# Patient Record
Sex: FEMALE | Race: BLACK OR AFRICAN AMERICAN | HISPANIC OR LATINO | Employment: UNEMPLOYED | URBAN - METROPOLITAN AREA
[De-identification: names, ages, dates, MRNs, and addresses within clinical notes are randomized per-mention and may not be internally consistent; named-entity substitution may affect disease eponyms.]

---

## 2017-01-12 ENCOUNTER — HOSPITAL ENCOUNTER (EMERGENCY)
Facility: HOSPITAL | Age: 27
Discharge: HOME | End: 2017-01-12
Attending: EMERGENCY MEDICINE | Admitting: EMERGENCY MEDICINE

## 2017-01-12 VITALS
OXYGEN SATURATION: 98 % | DIASTOLIC BLOOD PRESSURE: 77 MMHG | WEIGHT: 125.66 LBS | SYSTOLIC BLOOD PRESSURE: 117 MMHG | HEART RATE: 104 BPM | TEMPERATURE: 99.3 F | RESPIRATION RATE: 16 BRPM

## 2017-01-12 DIAGNOSIS — K11.20 SIALADENITIS: Primary | ICD-10-CM

## 2017-01-12 PROCEDURE — 99283 EMERGENCY DEPT VISIT LOW MDM: CPT

## 2017-06-16 ENCOUNTER — ALLSCRIPTS OFFICE VISIT (OUTPATIENT)
Dept: OTHER | Facility: OTHER | Age: 27
End: 2017-06-16

## 2017-06-19 ENCOUNTER — ALLSCRIPTS OFFICE VISIT (OUTPATIENT)
Dept: OTHER | Facility: OTHER | Age: 27
End: 2017-06-19

## 2017-06-27 ENCOUNTER — ALLSCRIPTS OFFICE VISIT (OUTPATIENT)
Dept: OTHER | Facility: OTHER | Age: 27
End: 2017-06-27

## 2017-08-04 ENCOUNTER — GENERIC CONVERSION - ENCOUNTER (OUTPATIENT)
Dept: OTHER | Facility: OTHER | Age: 27
End: 2017-08-04

## 2017-08-04 DIAGNOSIS — Z34.92 ENCOUNTER FOR SUPERVISION OF NORMAL PREGNANCY IN SECOND TRIMESTER: ICD-10-CM

## 2017-08-08 ENCOUNTER — APPOINTMENT (OUTPATIENT)
Dept: LAB | Facility: HOSPITAL | Age: 27
End: 2017-08-08

## 2017-08-08 DIAGNOSIS — Z34.92 ENCOUNTER FOR SUPERVISION OF NORMAL PREGNANCY IN SECOND TRIMESTER: ICD-10-CM

## 2017-08-08 LAB
ABO GROUP BLD: NORMAL
BACTERIA UR QL AUTO: ABNORMAL /HPF
BASOPHILS # BLD AUTO: 0.01 THOUSANDS/ΜL (ref 0–0.1)
BASOPHILS NFR BLD AUTO: 0 % (ref 0–1)
BILIRUB UR QL STRIP: NEGATIVE
BLD GP AB SCN SERPL QL: NEGATIVE
CLARITY UR: ABNORMAL
COLOR UR: YELLOW
EOSINOPHIL # BLD AUTO: 0.07 THOUSAND/ΜL (ref 0–0.61)
EOSINOPHIL NFR BLD AUTO: 1 % (ref 0–6)
ERYTHROCYTE [DISTWIDTH] IN BLOOD BY AUTOMATED COUNT: 15.9 % (ref 11.6–15.1)
GLUCOSE UR STRIP-MCNC: NEGATIVE MG/DL
HCT VFR BLD AUTO: 28.7 % (ref 34.8–46.1)
HGB BLD-MCNC: 8.8 G/DL (ref 11.5–15.4)
HGB UR QL STRIP.AUTO: NEGATIVE
KETONES UR STRIP-MCNC: NEGATIVE MG/DL
LEUKOCYTE ESTERASE UR QL STRIP: ABNORMAL
LYMPHOCYTES # BLD AUTO: 1.21 THOUSANDS/ΜL (ref 0.6–4.47)
LYMPHOCYTES NFR BLD AUTO: 13 % (ref 14–44)
MCH RBC QN AUTO: 23.1 PG (ref 26.8–34.3)
MCHC RBC AUTO-ENTMCNC: 30.7 G/DL (ref 31.4–37.4)
MCV RBC AUTO: 75 FL (ref 82–98)
MONOCYTES # BLD AUTO: 0.52 THOUSAND/ΜL (ref 0.17–1.22)
MONOCYTES NFR BLD AUTO: 6 % (ref 4–12)
NEUTROPHILS # BLD AUTO: 7.29 THOUSANDS/ΜL (ref 1.85–7.62)
NEUTS SEG NFR BLD AUTO: 80 % (ref 43–75)
NITRITE UR QL STRIP: POSITIVE
NON-SQ EPI CELLS URNS QL MICRO: ABNORMAL /HPF
NRBC BLD AUTO-RTO: 0 /100 WBCS
PH UR STRIP.AUTO: 6.5 [PH] (ref 4.5–8)
PLATELET # BLD AUTO: 247 THOUSANDS/UL (ref 149–390)
PMV BLD AUTO: 10.4 FL (ref 8.9–12.7)
PROT UR STRIP-MCNC: NEGATIVE MG/DL
RBC # BLD AUTO: 3.81 MILLION/UL (ref 3.81–5.12)
RBC #/AREA URNS AUTO: ABNORMAL /HPF
RH BLD: POSITIVE
SP GR UR STRIP.AUTO: 1.02 (ref 1–1.03)
SPECIMEN EXPIRATION DATE: NORMAL
UROBILINOGEN UR QL STRIP.AUTO: 0.2 E.U./DL
WBC # BLD AUTO: 9.1 THOUSAND/UL (ref 4.31–10.16)
WBC #/AREA URNS AUTO: ABNORMAL /HPF

## 2017-08-08 PROCEDURE — 80081 OBSTETRIC PANEL INC HIV TSTG: CPT

## 2017-08-08 PROCEDURE — 87077 CULTURE AEROBIC IDENTIFY: CPT

## 2017-08-08 PROCEDURE — 87086 URINE CULTURE/COLONY COUNT: CPT

## 2017-08-08 PROCEDURE — 36415 COLL VENOUS BLD VENIPUNCTURE: CPT

## 2017-08-08 PROCEDURE — 81001 URINALYSIS AUTO W/SCOPE: CPT

## 2017-08-08 PROCEDURE — 87186 SC STD MICRODIL/AGAR DIL: CPT

## 2017-08-09 ENCOUNTER — GENERIC CONVERSION - ENCOUNTER (OUTPATIENT)
Dept: OTHER | Facility: OTHER | Age: 27
End: 2017-08-09

## 2017-08-09 ENCOUNTER — ALLSCRIPTS OFFICE VISIT (OUTPATIENT)
Dept: PERINATAL CARE | Facility: OTHER | Age: 27
End: 2017-08-09

## 2017-08-09 LAB
HBV SURFACE AG SER QL: NORMAL
HIV 1+2 AB+HIV1 P24 AG SERPL QL IA: NORMAL
RPR SER QL: NORMAL
RUBV IGG SERPL IA-ACNC: 21.8 IU/ML

## 2017-08-09 PROCEDURE — 76811 OB US DETAILED SNGL FETUS: CPT | Performed by: OBSTETRICS & GYNECOLOGY

## 2017-08-09 PROCEDURE — 76817 TRANSVAGINAL US OBSTETRIC: CPT | Performed by: OBSTETRICS & GYNECOLOGY

## 2017-08-10 ENCOUNTER — GENERIC CONVERSION - ENCOUNTER (OUTPATIENT)
Dept: OTHER | Facility: OTHER | Age: 27
End: 2017-08-10

## 2017-08-10 LAB — BACTERIA UR CULT: NORMAL

## 2017-08-15 ENCOUNTER — GENERIC CONVERSION - ENCOUNTER (OUTPATIENT)
Dept: OTHER | Facility: OTHER | Age: 27
End: 2017-08-15

## 2017-09-14 ENCOUNTER — GENERIC CONVERSION - ENCOUNTER (OUTPATIENT)
Dept: OTHER | Facility: OTHER | Age: 27
End: 2017-09-14

## 2017-09-14 ENCOUNTER — APPOINTMENT (OUTPATIENT)
Dept: LAB | Facility: HOSPITAL | Age: 27
End: 2017-09-14

## 2017-09-14 ENCOUNTER — ALLSCRIPTS OFFICE VISIT (OUTPATIENT)
Dept: OTHER | Facility: OTHER | Age: 27
End: 2017-09-14

## 2017-09-14 DIAGNOSIS — O09.93 SUPERVISION OF HIGH RISK PREGNANCY IN THIRD TRIMESTER: ICD-10-CM

## 2017-09-14 DIAGNOSIS — O99.019 ANEMIA COMPLICATING PREGNANCY: ICD-10-CM

## 2017-09-14 DIAGNOSIS — Z13.1 ENCOUNTER FOR SCREENING FOR DIABETES MELLITUS: ICD-10-CM

## 2017-09-14 DIAGNOSIS — R82.71 BACTERIURIA: ICD-10-CM

## 2017-09-14 LAB
BASOPHILS # BLD AUTO: 0.02 THOUSANDS/ΜL (ref 0–0.1)
BASOPHILS NFR BLD AUTO: 0 % (ref 0–1)
EOSINOPHIL # BLD AUTO: 0.12 THOUSAND/ΜL (ref 0–0.61)
EOSINOPHIL NFR BLD AUTO: 1 % (ref 0–6)
ERYTHROCYTE [DISTWIDTH] IN BLOOD BY AUTOMATED COUNT: 17 % (ref 11.6–15.1)
FERRITIN SERPL-MCNC: 5 NG/ML (ref 8–388)
GLUCOSE (HISTORICAL): NORMAL
GLUCOSE 1H P 50 G GLC PO SERPL-MCNC: 124 MG/DL
HCT VFR BLD AUTO: 30.7 % (ref 34.8–46.1)
HGB BLD-MCNC: 9.4 G/DL (ref 11.5–15.4)
HGB UR QL STRIP.AUTO: NORMAL
KETONES UR STRIP-MCNC: NORMAL MG/DL
LEUKOCYTE ESTERASE UR QL STRIP: NORMAL
LYMPHOCYTES # BLD AUTO: 1.52 THOUSANDS/ΜL (ref 0.6–4.47)
LYMPHOCYTES NFR BLD AUTO: 16 % (ref 14–44)
MCH RBC QN AUTO: 23.3 PG (ref 26.8–34.3)
MCHC RBC AUTO-ENTMCNC: 30.6 G/DL (ref 31.4–37.4)
MCV RBC AUTO: 76 FL (ref 82–98)
MONOCYTES # BLD AUTO: 0.55 THOUSAND/ΜL (ref 0.17–1.22)
MONOCYTES NFR BLD AUTO: 6 % (ref 4–12)
NEUTROPHILS # BLD AUTO: 7.29 THOUSANDS/ΜL (ref 1.85–7.62)
NEUTS SEG NFR BLD AUTO: 77 % (ref 43–75)
NITRITE UR QL STRIP: NORMAL
NRBC BLD AUTO-RTO: 0 /100 WBCS
PLATELET # BLD AUTO: 206 THOUSANDS/UL (ref 149–390)
PMV BLD AUTO: 10.1 FL (ref 8.9–12.7)
PROT UR STRIP-MCNC: NORMAL MG/DL
RBC # BLD AUTO: 4.03 MILLION/UL (ref 3.81–5.12)
WBC # BLD AUTO: 9.5 THOUSAND/UL (ref 4.31–10.16)

## 2017-09-14 PROCEDURE — 86592 SYPHILIS TEST NON-TREP QUAL: CPT

## 2017-09-14 PROCEDURE — 36415 COLL VENOUS BLD VENIPUNCTURE: CPT

## 2017-09-14 PROCEDURE — 82950 GLUCOSE TEST: CPT

## 2017-09-14 PROCEDURE — G0145 SCR C/V CYTO,THINLAYER,RESCR: HCPCS | Performed by: OBSTETRICS & GYNECOLOGY

## 2017-09-14 PROCEDURE — 82728 ASSAY OF FERRITIN: CPT

## 2017-09-14 PROCEDURE — 85025 COMPLETE CBC W/AUTO DIFF WBC: CPT

## 2017-09-15 ENCOUNTER — LAB REQUISITION (OUTPATIENT)
Dept: LAB | Facility: HOSPITAL | Age: 27
End: 2017-09-15

## 2017-09-15 ENCOUNTER — GENERIC CONVERSION - ENCOUNTER (OUTPATIENT)
Dept: OTHER | Facility: OTHER | Age: 27
End: 2017-09-15

## 2017-09-15 DIAGNOSIS — Z11.3 ENCOUNTER FOR SCREENING FOR INFECTIONS WITH PREDOMINANTLY SEXUAL MODE OF TRANSMISSION: ICD-10-CM

## 2017-09-15 DIAGNOSIS — Z12.4 ENCOUNTER FOR SCREENING FOR MALIGNANT NEOPLASM OF CERVIX: ICD-10-CM

## 2017-09-15 DIAGNOSIS — O26.893 OTHER SPECIFIED PREGNANCY RELATED CONDITIONS, THIRD TRIMESTER: ICD-10-CM

## 2017-09-15 LAB
CHLAMYDIA DNA CVX QL NAA+PROBE: NORMAL
N GONORRHOEA DNA GENITAL QL NAA+PROBE: NORMAL
RPR SER QL: NORMAL

## 2017-09-15 PROCEDURE — 87147 CULTURE TYPE IMMUNOLOGIC: CPT | Performed by: OBSTETRICS & GYNECOLOGY

## 2017-09-15 PROCEDURE — 87086 URINE CULTURE/COLONY COUNT: CPT | Performed by: OBSTETRICS & GYNECOLOGY

## 2017-09-15 PROCEDURE — 87491 CHLMYD TRACH DNA AMP PROBE: CPT | Performed by: OBSTETRICS & GYNECOLOGY

## 2017-09-15 PROCEDURE — 87591 N.GONORRHOEAE DNA AMP PROB: CPT | Performed by: OBSTETRICS & GYNECOLOGY

## 2017-09-16 LAB
BACTERIA UR CULT: NORMAL
BACTERIA UR CULT: NORMAL

## 2017-09-21 LAB
LAB AP GYN PRIMARY INTERPRETATION: NORMAL
Lab: NORMAL
PATH INTERP SPEC-IMP: NORMAL

## 2017-09-25 ENCOUNTER — GENERIC CONVERSION - ENCOUNTER (OUTPATIENT)
Dept: OTHER | Facility: OTHER | Age: 27
End: 2017-09-25

## 2017-09-28 ENCOUNTER — ALLSCRIPTS OFFICE VISIT (OUTPATIENT)
Dept: OTHER | Facility: OTHER | Age: 27
End: 2017-09-28

## 2017-09-28 LAB
GLUCOSE (HISTORICAL): NORMAL
HGB UR QL STRIP.AUTO: NORMAL
NITRITE UR QL STRIP: POSITIVE
PROT UR STRIP-MCNC: NORMAL MG/DL

## 2017-10-18 ENCOUNTER — GENERIC CONVERSION - ENCOUNTER (OUTPATIENT)
Dept: OTHER | Facility: OTHER | Age: 27
End: 2017-10-18

## 2017-11-14 ENCOUNTER — HOSPITAL ENCOUNTER (OUTPATIENT)
Facility: HOSPITAL | Age: 27
Discharge: HOME | End: 2017-11-14
Attending: OBSTETRICS & GYNECOLOGY | Admitting: OBSTETRICS & GYNECOLOGY

## 2017-11-14 VITALS
TEMPERATURE: 98.2 F | OXYGEN SATURATION: 99 % | WEIGHT: 142 LBS | RESPIRATION RATE: 16 BRPM | HEIGHT: 60 IN | HEART RATE: 97 BPM | DIASTOLIC BLOOD PRESSURE: 62 MMHG | SYSTOLIC BLOOD PRESSURE: 113 MMHG | BODY MASS INDEX: 27.88 KG/M2

## 2017-11-14 DIAGNOSIS — Z3A.36 36 WEEKS GESTATION OF PREGNANCY: ICD-10-CM

## 2017-11-14 DIAGNOSIS — O26.899 ABDOMINAL PAIN AFFECTING PREGNANCY, ANTEPARTUM: ICD-10-CM

## 2017-11-14 DIAGNOSIS — R10.9 ABDOMINAL PAIN AFFECTING PREGNANCY, ANTEPARTUM: ICD-10-CM

## 2017-11-14 PROCEDURE — 99202 OFFICE O/P NEW SF 15 MIN: CPT

## 2017-11-14 PROCEDURE — 87653 STREP B DNA AMP PROBE: CPT | Performed by: OBSTETRICS & GYNECOLOGY

## 2017-11-14 RX ORDER — DIPHENOXYLATE HYDROCHLORIDE AND ATROPINE SULFATE 2.5; .025 MG/1; MG/1
1 TABLET ORAL DAILY
Status: ON HOLD | COMMUNITY
End: 2018-11-05 | Stop reason: ALTCHOICE

## 2017-11-15 ENCOUNTER — ALLSCRIPTS OFFICE VISIT (OUTPATIENT)
Dept: OTHER | Facility: OTHER | Age: 27
End: 2017-11-15

## 2017-11-15 LAB
GLUCOSE (HISTORICAL): NORMAL
PROT UR STRIP-MCNC: NORMAL MG/DL

## 2017-11-15 NOTE — PROGRESS NOTES
Progress Note - OB/GYN   Michael Escalante 32 y o  female MRN: [id number]  Unit/Bed#: L&D 323-01 Encounter: [id number]    Assessment:  27yo  @ 36wga here not in  labor  Plan:  D/C home with labor precautions  PO hydration  Tylenol PRN  F/u GBS culture  Routine office appointment tomorrow  D/W Dr Bib Madison  Subjective/Objective   Chief Complaint: contractions    Subjective:   -contractions have now resolved - were every 2minutes duration of 1minute, now unable to quantify frequency  -denies LOF, VB  -feels baby moving    Pertinent hx:  -GBS unknown - was collected  -poor prenatal care - patient states secondary to transportation & children    Objective:   -SVE fingertip/thick/high  -'sbpm reactive  -[place] quiet, mild irritability    Vitals: Blood pressure 113/62, pulse 97, temperature 98 2 °F (36 8 °C), temperature source Oral, resp  rate 16, height 5' (1 524 m), weight 64 4 kg (142 lb), last menstrual period 2017, SpO2 99 %  ,Body mass index is 27 73 kg/m²      No intake or output data in the 24 hours ending 17 2107    Invasive Devices          No matching active lines, drains, or airways

## 2017-11-16 LAB — GP B STREP DNA SPEC QL NAA+PROBE: ABNORMAL

## 2017-11-22 ENCOUNTER — ALLSCRIPTS OFFICE VISIT (OUTPATIENT)
Dept: OTHER | Facility: OTHER | Age: 27
End: 2017-11-22

## 2017-11-22 DIAGNOSIS — O26.893 OTHER SPECIFIED PREGNANCY RELATED CONDITIONS, THIRD TRIMESTER: ICD-10-CM

## 2017-11-22 DIAGNOSIS — Z34.93 ENCOUNTER FOR SUPERVISION OF NORMAL PREGNANCY IN THIRD TRIMESTER: ICD-10-CM

## 2017-11-22 DIAGNOSIS — O99.019 ANEMIA COMPLICATING PREGNANCY: ICD-10-CM

## 2017-11-22 LAB
GLUCOSE (HISTORICAL): NORMAL
LEUKOCYTE ESTERASE UR QL STRIP: NORMAL
PROT UR STRIP-MCNC: NORMAL MG/DL

## 2017-11-27 ENCOUNTER — HOSPITAL ENCOUNTER (OUTPATIENT)
Facility: HOSPITAL | Age: 27
Discharge: HOME | End: 2017-11-27
Attending: OBSTETRICS & GYNECOLOGY | Admitting: OBSTETRICS & GYNECOLOGY

## 2017-11-27 VITALS
SYSTOLIC BLOOD PRESSURE: 109 MMHG | RESPIRATION RATE: 18 BRPM | HEART RATE: 112 BPM | OXYGEN SATURATION: 99 % | TEMPERATURE: 98.1 F | DIASTOLIC BLOOD PRESSURE: 62 MMHG

## 2017-11-27 PROCEDURE — 99203 OFFICE O/P NEW LOW 30 MIN: CPT

## 2017-11-27 NOTE — PROGRESS NOTES
L&D Triage Note - OB/GYN  Daniel Cervantes 32 y o  female MRN: [id number]  Unit/Bed#: L&D 323-01 Encounter: [id number]      SUBJECTIVE:  Daniel Cervantes 32 y o   at 41w10d with an Estimated Date of Delivery: 17 with increasingly strong contractions  She has not felt this intense pressure in her prior 3 pregnancies  Came in because she was concerned  Denies any vaginal bleeding, LOF and endorses positive FM  Her current obstetrical history is significant for GBS positive status, 3 prior  deliveries        Contractions: present, q5-10min  Leakage of fluid: none  Vaginal Bleeding: none  Fetal movement: present    OBJECTIVE:  Vitals:    17 1628   BP:    Pulse:    Resp: 20   Temp: 97 8 °F (36 6 °C)   SpO2:      SVE: 1 / thick / high    FHT:  13 / Moderate 6 - 25 bpm / reactive accelerations, no decelerations  [place]: [id number], irritability and irregular    ASSESSMENT:  Daniel Cervantes is a 32 y o   at 37w6d not in labor    PLAN:  1) Rule out labor workup negative   - Patient previously fingertip/thick/high in past clinic visit   - Now 1cm/thick/high   - No regular contraction pattern observed, NST reactive, scant contractions  2) Continue routine prenatal care  3) Discharge from [facility] triage with term labor precautions   - Case discussed with Dr Celestine Zarate DO  PGY-1 OB/GYN Resident   2017 5:39 PM

## 2017-11-27 NOTE — DISCHARGE INSTRUCTIONS
Pregnancy at [address] to [address]:   You are considered full term at the beginning of 37 weeks  Your breathing may be easier if your baby has moved down into a head-down position  You may need to urinate more often because the baby may be pressing on your bladder  You may also feel more discomfort and get tired easily  DISCHARGE INSTRUCTIONS:   Seek care immediately if:   · You develop a severe headache that does not go away  · You have new or increased vision changes, such as blurred or spotted vision  · You have new or increased swelling in your face or hands  · You have vaginal spotting or bleeding  · Your water broke or you feel warm water gushing or trickling from your vagina  Contact your healthcare provider if:   · You have more than 5 contractions in 1 hour  · You notice any changes in your baby's movements  · You have abdominal cramps, pressure, or tightening  · You have a change in vaginal discharge  · You have chills or a fever  · You have vaginal itching, burning, or pain  · You have yellow, green, white, or foul-smelling vaginal discharge  · You have pain or burning when you urinate, less urine than usual, or pink or bloody urine  · You have questions or concerns about your condition or care  How to care for yourself at this stage of your pregnancy:   · Eat a variety of healthy foods  Healthy foods include fruits, vegetables, whole-grain breads, low-fat dairy foods, beans, lean meats, and fish  Drink liquids as directed  Ask how much liquid to drink each day and which liquids are best for you  Limit caffeine to less than 200 milligrams each day  Limit your intake of fish to 2 servings each week  Choose fish low in mercury such as canned light tuna, shrimp, salmon, cod, or tilapia  Do not  eat fish high in mercury such as swordfish, tilefish, will mackerel, and shark  · Take prenatal vitamins as directed    Your need for certain vitamins and minerals, such as folic acid, increases during pregnancy  Prenatal vitamins provide some of the extra vitamins and minerals you need  Prenatal vitamins may also help to decrease the risk of certain birth defects  · Rest as needed  Put your feet up if you have swelling in your ankles and feet  · Do not smoke  If you smoke, it is never too late to quit  Smoking increases your risk of a miscarriage and other health problems during your pregnancy  Smoking can cause your baby to be born too early or weigh less at birth  Ask your healthcare provider for information if you need help quitting  · Do not drink alcohol  Alcohol passes from your body to your baby through the placenta  It can affect your baby's brain development and cause fetal alcohol syndrome (FAS)  FAS is a group of conditions that causes mental, behavior, and growth problems  · Talk to your healthcare provider before you take any medicines  Many medicines may harm your baby if you take them when you are pregnant  Do not take any medicines, vitamins, herbs, or supplements without first talking to your healthcare provider  Never use illegal or street drugs (such as marijuana or cocaine) while you are pregnant  · Talk to your healthcare provider before you travel  You may not be able to travel in an airplane after 36 weeks  He may also recommend that you avoid long road trips  Safety tips:   · Avoid hot tubs and saunas  Do not use a hot tub or sauna while you are pregnant, especially during your first trimester  Hot tubs and saunas may raise your baby's temperature and increase the risk of birth defects  · Avoid toxoplasmosis  This is an infection caused by eating raw meat or being around infected cat feces  It can cause birth defects, miscarriages, and other problems  Wash your hands after you touch raw meat  Make sure any meat is well-cooked before you eat it  Avoid raw eggs and unpasteurized milk   Use gloves or ask someone else to clean your cat's litter box while you are pregnant  · Ask your healthcare provider about travel  The most comfortable time to travel is during the second trimester  Ask your healthcare provider if you can travel after 36 weeks  You may not be able to travel in an airplane after 36 weeks  He may also recommend that you avoid long road trips  Changes that are happening with your baby:  By 38 weeks, your baby may weigh between 6 and 9 pounds  Your baby may be about 14 inches long from the top of the head to the rump (baby's bottom)  Your baby hears well enough to know your voice  As your baby gets larger, you may feel fewer kicks and more stretching and rolling  Your baby may move into a head-down position  Your baby will also rest lower in your abdomen  What you need to know about prenatal care: Your healthcare provider will check your blood pressure and weight  You may also need the following:  · A urine test  may also be done to check for sugar and protein  These can be signs of gestational diabetes or infection  Protein in your urine may also be a sign of preeclampsia  Preeclampsia is a condition that can develop during week 20 or later of your pregnancy  It causes high blood pressure, and it can cause problems with your kidneys and other organs  · A blood test  may be done to check for anemia (low iron level)  · A Tdap vaccine  may be recommended by your healthcare provider  · A group B strep test  is a test that is done to check for group B strep infection  Group B strep is a type of bacteria that may be found in the vagina or rectum  It can be passed to your baby during delivery if you have it  Your healthcare provider will take swab your vagina or rectum and send the sample to the lab for tests  · Fundal height  is a measurement of your uterus to check your baby's growth  This number is usually the same as the number of weeks that you have been pregnant   Your healthcare provider may also check your baby's position  · Your baby's heart rate  will be checked  © 2017 [address] [employer] Information is for End User's use only and may not be sold, redistributed or otherwise used for commercial purposes  All illustrations and images included in CareNotes® are the copyrighted property of A D A M [employer] Inc  or Edgar Castro  The above information is an  only  It is not intended as medical advice for individual conditions or treatments  Talk to your doctor, nurse or pharmacist before following any medical regimen to see if it is safe and effective for you

## 2017-11-29 ENCOUNTER — GENERIC CONVERSION - ENCOUNTER (OUTPATIENT)
Dept: OTHER | Facility: OTHER | Age: 27
End: 2017-11-29

## 2017-11-29 ENCOUNTER — APPOINTMENT (OUTPATIENT)
Dept: LAB | Facility: HOSPITAL | Age: 27
End: 2017-11-29

## 2017-11-29 DIAGNOSIS — Z34.93 ENCOUNTER FOR SUPERVISION OF NORMAL PREGNANCY IN THIRD TRIMESTER: ICD-10-CM

## 2017-11-29 DIAGNOSIS — O99.019 ANEMIA COMPLICATING PREGNANCY: ICD-10-CM

## 2017-11-29 LAB
ANISOCYTOSIS BLD QL SMEAR: PRESENT
BASOPHILS # BLD MANUAL: 0 THOUSAND/UL (ref 0–0.1)
BASOPHILS NFR MAR MANUAL: 0 % (ref 0–1)
CLARITY UR: NORMAL
COLOR UR: YELLOW
EOSINOPHIL # BLD MANUAL: 0 THOUSAND/UL (ref 0–0.4)
EOSINOPHIL NFR BLD MANUAL: 0 % (ref 0–6)
ERYTHROCYTE [DISTWIDTH] IN BLOOD BY AUTOMATED COUNT: 16.9 % (ref 11.6–15.1)
GLUCOSE (HISTORICAL): NORMAL
HCT VFR BLD AUTO: 29 % (ref 34.8–46.1)
HGB BLD-MCNC: 8.6 G/DL (ref 11.5–15.4)
HGB UR QL STRIP.AUTO: NORMAL
KETONES UR STRIP-MCNC: NORMAL MG/DL
LEUKOCYTE ESTERASE UR QL STRIP: NORMAL
LYMPHOCYTES # BLD AUTO: 0.19 THOUSAND/UL (ref 0.6–4.47)
LYMPHOCYTES # BLD AUTO: 2 % (ref 14–44)
MCH RBC QN AUTO: 21.8 PG (ref 26.8–34.3)
MCHC RBC AUTO-ENTMCNC: 29.7 G/DL (ref 31.4–37.4)
MCV RBC AUTO: 74 FL (ref 82–98)
MONOCYTES # BLD AUTO: 0.58 THOUSAND/UL (ref 0–1.22)
MONOCYTES NFR BLD: 6 % (ref 4–12)
NEUTROPHILS # BLD MANUAL: 8.94 THOUSAND/UL (ref 1.85–7.62)
NEUTS BAND NFR BLD MANUAL: 31 % (ref 0–8)
NEUTS SEG NFR BLD AUTO: 61 % (ref 43–75)
NITRITE UR QL STRIP: NORMAL
PLATELET # BLD AUTO: 195 THOUSANDS/UL (ref 149–390)
PLATELET BLD QL SMEAR: ADEQUATE
PMV BLD AUTO: 11 FL (ref 8.9–12.7)
POLYCHROMASIA BLD QL SMEAR: PRESENT
PROT UR STRIP-MCNC: NORMAL MG/DL
RBC # BLD AUTO: 3.94 MILLION/UL (ref 3.81–5.12)
TOTAL CELLS COUNTED SPEC: 100
WBC # BLD AUTO: 9.72 THOUSAND/UL (ref 4.31–10.16)

## 2017-11-29 PROCEDURE — 86592 SYPHILIS TEST NON-TREP QUAL: CPT

## 2017-11-29 PROCEDURE — 85027 COMPLETE CBC AUTOMATED: CPT

## 2017-11-29 PROCEDURE — 36415 COLL VENOUS BLD VENIPUNCTURE: CPT

## 2017-11-29 PROCEDURE — 85007 BL SMEAR W/DIFF WBC COUNT: CPT

## 2017-11-30 ENCOUNTER — APPOINTMENT (OUTPATIENT)
Dept: LAB | Facility: HOSPITAL | Age: 27
End: 2017-11-30

## 2017-11-30 DIAGNOSIS — O26.893 OTHER SPECIFIED PREGNANCY RELATED CONDITIONS, THIRD TRIMESTER: ICD-10-CM

## 2017-11-30 LAB — RPR SER QL: NORMAL

## 2017-11-30 PROCEDURE — 87186 SC STD MICRODIL/AGAR DIL: CPT

## 2017-11-30 PROCEDURE — 87086 URINE CULTURE/COLONY COUNT: CPT

## 2017-11-30 PROCEDURE — 87077 CULTURE AEROBIC IDENTIFY: CPT

## 2017-11-30 PROCEDURE — 87147 CULTURE TYPE IMMUNOLOGIC: CPT

## 2017-12-02 LAB
BACTERIA UR CULT: ABNORMAL
BACTERIA UR CULT: ABNORMAL

## 2017-12-05 ENCOUNTER — GENERIC CONVERSION - ENCOUNTER (OUTPATIENT)
Dept: OTHER | Facility: OTHER | Age: 27
End: 2017-12-05

## 2017-12-06 ENCOUNTER — ALLSCRIPTS OFFICE VISIT (OUTPATIENT)
Dept: OTHER | Facility: OTHER | Age: 27
End: 2017-12-06

## 2017-12-06 LAB
BILIRUB UR QL STRIP: NEGATIVE
CLARITY UR: NORMAL
COLOR UR: YELLOW
GLUCOSE (HISTORICAL): NORMAL
HGB UR QL STRIP.AUTO: NORMAL
KETONES UR STRIP-MCNC: NORMAL MG/DL
LEUKOCYTE ESTERASE UR QL STRIP: NORMAL
NITRITE UR QL STRIP: POSITIVE
PH UR STRIP.AUTO: 7 [PH]
PROT UR STRIP-MCNC: NORMAL MG/DL
SP GR UR STRIP.AUTO: 1.01
UROBILINOGEN UR QL STRIP.AUTO: 0.2

## 2017-12-11 ENCOUNTER — HOSPITAL ENCOUNTER (INPATIENT)
Facility: HOSPITAL | Age: 27
LOS: 2 days | Discharge: HOME | End: 2017-12-13
Attending: OBSTETRICS & GYNECOLOGY | Admitting: OBSTETRICS & GYNECOLOGY

## 2017-12-11 DIAGNOSIS — Z3A.39 39 WEEKS GESTATION OF PREGNANCY: Primary | ICD-10-CM

## 2017-12-11 PROBLEM — R82.71 BACTERIURIA: Status: ACTIVE | Noted: 2017-09-22

## 2017-12-11 PROBLEM — O26.893 DYSURIA DURING PREGNANCY IN THIRD TRIMESTER: Status: ACTIVE | Noted: 2017-09-14

## 2017-12-11 PROBLEM — R30.0 DYSURIA DURING PREGNANCY IN THIRD TRIMESTER: Status: ACTIVE | Noted: 2017-09-14

## 2017-12-11 PROBLEM — O99.019 ANEMIA DURING PREGNANCY: Status: ACTIVE | Noted: 2017-09-14

## 2017-12-11 LAB
ABO GROUP BLD: NORMAL
BASE EXCESS BLDCOA CALC-SCNC: -7.7 MMOL/L (ref 3–11)
BASE EXCESS BLDCOV CALC-SCNC: -4.5 MMOL/L (ref 1–9)
BASOPHILS # BLD AUTO: 0.01 THOUSANDS/ΜL (ref 0–0.1)
BASOPHILS NFR BLD AUTO: 0 % (ref 0–1)
BLD GP AB SCN SERPL QL: NEGATIVE
EOSINOPHIL # BLD AUTO: 0.04 THOUSAND/ΜL (ref 0–0.61)
EOSINOPHIL NFR BLD AUTO: 0 % (ref 0–6)
ERYTHROCYTE [DISTWIDTH] IN BLOOD BY AUTOMATED COUNT: 16.9 % (ref 11.6–15.1)
HCO3 BLDCOA-SCNC: 18.7 MMOL/L (ref 17.3–27.3)
HCO3 BLDCOV-SCNC: 19.9 MMOL/L (ref 12.2–28.6)
HCT VFR BLD AUTO: 27.6 % (ref 34.8–46.1)
HGB BLD-MCNC: 8.3 G/DL (ref 11.5–15.4)
LYMPHOCYTES # BLD AUTO: 1.59 THOUSANDS/ΜL (ref 0.6–4.47)
LYMPHOCYTES NFR BLD AUTO: 14 % (ref 14–44)
MCH RBC QN AUTO: 21.9 PG (ref 26.8–34.3)
MCHC RBC AUTO-ENTMCNC: 30.1 G/DL (ref 31.4–37.4)
MCV RBC AUTO: 73 FL (ref 82–98)
MONOCYTES # BLD AUTO: 0.85 THOUSAND/ΜL (ref 0.17–1.22)
MONOCYTES NFR BLD AUTO: 7 % (ref 4–12)
NEUTROPHILS # BLD AUTO: 9.16 THOUSANDS/ΜL (ref 1.85–7.62)
NEUTS SEG NFR BLD AUTO: 79 % (ref 43–75)
NRBC BLD AUTO-RTO: 0 /100 WBCS
O2 CT VFR BLDCOA CALC: 13.2 ML/DL
OXYHGB MFR BLDCOA: 67.8 %
OXYHGB MFR BLDCOV: 83.3 %
PCO2 BLDCOA: 41.1 MM[HG] (ref 30–60)
PCO2 BLDCOV: 34.8 MM HG (ref 27–43)
PH BLDCOA: 7.28 [PH] (ref 7.23–7.43)
PH BLDCOV: 7.38 [PH] (ref 7.19–7.49)
PLATELET # BLD AUTO: 186 THOUSANDS/UL (ref 149–390)
PMV BLD AUTO: 11.2 FL (ref 8.9–12.7)
PO2 BLDCOA: 29.4 MM HG (ref 5–25)
PO2 BLDCOV: 37.6 MM HG (ref 15–45)
RBC # BLD AUTO: 3.79 MILLION/UL (ref 3.81–5.12)
RH BLD: POSITIVE
RPR SER QL: NORMAL
SAO2 % BLDCOV: 15.9 ML/DL
SPECIMEN EXPIRATION DATE: NORMAL
WBC # BLD AUTO: 11.65 THOUSAND/UL (ref 4.31–10.16)

## 2017-12-11 PROCEDURE — 86850 RBC ANTIBODY SCREEN: CPT | Performed by: OBSTETRICS & GYNECOLOGY

## 2017-12-11 PROCEDURE — 99213 OFFICE O/P EST LOW 20 MIN: CPT

## 2017-12-11 PROCEDURE — 82805 BLOOD GASES W/O2 SATURATION: CPT | Performed by: OBSTETRICS & GYNECOLOGY

## 2017-12-11 PROCEDURE — 85025 COMPLETE CBC W/AUTO DIFF WBC: CPT | Performed by: OBSTETRICS & GYNECOLOGY

## 2017-12-11 PROCEDURE — 86900 BLOOD TYPING SEROLOGIC ABO: CPT | Performed by: OBSTETRICS & GYNECOLOGY

## 2017-12-11 PROCEDURE — 86592 SYPHILIS TEST NON-TREP QUAL: CPT | Performed by: OBSTETRICS & GYNECOLOGY

## 2017-12-11 PROCEDURE — 86901 BLOOD TYPING SEROLOGIC RH(D): CPT | Performed by: OBSTETRICS & GYNECOLOGY

## 2017-12-11 RX ORDER — FERROUS SULFATE 325(65) MG
325 TABLET ORAL
Status: DISCONTINUED | OUTPATIENT
Start: 2017-12-11 | End: 2017-12-13 | Stop reason: HOSPADM

## 2017-12-11 RX ORDER — CEPHALEXIN 500 MG/1
500 CAPSULE ORAL EVERY 12 HOURS SCHEDULED
COMMUNITY
End: 2017-12-13 | Stop reason: HOSPADM

## 2017-12-11 RX ORDER — OXYTOCIN/RINGER'S LACTATE 30/500 ML
1-30 PLASTIC BAG, INJECTION (ML) INTRAVENOUS
Status: DISCONTINUED | OUTPATIENT
Start: 2017-12-11 | End: 2017-12-11

## 2017-12-11 RX ORDER — ONDANSETRON 2 MG/ML
4 INJECTION INTRAMUSCULAR; INTRAVENOUS EVERY 6 HOURS PRN
Status: DISCONTINUED | OUTPATIENT
Start: 2017-12-11 | End: 2017-12-11

## 2017-12-11 RX ORDER — FERROUS SULFATE 325(65) MG
1 TABLET ORAL 2 TIMES DAILY
Status: ON HOLD | COMMUNITY
Start: 2017-08-09 | End: 2018-08-07

## 2017-12-11 RX ORDER — ONDANSETRON 2 MG/ML
4 INJECTION INTRAMUSCULAR; INTRAVENOUS EVERY 8 HOURS PRN
Status: DISCONTINUED | OUTPATIENT
Start: 2017-12-11 | End: 2017-12-13 | Stop reason: HOSPADM

## 2017-12-11 RX ORDER — BUTORPHANOL TARTRATE 1 MG/ML
1 INJECTION, SOLUTION INTRAMUSCULAR; INTRAVENOUS ONCE
Status: COMPLETED | OUTPATIENT
Start: 2017-12-11 | End: 2017-12-11

## 2017-12-11 RX ORDER — ACETAMINOPHEN 325 MG/1
650 TABLET ORAL EVERY 6 HOURS PRN
Status: DISCONTINUED | OUTPATIENT
Start: 2017-12-11 | End: 2017-12-13 | Stop reason: HOSPADM

## 2017-12-11 RX ORDER — CALCIUM CARBONATE 200(500)MG
1000 TABLET,CHEWABLE ORAL DAILY PRN
Status: DISCONTINUED | OUTPATIENT
Start: 2017-12-11 | End: 2017-12-13 | Stop reason: HOSPADM

## 2017-12-11 RX ORDER — DIPHENHYDRAMINE HCL 25 MG
25 TABLET ORAL EVERY 6 HOURS PRN
Status: DISCONTINUED | OUTPATIENT
Start: 2017-12-11 | End: 2017-12-13 | Stop reason: HOSPADM

## 2017-12-11 RX ORDER — PROMETHAZINE HYDROCHLORIDE 25 MG/ML
12.5 INJECTION, SOLUTION INTRAMUSCULAR; INTRAVENOUS ONCE
Status: COMPLETED | OUTPATIENT
Start: 2017-12-11 | End: 2017-12-11

## 2017-12-11 RX ORDER — DOCUSATE SODIUM 100 MG/1
100 CAPSULE, LIQUID FILLED ORAL 2 TIMES DAILY
Status: DISCONTINUED | OUTPATIENT
Start: 2017-12-11 | End: 2017-12-13 | Stop reason: HOSPADM

## 2017-12-11 RX ORDER — SODIUM CHLORIDE, SODIUM LACTATE, POTASSIUM CHLORIDE, CALCIUM CHLORIDE 600; 310; 30; 20 MG/100ML; MG/100ML; MG/100ML; MG/100ML
125 INJECTION, SOLUTION INTRAVENOUS CONTINUOUS
Status: DISCONTINUED | OUTPATIENT
Start: 2017-12-11 | End: 2017-12-11

## 2017-12-11 RX ORDER — OXYCODONE HYDROCHLORIDE AND ACETAMINOPHEN 5; 325 MG/1; MG/1
1 TABLET ORAL EVERY 4 HOURS PRN
Status: DISCONTINUED | OUTPATIENT
Start: 2017-12-11 | End: 2017-12-13 | Stop reason: HOSPADM

## 2017-12-11 RX ORDER — DIAPER,BRIEF,INFANT-TODD,DISP
1 EACH MISCELLANEOUS AS NEEDED
Status: DISCONTINUED | OUTPATIENT
Start: 2017-12-11 | End: 2017-12-13 | Stop reason: HOSPADM

## 2017-12-11 RX ORDER — IBUPROFEN 600 MG/1
600 TABLET ORAL EVERY 6 HOURS PRN
Status: DISCONTINUED | OUTPATIENT
Start: 2017-12-11 | End: 2017-12-13 | Stop reason: HOSPADM

## 2017-12-11 RX ORDER — OXYTOCIN/RINGER'S LACTATE 30/500 ML
250 PLASTIC BAG, INJECTION (ML) INTRAVENOUS CONTINUOUS
Status: ACTIVE | OUTPATIENT
Start: 2017-12-11 | End: 2017-12-11

## 2017-12-11 RX ADMIN — SODIUM CHLORIDE 2.5 MILLION UNITS: 9 INJECTION, SOLUTION INTRAVENOUS at 06:24

## 2017-12-11 RX ADMIN — IBUPROFEN 600 MG: 600 TABLET, FILM COATED ORAL at 20:07

## 2017-12-11 RX ADMIN — FERROUS SULFATE TAB 325 MG (65 MG ELEMENTAL FE) 325 MG: 325 (65 FE) TAB at 14:08

## 2017-12-11 RX ADMIN — Medication 2 MILLI-UNITS/MIN: at 05:29

## 2017-12-11 RX ADMIN — BUTORPHANOL TARTRATE 1 MG: 1 INJECTION, SOLUTION INTRAMUSCULAR; INTRAVENOUS at 09:16

## 2017-12-11 RX ADMIN — PROMETHAZINE HYDROCHLORIDE 12.5 MG: 25 INJECTION INTRAMUSCULAR; INTRAVENOUS at 09:16

## 2017-12-11 RX ADMIN — SODIUM CHLORIDE 5 MILLION UNITS: 0.9 INJECTION, SOLUTION INTRAVENOUS at 02:27

## 2017-12-11 RX ADMIN — DOCUSATE SODIUM 100 MG: 100 CAPSULE, LIQUID FILLED ORAL at 14:08

## 2017-12-11 RX ADMIN — SODIUM CHLORIDE, SODIUM LACTATE, POTASSIUM CHLORIDE, AND CALCIUM CHLORIDE 125 ML/HR: .6; .31; .03; .02 INJECTION, SOLUTION INTRAVENOUS at 05:29

## 2017-12-11 RX ADMIN — IBUPROFEN 600 MG: 600 TABLET, FILM COATED ORAL at 14:08

## 2017-12-11 RX ADMIN — DOCUSATE SODIUM 100 MG: 100 CAPSULE, LIQUID FILLED ORAL at 18:06

## 2017-12-11 RX ADMIN — FERROUS SULFATE TAB 325 MG (65 MG ELEMENTAL FE) 325 MG: 325 (65 FE) TAB at 18:06

## 2017-12-11 RX ADMIN — SODIUM CHLORIDE 2.5 MILLION UNITS: 9 INJECTION, SOLUTION INTRAVENOUS at 11:09

## 2017-12-11 NOTE — H&P
H&P Exam - Obstetrics   Song Peters 32 y o  female MRN: [id number]  Unit/Bed#: L&D 321-01 Encounter: [id number]      History of Present Illness     Chief Complaint: SROM    HPI:  Song Peters is a 32 y o   female with an JIMMIE of 2017, by Patient Reported at 39w6d weeks gestation who is being admitted for spontaneous rupture of membrane  Contractions: present, 2-7 minutes  Loss of fluid: present  Vaginal bleeding: absent  Fetal movement: present    She is a [facility] patient  PREGNANCY COMPLICATIONS:   1) Anemia, Hg 9 4g/dL, MCV 76, ferritin 5 (17), not taking iron  2) GBS bacteriuria     OB History    Para Term  AB Living   4 3 3     3   SAB TAB Ectopic Multiple Live Births           3      # Outcome Date GA Lbr Nicho/2nd Weight Sex Delivery Anes PTL Lv   4 Current            3 Term 02/16/15 38w0d  3515 g (7 lb 12 oz) F Vag-Spont   MIO   2 Term 13 40w0d  3232 g (7 lb 2 oz) F Vag-Spont   MIO   1 Term 10/06/12 40w0d  3175 g (7 lb) M Vag-Spont   MIO          Baby complications/comments: None    Review of Systems   Constitutional: Negative  Respiratory: Negative  Cardiovascular: Negative  Historical Information   History reviewed  No pertinent past medical history  Past Surgical History:   Procedure Laterality Date    TONSILLECTOMY      TONSILLECTOMY       Social History   History   Alcohol Use No     History   Drug Use No     History   Smoking Status    Current Every Day Smoker    Packs/day: 1 00   Smokeless Tobacco    Not on file     Comment: 1 cigarette/ day     Family History: non-contributory    Meds/Allergies      Prescriptions Prior to Admission   Medication    cephalexin (KEFLEX) 500 mg capsule    ferrous sulfate 325 (65 Fe) mg tablet    multivitamin (THERAGRAN) TABS      No Known Allergies    OBJECTIVE:    Vitals: Blood pressure 129/74, pulse (!) 123, temperature 98 5 °F (36 9 °C), temperature source Oral, resp   rate 20, height 5' (1 524 m), weight 65 8 kg (145 lb), last menstrual period 01/11/2017, currently breastfeeding  Body mass index is 28 32 kg/m²  Physical Exam   Constitutional: She is oriented to person, place, and time  She appears well-developed and well-nourished  Cardiovascular: Normal rate, regular rhythm and normal heart sounds  Exam reveals no gallop and no friction rub  No murmur heard  Pulmonary/Chest: Effort normal and breath sounds normal  No respiratory distress  She has no wheezes  She has no rales  She exhibits no tenderness  Abdominal: Soft  Bowel sounds are normal  She exhibits no distension and no mass  There is no tenderness  There is no rebound and no guarding  Gravid   Neurological: She is alert and oriented to person, place, and time         Nitrazine: positive    Cervix:  Dilation: 3  Effacement (%): 50  Station: -2    Fetal heart rate:   Baseline: 140  Variability: moderate  Accelerations: 15x15, episodic  Decelerations: absent    [place]:   Every 2-7 minutes    EFW: 7lb    GBS: positive    Prenatal Labs:   Blood Type:   Lab Results   Component Value Date/Time    ABO Grouping A 08/08/2017 04:35 PM    ABO Grouping A 02/16/2015 04:42 AM     , D (Rh type):   Lab Results   Component Value Date/Time    Rh Factor Positive 08/08/2017 04:35 PM    Rh Factor Positive 02/16/2015 04:42 AM     , Antibody Screen:   Lab Results   Component Value Date/Time    Antibody Screen Negative 08/08/2017 04:35 PM    Antibody Screen Negative 02/16/2015 04:42 AM    , HCT/HGB:   Lab Results   Component Value Date/Time    Hematocrit 27 6 (L) 12/11/2017 02:24 AM    Hematocrit 27 1 (L) 02/16/2015 04:42 AM    Hemoglobin 8 3 (L) 12/11/2017 02:24 AM    Hemoglobin 8 0 (L) 02/16/2015 04:42 AM    Hemoglobin, Istat 11 9 07/27/2015 07:29 PM      , MCV:   Lab Results   Component Value Date/Time    MCV 73 (L) 12/11/2017 02:24 AM    MCV 71 (L) 02/16/2015 04:42 AM      , Platelets:   Lab Results   Component Value Date/Time    Platelets 366 2017 02:24 AM    Platelets 733 15/90/6743 04:42 AM      , 1 hour Glucola:   Lab Results   Component Value Date/Time    GLUCOSE 1 HR 50 GM GLUC CHALLENGE-PREG  2014 01:16 PM    Glucose 124 2017 06:10 PM   Rubella:   Lab Results   Component Value Date/Time    RUBELLA IGG QUANTITATION 18 0 2014 12:43 PM    Rubella IgG Quant 21 8 2017 04:35 PM        VDRL/RPR:   Lab Results   Component Value Date/Time    RPR SCREEN Non-Reactive (q 2015 04:42 AM    RPR Non-Reactive 2017 04:21 PM      , Urine Culture/Screen:   Lab Results   Component Value Date/Time    Urine Culture >100,000 cfu/ml Escherichia coli (A) 2017 02:18 PM    Urine Culture (A) 2017 02:18 PM     40,000-49,000 cfu/ml Beta Hemolytic Streptococcus Group B       Hep B:   Lab Results   Component Value Date/Time    HEPATITIS B SURFACE ANTIGEN Non-Reactive (q 2014 12:43 PM    Hepatitis B Surface Ag Non-reactive 2017 04:35 PM     , HIV:   Lab Results   Component Value Date/Time    HIV-1/2 AB-AG Non-Reactive (q 2014 12:43 PM    HIV-1/HIV-2 Ab Non-Reactive 2017 04:35 PM     , Gonorrhea:   Lab Results   Component Value Date/Time    N GONORRHOEAE, AMPLIFIED DNA Negative 2015 10:35 AM    N gonorrhoeae, DNA Probe N  gonorrhoeae Amplified DNA Negative 09/15/2017 01:53 PM     , Group B Strep:    Lab Results   Component Value Date/Time    Strep Grp B PCR Positive for Beta Hemolytic Strep Grp B by PCR (A) 2017 09:43 PM          Invasive Devices          No matching active lines, drains, or airways            Assessment/Plan     ASSESSMENT:   26yo   at 39w6d weeks gestation who is being admitted for SROM      PLAN:   1) Admit   2) CBC, RPR, Blood Type   3) Expectant management   4) GBS positive status: start PCN for prophylaxis    5) Analgesia and/or epidural at patient request   6) Anticipate    7) Discussed with Dr Pebbles Huerta      This patient will be an INPATIENT  and I certify the anticipated length of stay is >2 Midnights      Vania Reyes MD  12/11/2017  2:48 AM

## 2017-12-11 NOTE — DISCHARGE INSTRUCTIONS
Vaginal Delivery   WHAT YOU SHOULD KNOW:   A vaginal delivery is the birth of your baby through your vagina (birth canal)  AFTER YOU LEAVE:   Medicines:  · NSAIDs  help decrease swelling and pain or fever  This medicine is available with or without a doctor's order  NSAIDs can cause stomach bleeding or kidney problems in certain people  If you take blood thinner medicine, always ask your healthcare provider if NSAIDs are safe for you  Always read the medicine label and follow directions  · Take your medicine as directed  Call your healthcare provider if you think your medicine is not helping or if you have side effects  Tell him if you are allergic to any medicine  Keep a list of the medicines, vitamins, and herbs you take  Include the amounts, and when and why you take them  Bring the list or the pill bottles to follow-up visits  Carry your medicine list with you in case of an emergency  Follow up with your primary healthcare provider:  Most women need to return 6 weeks after a vaginal delivery  Ask about how to care for your wounds or stitches  Write down your questions so you remember to ask them during your visits  Activity:  Rest as much as possible  Try to keep all activities short  You may be able to do some exercise soon after you have your baby  Talk with your primary healthcare provider before you start exercising  If you work outside the home, ask when you can return to your job  Kegel exercises:  Kegel exercises may help your vaginal and rectal muscles heal faster  You can do Kegel exercises by tightening and relaxing the muscles around your vagina  Kegel exercises help make the muscles stronger  Breast care:  When your milk comes in, your breasts may feel full and hard  Ask how to care for your breasts, even if you are not breastfeeding  Constipation:  Do not try to push the bowel movement out if it is too hard   High-fiber foods, extra liquids, and regular exercise can help you prevent constipation  Examples of high-fiber foods are fruit and bran  Prune juice and water are good liquids to drink  Regular exercise helps your digestive system work  You may also be told to take over-the-counter fiber and stool softener medicines  Take these items as directed  Hemorrhoids:  Pregnancy can cause severe hemorrhoids  You may have rectal pain because of the hemorrhoids  Ask how to prevent or treat hemorrhoids  Perineum care: Your perineum is the area between your vagina and anus  Keep the area clean and dry to help it heal and to prevent infection  Wash the area gently with soap and water when you bathe or shower  Rinse your perineum with warm water when you use the toilet  Your primary healthcare provider may suggest you use a warm sitz bath to help decrease pain  A sitz bath is a bathtub or basin filled to hip level  Stay in the sitz bath for 20 to 30 minutes, or as directed  Vaginal discharge: You will have vaginal discharge, called lochia, after your delivery  The lochia is bright red the first day or two after the birth  By the fourth day, the amount decreases, and it turns red-brown  Use a sanitary pad rather than a tampon to prevent a vaginal infection  It is normal to have lochia up to 8 weeks after your baby is born  Monthly periods: Your period may start again within 7 to 12 weeks after your baby is born  If you are breastfeeding, it may take longer for your period to start again  You can still get pregnant again even though you do not have your monthly period  Talk with your primary healthcare provider about a birth control method that will be good for you if you do not want to get pregnant  Mood changes: Many new mothers have some kind of mood changes after delivery  Some of these changes occur because of lack of sleep, hormone changes, and caring for a new baby  Some mood changes can be more serious, such as postpartum depression   Talk with your primary healthcare provider if you feel unable to care for yourself or your baby  Sexual activity:  You may need to avoid sex for 6 to 7 weeks after you have your baby  You may notice you have a decreased desire for sex, or sex may be painful  You may need to use a vaginal lubricant (gel) to help make sex more comfortable  Contact your primary healthcare provider if:   · You have heavy vaginal bleeding that fills 1 or more sanitary pads in 1 hour  · You have a fever  · Your pain does not go away, or gets worse  · The skin between your vagina and rectum is swollen, warm, or red  · You have swollen, hard, or painful breasts  · You feel very sad or depressed  · You feel more tired than usual      · You have questions or concerns about your condition or care  Seek care immediately or call 911 if:   · You have pus or yellow drainage coming from your vagina or wound  · You are urinating very little, or not at all  · Your arm or leg feels warm, tender, and painful  It may look swollen and red  · You feel lightheaded, have sudden and worsening chest pain, or trouble breathing  You may have more pain when you take deep breaths or cough, or you may cough up blood  © [address] is for End User's use only and may not be sold, redistributed or otherwise used for commercial purposes  All illustrations and images included in CareNotes® are the copyrighted property of A [employer] M [employer] Inc  or [employer]  The above information is an  only  It is not intended as medical advice for individual conditions or treatments  Talk to your doctor, nurse or pharmacist before following any medical regimen to see if it is safe and effective for you  Postpartum Bleeding   WHAT YOU NEED TO KNOW:   Postpartum bleeding is vaginal bleeding after childbirth  This bleeding is normal, whether your baby was born vaginally or by    It contains blood and the tissue that lined the inside of your uterus when you were pregnant  DISCHARGE INSTRUCTIONS:   What to expect with postpartum bleeding:  Postpartum bleeding usually lasts at least 10 days, and may last longer than 6 weeks  Your bleeding may range from light (barely staining a pad) to heavy (soaking a pad in 1 hour)  Usually, you have heavier bleeding right after childbirth, which slows over the next few weeks until it stops  The bleeding is red or dark brown with clots for the first 1 to 3 days  It then turns pink for several days, and then becomes a white or yellow discharge until it ends  Follow up with your obstetrician as directed:  Do not have sex until your obstetrician says it is okay  Write down your questions so you remember to ask them during your visits  Contact your healthcare provider or obstetrician if:   · Your bleeding increases, or you have heavy bleeding that soaks a pad in 1 hour for 2 hours in a row  · You pass large blood clots  · You are breathing faster than normal, or your heart is beating faster than normal     · You are urinating less than usual, or not at all  · You feel dizzy  · You have questions or concerns about your condition or care  Seek immediate care or call 911 if:   · You are suddenly short of breath and feel lightheaded  · You have sudden chest pain  © 2017 [address]  Information is for End User's use only and may not be sold, redistributed or otherwise used for commercial purposes  All illustrations and images included in CareNotes® are the copyrighted property of [employer]  or [employer] Castro  The above information is an  only  It is not intended as medical advice for individual conditions or treatments  Talk to your doctor, nurse or pharmacist before following any medical regimen to see if it is safe and effective for you        Breast Care for the Breast Feeding Mother   WHAT YOU SHOULD KNOW:   Your breasts will go through normal changes while you are breastfeeding  Sometimes breast and nipple problems can develop while you are breastfeeding  Learn about changes that are normal and those that may be a problem  Breast care can help you prevent and manage problems so you and your baby can enjoy the benefits of breastfeeding  AFTER YOU LEAVE:   Breast changes while you are breastfeeding:   · For the first few days after your baby is born, your body makes a small amount of breast milk (colostrum)  Within about 2 to 5 days, your body will begin making mature milk  It may take up to 10 days or longer for mature milk to come in  When your mature milk comes in, your breasts will become full and firm  They may feel tender  · Breastfeeding your baby will decrease the full feeling in your breasts  You may feel a tingly sensation during feedings as milk is released from your breasts  This is called the milk let-down reflex  After 7 or more days, the fullness may feel like it has decreased  Your nipples should look the same as they did before you started breastfeeding  Breasts that feel full before and empty after breastfeeding are signs that breastfeeding is going well  Breast problems that can occur while you are breastfeeding:   · Nipple soreness  may occur when you begin to breastfeed your baby  You may also have nipple soreness if your baby is not latched on to your breast correctly  Correct positioning and latch-on may decrease or stop the pain in your nipples  Work with your caregivers to help your baby latch on correctly  It may also be helpful to place warm, wet compresses on your nipples to help decrease pain  · Plugged milk ducts  may cause painful breast lumps  Plugged ducts may be caused by not emptying your breasts completely during feedings  When your baby pauses during breastfeeding, massage and gently squeeze your breast  Gentle massage may unplug a blocked milk duct   Pump out any milk left in your breasts after your baby is done breastfeeding  Avoid wearing tight tops, tight bras, or under-wire bras, because they may put pressure on your breasts  · Engorgement  may occur as your milk comes in soon after you begin breastfeeding  Engorgement may cause your breasts to become swollen and painful  Your breasts may also become engorged if you miss a feeding or you do not breastfeed on demand  The best way to decrease engorgement symptoms is to empty your breasts by feeding your baby often  Engorgement can make it hard for your baby to latch on to your breast  If this happens, express a small amount of milk and then have your baby latch on  Cold compresses, gel packs, or ice packs on your breasts can help decrease pain and swelling  Ask your caregiver how often and how long you should use cold, or ice packs  · A breast infection called mastitis  can develop if you have plugged milk ducts or engorgement  Mastitis causes your breasts to become red, swollen, and painful  You may also have flu-like symptoms, such as chills and a fever  Place heat on your breasts to help decrease the pain  You may want to place a moist, warm cloth on the painful breast or both of your breasts  Ask how often to do this  Your primary healthcare provider [facility]) may suggest that you take an NSAID, such as ibuprofen, to decrease pain and swelling  He may also order antibiotics to treat mastitis  Ask about feeding your baby when you have a breast infection  How to help prevent or manage breast problems while you are breastfeeding:   · Learn how to position your baby and latch him on correctly  To latch your baby correctly to your breast, make sure that his mouth covers most of your areola (dark area around your nipple)  He should not be attached only to the nipple  Your baby is latched on well if you feel comfortable and do not feel pain  A correct latch helps him get enough milk and can help to prevent sore nipples and other breast problems   There are several breastfeeding positions that you can try  Find the position that works best for you and your baby  Ask your caregiver for more information about how to hold and breastfeed your baby  · Prevent biting  Your baby may get teeth at about 1to 3months of age  To help prevent biting, break his suction once he is finished or if he has fallen asleep  To break his suction, slip a finger into the side of his mouth  If your baby bites you, respond with surprise or unhappiness  Offer praise when he does not bite you  · Breastfeed your baby regularly  Feed your baby 8 to 12 times a day  You may need to wake up your baby at night to feed him  It is okay to feed from 1 or both breasts at each feeding  Your baby should breastfeed from both breasts equally over the course of a day  If your baby only feeds from 1 side during a feeding, offer your other breast to him first for the next feeding  · Schedule and keep follow-up visits  Talk to your baby's pediatrician or your PHP during follow-up visits if you have breast problems  Caregivers may suggest that you, or you and your partner, attend classes on breastfeeding  You also may want to join a breastfeeding support group  Caregivers may suggest that you see a lactation consultant  This is a caregiver who can help you with breastfeeding  Contact your PHP if:   · You have a fever and chills  · You have body aches and you feel like you do not have any energy  · One or both of your breasts is red, swollen or hard, painful, and feels warm or hot  · You have breast engorgement that does not get better within 24 hours  · You see or feel a lump in your breast that hurts when you touch it  · You have nipple pain during breastfeeding or between feedings  · Your nipples are red, dry, cracked, or bleeding, or they have scabs on them  · You have questions or concerns about your condition or care    © 2014 [address] is for End User's use only and may not be sold, redistributed or otherwise used for commercial purposes  All illustrations and images included in CareNotes® are the copyrighted property of [employer]  or [address]  The above information is an  only  It is not intended as medical advice for individual conditions or treatments  Talk to your doctor, nurse or pharmacist before following any medical regimen to see if it is safe and effective for you

## 2017-12-11 NOTE — OB LABOR/OXYTOCIN SAFETY PROGRESS
Oxytocin Safety Progress Check Note - Kenia Conway 32 y o  female MRN: [id number]    Unit/Bed#: L&D 321-01 Encounter: [id number]    Obstetric History       T3      L3     SAB0   TAB0   Ectopic0   Multiple0   Live Births3      Gestational Age: 37w11d  Dose (ludmila-units/min) Oxytocin: 6 ludmila-units/min  Contraction Frequency (minutes): 2  Contraction Quality: Moderate  Tachysystole: No   Dilation: 4        Effacement (%): 80  Station: -1  Baseline Rate: 130 bpm     FHR Category: Category I     Oxytocin Safety Progress Check: Safety check completed    Notes/comments:   Patient reports painful contractions, desires pain medication, declines epidural  SVE 80/-1  Pitocin at 6, contractions every 2 minutes  Category I FHT  Continue current plan of care: PCN for GBS+, Pitocin  Stadol and Phenergan for pain control  Recheck in 2-4 hours, sooner if indicated       Discussed w/ Dr Malik Montoya MD 2017 9:12 AM

## 2017-12-11 NOTE — DISCHARGE SUMMARY
Discharge Summary - OB/GYN  Darryl Melendez 32 y o  female MRN: [id number]  Unit/Bed#: L&D 321-01 Encounter: [id number]    Admission Date: 2017     Discharge Date: 17    Attending: Rebecca Mejias MD       Principal Diagnosis: Pregnancy at 39w6d    Secondary Diagnosis:   Premature rupture of membranes  GBS positive  Anemia    Procedures: spontaneous vaginal delivery    Anesthesia: none    Hospital course: Darryl Melendez is a 32 y o  [id number] at 39w6d who was initially admitted for premature rupture of membranes  OB history notable for GBS positive  SVE on admission 3/50/ and PCN was started  Labor was augmented with Pitocin  She delivered a viable male  on 17 at 1158  Weight 6lbs 9oz via normal spontaneous vaginal delivery  She sustained no lacerations  Apgars were 8 (1 min) and 9 (5 min)   was transferred to  nursery  Patient tolerated the procedure well  Her post-delivery course was uncomplicated  Admission Hb 8 3, postdelivery Hb 6 3, repeat stable at 6 1  The patient was symptomatic  She was continued on PO iron during admission  Her postpartum pain was well controlled with oral analgesics  On day of discharge, she was ambulating and able to reasonably perform all ADLs  She was voiding and had appropriate bowel function  Pain was well controlled  She was discharged home on postpartum day #2 without complications  Patient was instructed to follow up with her OB as an outpatient and was given appropriate warnings to call provider if she develops signs of infection or uncontrolled pain  Complications: none apparent    Condition at discharge: stable     Discharge instructions/Information to patient and family:   See after visit summary for information provided to patient and family  Provisions for Follow-Up Care:  See after visit summary for information related to follow-up care and any pertinent home health orders        Disposition: See After Visit Summary for discharge disposition information  Planned Readmission: No    Discharge Medications: For a complete list of the patient's medications, please refer to her med rec

## 2017-12-11 NOTE — OB LABOR/OXYTOCIN SAFETY PROGRESS
Oxytocin Safety Progress Check Note - Suri Egan 32 y o  female MRN: [id number]    Unit/Bed#: L&D 321-01 Encounter: [id number]    Obstetric History       T3      L3     SAB0   TAB0   Ectopic0   Multiple0   Live Births3      Gestational Age: 37w11d  Dose (ludmila-units/min) Oxytocin: 6 ludmila-units/min  Contraction Frequency (minutes): 2-3  Contraction Quality: Strong  Tachysystole: No   Dilation: 7        Effacement (%): 80  Station: 0  Baseline Rate: 130 bpm     FHR Category: Category II     Oxytocin Safety Progress Check: Safety check completed    Notes/comments:   Patient reports increased pressure  SVE 7/80/0  Painful contractions, still declining epidural  Category II FHT for nonrecurrent variable decelerations w/ spontaneous return to baseline, moderate variability & accels present, overall reassuring FHT  Continue current plan of care    Dr Margarie Hammans aware            Juan Infante MD 2017 11:03 AM

## 2017-12-11 NOTE — L&D DELIVERY NOTE
DELIVERY NOTE  Beau Salmeron 32 y o  female MRN: [id number]  Unit/Bed#: L&D 321-01 Encounter: [id number]    Obstetrician:   [facility]  Sw:  Wicho Navarro    Pre-Delivery Diagnosis:   Pregnancy at 39w6d  Premature rupture of membranes  GBS positive    Post-Delivery Diagnosis: Same as above - Delivered    Procedure: Spontaneous vaginal delivery    Indications for instrumental delivery: none    Estimated Blood Loss:  517            Complications:  None    Description of Delivery: Patient delivered a viable Male  over intact perineum  A nuchal cord was not noted  With the assistance of maternal expulsive efforts and downward traction of fetal head, the anterior shoulder was delivered without difficulty, followed by the remainder of the infant's body  After delivery of the , the umbilical cord was doubly clamped and cut and the  was passed off to  staff for routine care  Umbilical cord blood and umbilical artery and venous gases were collected  Placenta was delivered with fundal massage and gentle traction on the cord with active management of the third stage of labor  Placenta delivered intact with a 3-vessel cord  Active management of the third stage of labor was undertaken with IV pitocin  Bleeding was noted to be stable  The uterus was noted to be firm, palpated 1cm above the umbilicus  Inspection of the perineum, vagina, labia, and urethra revealed no lacerations  Mother and baby are currently recovering nicely in stable condition      APGARS: 8 at one minute, 9 at 5 minutes  Blood gases: Arterial pH: 7 275, Base excess: -7 7 ;   Venous pH:7 375, Base excess: -4 5

## 2017-12-11 NOTE — OB LABOR/OXYTOCIN SAFETY PROGRESS
Oxytocin Safety Progress Check Note - Helen Asp 32 y o  female MRN: [id number]    Unit/Bed#: L&D 321-01 Encounter: [id number]    Obstetric History       T3      L3     SAB0   TAB0   Ectopic0   Multiple0   Live Births3      Gestational Age: 39w6d  Dose (ludmila-units/min) Oxytocin: 4 ludmila-units/min  Contraction Frequency (minutes): 4, irregular  Contraction Quality: Mild  Tachysystole: No   Dilation: 3        Effacement (%): 50  Station: -2  Baseline Rate: 120 bpm     FHR Category: Category I     Oxytocin Safety Progress Check: Safety check completed    Notes/comments:   Pitocin at 4, irregular contraction pattern  Category I FHT  Patient comfortable, SVE deferred at this time  Continue Pitocin titrations  Continue PCN for GBS+  Plan to recheck in 2 hours, sooner if indicated    Discussed w/ Dr Braulio Banks MD 2017 7:56 AM

## 2017-12-11 NOTE — LACTATION NOTE
This note was copied from a baby's chart  Spoke with mom about exclusive breastfeeding and discouraged her giving bottles of formula  Discussed the risks of early supplementation and enc excl  breastfeeding for 3-4 weeks

## 2017-12-11 NOTE — OB LABOR/OXYTOCIN SAFETY PROGRESS
Labor Progress Note - Cee Ivanor 32 y o  female MRN: [id number]    Unit/Bed#: L&D 321-01 Encounter: [id number]    Obstetric History       T3      L3     SAB0   TAB0   Ectopic0   Multiple0   Live Births3      Gestational Age: 39w6d  Dose (ludmila-units/min) Oxytocin: 4 ludmila-units/min  Contraction Frequency (minutes): 1-2  Contraction Quality: Strong  Tachysystole: No   Dilation: 4        Effacement (%): 80  Station: -1  Baseline Rate: 130 bpm     FHR Category: Category II     Oxytocin Safety Progress Check: Safety check completed    Notes/comments:   Patient reports increased painful contractions, declines epidural   Category II FHT for nonrecurrent variable decelerations that spontaneously resolve to baseline  Patient sitting up in bed, small breaks in  [address]  Patient refuses SVE at this time         Dr Mehta aware    Yolanda Centeno MD 2017 10:47 AM

## 2017-12-12 LAB
ERYTHROCYTE [DISTWIDTH] IN BLOOD BY AUTOMATED COUNT: 16.8 % (ref 11.6–15.1)
HCT VFR BLD AUTO: 21.2 % (ref 34.8–46.1)
HGB BLD-MCNC: 6.3 G/DL (ref 11.5–15.4)
MCH RBC QN AUTO: 21.8 PG (ref 26.8–34.3)
MCHC RBC AUTO-ENTMCNC: 29.7 G/DL (ref 31.4–37.4)
MCV RBC AUTO: 73 FL (ref 82–98)
PLATELET # BLD AUTO: 159 THOUSANDS/UL (ref 149–390)
PMV BLD AUTO: 10.5 FL (ref 8.9–12.7)
RBC # BLD AUTO: 2.89 MILLION/UL (ref 3.81–5.12)
WBC # BLD AUTO: 15.36 THOUSAND/UL (ref 4.31–10.16)

## 2017-12-12 PROCEDURE — 85027 COMPLETE CBC AUTOMATED: CPT | Performed by: OBSTETRICS & GYNECOLOGY

## 2017-12-12 RX ORDER — ACETAMINOPHEN AND CODEINE PHOSPHATE 120; 12 MG/5ML; MG/5ML
1 SOLUTION ORAL DAILY
Qty: 30 TABLET | Refills: 3 | Status: SHIPPED | OUTPATIENT
Start: 2017-12-12 | End: 2018-08-07 | Stop reason: HOSPADM

## 2017-12-12 RX ADMIN — DOCUSATE SODIUM 100 MG: 100 CAPSULE, LIQUID FILLED ORAL at 18:07

## 2017-12-12 RX ADMIN — FERROUS SULFATE TAB 325 MG (65 MG ELEMENTAL FE) 325 MG: 325 (65 FE) TAB at 11:53

## 2017-12-12 RX ADMIN — FERROUS SULFATE TAB 325 MG (65 MG ELEMENTAL FE) 325 MG: 325 (65 FE) TAB at 18:07

## 2017-12-12 RX ADMIN — FERROUS SULFATE TAB 325 MG (65 MG ELEMENTAL FE) 325 MG: 325 (65 FE) TAB at 07:56

## 2017-12-12 RX ADMIN — IBUPROFEN 600 MG: 600 TABLET, FILM COATED ORAL at 13:07

## 2017-12-12 RX ADMIN — OXYCODONE HYDROCHLORIDE AND ACETAMINOPHEN 1 TABLET: 5; 325 TABLET ORAL at 22:45

## 2017-12-12 RX ADMIN — DOCUSATE SODIUM 100 MG: 100 CAPSULE, LIQUID FILLED ORAL at 07:56

## 2017-12-12 RX ADMIN — WITCH HAZEL 1 PAD: 500 SOLUTION RECTAL; TOPICAL at 23:08

## 2017-12-12 RX ADMIN — BENZOCAINE AND MENTHOL: 20; .5 SPRAY TOPICAL at 23:08

## 2017-12-12 RX ADMIN — IBUPROFEN 600 MG: 600 TABLET, FILM COATED ORAL at 19:11

## 2017-12-12 RX ADMIN — IBUPROFEN 600 MG: 600 TABLET, FILM COATED ORAL at 04:34

## 2017-12-12 NOTE — PROGRESS NOTES
Progress Note - OB/GYN   Roman Hendrix 32 y o  female MRN: [id number]  Unit/Bed#: L&D 313-01 Encounter: [id number]    Assessment:  Post partum Day #1 s/p , stable, baby in room    Plan:  1) Anemia   Hg 8 3 --> 6 3   325mg ferrous sulfate three times daily   Asymptomatic, VSS  2) Contraception   Would like IUD with micronor bridge  3) Continue routine post partum care   Encourage ambulation   Encourage breastfeeding   Anticipate discharge tomorrow     Subjective/Objective   Chief Complaint:     Post delivery  Patient feeling well  Pain is minimal  Lochia is minimal with no clots  She denies lightheadedness, headache, nausea, vomiting, chest pain, shortness of breath  Subjective:     Pain: yes, cramping, improved with meds  Tolerating PO: yes  Voiding: yes  Flatus: no  BM: no  Ambulating: yes  Breastfeeding: attempted yesterday  Currently bottlefeeding  Chest pain: no  Shortness of breath: no  Leg pain: no  Lochia: minimal    Objective:     Vitals: BP 93/59   Pulse 86   Temp 98 3 °F (36 8 °C) (Oral)   Resp 16   Ht 5' (1 524 m)   Wt 65 8 kg (145 lb)   LMP 2017   Breastfeeding? Yes Comment: breast/bottle  BMI 28 32 kg/m²       Intake/Output Summary (Last 24 hours) at 17 2095  Last data filed at 17   Gross per 24 hour   Intake                0 ml   Output              600 ml   Net             -600 ml       Lab Results   Component Value Date    WBC 15 36 (H) 2017    HGB 6 3 (LL) 2017    HCT 21 2 (L) 2017    MCV 73 (L) 2017     2017       Physical Exam:     Gen: AAOx3, NAD  CV: RRR  Lungs: CTA b/l  Abd: Soft, non-tender, non-distended, no rebound or guarding  Uterine fundus firm and non-tender, at the umbilicus     Ext: Non tender    Delmi Kennedy MD  2017  6:27 AM

## 2017-12-12 NOTE — LACTATION NOTE
This note was copied from a baby's chart  Spoke with mom about breastfeeding and she verb she continued to supplement with formula over night but that baby is not tolerating the formula so she is going to try to latch next feeding   Enc her to call for assistance as needed,phone # given

## 2017-12-13 ENCOUNTER — GENERIC CONVERSION - ENCOUNTER (OUTPATIENT)
Dept: OTHER | Facility: OTHER | Age: 27
End: 2017-12-13

## 2017-12-13 VITALS
RESPIRATION RATE: 18 BRPM | HEIGHT: 60 IN | OXYGEN SATURATION: 99 % | TEMPERATURE: 97.8 F | SYSTOLIC BLOOD PRESSURE: 73 MMHG | WEIGHT: 145 LBS | HEART RATE: 72 BPM | DIASTOLIC BLOOD PRESSURE: 47 MMHG | BODY MASS INDEX: 28.47 KG/M2

## 2017-12-13 LAB
BASOPHILS # BLD AUTO: 0.02 THOUSANDS/ΜL (ref 0–0.1)
BASOPHILS NFR BLD AUTO: 0 % (ref 0–1)
EOSINOPHIL # BLD AUTO: 0.11 THOUSAND/ΜL (ref 0–0.61)
EOSINOPHIL NFR BLD AUTO: 1 % (ref 0–6)
ERYTHROCYTE [DISTWIDTH] IN BLOOD BY AUTOMATED COUNT: 16.8 % (ref 11.6–15.1)
HCT VFR BLD AUTO: 21.4 % (ref 34.8–46.1)
HGB BLD-MCNC: 6.1 G/DL (ref 11.5–15.4)
LYMPHOCYTES # BLD AUTO: 2.54 THOUSANDS/ΜL (ref 0.6–4.47)
LYMPHOCYTES NFR BLD AUTO: 23 % (ref 14–44)
MCH RBC QN AUTO: 21.5 PG (ref 26.8–34.3)
MCHC RBC AUTO-ENTMCNC: 28.5 G/DL (ref 31.4–37.4)
MCV RBC AUTO: 75 FL (ref 82–98)
MONOCYTES # BLD AUTO: 1 THOUSAND/ΜL (ref 0.17–1.22)
MONOCYTES NFR BLD AUTO: 9 % (ref 4–12)
NEUTROPHILS # BLD AUTO: 7.21 THOUSANDS/ΜL (ref 1.85–7.62)
NEUTS SEG NFR BLD AUTO: 67 % (ref 43–75)
PLATELET # BLD AUTO: 197 THOUSANDS/UL (ref 149–390)
PMV BLD AUTO: 11.3 FL (ref 8.9–12.7)
RBC # BLD AUTO: 2.84 MILLION/UL (ref 3.81–5.12)
WBC # BLD AUTO: 10.88 THOUSAND/UL (ref 4.31–10.16)

## 2017-12-13 PROCEDURE — 85025 COMPLETE CBC W/AUTO DIFF WBC: CPT | Performed by: OBSTETRICS & GYNECOLOGY

## 2017-12-13 RX ORDER — DIAPER,BRIEF,INFANT-TODD,DISP
1 EACH MISCELLANEOUS AS NEEDED
Qty: 30 G | Refills: 0
Start: 2017-12-13 | End: 2018-10-10 | Stop reason: HOSPADM

## 2017-12-13 RX ORDER — ACETAMINOPHEN 325 MG/1
TABLET ORAL
Qty: 30 TABLET | Refills: 0 | Status: ON HOLD
Start: 2017-12-13 | End: 2018-11-09

## 2017-12-13 RX ORDER — IBUPROFEN 600 MG/1
600 TABLET ORAL EVERY 6 HOURS PRN
Qty: 30 TABLET | Refills: 0
Start: 2017-12-13 | End: 2018-09-13 | Stop reason: HOSPADM

## 2017-12-13 RX ADMIN — DOCUSATE SODIUM 100 MG: 100 CAPSULE, LIQUID FILLED ORAL at 10:05

## 2017-12-13 RX ADMIN — IBUPROFEN 600 MG: 600 TABLET, FILM COATED ORAL at 04:12

## 2017-12-13 RX ADMIN — FERROUS SULFATE TAB 325 MG (65 MG ELEMENTAL FE) 325 MG: 325 (65 FE) TAB at 10:05

## 2017-12-13 RX ADMIN — IBUPROFEN 600 MG: 600 TABLET, FILM COATED ORAL at 10:04

## 2017-12-13 NOTE — PROGRESS NOTES
Progress Note - OB/GYN   Mino Stephenson 32 y o  female MRN: [id number]  Unit/Bed#: L&D 313-01 Encounter: [id number]    Assessment:  Post partum Day #2 s/p , stable, baby in room    Plan:  1) Anemia              Hg 8 3 --> 6 3 --> 6 1              325mg ferrous sulfate three times daily              Asymptomatic, VSS  2) Contraception              Would like IUD with micronor bridge  3) Continue routine post partum care              Encourage ambulation              Encourage breastfeeding              Anticipate discharge today       Subjective/Objective   Chief Complaint:     Post delivery  Patient is doing well  Lochia WNL  Pain well controlled  Subjective:     Pain: yes, cramping, improved with meds  Tolerating PO: yes  Voiding: yes  Flatus: yes  BM: no  Ambulating: yes  Breastfeeding:  yes  Chest pain: no  Shortness of breath: no  Leg pain: no  Lochia: minimal    Objective:     Vitals: BP 97/57   Pulse 91   Temp 97 8 °F (36 6 °C) (Oral)   Resp 18   Ht 5' (1 524 m)   Wt 65 8 kg (145 lb)   LMP 2017   Breastfeeding? No   BMI 28 32 kg/m²     No intake or output data in the 24 hours ending 17 0648    Lab Results   Component Value Date    WBC 10 88 (H) 2017    HGB 6 1 (LL) 2017    HCT 21 4 (L) 2017    MCV 75 (L) 2017     2017       Physical Exam:     Gen: AAOx3, NAD  CV: RRR  Lungs: CTA b/l  Abd: Soft, non-tender, non-distended, no rebound or guarding  Uterine fundus firm and non-tender, 1 cm above the umbilicus     Ext: Non tender    Antonio Lopez MD  2017  6:48 AM

## 2017-12-13 NOTE — PLAN OF CARE
Problem: Knowledge Deficit  Goal: Patient/family/caregiver demonstrates understanding of disease process, treatment plan, medications, and discharge instructions  Complete learning assessment and assess knowledge base    Interventions:  - Provide teaching at level of understanding  - Provide teaching via preferred learning methods   Outcome: Adequate for Discharge      Problem: PAIN - ADULT  Goal: Verbalizes/displays adequate comfort level or baseline comfort level  Interventions:  - Encourage patient to monitor pain and request assistance  - Assess pain using appropriate pain scale  - Administer analgesics based on type and severity of pain and evaluate response  - Implement non-pharmacological measures as appropriate and evaluate response  - Consider cultural and social influences on pain and pain management  - Notify physician/advanced practitioner if interventions unsuccessful or patient reports new pain   Outcome: Adequate for Discharge      Problem: INFECTION - ADULT  Goal: Absence or prevention of progression during hospitalization  INTERVENTIONS:  - Assess and monitor for signs and symptoms of infection  - Monitor lab/diagnostic results  - Monitor all insertion sites, i e  indwelling lines, tubes, and drains  - Monitor endotracheal (as able) and nasal secretions for changes in amount and color  - [place] appropriate cooling/warming therapies per order  - Administer medications as ordered  - Instruct and encourage patient and family to use good hand hygiene technique  - Identify and instruct in appropriate isolation precautions for identified infection/condition   Outcome: Adequate for Discharge    Goal: Absence of fever/infection during neutropenic period  INTERVENTIONS:  - Monitor WBC  - Implement neutropenic guidelines   Outcome: Adequate for Discharge      Problem: SAFETY ADULT  Goal: Patient will remain free of falls  INTERVENTIONS:  - Assess patient frequently for physical needs  -  Identify cognitive and physical deficits and behaviors that affect risk of falls    -  [place] fall precautions as indicated by assessment   - Educate patient/family on patient safety including physical limitations  - Instruct patient to call for assistance with activity based on assessment  - Modify environment to reduce risk of injury  - Consider OT/PT consult to assist with strengthening/mobility   Outcome: Adequate for Discharge    Goal: Maintain or return to baseline ADL function  INTERVENTIONS:  -  Assess patient's ability to carry out ADLs; assess patient's baseline for ADL function and identify physical deficits which impact ability to perform ADLs (bathing, care of mouth/teeth, toileting, grooming, dressing, etc )  - Assess/evaluate cause of self-care deficits   - Assess range of motion  - Assess patient's mobility; develop plan if impaired  - Assess patient's need for assistive devices and provide as appropriate  - Encourage maximum independence but intervene and supervise when necessary  ¯ Involve family in performance of ADLs  ¯ Assess for home care needs following discharge   ¯ Request OT consult to assist with ADL evaluation and planning for discharge  ¯ Provide patient education as appropriate   Outcome: Adequate for Discharge    Goal: Maintain or return mobility status to optimal level  INTERVENTIONS:  - Assess patient's baseline mobility status (ambulation, transfers, stairs, etc )    - Identify cognitive and physical deficits and behaviors that affect mobility  - Identify mobility aids required to assist with transfers and/or ambulation (gait belt, sit-to-stand, lift, walker, cane, etc )  - [place] fall precautions as indicated by assessment  - Record patient progress and toleration of activity level on Mobility SBAR; progress patient to next Phase/Stage  - Instruct patient to call for assistance with activity based on assessment  - Request Rehabilitation consult to assist with strengthening/weightbearing, etc  Outcome: Adequate for Discharge      Problem: DISCHARGE PLANNING  Goal: Discharge to home or other facility with appropriate resources  INTERVENTIONS:  - Identify barriers to discharge w/patient and caregiver  - Arrange for needed discharge resources and transportation as appropriate  - Identify discharge learning needs (meds, wound care, etc )  - Arrange for interpretive services to assist at discharge as needed  - Refer to Case Management Department for coordinating discharge planning if the patient needs post-hospital services based on physician/advanced practitioner order or complex needs related to functional status, cognitive ability, or social support system   Outcome: Adequate for Discharge      Problem: POSTPARTUM  Goal: Experiences normal postpartum course  INTERVENTIONS:  - Monitor maternal vital signs  - Assess uterine involution and lochia   Outcome: Adequate for Discharge    Goal: Appropriate maternal -  bonding  INTERVENTIONS:  - Identify family support  - Assess for appropriate maternal/infant bonding   -Encourage maternal/infant bonding opportunities  - Referral to  or  as needed   Outcome: Adequate for Discharge    Goal: Establishment of infant feeding pattern  INTERVENTIONS:  - Assess breast/bottle feeding  - Refer to lactation as needed   Outcome: Adequate for Discharge    Goal: Incision(s), wounds(s) or drain site(s) healing without S/S of infection  INTERVENTIONS  - Assess and document risk factors for skin impairment   - Assess and document dressing, incision, wound bed, drain sites and surrounding tissue  - Initiate Nutrition services consult and/or wound management as needed   Outcome: Adequate for Discharge

## 2017-12-15 ENCOUNTER — GENERIC CONVERSION - ENCOUNTER (OUTPATIENT)
Dept: OTHER | Facility: OTHER | Age: 27
End: 2017-12-15

## 2017-12-30 LAB — PLACENTA IN STORAGE: NORMAL

## 2018-01-09 NOTE — PROGRESS NOTES
Chief Complaint  Pt here today for her 2nd PPD placement  Placed on LFA @11:15AM Pt was informed to return within 48-72 hours   for the reading  OB64      Active Problems    1  Anemia (285 9) (D64 9)   2  Bacterial vaginosis (616 10,041 9) (N76 0,B96 89)   3  Cervical cancer screening (V76 2) (Z12 4)   4  Depression screening (V79 0) (Z13 89)   5  Encounter for PPD test (V74 1) (Z11 1)   6  Migraine (346 90) (G43 909)   7  Pregnancy (V22 2) (Z33 1)   8  UTI in pregnancy (646 60,599 0) (O23 40)    Current Meds   1  Cephalexin 500 MG Oral Capsule; TAKE 1 CAPSULE EVERY 12 HOURS UNTIL GONE;   Therapy: 45HLB4667 to (Evaluate:24Ydi8463)  Requested for: 21OIC6206; Last   Rx:85Dcv8346 Ordered   2  Colace 100 MG Oral Capsule; TAKE 1 CAPSULE TWICE DAILY AS NEEDED; Therapy: 66JPO5308 to (Evaluate:19Ojo3827)  Requested for: 45GWQ7566; Last   Rx:56Hsm9702 Ordered   3  Ferrous Sulfate 325 (65 Fe) MG Oral Tablet; TAKE 1 TABLET TWICE DAILY WITH   MEALS; Therapy: 16MXN1475 to (Evaluate:37Obj9643)  Requested for: 56ALC0806; Last   Rx:91Xbs5423 Ordered   4  Prenatal One Daily 27-0 8 MG Oral Tablet; Therapy: 79RSH8239 to Recorded    Allergies    1  No Known Drug Allergies    2  No Known Environmental Allergies   3   No Known Food Allergies    Plan  Encounter for PPD test    · PPD    Future Appointments    Date/Time Provider Specialty Site   06/30/2017 10:00 AM Anita CARBALLO, Nurse Schedule  [facility]     Signatures   Electronically signed by : NIKKI Paz; Jun 27 2017 12:20PM EST                       (Author)    Electronically signed by : MARQUEZ Montenegro ; Jul 5 2017  2:33PM EST

## 2018-01-09 NOTE — MISCELLANEOUS
Message  spoke to patient on the phone, she is having irregular contractions and back pain  spoke with Dr Linnette Klein she is to drink lots of water, trying a warm shower, heating pad, tylenol, if not getting better she is to call the [phone] number to speak with the doctors on call  baby is moving normal, denies loss of fluids or bleeding  she Vita Burton [address] to her apt on 10/12, she is to be evaluated this week by the doctors      Active Problems    1  Anemia affecting pregnancy, third trimester   2  Anemia during pregnancy (648 20) (O99 019)   3  Bacteriuria (791 9) (R82 71)   4  Cervical cancer screening (V76 2) (Z12 4)   5  Dysuria during pregnancy in third trimester (646 83,788 1) (O26 893,R30 0)   6  Encounter for pregnancy related examination in second trimester (V22 1) (Z34 92)   7  Encounter for supervision of high risk pregnancy in third trimester, antepartum (V23 9)   (O09 93)   8  Late prenatal care (V23 7) (O09 30)   9  Prenatal care in third trimester (V22 1) (Z34 93)   10  Screen for sexually transmitted diseases (V74 5) (Z11 3)   11  Screening for cervical cancer (V76 2) (Z12 4)   12  Screening for diabetes mellitus (V77 1) (Z13 1)   13  Urinary tract infection in mother during pregnancy, second trimester    Current Meds   1  Amoxicillin 500 MG Oral Tablet; TAKE 1 TABLET EVERY 12 HOURS DAILY; Therapy: 57UVU4080 to (Evaluate:10Lhh8042)  Requested for: 89IRE2916; Last   Rx:27Poh8826 Ordered   2  Colace 100 MG Oral Capsule; TAKE 1 CAPSULE TWICE DAILY AS NEEDED; Therapy: 29OIP0019 to (Evaluate:83Ghb8073)  Requested for: 36Wbf4391; Last   Rx:57Tkl2054 Ordered   3  Ferrous Sulfate 325 (65 Fe) MG Oral Tablet; TAKE 1 TABLET TWICE DAILY WITH   MEALS; Therapy: 23LIG3451 to (Evaluate:72Nim6604)  Requested for: 91Vvg9514; Last   Rx:37Ebg2843 Ordered   4  Nitrofurantoin Macrocrystal 100 MG Oral Capsule; TAKE 1 CAPSULE EVERY 12 HOURS   DAILY;    Therapy: 19PER4369 to (Evaluate:98Ebo6592)  Requested for: 92Ckk6776; Last Rx:10Aug2017 Ordered   5  Nitrofurantoin Monohyd Macro 100 MG Oral Capsule (Macrobid); TAKE 1 CAPSULE   TWICE DAILY UNTIL GONE;   Therapy: 55VUP7526 to (Evaluate:17Aug2017)  Requested for: 10Aug2017; Last   Rx:10Aug2017 Ordered   6  Prenatal One Daily 27-0 8 MG Oral Tablet; Therapy: 79WPW0051 to Recorded    Allergies    1  No Known Drug Allergies    2  No Known Environmental Allergies   3   No Known Food Allergies    Signatures   Electronically signed by : Faiza Penaloza RN; Oct 18 2017  1:36PM EST                       (Author)

## 2018-01-10 NOTE — MISCELLANEOUS
Provider Comments  Provider Comments:   pt didn't show to her appointment at [phone]        Signatures   Electronically signed by : NIKKI Bolton; Dec 28 2016  5:29PM EST                       (Author)

## 2018-01-10 NOTE — RESULT NOTES
Verified Results  (1) PRENATAL PANEL 70Nrk6098 04:35PM Herminia Corona Order Number: [id number]     Test Name Result Flag Reference   HIV 1/2 AND P24 Non-Reactive  Non-Reactive   This test detects HIV 1, HIV2 and p24 Antigen  BILIRUBIN UA Negative  Negative   CLARITY Slightly Cloudy     COLOR Yellow     KETONES UA Negative mg/dl  Negative   LEUKOCYTE ESTERASE UA Small A Negative   NITRITE UA Positive A Negative   BLOOD UA Negative  Negative, Trace-Intact   PH UA 6 5  4 5-8 0   PROTEIN UA Negative mg/dl  Negative   SPECIFIC GRAVITY UA 1 020  1 003-1 030   GLUCOSE UA Negative mg/dl  Negative   UROBILINOGEN UA 0 2 E U /dl  0 2, 1 0 E U /dl   HEPATITIS B SURFACE ANTIGEN Non-reactive  Non-reactive, NonReactive - Confirmed   BASOPHILS ABSOLUTE COUNT 0 01 Thousands/? ??L  0 00-0 10   EOSINOPHILS ABSOLUTE COUNT 0 07 Thousand/? ??L  0 00-0 61   LYMPHOCYTES ABSOLUTE COUNT 1 21 Thousands/? ??L  0 60-4 47   MONOCYTES ABSOLUTE COUNT 0 52 Thousand/? ??L  0 17-1 22   NEUTROPHILS ABSOLUTE COUNT 7 29 Thousands/? ??L  1 85-7 62   BASOPHILS RELATIVE PERCENT 0 %  0-1   EOSINOPHILS RELATIVE PERCENT 1 %  0-6   HEMATOCRIT 28 7 % L 34 8-46 1   HEMOGLOBIN 8 8 g/dL L 11 5-15 4   LYMPHOCYTES RELATIVE PERCENT 13 % L 14-44   MCH 23 1 pg L 26 8-34 3   MCHC 30 7 g/dL L 31 4-37 4   MCV 75 fL L 82-98   MONOCYTES RELATIVE PERCENT 6 %  4-12   nRBC AUTOMATED 0 /100 WBCs     PLATELET COUNT 788 Thousands/uL  149-390   MPV 10 4 fL  8 9-12 7   RBC COUNT 3 81 Million/uL  3 81-5 12   RDW 15 9 % H 11 6-15 1   NEUTROPHILS RELATIVE PERCENT 80 % H 43-75   WBC COUNT 9 10 Thousand/uL  4 31-10 16   RUBELLA (IGG) 21 8 IU/mL  >9 9   Rubella IgG       <5 0 IU/mL     Negative: not immune      5 0-9 9 IU/mL  Equivocal     >9 9 IU/mL     Positive: immune   ABO GROUPING A     RH FACTOR Positive     ANTIBODY SCREEN Negative     RPR Non-Reactive  Non-Reactive   SPECIMEN EXPIRATION DATE [id number]

## 2018-01-11 NOTE — MISCELLANEOUS
Message  left a message on the patients phone to make sure she is taking her iron and prenatal vitamins every day  Hgb was low but higher then what it was in August      Active Problems    1  Anemia affecting pregnancy, third trimester   2  Anemia during pregnancy (648 20) (O99 019)   3  Cervical cancer screening (V76 2) (Z12 4)   4  Dysuria during pregnancy in third trimester (646 83,788 1) (O26 893,R30 0)   5  Encounter for pregnancy related examination in second trimester (V22 1) (Z34 92)   6  Encounter for supervision of high risk pregnancy in third trimester, antepartum (V23 9)   (O09 93)   7  Late prenatal care (V23 7) (O09 30)   8  Screen for sexually transmitted diseases (V74 5) (Z11 3)   9  Screening for cervical cancer (V76 2) (Z12 4)   10  Screening for diabetes mellitus (V77 1) (Z13 1)   11  Urinary tract infection in mother during pregnancy, second trimester    Current Meds   1  Colace 100 MG Oral Capsule; TAKE 1 CAPSULE TWICE DAILY AS NEEDED; Therapy: 93KTM9067 to (Evaluate:68Xnp6375)  Requested for: 56Ead5528; Last   Rx:59Ecl1705 Ordered   2  Ferrous Sulfate 325 (65 Fe) MG Oral Tablet; TAKE 1 TABLET TWICE DAILY WITH   MEALS; Therapy: 59VOX1828 to (Evaluate:48Lep2782)  Requested for: 06Dja1620; Last   Rx:54Djp9144 Ordered   3  Nitrofurantoin Macrocrystal 100 MG Oral Capsule; TAKE 1 CAPSULE EVERY 12 HOURS   DAILY; Therapy: 02MID7449 to (Evaluate:17Aug2017)  Requested for: 10Aug2017; Last   Rx:10Aug2017 Ordered   4  Nitrofurantoin Monohyd Macro 100 MG Oral Capsule (Macrobid); TAKE 1 CAPSULE   TWICE DAILY UNTIL GONE;   Therapy: 08DNK0489 to (Evaluate:17Aug2017)  Requested for: 10Aug2017; Last   Rx:10Aug2017 Ordered   5  Prenatal One Daily 27-0 8 MG Oral Tablet; Therapy: 67TDS4983 to Recorded    Allergies    1  No Known Drug Allergies    2  No Known Environmental Allergies   3   No Known Food Allergies    Signatures   Electronically signed by : Tripp Ball RN; Sep 15 2017 12:59PM EST (Author)

## 2018-01-11 NOTE — MISCELLANEOUS
Provider Comments  Provider Comments:   PT NO SHOW FOR PN NOB LETTER SENT      Signatures   Electronically signed by : Mayra Jaeger, ; Aug 15 2017  2:51PM EST                       (Author)

## 2018-01-11 NOTE — RESULT NOTES
Verified Results  (1) PRENATAL PANEL 09Onv7386 04:35PM Krystal VIDAL Order Number: [id number]     Test Name Result Flag Reference   BILIRUBIN UA Negative  Negative   CLARITY Slightly Cloudy     COLOR Yellow     KETONES UA Negative mg/dl  Negative   LEUKOCYTE ESTERASE UA Small A Negative   NITRITE UA Positive A Negative   BLOOD UA Negative  Negative, Trace-Intact   PH UA 6 5  4 5-8 0   PROTEIN UA Negative mg/dl  Negative   SPECIFIC GRAVITY UA 1 020  1 003-1 030   GLUCOSE UA Negative mg/dl  Negative   UROBILINOGEN UA 0 2 E U /dl  0 2, 1 0 E U /dl   HEPATITIS B SURFACE ANTIGEN Non-reactive  Non-reactive, NonReactive - Confirmed   BASOPHILS ABSOLUTE COUNT 0 01 Thousands/? ??L  0 00-0 10   EOSINOPHILS ABSOLUTE COUNT 0 07 Thousand/? ??L  0 00-0 61   LYMPHOCYTES ABSOLUTE COUNT 1 21 Thousands/? ??L  0 60-4 47   MONOCYTES ABSOLUTE COUNT 0 52 Thousand/? ??L  0 17-1 22   NEUTROPHILS ABSOLUTE COUNT 7 29 Thousands/? ??L  1 85-7 62   BASOPHILS RELATIVE PERCENT 0 %  0-1   EOSINOPHILS RELATIVE PERCENT 1 %  0-6   HEMATOCRIT 28 7 % L 34 8-46 1   HEMOGLOBIN 8 8 g/dL L 11 5-15 4   LYMPHOCYTES RELATIVE PERCENT 13 % L 14-44   MCH 23 1 pg L 26 8-34 3   MCHC 30 7 g/dL L 31 4-37 4   MCV 75 fL L 82-98   MONOCYTES RELATIVE PERCENT 6 %  4-12   nRBC AUTOMATED 0 /100 WBCs     PLATELET COUNT 875 Thousands/uL  149-390   MPV 10 4 fL  8 9-12 7   RBC COUNT 3 81 Million/uL  3 81-5 12   RDW 15 9 % H 11 6-15 1   NEUTROPHILS RELATIVE PERCENT 80 % H 43-75   WBC COUNT 9 10 Thousand/uL  4 31-10 16   RUBELLA (IGG) 21 8 IU/mL  >9 9   Rubella IgG       <5 0 IU/mL     Negative: not immune      5 0-9 9 IU/mL  Equivocal     >9 9 IU/mL     Positive: immune   ABO GROUPING A     RH FACTOR Positive     ANTIBODY SCREEN Negative     RPR Non-Reactive  Non-Reactive   SPECIMEN EXPIRATION DATE [id number]

## 2018-01-11 NOTE — MISCELLANEOUS
Message  Return to work or school:   Shanel Bernstein is under my professional care  She was seen in my office on 08/04/2017       Pt  had appt  at 0900 appt  today          Signatures   Electronically signed by : Vinod Arango RN; Aug  4 2017  9:24AM EST                       (Author)

## 2018-01-12 VITALS
BODY MASS INDEX: 28 KG/M2 | SYSTOLIC BLOOD PRESSURE: 110 MMHG | WEIGHT: 142.6 LBS | DIASTOLIC BLOOD PRESSURE: 74 MMHG | HEIGHT: 60 IN

## 2018-01-12 NOTE — PROGRESS NOTES
AUG 9 2017         RE: Song Peters                         To: Benjy MARQUEZ Mejía    MR#: [id number]                                     [address]   : [address], [address]   SS#: *********                                    Fax: [phone]   ENC: [id number]   (Exam #: [id number])      The LMP of this 32year old,  G4, P3-0-0-3 patient was unknown, her   working JIMMIE is DEC 12 2017 and the current gestational age is 25 weeks 1   day by [address] [place] [address]  A sonographic examination was performed on AUG   9 2017 using real time equipment  The ultrasound examination was performed   using abdominal & vaginal techniques  The patient has a BMI of 27 9  Her   blood pressure today was 104/67  Earliest ultrasound found in her record   17  22w1d 17  JIMMIE            Cruz Grissom is late to prenatal care  This is her first ultrasound  She   reports she was on iron pills in early pregnancy for anemia  She denies   any allergies or any use of alcohol or illicit drugs  She reports she   smokes about 10 cigarettes daily outside of pregnancy and she has   decreased down to 4 cigarettes currently per day  She denies any other   medical history  Her surgical history is significant for a tonsillectomy  Her OB history includes 3 term pregnancies and she reports no   miscarriages  She reports her babies weighed between 7 pounds to 7 lbs  12   oz  This pregnancy has a new father  Her first generation family history   includes diabetes and hypertension in her mom and dad and she denies any   history of thrombosis or fetal anomalies in either her family or the   father of the baby's family  She has not had genetic screening to date  She is unsure of her LMP        Cardiac motion was observed at 146 bpm       INDICATIONS      fetal anatomical survey   dating      Exam Types      LEVEL II   Transvaginal      RESULTS      Fetus # 1 of 1 Vertex presentation   Fetal growth appeared normal   Placenta Location = Anterior   No placenta previa   Placenta Grade = I      MEASUREMENTS (* Included In Average GA)      AC              16 7 cm        21 weeks 4 days* (38%)   BPD              5 3 cm        22 weeks 1 day * (52%)   HC              19 6 cm        21 weeks 5 days* (38%)   Femur            3 9 cm        22 weeks 6 days* (58%)      Nuchal Fold      4 3 mm      Humerus          3 7 cm        22 weeks 6 days  (67%)      Cerebellum       2 3 cm        22 weeks 4 days   Biorbit          3 6 cm        22 weeks 6 days   CisternaMagna    5 7 mm      HC/AC           1 17   FL/AC           0 23   FL/BPD          0 74   EFW (Ac/Fl/Hc)   469 grams - 1 lbs 1 oz                 (37%)      THE AVERAGE GESTATIONAL AGE is 22 weeks 1 day +/- 10 days  AMNIOTIC FLUID         Largest Vertical Pocket = 4 2 cm   Amniotic Fluid: Normal      CERVICAL EVALUATION      SUPINE      Cervical Length: 4 80 cm      OTHER TEST RESULTS           Funneling?: No             Dynamic Changes?: No        Resp  To TFP?: No      ANATOMY      Head                                    Normal   Face/Neck                               Normal   Th  Cav  Normal   Heart                                   Normal   Abd  Cav  Normal   Stomach                                 Normal   Right Kidney                            Normal   Left Kidney                             Normal   Bladder                                 Normal   Abd  Wall                               Normal   Spine                                   Normal   Extrems                                 Normal   Genitalia                               Normal   Placenta                                Normal   Umbl   Cord                              Normal   Uterus                                  Normal   PCI                                     Normal      ANATOMY DETAILS Visualized Appearing Sonographically Normal:   HEAD: (Calvarium, BPD Level, Cavum, Lateral Ventricles, Choroid Plexus,   Cerebellum, Cisterna Magna);    FACE/NECK: (Neck, Nuchal Fold, Profile,   Orbits, Nose/Lips, Palate, Face);    TH  CAV  : (Lungs, Diaphragm); HEART: (Four Chamber View, Proximal Left Outflow, Proximal Right Outflow,   3VV, 3 Vessel Trachea, Short Axis of Greater Vessels, Ductal Arch, Aortic   Arch, Interventricular Septum, Interatrial Septum, IVC, SVC, Cardiac Axis,   Cardiac Position);    ABD  CAV : (Liver);    STOMACH, RIGHT KIDNEY, LEFT   KIDNEY, BLADDER, ABD  WALL, SPINE: (Cervical Spine, Thoracic Spine, Lumbar   Spine, Sacrum);    EXTREMS: (Lt Humerus, Rt Humerus, Lt Forearm, Rt   Forearm, Lt Hand, Rt Hand, Lt Femur, Rt Femur, Lt Low Leg, Rt Low Leg, Lt   Foot, Rt Foot);    GENITALIA, PLACENTA, UMBL  CORD, UTERUS, PCI      ANATOMY COMMENTS       Her survey of the fetal anatomy is complete  No fetal structural abnormality or ultrasound marker for aneuploidy is   identified on the Level II ultrasound study today  Fetal growth and   amniotic fluid volume appear normal   Active movement of the fetal body &   extremities was seen  There is no suspicion of a subchorionic bleed  The   placental cord insertion was normal       ADNEXA      The left ovary appeared normal and measured 2 3 x 1 4 x 2 0 cm with a   volume of 3 4 cc  The right ovary appeared normal and measured 2 7 x 2 4 x   2 0 cm with a volume of 6 8 cc  IMPRESSION      Fox IUP   22 weeks and 1 day by this ultrasound  (JIMMIE=DEC 12 2017)   22 weeks and 1 day by 2nd Trimester Sono  (JIMMIE=DEC 12 2017)   Vertex presentation   Fetal growth appeared normal   Normal anatomy survey   Regular fetal heart rate of 146 bpm   Anterior placenta   No placenta previa      GENERAL COMMENT      She has her first ob visit with a provider scheduled for 8/15/17        RECOMMENDATION      Growth Ultrasound: As indicated      Jt Reyes R  MARQUEZ Harman     Maternal-Fetal Medicine   Electronically signed 08/09/17 09:39

## 2018-01-13 VITALS
BODY MASS INDEX: 26.96 KG/M2 | SYSTOLIC BLOOD PRESSURE: 94 MMHG | HEIGHT: 60 IN | TEMPERATURE: 98.4 F | HEART RATE: 109 BPM | DIASTOLIC BLOOD PRESSURE: 60 MMHG | OXYGEN SATURATION: 100 % | RESPIRATION RATE: 18 BRPM | WEIGHT: 137.31 LBS

## 2018-01-13 VITALS
BODY MASS INDEX: 28.17 KG/M2 | HEIGHT: 60 IN | SYSTOLIC BLOOD PRESSURE: 95 MMHG | DIASTOLIC BLOOD PRESSURE: 65 MMHG | WEIGHT: 143.5 LBS

## 2018-01-13 VITALS
DIASTOLIC BLOOD PRESSURE: 62 MMHG | HEIGHT: 60 IN | BODY MASS INDEX: 28.27 KG/M2 | SYSTOLIC BLOOD PRESSURE: 96 MMHG | WEIGHT: 144 LBS

## 2018-01-14 VITALS
SYSTOLIC BLOOD PRESSURE: 104 MMHG | HEIGHT: 60 IN | WEIGHT: 143.01 LBS | BODY MASS INDEX: 28.08 KG/M2 | DIASTOLIC BLOOD PRESSURE: 67 MMHG

## 2018-01-14 VITALS
BODY MASS INDEX: 28.71 KG/M2 | DIASTOLIC BLOOD PRESSURE: 77 MMHG | SYSTOLIC BLOOD PRESSURE: 114 MMHG | HEART RATE: 118 BPM | WEIGHT: 147 LBS

## 2018-01-15 NOTE — MISCELLANEOUS
Message  telephone call to patient to inform of UTI and need for antibiotic  Will obtain ASAP and take until completed      Active Problems    1  Anemia affecting pregnancy, third trimester (437 02) (O99 013)   2  Encounter for pregnancy related examination in second trimester (V22 1) (Z34 92)   3  Late prenatal care (V23 7) (O09 30)   4  Urinary tract infection in mother during pregnancy, second trimester (646 63) (O23 42)    Current Meds   1  Colace 100 MG Oral Capsule; TAKE 1 CAPSULE TWICE DAILY AS NEEDED; Therapy: 26FWV8522 to (Evaluate:07Dec2017)  Requested for: 09Aug2017; Last   Rx:09Aug2017 Ordered   2  Ferrous Sulfate 325 (65 Fe) MG Oral Tablet; TAKE 1 TABLET TWICE DAILY WITH   MEALS; Therapy: 42UOS9264 to (Evaluate:07Dec2017)  Requested for: 09Aug2017; Last   Rx:09Aug2017 Ordered   3  Nitrofurantoin Macrocrystal 100 MG Oral Capsule; TAKE 1 CAPSULE EVERY 12 HOURS   DAILY; Therapy: 64PBH8850 to (Evaluate:17Aug2017)  Requested for: 10Aug2017; Last   Rx:10Aug2017 Ordered   4  Nitrofurantoin Monohyd Macro 100 MG Oral Capsule (Macrobid); TAKE 1 CAPSULE   TWICE DAILY UNTIL GONE;   Therapy: 34XQF9934 to (Evaluate:17Aug2017)  Requested for: 10Aug2017; Last   Rx:10Aug2017 Ordered   5  Prenatal One Daily 27-0 8 MG Oral Tablet; Therapy: 62WGN4005 to Recorded    Allergies    1  No Known Drug Allergies    2  No Known Environmental Allergies   3   No Known Food Allergies    Signatures   Electronically signed by : Scott Perry RN; Aug 10 2017 12:32PM EST                       (Author)

## 2018-01-16 NOTE — MISCELLANEOUS
Message  spoke to patient on the phone, she has a mild UTI  patient ordered antibiotics to take twice a day for 7 days  patient to take until completion      Active Problems    1  Anemia affecting pregnancy, third trimester   2  Anemia during pregnancy (648 20) (O99 019)   3  Bacteriuria (791 9) (R82 71)   4  Cervical cancer screening (V76 2) (Z12 4)   5  Dysuria during pregnancy in third trimester (646 83,788 1) (O26 893,R30 0)   6  Encounter for pregnancy related examination in second trimester (V22 1) (Z34 92)   7  Encounter for supervision of high risk pregnancy in third trimester, antepartum (V23 9)   (O09 93)   8  Late prenatal care (V23 7) (O09 30)   9  Screen for sexually transmitted diseases (V74 5) (Z11 3)   10  Screening for cervical cancer (V76 2) (Z12 4)   11  Screening for diabetes mellitus (V77 1) (Z13 1)   12  Urinary tract infection in mother during pregnancy, second trimester    Current Meds   1  Amoxicillin 500 MG Oral Tablet; TAKE 1 TABLET EVERY 12 HOURS DAILY; Therapy: 07CAQ9106 to (Evaluate:69Frp5150)  Requested for: 00XKT2973; Last   Rx:10Crr8276 Ordered   2  Colace 100 MG Oral Capsule; TAKE 1 CAPSULE TWICE DAILY AS NEEDED; Therapy: 75CGS1578 to (Evaluate:81Nmq3921)  Requested for: 99Kmd2720; Last   Rx:83Cnq1483 Ordered   3  Ferrous Sulfate 325 (65 Fe) MG Oral Tablet; TAKE 1 TABLET TWICE DAILY WITH   MEALS; Therapy: 99TMU7153 to (Evaluate:39Xvt6840)  Requested for: 15Whv3288; Last   Rx:15Xiq7537 Ordered   4  Nitrofurantoin Macrocrystal 100 MG Oral Capsule; TAKE 1 CAPSULE EVERY 12 HOURS   DAILY; Therapy: 53JKD8593 to (Evaluate:64Fef7529)  Requested for: 47Pbe5906; Last   Rx:47Ktf4648 Ordered   5  Nitrofurantoin Monohyd Macro 100 MG Oral Capsule (Macrobid); TAKE 1 CAPSULE   TWICE DAILY UNTIL GONE;   Therapy: 93XIX1847 to (Evaluate:51Pef1670)  Requested for: 54Hmm3544; Last   Rx:99Ctm1316 Ordered   6  Prenatal One Daily 27-0 8 MG Oral Tablet;    Therapy: 76DAL9229 to Recorded    Allergies    1  No Known Drug Allergies    2  No Known Environmental Allergies   3   No Known Food Allergies    Signatures   Electronically signed by : Corrina Carrasco RN; Sep 25 2017  9:04AM EST                       (Author)

## 2018-01-17 NOTE — PROGRESS NOTES
Assessment    1  Encounter for preventive health examination (V70 0) (Z00 00)    Plan  Cervical cancer screening    · *1 - [address], [address]  [phone]  Status: Canceled - Retrospective By Protocol Authorization  Care Summary provided  : Yes  Depression screening    · *VB-Depression Screening; Status:Complete - Retrospective By Protocol Authorization;    Done: 05UQD0135 01:15PM  Health Maintenance    · Begin or continue regular aerobic exercise  Gradually work up to at least 3 sessions of 30  minutes of exercise a week ; Status:Complete;   Done: 82AKD7784   · Brush your teeth 3 times a day and floss at least once a day ; Status:Complete;   Done:  70MUK3444   · Decreasing the stress in your life may help your condition improve ; Status:Complete;    Done: 74GOD6526   · Drink plenty of fluids ; Status:Complete;   Done: 97UBX4621   · Regular aerobic exercise can help reduce stress ; Status:Complete;   Done: 42FAF5347   · Stretch and warm up your muscles during the first 10 minutes , then cool down your  muscles for the last 10 minutes of exercise ; Status:Complete;   Done: 04YES9433   · There are ways to decrease your stress and improve your sense of well-being  We  encourage you to keep active and exercise regularly  Make time to take care of yourself  and participate in activities that you enjoy  Stay connected to friends and family that can  support and comfort you  If at any time you have thoughts of harming yourself or  someone else, contact us immediately ; Status:Active; Requested KOE:[id number];    · Use a sun block product with an SPF of 15 or more ; Status:Complete;   Done:  96KZO0687   · We encourage all of our patients to exercise regularly    30 minutes of exercise or physical  activity five or more days a week is recommended for children and adults ;  Status:Complete;   Done: 01MOB8973   · We recommend routine visits to a dentist ; Status:Complete;   Done: 62FAS2304    Discussion/Summary  health maintenance visit Currently, she eats a healthy diet and has an adequate exercise regimen  cervical cancer screening is current Breast cancer screening: the risks and benefits of breast cancer screening were discussed, self breast exam technique was taught and monthly self breast exam was advised  The immunizations are up to date  She was advised to be evaluated by a dentist  Advice and education were given regarding nutrition, aerobic exercise, tobacco cessation, sunscreen use, self skin examination and seat belt use  Patient discussion: discussed with the patient  Discussed smoking cessation with patient, especially since she is pregnant  Continue with current diet and exercise routine  Placed PPD today, follow up on Monday  Follow up in 1 year for physical    Possible side effects of new medications were reviewed with the patient/guardian today  The treatment plan was reviewed with the patient/guardian  The patient/guardian understands and agrees with the treatment plan     Self Referrals: No      Chief Complaint  NP here to establish care job physical and ppd placement  pt is 11 weeks pregnant      History of Present Illness  HM, Adult Female: The patient is being seen for a health maintenance evaluation  The last health maintenance visit was 1 year(s) ago  General Health: The patient's health since the last visit is described as good  She does not have regular dental visits  She denies vision problems  She denies hearing loss  Immunizations status: up to date  Lifestyle:  She does not have a healthy diet  She does not have any weight concerns  She exercises regularly  She exercises 3 or more times per week  Exercise includes walking and Goes to gym a couple of times a week  She uses tobacco  She denies alcohol use  She denies drug use  Reproductive health:  she reports normal menses  she is sexually active     Screening:   HPI: 31 y/o F presenting for job physical  No concerns or questions  Needs PPD placed  Review of Systems    Constitutional: No fever, no chills, feels well, no tiredness, no recent weight gain or weight loss  Eyes: No complaints of eye pain, no red eyes, no eyesight problems, no discharge, no dry eyes, no itching of eyes  ENT: no complaints of earache, no loss of hearing, no nose bleeds, no nasal discharge, no sore throat, no hoarseness  Cardiovascular: No complaints of slow heart rate, no fast heart rate, no chest pain, no palpitations, no leg claudication, no lower extremity edema  Respiratory: No complaints of shortness of breath, no wheezing, no cough, no SOB on exertion, no orthopnea, no PND  Gastrointestinal: No complaints of abdominal pain, no constipation, no nausea or vomiting, no diarrhea, no bloody stools  Genitourinary: No complaints of dysuria, no incontinence, no pelvic pain, no dysmenorrhea, no vaginal discharge or bleeding  Musculoskeletal: No complaints of arthralgias, no myalgias, no joint swelling or stiffness, no limb pain or swelling  Integumentary: No complaints of skin rash or lesions, no itching, no skin wounds, no breast pain or lump  Neurological: No complaints of headache, no confusion, no convulsions, no numbness, no dizziness or fainting, no tingling, no limb weakness, no difficulty walking  Psychiatric: Not suicidal, no sleep disturbance, no anxiety or depression, no change in personality, no emotional problems  Endocrine: No complaints of proptosis, no hot flashes, no muscle weakness, no deepening of the voice, no feelings of weakness  Hematologic/Lymphatic: No complaints of swollen glands, no swollen glands in the neck, does not bleed easily, does not bruise easily  ROS reviewed  Active Problems    1  Anemia (285 9) (D64 9)   2  Bacterial vaginosis (616 10,041 9) (N76 0,B96 89)   3  Cervical cancer screening (V76 2) (Z12 4)   4  Pregnancy (V22 2) (Z33 1)   5  UTI in pregnancy (646 60,599 0) (O23 40)    Past Medical History    · Encounter for nuchal translucency testing (V28 89) (Z36)   · Screening, , for risk of pre-term labor (V28 82) (Z36)    Surgical History    · History of Tonsillectomy    Family History  Mother    · No pertinent family history    Social History    · [employer] ([address])   · Current every day smoker (305 1) (F17 200)   · Denied: History of drug use   · No alcohol use   · No drug use    Current Meds   1  Cephalexin 500 MG Oral Capsule; TAKE 1 CAPSULE EVERY 12 HOURS UNTIL GONE;   Therapy: 71ALR1399 to (Evaluate:99Brb4968)  Requested for: 24EZX9175; Last   Rx:70Qlw8150 Ordered   2  Colace 100 MG Oral Capsule; TAKE 1 CAPSULE TWICE DAILY AS NEEDED; Therapy: 07SRE5507 to (Evaluate:25Qgl6891)  Requested for: 65MRO9326; Last   Rx:48Agc9666 Ordered   3  Ferrous Sulfate 325 (65 Fe) MG Oral Tablet; TAKE 1 TABLET TWICE DAILY WITH   MEALS; Therapy: 82JEP6746 to (Evaluate:43Avn3290)  Requested for: 08LBI9237; Last   Rx:61Ugc9521 Ordered   4  Prenatal One Daily 27-0 8 MG Oral Tablet; Therapy: 73AXF6838 to Recorded    Allergies    1  No Known Drug Allergies    2  No Known Environmental Allergies   3  No Known Food Allergies    Vitals   Recorded: 03LLW2981 01:08PM   Temperature 98 4 F, Tympanic   Heart Rate 109   Respiration 18   Systolic 94   Diastolic 60   Height 5 ft    Weight 137 lb 5 oz   BMI Calculated 26 82   BSA Calculated 1 59   O2 Saturation 100   LMP 11 WEEKS PREGNANT     Physical Exam    Constitutional   General appearance: No acute distress, well appearing and well nourished  Head and Face   Head and face: Normal     Palpation of the face and sinuses: No sinus tenderness  Eyes   Conjunctiva and lids: No swelling, erythema or discharge  Pupils and irises: Equal, round, reactive to light      Ears, Nose, Mouth, and Throat   External inspection of ears and nose: Normal     Otoscopic examination: Tympanic membranes translucent with normal light reflex  Canals patent without erythema  Hearing: Normal     Nasal mucosa, septum, and turbinates: Normal without edema or erythema  Lips, teeth, and gums: Normal, good dentition  Oropharynx: Normal with no erythema, edema, exudate or lesions  Neck   Neck: Supple, symmetric, trachea midline, no masses  Pulmonary   Respiratory effort: No increased work of breathing or signs of respiratory distress  Auscultation of lungs: Clear to auscultation  Cardiovascular   Palpation of heart: Normal PMI, no thrills  Auscultation of heart: Normal rate and rhythm, normal S1 and S2, no murmurs  Peripheral vascular exam: Normal     Examination of extremities for edema and/or varicosities: Normal     Abdomen   Abdomen: Non-tender, no masses  Liver and spleen: No hepatomegaly or splenomegaly  Lymphatic   Palpation of lymph nodes in neck: No lymphadenopathy  Musculoskeletal   Gait and station: Normal     Joints, bones, and muscles: Normal     Range of motion: Normal     Muscle strength/tone: Normal     Skin   Skin and subcutaneous tissue: Normal without rashes or lesions  Neurologic   Cranial nerves: Cranial nerves II-XII intact  Reflexes: 2+ and symmetric  Coordination: Normal finger to nose and heel to shin  Psychiatric   Judgment and insight: Normal     Orientation to person, place, and time: Normal     Mood and affect: Normal        Results/Data  PHQ-2 Adult Depression Screening 07Ogx7102 01:15PM User, Sunshine     Test Name Result Flag Reference   PHQ-2 Adult Depression Score 0     Over the last two weeks, how often have you been bothered by any of the following problems?   Little interest or pleasure in doing things: Not at all - 0  Feeling down, depressed, or hopeless: Not at all - 0   PHQ-2 Adult Depression Screening Negative       *VB-Depression Screening 48CRO0390 01:15PM Cleophus Rafiq     Test Name Result Flag Reference   Depression Scale Result Depression Screen - Negative For Symptoms       Signatures   Electronically signed by : NIKKI Nichole; Jun 16 2017  1:34PM EST                       (Author)    Electronically signed by : MARQUEZ Nagel ; Jul 5 2017  2:33PM EST

## 2018-01-18 NOTE — PROGRESS NOTES
Chief Complaint  Pt here today today for her PPD read results are Negative @0MM SR16      Active Problems    1  Anemia (285 9) (D64 9)   2  Bacterial vaginosis (616 10,041 9) (N76 0,B96 89)   3  Cervical cancer screening (V76 2) (Z12 4)   4  Depression screening (V79 0) (Z13 89)   5  Encounter for PPD test (V74 1) (Z11 1)   6  Migraine (346 90) (G43 909)   7  Pregnancy (V22 2) (Z33 1)   8  UTI in pregnancy (646 60,599 0) (O23 40)    Current Meds   1  Cephalexin 500 MG Oral Capsule; TAKE 1 CAPSULE EVERY 12 HOURS UNTIL GONE;   Therapy: 10JED4185 to (Evaluate:92Fpr7816)  Requested for: 49GZF8690; Last   Rx:89Vbi1188 Ordered   2  Colace 100 MG Oral Capsule; TAKE 1 CAPSULE TWICE DAILY AS NEEDED; Therapy: 39CFM0439 to (Evaluate:98Hcy9350)  Requested for: 81ZVQ5919; Last   Rx:14Hdw9546 Ordered   3  Ferrous Sulfate 325 (65 Fe) MG Oral Tablet; TAKE 1 TABLET TWICE DAILY WITH   MEALS; Therapy: 55LTI1008 to (Evaluate:03Jzz1155)  Requested for: 60GOJ5325; Last   Rx:23Fhw9622 Ordered   4  Prenatal One Daily 27-0 8 MG Oral Tablet; Therapy: 01TQO1530 to Recorded    Allergies    1  No Known Drug Allergies    2  No Known Environmental Allergies   3   No Known Food Allergies    Plan  Encounter for PPD test    · PPD    Future Appointments    Date/Time Provider Specialty Site   06/27/2017 01:00 PM Anita CARBALLO, Nurse Schedule  [facility]     Signatures   Electronically signed by : NIKKI Saldana; Jun 20 2017  8:03AM EST                       (Author)    Electronically signed by : MARQUEZ Ochoa ; Jul 5 2017  2:33PM EST

## 2018-01-22 VITALS
HEART RATE: 75 BPM | BODY MASS INDEX: 28.66 KG/M2 | WEIGHT: 146 LBS | SYSTOLIC BLOOD PRESSURE: 103 MMHG | HEIGHT: 60 IN | DIASTOLIC BLOOD PRESSURE: 63 MMHG

## 2018-01-22 VITALS
SYSTOLIC BLOOD PRESSURE: 93 MMHG | WEIGHT: 147 LBS | DIASTOLIC BLOOD PRESSURE: 63 MMHG | BODY MASS INDEX: 28.86 KG/M2 | HEIGHT: 60 IN

## 2018-01-23 VITALS
HEART RATE: 100 BPM | WEIGHT: 145 LBS | DIASTOLIC BLOOD PRESSURE: 66 MMHG | BODY MASS INDEX: 28.32 KG/M2 | SYSTOLIC BLOOD PRESSURE: 102 MMHG

## 2018-01-23 NOTE — MISCELLANEOUS
Message  patient had  on   baby is breast feeding and bottle feeding  no questions or concerns at this time  transferred to front desk to schedule a 3 week PP apt      Active Problems    1  Anemia affecting pregnancy, third trimester   2  Anemia during pregnancy (648 20) (O99 019)   3  Bacteriuria (791 9) (R82 71)   4  Cervical cancer screening (V76 2) (Z12 4)   5  Dysuria during pregnancy in third trimester (646 83,788 1) (O26 893,R30 0)   6  Encounter for pregnancy related examination in second trimester (V22 1) (Z34 92)   7  Encounter for supervision of high risk pregnancy in third trimester, antepartum (V23 9)   (O09 93)   8  Group B streptococcal infection in pregnancy (647 80,041 02) (O98 819,B95 1)   9  Late prenatal care (V23 7) (O09 30)   10  Prenatal care in third trimester (V22 1) (Z34 93)   11  Screen for sexually transmitted diseases (V74 5) (Z11 3)   12  Screening for cervical cancer (V76 2) (Z12 4)   13  Screening for diabetes mellitus (V77 1) (Z13 1)   14  Urinary tract infection in mother during pregnancy, second trimester    Current Meds   1  Colace 100 MG Oral Capsule; TAKE 1 CAPSULE TWICE DAILY AS NEEDED; Therapy: 41QSS5774 to (Evaluate:12Lim2540)  Requested for: 80Fmd1588; Last   Rx:27Kmp8067 Ordered   2  DrRx Keflex 500 MG #30; Keflex 500mg 1 po q 12 hrs x7 days; Therapy: 97EZD9327 to (Evaluate:01Gvm1416); Last Rx:70Lbe5721 Ordered   3  Ferrous Sulfate 325 (65 Fe) MG Oral Tablet; TAKE 1 TABLET TWICE DAILY WITH   MEALS; Therapy: 49NKO3478 to (Evaluate:86Inr9401)  Requested for: 08Nxt9910; Last   Rx:10Cvu2013 Ordered   4  Prenatal One Daily 27-0 8 MG Oral Tablet; Therapy: 76BXR5731 to Recorded    Allergies    1  No Known Drug Allergies    2  No Known Environmental Allergies   3   No Known Food Allergies    Signatures   Electronically signed by : Pascual Khan RN; Dec 15 2017  1:18PM EST                       (Author)

## 2018-01-23 NOTE — MISCELLANEOUS
Discussion/Summary  Discussion Summary:   Patient is to schedule follow-up appointment with OBGYN for vaginal delivery  History of Present Illness  TCM Communication [facility]: The patient is being contacted for follow-up after hospitalization  She was hospitalized at [facility]  The date of admission: 12/11/17, date of discharge: 12/13/17  Diagnosis: VAGINAL DELIVERY  She was discharged to home  Medications were not reviewed today  She did not schedule a follow up appointment  Communication performed and completed by Morteza Stack   HPI: TCMXX/NC      Active Problems    1  Anemia affecting pregnancy, third trimester   2  Anemia during pregnancy (648 20) (O99 019)   3  Bacteriuria (791 9) (R82 71)   4  Cervical cancer screening (V76 2) (Z12 4)   5  Dysuria during pregnancy in third trimester (646 83,788 1) (O26 893,R30 0)   6  Encounter for pregnancy related examination in second trimester (V22 1) (Z34 92)   7  Encounter for supervision of high risk pregnancy in third trimester, antepartum (V23 9)   (O09 93)   8  Group B streptococcal infection in pregnancy (647 80,041 02) (O98 819,B95 1)   9  Late prenatal care (V23 7) (O09 30)   10  Prenatal care in third trimester (V22 1) (Z34 93)   11  Screen for sexually transmitted diseases (V74 5) (Z11 3)   12  Screening for cervical cancer (V76 2) (Z12 4)   13  Screening for diabetes mellitus (V77 1) (Z13 1)   14  Urinary tract infection in mother during pregnancy, second trimester    Past Medical History    1  History of Bacterial vaginosis (616 10,041 9) (N76 0,B96 89)   2  History of Depression screening (V79 0) (Z13 89)   3  Encounter for nuchal translucency testing (V28 89) (Z36 82)   4  History of Encounter for PPD test (V74 1) (Z11 1)   5  History of Encounter for supervision of other normal pregnancy in first trimester (V22 1)   (Z34 81)   6  History of anemia (V12 3) (Z86 2)   7  History of pregnancy (V13 29)   8  History of Migraine (346 90) (G43 909)   9  Screening, , for risk of pre-term labor (V28 82) (Z36 86)   10  History of UTI in pregnancy (646 60,599 0) (O23 40)    Surgical History    1  History of Tonsillectomy    Family History  Mother    1  No pertinent family history  Father    2  Family history of diabetes mellitus (V18 0) (Z83 3)    Social History    · [employer] ([employer]   · Current every day smoker (305 1) (F17 200)   · Denied: History of drug use   · No alcohol use   · No drug use    Current Meds   1  Colace 100 MG Oral Capsule; TAKE 1 CAPSULE TWICE DAILY AS NEEDED; Therapy: 79TVE0738 to (Evaluate:14Dhe4877)  Requested for: 18Bzp0077; Last   Rx:37Fub4029 Ordered   2  DrRx Keflex 500 MG #30; Keflex 500mg 1 po q 12 hrs x7 days; Therapy: 63HDI9182 to (Evaluate:57Udu8902); Last Rx:68Wjx9384 Ordered   3  Ferrous Sulfate 325 (65 Fe) MG Oral Tablet; TAKE 1 TABLET TWICE DAILY WITH MEALS; Therapy: 86ARA0603 to (Evaluate:28Ksf9201)  Requested for: 47Pkh3485; Last   Rx:87Osf8873 Ordered   4  Prenatal One Daily 27-0 8 MG Oral Tablet; Therapy: 49VUS3069 to Recorded    Allergies    1  No Known Drug Allergies    2  No Known Environmental Allergies   3   No Known Food Allergies    Message   Recorded as Task   Date: 2017 02:09 PM, Created By: System   Task Name: [facility]   Assigned To: RACQUEL HEBERT,CLINICAL TEAM   Regarding Patient: Lakeisha Sandra, Status: Active   Comment:    System - 13 Dec 2017 2:09 PM     Patient discharged from hospital   Patient Name: Abdulaziz Resendiz  Patient YOB: 1990  Discharge Date: 2017  Facility: [place] [address] Dec 2017 2:23 PM     TASK REASSIGNED: Previously Assigned To Brionna Batres - 13 Dec 2017 2:31 PM     TASK REASSIGNED: Previously Assigned To RACQUEL HEBERT,CLINICAL TEAM  TCMXX/NC     Signatures   Electronically signed by : Caleb Benson, ; Dec 13 2017  2:34PM EST                       (Author)    Electronically signed by : Ann 14Alfredo Avsilvio N Romance, [address]; Dec 21 2017 11:55AM EST                       (Author)    Electronically signed by : MARQUEZ Wesley ; Dec 21 2017  2:19PM EST

## 2018-01-23 NOTE — MISCELLANEOUS
Message  Left msg for patient to  keflex from the pharmacy and taking BID for 7 days for UTI   she has a PN apt tomorrow      Active Problems    1  Anemia affecting pregnancy, third trimester   2  Anemia during pregnancy (648 20) (O99 019)   3  Bacteriuria (791 9) (R82 71)   4  Cervical cancer screening (V76 2) (Z12 4)   5  Dysuria during pregnancy in third trimester (646 83,788 1) (O26 893,R30 0)   6  Encounter for pregnancy related examination in second trimester (V22 1) (Z34 92)   7  Encounter for supervision of high risk pregnancy in third trimester, antepartum (V23 9)   (O09 93)   8  Group B streptococcal infection in pregnancy (647 80,041 02) (O98 819,B95 1)   9  Late prenatal care (V23 7) (O09 30)   10  Prenatal care in third trimester (V22 1) (Z34 93)   11  Screen for sexually transmitted diseases (V74 5) (Z11 3)   12  Screening for cervical cancer (V76 2) (Z12 4)   13  Screening for diabetes mellitus (V77 1) (Z13 1)   14  Urinary tract infection in mother during pregnancy, second trimester    Current Meds   1  Colace 100 MG Oral Capsule; TAKE 1 CAPSULE TWICE DAILY AS NEEDED; Therapy: 25BBJ6496 to (Evaluate:61Jcx6397)  Requested for: 31Cmf3240; Last   Rx:55Zvm2365 Ordered   2  DrRx Keflex 500 MG #30; Keflex 500mg 1 po q 12 hrs x7 days; Therapy: 66WDJ2709 to (Evaluate:02Tey6626); Last Rx:49Nmo4970 Ordered   3  Ferrous Sulfate 325 (65 Fe) MG Oral Tablet; TAKE 1 TABLET TWICE DAILY WITH   MEALS; Therapy: 15JTJ3979 to (Evaluate:26Ckl5997)  Requested for: 96Fih6496; Last   Rx:57Nae3644 Ordered   4  Prenatal One Daily 27-0 8 MG Oral Tablet; Therapy: 38DKS8477 to Recorded    Allergies    1  No Known Drug Allergies    2  No Known Environmental Allergies   3   No Known Food Allergies    Signatures   Electronically signed by : Natalee Salgado RN; Dec  5 2017  4:47PM EST                       (Author)

## 2018-08-07 ENCOUNTER — HOSPITAL ENCOUNTER (OUTPATIENT)
Facility: HOSPITAL | Age: 28
Discharge: HOME | End: 2018-08-07
Attending: OBSTETRICS & GYNECOLOGY | Admitting: OBSTETRICS & GYNECOLOGY
Payer: COMMERCIAL

## 2018-08-07 VITALS
HEIGHT: 65 IN | WEIGHT: 123 LBS | SYSTOLIC BLOOD PRESSURE: 120 MMHG | RESPIRATION RATE: 18 BRPM | BODY MASS INDEX: 20.49 KG/M2 | TEMPERATURE: 98.8 F | HEART RATE: 113 BPM | OXYGEN SATURATION: 99 % | DIASTOLIC BLOOD PRESSURE: 67 MMHG

## 2018-08-07 DIAGNOSIS — O99.019 ANEMIA DURING PREGNANCY: ICD-10-CM

## 2018-08-07 DIAGNOSIS — O23.40 URINARY TRACT INFECTION AFFECTING PREGNANCY: ICD-10-CM

## 2018-08-07 DIAGNOSIS — B37.3 VAGINAL CANDIDIASIS: ICD-10-CM

## 2018-08-07 DIAGNOSIS — Z3A.25 25 WEEKS GESTATION OF PREGNANCY: Primary | ICD-10-CM

## 2018-08-07 PROBLEM — R30.0 DYSURIA DURING PREGNANCY IN THIRD TRIMESTER: Status: RESOLVED | Noted: 2017-09-14 | Resolved: 2018-08-07

## 2018-08-07 PROBLEM — B37.31 VAGINAL CANDIDIASIS: Status: ACTIVE | Noted: 2018-08-07

## 2018-08-07 PROBLEM — Z3A.39 39 WEEKS GESTATION OF PREGNANCY: Status: RESOLVED | Noted: 2017-12-11 | Resolved: 2018-08-07

## 2018-08-07 PROBLEM — O23.43 URINARY TRACT INFECTION IN MOTHER DURING THIRD TRIMESTER OF PREGNANCY: Status: ACTIVE | Noted: 2018-08-07

## 2018-08-07 PROBLEM — O26.893 DYSURIA DURING PREGNANCY IN THIRD TRIMESTER: Status: RESOLVED | Noted: 2017-09-14 | Resolved: 2018-08-07

## 2018-08-07 PROBLEM — O26.899 ABDOMINAL PAIN IN PREGNANCY: Status: ACTIVE | Noted: 2018-08-07

## 2018-08-07 PROBLEM — O09.33 NO PRENATAL CARE IN CURRENT PREGNANCY IN THIRD TRIMESTER: Status: ACTIVE | Noted: 2018-08-07

## 2018-08-07 PROBLEM — R10.9 ABDOMINAL PAIN IN PREGNANCY: Status: ACTIVE | Noted: 2018-08-07

## 2018-08-07 LAB
ABO GROUP BLD: NORMAL
AMPHETAMINES SERPL QL SCN: NEGATIVE
BACTERIA UR QL AUTO: ABNORMAL /HPF
BARBITURATES UR QL: NEGATIVE
BASOPHILS # BLD AUTO: 0.01 THOUSANDS/ΜL (ref 0–0.1)
BASOPHILS NFR BLD AUTO: 0 % (ref 0–1)
BENZODIAZ UR QL: NEGATIVE
BILIRUB UR QL STRIP: NEGATIVE
BLD GP AB SCN SERPL QL: NEGATIVE
CLARITY UR: ABNORMAL
COCAINE UR QL: NEGATIVE
COLOR UR: YELLOW
EOSINOPHIL # BLD AUTO: 0.06 THOUSAND/ΜL (ref 0–0.61)
EOSINOPHIL NFR BLD AUTO: 1 % (ref 0–6)
ERYTHROCYTE [DISTWIDTH] IN BLOOD BY AUTOMATED COUNT: 17.6 % (ref 11.6–15.1)
GLUCOSE UR STRIP-MCNC: NEGATIVE MG/DL
HCT VFR BLD AUTO: 25 % (ref 34.8–46.1)
HGB BLD-MCNC: 7 G/DL (ref 11.5–15.4)
HGB UR QL STRIP.AUTO: ABNORMAL
KETONES UR STRIP-MCNC: NEGATIVE MG/DL
LEUKOCYTE ESTERASE UR QL STRIP: ABNORMAL
LYMPHOCYTES # BLD AUTO: 1.67 THOUSANDS/ΜL (ref 0.6–4.47)
LYMPHOCYTES NFR BLD AUTO: 20 % (ref 14–44)
MCH RBC QN AUTO: 19.4 PG (ref 26.8–34.3)
MCHC RBC AUTO-ENTMCNC: 28 G/DL (ref 31.4–37.4)
MCV RBC AUTO: 69 FL (ref 82–98)
METHADONE UR QL: NEGATIVE
MONOCYTES # BLD AUTO: 0.68 THOUSAND/ΜL (ref 0.17–1.22)
MONOCYTES NFR BLD AUTO: 8 % (ref 4–12)
NEUTROPHILS # BLD AUTO: 5.78 THOUSANDS/ΜL (ref 1.85–7.62)
NEUTS SEG NFR BLD AUTO: 71 % (ref 43–75)
NITRITE UR QL STRIP: POSITIVE
NON-SQ EPI CELLS URNS QL MICRO: ABNORMAL /HPF
NRBC BLD AUTO-RTO: 0 /100 WBCS
OPIATES UR QL SCN: NEGATIVE
OTHER STN SPEC: ABNORMAL
PCP UR QL: NEGATIVE
PH UR STRIP.AUTO: 7.5 [PH] (ref 4.5–8)
PLATELET # BLD AUTO: 224 THOUSANDS/UL (ref 149–390)
PMV BLD AUTO: 10.3 FL (ref 8.9–12.7)
PROT UR STRIP-MCNC: ABNORMAL MG/DL
RBC # BLD AUTO: 3.6 MILLION/UL (ref 3.81–5.12)
RBC #/AREA URNS AUTO: ABNORMAL /HPF
RH BLD: POSITIVE
SP GR UR STRIP.AUTO: 1.01 (ref 1–1.03)
SPECIMEN EXPIRATION DATE: NORMAL
THC UR QL: NEGATIVE
UROBILINOGEN UR QL STRIP.AUTO: 2 E.U./DL
WBC # BLD AUTO: 8.2 THOUSAND/UL (ref 4.31–10.16)
WBC #/AREA URNS AUTO: ABNORMAL /HPF

## 2018-08-07 PROCEDURE — 87491 CHLMYD TRACH DNA AMP PROBE: CPT | Performed by: STUDENT IN AN ORGANIZED HEALTH CARE EDUCATION/TRAINING PROGRAM

## 2018-08-07 PROCEDURE — 87077 CULTURE AEROBIC IDENTIFY: CPT | Performed by: STUDENT IN AN ORGANIZED HEALTH CARE EDUCATION/TRAINING PROGRAM

## 2018-08-07 PROCEDURE — 80307 DRUG TEST PRSMV CHEM ANLYZR: CPT | Performed by: STUDENT IN AN ORGANIZED HEALTH CARE EDUCATION/TRAINING PROGRAM

## 2018-08-07 PROCEDURE — 87591 N.GONORRHOEAE DNA AMP PROB: CPT | Performed by: STUDENT IN AN ORGANIZED HEALTH CARE EDUCATION/TRAINING PROGRAM

## 2018-08-07 PROCEDURE — 80081 OBSTETRIC PANEL INC HIV TSTG: CPT | Performed by: STUDENT IN AN ORGANIZED HEALTH CARE EDUCATION/TRAINING PROGRAM

## 2018-08-07 PROCEDURE — 81001 URINALYSIS AUTO W/SCOPE: CPT | Performed by: STUDENT IN AN ORGANIZED HEALTH CARE EDUCATION/TRAINING PROGRAM

## 2018-08-07 PROCEDURE — 76815 OB US LIMITED FETUS(S): CPT | Performed by: STUDENT IN AN ORGANIZED HEALTH CARE EDUCATION/TRAINING PROGRAM

## 2018-08-07 PROCEDURE — 87186 SC STD MICRODIL/AGAR DIL: CPT | Performed by: STUDENT IN AN ORGANIZED HEALTH CARE EDUCATION/TRAINING PROGRAM

## 2018-08-07 PROCEDURE — 99212 OFFICE O/P EST SF 10 MIN: CPT

## 2018-08-07 PROCEDURE — 76817 TRANSVAGINAL US OBSTETRIC: CPT | Performed by: STUDENT IN AN ORGANIZED HEALTH CARE EDUCATION/TRAINING PROGRAM

## 2018-08-07 PROCEDURE — 87086 URINE CULTURE/COLONY COUNT: CPT | Performed by: STUDENT IN AN ORGANIZED HEALTH CARE EDUCATION/TRAINING PROGRAM

## 2018-08-07 RX ORDER — FERROUS SULFATE 325(65) MG
1 TABLET ORAL
Qty: 60 TABLET | Refills: 4 | Status: ON HOLD | OUTPATIENT
Start: 2018-08-07 | End: 2018-11-05 | Stop reason: SDDI

## 2018-08-07 RX ORDER — NITROFURANTOIN 25; 75 MG/1; MG/1
100 CAPSULE ORAL 2 TIMES DAILY
Qty: 10 CAPSULE | Refills: 0 | Status: SHIPPED | OUTPATIENT
Start: 2018-08-07 | End: 2018-08-12

## 2018-08-07 NOTE — ED NOTES
FHT = [address] [facility], [address], [address] [facility]  08/07/18 0793

## 2018-08-08 LAB
CHLAMYDIA DNA CVX QL NAA+PROBE: NORMAL
HBV SURFACE AG SER QL: NORMAL
HIV 1+2 AB+HIV1 P24 AG SERPL QL IA: NORMAL
N GONORRHOEA DNA GENITAL QL NAA+PROBE: NORMAL
RPR SER QL: NORMAL
RUBV IGG SERPL IA-ACNC: 21.4 IU/ML

## 2018-08-08 NOTE — PROGRESS NOTES
Triage Note - OB  Zechariah Tidwell 29 y o  female MRN: [id number]  Unit/Bed#: L&D 326-01 Encounter: [id number]    Chief Complaint: Abdominal pain  JIMMIE: Estimated Date of Delivery: None noted  HPI: Patient is a [id number] with LMP of "sometime at the end of March" presenting with abdominal pain  Patient has not had any prenatal care to date  Patient states she was unable to obtain care because of her work schedule  She reports abdominal pain that had begun today while she was at work  She reports this as sharp generalized abdominal pain that is more uncomfortable when she is sitting up in forward and is relieved when she is laying back  She reports some dysuria and vaginal itching and she either has a urinary tract infection or yeast infection  She reports she feels she had fevers over the weekend but denies current fevers  She denies any nausea or vomiting, diarrhea, chest pain, shortness of breath or any other complaints this evening  Her OB history significant for vaginal deliveries with her last delivery here at [facility]  She reports would like to follow up at [facility]  She denies any gyn history  Vitals:   /67 (BP Location: Left arm)   Pulse (!) 113   Temp 98 8 °F (37 1 °C) (Oral)   Resp 18   Ht 5' 5" (1 651 m)   Wt 55 8 kg (123 lb)   SpO2 99%   BMI 20 47 kg/m²   Body mass index is 20 47 kg/m²  Physical Exam  GEN: Appears common in no acute distress  ABD:   Soft, nontender, gravid uterus measuring 5 cm above umbilicus,  SSE: vaginal erythema noted with thick white discharge, cervix appear multiparous and FT dilated, no lesions noted, minimal amount of thin white discharge with moderate amount of thick white discharge  Wet mount/KOH:  Negative clue cells, negative Trichomonas, moderate amount hyphae seen, moderate leuks    pH: 4 0    FHT:  Baseline Rate: 145 bpm  Variability: Moderate 6-25 bpm  Accelerations: 10 x 10 (<32 weeks), 15 x 15 or greater, At variable times  Decelerations: None  TOCO:   Contraction Frequency (minutes): 0    Labs:   Recent Results (from the past 24 hour(s))   Rapid drug screen, urine    Collection Time: 08/07/18  8:15 PM   Result Value Ref Range    Amph/Meth UR Negative Negative    Barbiturate Ur Negative Negative    Benzodiazepine Urine Negative Negative    Cocaine Urine Negative Negative    Methadone Urine Negative Negative    Opiate Urine Negative Negative    PCP Ur Negative Negative    THC Urine Negative Negative   Urinalysis with reflex to microscopic    Collection Time: 08/07/18  8:15 PM   Result Value Ref Range    Color, UA Yellow     Clarity, UA Slightly Cloudy     Specific Dexter, UA 1 015 1 003 - 1 030    pH, UA 7 5 4 5 - 8 0    Leukocytes, UA Moderate (A) Negative    Nitrite, UA Positive (A) Negative    Protein, UA Trace (A) Negative mg/dl    Glucose, UA Negative Negative mg/dl    Ketones, UA Negative Negative mg/dl    Urobilinogen, UA 2 0 (A) 0 2, 1 0 E U /dl E U /dl    Bilirubin, UA Negative Negative    Blood, UA Small (A) >=2000 (4+), Trace-Intact, Negative   CBC and differential    Collection Time: 08/07/18  8:15 PM   Result Value Ref Range    WBC 8 20 4  31 - 10 16 Thousand/uL    RBC 3 60 (L) 3 81 - 5 12 Million/uL    Hemoglobin 7 0 (L) 11 5 - 15 4 g/dL    Hematocrit 25 0 (L) 34 8 - 46 1 %    MCV 69 (L) 82 - 98 fL    MCH 19 4 (L) 26 8 - 34 3 pg    MCHC 28 0 (L) 31 4 - 37 4 g/dL    RDW 17 6 (H) 11 6 - 15 1 %    MPV 10 3 8 9 - 12 7 fL    Platelets 429 805 - 539 Thousands/uL    nRBC 0 /100 WBCs    Neutrophils Relative 71 43 - 75 %    Lymphocytes Relative 20 14 - 44 %    Monocytes Relative 8 4 - 12 %    Eosinophils Relative 1 0 - 6 %    Basophils Relative 0 0 - 1 %    Neutrophils Absolute 5 78 1 85 - 7 62 Thousands/µL    Lymphocytes Absolute 1 67 0 60 - 4 47 Thousands/µL    Monocytes Absolute 0 68 0 17 - 1 22 Thousand/µL    Eosinophils Absolute 0 06 0 00 - 0 61 Thousand/µL    Basophils Absolute 0 01 0 00 - 0 10 Thousands/µL       Imaging:   Transvaginal ultrasound:   cervical length measuring 3 71-3 83 cm   no dynamic change   no funneling   vertex presentation     Transabdominal ultrasound:   single intrauterine pregnancy   vertex presentation    Left fundal placenta   BPD 6 31 cm ( 25 weeks 4 days)   HC  23 13 cm ( 25 weeks 1 day)   AC: 20 06 cm ( 24 weeks 5 days)   FL 4 56 cm ( 25 weeks 1 day)   gestational age based on today's ultrasound was 25 weeks and 2 days with an  JIMMIE 2018 based on today's ultrasound  Lab, Imaging and other studies: I have personally reviewed p  Negative clue cells, negativeertinent reports  A/P:  72-year-old  004 with  No prenatal care and abdominal pain  1)   No prenatal care with current pregnancy at approximately 25 weeks and 2 days by today's ultrasound    Prenatal panel drawn and is pending    Anemia with hemoglobin of 7 0 noted  on labs returning on prenatal panel     GC chlamydia complete impending    UDS negative    Counseled patient on the importance of  routine prenatal follow-up and to call for prenatal appointment as soon as possible    Consultation to perinatology for level 1 ultrasound given     2)  Abdominal pain with dysuria     labor workup negative    UA significant for UTI-  Will send Macrobid 100 mg b i d  for 5 days to pharmacy    KOH significant for vulvar candidiasis-  Will send Monistat 7 to pharmacy    3) Discharge instructions given to patient and   labor precautions reviewed      4) D/W Dr Samuel Garcia MD  2018  9:32 PM

## 2018-08-08 NOTE — PROCEDURES
bev Payan [id number] at Unknown with an JIMMIE of Not found  , was seen at [facility] for the following procedure(s): $Procedure Type: US - Transvaginal, US - abdominal]    Ultrasound Other  Fetal Presentation: Vertex  Cervical Length: 3 71  Funnel: No  Debris: No  Placenta Location: Fundal    Transvaginal ultrasound:   cervical length measuring 3 71-3 83 cm   no dynamic change   no funneling   vertex presentation      Transabdominal ultrasound:   single intrauterine pregnancy   vertex presentation    Left fundal placenta   BPD 6 31 cm ( 25 weeks 4 days)   HC  23 13 cm ( 25 weeks 1 day)   AC: 20 06 cm ( 24 weeks 5 days)   FL 4 56 cm ( 25 weeks 1 day)   gestational age based on today's ultrasound was 25 weeks and 2 days with an  JIMMIE 11/18/2018 based on today's ultrasound  Completed with Dr Alexis Rhine Lawrance Skiff, MD  08/07/18

## 2018-08-09 LAB — BACTERIA UR CULT: ABNORMAL

## 2018-08-10 ENCOUNTER — APPOINTMENT (OUTPATIENT)
Dept: LAB | Facility: HOSPITAL | Age: 28
End: 2018-08-10
Payer: COMMERCIAL

## 2018-08-10 ENCOUNTER — INITIAL PRENATAL (OUTPATIENT)
Dept: OBGYN CLINIC | Facility: CLINIC | Age: 28
End: 2018-08-10
Payer: COMMERCIAL

## 2018-08-10 VITALS
DIASTOLIC BLOOD PRESSURE: 67 MMHG | HEIGHT: 61 IN | WEIGHT: 128.8 LBS | BODY MASS INDEX: 24.32 KG/M2 | HEART RATE: 81 BPM | SYSTOLIC BLOOD PRESSURE: 108 MMHG

## 2018-08-10 DIAGNOSIS — Z3A.25 25 WEEKS GESTATION OF PREGNANCY: ICD-10-CM

## 2018-08-10 DIAGNOSIS — Z3A.25 25 WEEKS GESTATION OF PREGNANCY: Primary | ICD-10-CM

## 2018-08-10 LAB — HBA1C MFR BLD HPLC: 5.2 %

## 2018-08-10 PROCEDURE — 83036 HEMOGLOBIN GLYCOSYLATED A1C: CPT

## 2018-08-10 PROCEDURE — 99211 OFF/OP EST MAY X REQ PHY/QHP: CPT

## 2018-08-10 PROCEDURE — 36415 COLL VENOUS BLD VENIPUNCTURE: CPT

## 2018-08-10 PROCEDURE — 83020 HEMOGLOBIN ELECTROPHORESIS: CPT

## 2018-08-10 NOTE — PATIENT INSTRUCTIONS
Pregnancy   WHAT YOU NEED TO KNOW:   What do I need to know about pregnancy? A normal pregnancy lasts about 40 weeks  The first trimester lasts from your last period through the 12th week of pregnancy  The second trimester lasts from the 13th week of your pregnancy through the 23rd week  The third trimester lasts from your 24th week of pregnancy until your baby is born  If you know the date of your last period, your healthcare provider can estimate your due date  You may give birth to your baby any time from 37 weeks to 2 weeks after your due date  What is prenatal care? Prenatal care is a series of visits with your healthcare provider throughout your pregnancy  Prenatal care can help prevent problems during pregnancy and childbirth  At each prenatal visit, your healthcare provider will weigh you and check your blood pressure  Your healthcare provider will also check your baby's heartbeat and growth  You may also need the following at some visits:  · A pelvic exam  allows your healthcare provider to see your cervix (the bottom part of your uterus)  Your healthcare provider uses a speculum to gently open your vagina  He will check the size and shape of your uterus  · Blood tests  may be done to check for gestational diabetes and anemia (low iron level)  You may need other blood tests, such as blood type, Rh factor, or tests to check for birth defects  · A fetal ultrasound  shows pictures of your baby inside your uterus  It shows your baby's development  The movement and position of your baby can also be seen  Your healthcare provider may be able to tell you what your baby's gender is during the ultrasound  What can I do to have a healthy pregnancy? · Eat a variety of healthy foods  Healthy foods include fruits, vegetables, whole-grain breads, low-fat dairy foods, beans, lean meats, and fish  Drink liquids as directed  Ask how much liquid to drink each day and which liquids are best for you   Limit caffeine to less than 200 milligrams each day  Limit your intake of fish to 2 servings each week  Choose fish low in mercury such as canned light tuna, shrimp, crab, salmon, cod, or tilapia  Do not  eat fish high in mercury such as swordfish, tilefish, will mackerel, and shark  · Take prenatal vitamins as directed  Your need for certain vitamins and minerals, such as folic acid, increases during pregnancy  Prenatal vitamins provide some of the extra vitamins and minerals you need  Prenatal vitamins may also help to decrease the risk of certain birth defects  · Ask how much weight you should gain during your pregnancy  Too much or too little weight gain can be unhealthy for you and your baby  · Talk to your healthcare provider about exercise  Moderate exercise can help you stay fit  Your healthcare provider will help you plan an exercise program that is safe for you during pregnancy  · Do not smoke  If you smoke, it is never too late to quit  Smoking increases your risk of a miscarriage and other health problems during your pregnancy  Smoking can cause your baby to be born too early or weigh less at birth  Ask your healthcare provider for information if you need help quitting  · Do not drink alcohol  Alcohol passes from your body to your baby through the placenta  It can affect your baby's brain development and cause fetal alcohol syndrome (FAS)  FAS is a group of conditions that causes mental, behavior, and growth problems  · Talk to your healthcare provider before you take any medicines  Many medicines may harm your baby if you take them when you are pregnant  Do not take any medicines, vitamins, herbs, or supplements without first talking to your healthcare provider  Never use illegal or street drugs (such as marijuana or cocaine) while you are pregnant  What body changes may happen during my pregnancy?    · Breast changes  you will experience include tenderness and tingling during the early part of your pregnancy  Your breasts will become larger  You may need to use a support bra  You may see a thin, yellow fluid, called colostrum, leak from your nipples during the second trimester  Colostrum is a liquid that changes to milk about 3 days after you give birth  · Skin changes and stretch marks  may occur during your pregnancy  You may have red marks, called stretch marks, on your skin  Stretch marks will usually fade after pregnancy  Use lotion if your skin is dry and itchy  The skin on your face, around your nipples, and below your belly button may darken  Most of the time, your skin will return to its normal color after your baby is born  · Morning sickness  is nausea and vomiting that can happen at any time of day  Avoid fatty and spicy foods  Eat small meals throughout the day instead of large meals  Giuliana may help to decrease nausea  Ask your healthcare provider about other ways of decreasing nausea and vomiting  · Heartburn  may be caused by changes in your hormones during pregnancy  Your growing uterus may also push your stomach upward and force stomach acid to back up into your esophagus  Eat 4 or 5 small meals each day instead of large meals  Avoid spicy foods  Avoid eating right before bedtime  · Constipation  may develop during your pregnancy  To treat constipation, eat foods high in fiber such as fiber cereals, beans, fruits, vegetables, whole-grain breads, and prune juice  Get regular exercise and drink plenty of water  Your healthcare provider may also suggest a fiber supplement to soften your bowel movements  Talk to your healthcare provider before you use any medicines to decrease constipation  · Hemorrhoids  are enlarged veins in the rectal area  They may cause pain, itching, and bright red bleeding from your rectum  To decrease your risk of hemorrhoids, prevent constipation and do not strain to have a bowel movement   If you have hemorrhoids, soak in a tub of warm water to ease discomfort  Ask your healthcare provider how you can treat hemorrhoids  · Leg cramps and swelling  may be caused by low calcium levels or the added weight of pregnancy  Raise your legs above the level of your heart to decrease swelling  During a leg cramp, stretch or massage the muscle that has the cramp  Heat may help decrease pain and muscle spasms  Apply heat on your muscle for 20 to 30 minutes every 2 hours for as many days as directed  · Back pain  may occur as your baby grows  Do not stand for long periods of time or lift heavy items  Use good posture while you stand, squat, or bend  Wear low-heeled shoes with good support  Rest may also help to relieve back pain  Ask your healthcare provider about exercises you can do to strengthen your back muscles  What are some safety tips during pregnancy? · Avoid hot tubs and saunas  Do not use a hot tub or sauna while you are pregnant, especially during your first trimester  Hot tubs and saunas may raise your baby's temperature and increase the risk of birth defects  · Avoid toxoplasmosis  This is an infection caused by eating raw meat or being around infected cat feces  It can cause birth defects, miscarriages, and other problems  Wash your hands after you touch raw meat  Make sure any meat is well-cooked before you eat it  Avoid raw eggs and unpasteurized milk  Use gloves or ask someone else to clean your cat's litter box while you are pregnant  · Ask your healthcare provider about travel  The most comfortable time to travel is during the second trimester  Ask your healthcare provider if you can travel after 36 weeks  You may not be able to travel in an airplane after 36 weeks  He may also recommend that you avoid long road trips  When should I seek immediate care? · You develop a severe headache that does not go away  · You have new or increased vision changes, such as blurred or spotted vision      · You have new or increased swelling in your face or hands  · You have pain or cramping in your abdomen or low back  · You have vaginal bleeding  When should I contact my healthcare provider? · You have abdominal cramps, pressure, or tightening  · You have a change in vaginal discharge  · You cannot keep food or drinks down, and you are losing weight  · You have chills or a fever  · You have vaginal itching, burning, or pain  · You have yellow, green, white, or foul-smelling vaginal discharge  · You have pain or burning when you urinate, less urine than usual, or pink or bloody urine  · You have questions or concerns about your condition or care  CARE AGREEMENT:   You have the right to help plan your care  Learn about your health condition and how it may be treated  Discuss treatment options with your caregivers to decide what care you want to receive  You always have the right to refuse treatment  The above information is an  only  It is not intended as medical advice for individual conditions or treatments  Talk to your doctor, nurse or pharmacist before following any medical regimen to see if it is safe and effective for you  © 2017 [employer]  Information is for End User's use only and may not be sold, redistributed or otherwise used for commercial purposes  All illustrations and images included in CareNotes® are the copyrighted property of [employer]  or Edgar Castro

## 2018-08-10 NOTE — PROGRESS NOTES
OB Intake  o Patient presents for OB intake interview  o Accompanied by: Self  o [id number]  - Hx of  delivery prior to 36 weeks 6 days: no  o LMP: Patient's last menstrual period was 2018 (approximate)  o Estimated Date of Delivery: 18   - confirmed by U/S  o Signs and Symptoms of pregnancy:  - positive home pregnancy test  - Constipation: no  - Headaches: no  - Cramping/spotting: no  - PICA cravings: no  - Diabetes: If you answer yes, please order 1 hr gtt testing, 50grams   History of gestational diabetes no   BMI >35  no   Advance maternal age >35 no   First degree relative with type 2 diabetes no   History of PCOS no   Current metformin use no   Prior history of macrosomia or LGA no  o Immunization Record  Immunization History   Administered Date(s) Administered    Tuberculin Skin Test-PPD Intradermal 2017, 2017   -   o Tdap:  - Counseled to be given after 28 weeks  - Influenza vaccine discussed  o MRSA questionnaire: negative  o Dental visit within last 6 months  - no, recommendations discussed  Nurse Family Partnership: No  ONAF form submitted: No    Interview education:  Airam Wilson Pregnancy Essentials booklet given to patient  Reviewed and explained   Handouts given at todays visit  o Conrado Quigley & me phone application guide  o [facility] Baby & Me support center  o CDCs Response to [address] [place] [address] Maternal Fetal Medicine  o JIMMIE letter given    - [address]  - Work       Patient here receiving late prenatal care    U/S done on L&D, JIMMIE 18    Scripts given for hemoglobin electrophoresis and HgbA1C    Level 2 ultrasound scheduled for 8/15    Patient in the process of getting insurance set up

## 2018-08-13 NOTE — PROGRESS NOTES
Prenatal panel resulted with e coli UTI susceptible to macrobid  Manus Dianne was started empirically based on symptoms prior to this result during triage visit by Dr Cristobal Garvin  On 8/10/18 when she was seen for her intake visit, she was indeed taking her Macrobid  She will need a test of cure 7 days after her last pill       Larisa Hubbard MD  OB/GYN  8/13/2018 11:49 AM

## 2018-08-14 LAB
DEPRECATED HGB OTHER BLD-IMP: 0 %
HGB A MFR BLD: 1.9 % (ref 1.8–3.2)
HGB A MFR BLD: 98.1 % (ref 96.4–98.8)
HGB C MFR BLD: 0 %
HGB F MFR BLD: 0 % (ref 0–2)
HGB FRACT BLD-IMP: NORMAL
HGB S BLD QL SOLY: NEGATIVE
HGB S MFR BLD: 0 %

## 2018-08-15 ENCOUNTER — ROUTINE PRENATAL (OUTPATIENT)
Dept: PERINATAL CARE | Facility: CLINIC | Age: 28
End: 2018-08-15
Payer: COMMERCIAL

## 2018-08-15 VITALS
BODY MASS INDEX: 24.88 KG/M2 | SYSTOLIC BLOOD PRESSURE: 110 MMHG | HEART RATE: 123 BPM | WEIGHT: 131.8 LBS | DIASTOLIC BLOOD PRESSURE: 75 MMHG | HEIGHT: 61 IN

## 2018-08-15 DIAGNOSIS — Z3A.26 26 WEEKS GESTATION OF PREGNANCY: ICD-10-CM

## 2018-08-15 DIAGNOSIS — Z36.3 ENCOUNTER FOR ANTENATAL SCREENING FOR MALFORMATIONS: ICD-10-CM

## 2018-08-15 DIAGNOSIS — O09.892 SHORT INTERVAL BETWEEN PREGNANCIES COMPLICATING PREGNANCY, ANTEPARTUM, SECOND TRIMESTER: Primary | ICD-10-CM

## 2018-08-15 PROCEDURE — 99212 OFFICE O/P EST SF 10 MIN: CPT | Performed by: OBSTETRICS & GYNECOLOGY

## 2018-08-15 PROCEDURE — 76805 OB US >/= 14 WKS SNGL FETUS: CPT | Performed by: OBSTETRICS & GYNECOLOGY

## 2018-08-15 NOTE — LETTER
August 15, 2018     [address] [place] [address] PROVIDER    Patient: Mark Howard   YOB: 1990   Date of Visit: 8/15/2018       Dear   Provider: Thank you for referring Mark Howard to me for evaluation  Below are my notes for this consultation  If you have questions, please do not hesitate to call me  I look forward to following your patient along with you  Sincerely,        Yung Avalos MD        CC: No Recipients  Yung Avalos MD  8/15/2018  9:30 AM  Sign at close encounter  Please refer to the [facility] ultrasound report in Ob Procedures for additional information regarding the visit to the [facility]  today

## 2018-08-15 NOTE — PROGRESS NOTES
Please refer to the [facility] ultrasound report in Ob Procedures for additional information regarding the visit to the [facility]  today

## 2018-08-17 LAB
EST. AVERAGE GLUCOSE BLD GHB EST-MCNC: 103 MG/DL
HBA1C MFR BLD: 5.2 % (ref 4.2–6.3)

## 2018-08-20 ENCOUNTER — HOSPITAL ENCOUNTER (OUTPATIENT)
Facility: HOSPITAL | Age: 28
Discharge: HOME | End: 2018-08-20
Attending: OBSTETRICS & GYNECOLOGY | Admitting: OBSTETRICS & GYNECOLOGY
Payer: COMMERCIAL

## 2018-08-20 ENCOUNTER — DOCUMENTATION (OUTPATIENT)
Dept: LABOR AND DELIVERY | Facility: HOSPITAL | Age: 28
End: 2018-08-20

## 2018-08-20 VITALS
TEMPERATURE: 98.2 F | HEIGHT: 60 IN | SYSTOLIC BLOOD PRESSURE: 95 MMHG | HEART RATE: 99 BPM | RESPIRATION RATE: 16 BRPM | BODY MASS INDEX: 25.72 KG/M2 | DIASTOLIC BLOOD PRESSURE: 57 MMHG | OXYGEN SATURATION: 99 % | WEIGHT: 131 LBS

## 2018-08-20 DIAGNOSIS — N76.0 BACTERIAL VAGINOSIS: ICD-10-CM

## 2018-08-20 DIAGNOSIS — B96.89 BACTERIAL VAGINOSIS: ICD-10-CM

## 2018-08-20 DIAGNOSIS — O23.43 URINARY TRACT INFECTION IN MOTHER DURING THIRD TRIMESTER OF PREGNANCY: Primary | ICD-10-CM

## 2018-08-20 PROCEDURE — 99214 OFFICE O/P EST MOD 30 MIN: CPT

## 2018-08-20 RX ORDER — METRONIDAZOLE 500 MG/1
500 TABLET ORAL EVERY 8 HOURS SCHEDULED
Status: DISCONTINUED | OUTPATIENT
Start: 2018-08-20 | End: 2018-08-20 | Stop reason: HOSPADM

## 2018-08-20 RX ORDER — NITROFURANTOIN 25; 75 MG/1; MG/1
100 CAPSULE ORAL 2 TIMES DAILY WITH MEALS
Qty: 14 CAPSULE | Refills: 0 | Status: SHIPPED | OUTPATIENT
Start: 2018-08-20 | End: 2018-09-13 | Stop reason: HOSPADM

## 2018-08-20 RX ORDER — METRONIDAZOLE 500 MG/1
500 TABLET ORAL EVERY 12 HOURS SCHEDULED
Qty: 14 TABLET | Refills: 0 | Status: SHIPPED | OUTPATIENT
Start: 2018-08-20 | End: 2018-09-03

## 2018-08-20 RX ORDER — NITROFURANTOIN 25; 75 MG/1; MG/1
100 CAPSULE ORAL 2 TIMES DAILY WITH MEALS
Status: DISCONTINUED | OUTPATIENT
Start: 2018-08-20 | End: 2018-08-20 | Stop reason: HOSPADM

## 2018-08-20 NOTE — LETTER
[address] AND DELIVERY  [facility]  Dept: [phone]    August 20, 2018     Patient: Yoana Chavez   YOB: 1990   Date of Visit: 8/20/2018       To Whom it May Concern:    Yoana Chavez is under my professional care  She was seen in the hospital from 8/20/2018 at 1500 to 1630  She may return to work on today without limitation  Please take this into consideration with her occupational responsibilities  If you have any questions or concerns, please don't hesitate to call           Sincerely,          Amol Guevara MD

## 2018-08-20 NOTE — PROGRESS NOTES
L&D Triage Note - OB/GYN  Es Awe 29 y o  female MRN: [id number]  Unit/Bed#: L&D 322-01 Encounter: [id number]      Assessment:  29 y o   at Unknown Not  labor    Plan:  1  Encourage increase fluid intake  2  Bacterial vaginosis  -Flagyl [id number] for 7 days  3  Urinary tract infection at pregnancy  -Macrobid 200 for 7 days  4  Continue to routine prenatal care        ______________________________________________________________________      Chief Compliant: Pelvic pain    TIME: 1400  Subjective:  29 y o  [id number] at 27w3d with LMP c/o lowe abdominal pain and discharge since morning  Pain is intermittent and 5/10 intensity sharp  Decline vaginal bleeding and fluid leakage  Reporting good fetal movement  Discharge is whitish and not feeling bad odor  Urination is not painfull and decline bleeding but report bad odor with urination  Objective:  Vitals:    18 1514   BP: 95/57   Pulse: 99   Resp: 16   Temp: 98 2 °F (36 8 °C)   SpO2:      Speculum exam: Cervical os closed  No signs of bleeding and green mucopurulent discharge apprecited    KOH -  [place] -   Wet [place] cell apprecited  Nitrazine -   TVUS cervical length 4 5 cm and no funneling vertex fetus,   SVE: Deferred    FHT:  140 / Moderate 6 - 25 bpm / +accels not decel, reactive  [place]: q infrequent          Jaun Mclean    77:79

## 2018-08-20 NOTE — PROGRESS NOTES
12 Pt left with discharge instructions given by Dr Ladi Maria  Pt left prior to giving antibiotic dose

## 2018-09-13 ENCOUNTER — TELEPHONE (OUTPATIENT)
Dept: OBGYN CLINIC | Facility: CLINIC | Age: 28
End: 2018-09-13

## 2018-09-13 ENCOUNTER — HOSPITAL ENCOUNTER (OUTPATIENT)
Facility: HOSPITAL | Age: 28
Discharge: HOME | End: 2018-09-13
Attending: OBSTETRICS & GYNECOLOGY | Admitting: OBSTETRICS & GYNECOLOGY
Payer: COMMERCIAL

## 2018-09-13 VITALS
RESPIRATION RATE: 18 BRPM | HEART RATE: 109 BPM | TEMPERATURE: 98.6 F | BODY MASS INDEX: 27.09 KG/M2 | DIASTOLIC BLOOD PRESSURE: 55 MMHG | SYSTOLIC BLOOD PRESSURE: 105 MMHG | WEIGHT: 138 LBS | HEIGHT: 60 IN

## 2018-09-13 PROBLEM — O26.893 ABDOMINAL PAIN DURING PREGNANCY, THIRD TRIMESTER: Status: ACTIVE | Noted: 2018-08-07

## 2018-09-13 PROBLEM — R10.9 ABDOMINAL PAIN DURING PREGNANCY, THIRD TRIMESTER: Status: RESOLVED | Noted: 2018-08-07 | Resolved: 2018-09-13

## 2018-09-13 PROBLEM — Z3A.30 30 WEEKS GESTATION OF PREGNANCY: Status: ACTIVE | Noted: 2018-08-07

## 2018-09-13 PROBLEM — O26.893 ABDOMINAL PAIN DURING PREGNANCY, THIRD TRIMESTER: Status: RESOLVED | Noted: 2018-08-07 | Resolved: 2018-09-13

## 2018-09-13 PROCEDURE — 99204 OFFICE O/P NEW MOD 45 MIN: CPT

## 2018-09-13 NOTE — PROGRESS NOTES
L&D Triage Note - OB/GYN  Osmel Sesay 29 y o  female MRN: [id number]  Unit/Bed#: L&D 325-01 Encounter: [id number]      Assessment:  29 y o   at 30w6d with [facility] contractions, not in  labor  Grand multiparity  Short interval pregnancy    Plan:  1  Contractions  -no contractions on monitor, patient states that they have subsided since her arrival to triage  -not in  labor  -TVUS: CL: 3 96cm-4 24cm  2  Discharge home  -patient to f/u in the office  _____________________________________________________________________    Chief Compliant: contractions    TIME: 1115  Subjective:  29 y o  [id number] at 30w6d complaining of painful contractions that have been occurring intermittently for the past 2 days  She has tried staying hydrated and tylenol, but still gets contractions which are painful  She denies any LOF, vaginal bleeding  She reports good fetal movement  This is the patient's 5th child in the past 6 years so she is always on her feet with her other young children          Objective:  Vitals:    18 1059   BP: 105/55   Pulse: (!) 109   Resp: 18   Temp: 98 6 °F (37 °C)       SVE: def  FHT:  135 / Moderate 6 - 25 bpm / + accelerations, reactive  [place]: none    TVUS: CL: 3 96-4 24cm      Peg Davidson MD 2018 11:35 AM

## 2018-09-13 NOTE — LETTER
[address] AND DELIVERY  [address] [place] [address]  Dept: [phone]    September 13, 2018     Patient: Mason Archuleta   YOB: 1990   Date of Visit: 9/13/2018       To Whom it May Concern:    Mason Archuleta is under my professional care  She was seen in the hospital on 9/13/2018  She should be excused from work on 9/13/18  If you have any questions or concerns, please don't hesitate to call           Sincerely,          Zuleyma Melendez MD

## 2018-09-13 NOTE — TELEPHONE ENCOUNTER
Patient called stating she is having contractions 5 mins apart last 30 secs  She is having pain and pressure  She drank a bottle of water and took tylenol without any relief  Spoke with Dr Guevara Jade  Patient to be evaluated on L&D  L&D called and aware of patient coming to triage

## 2018-10-08 ENCOUNTER — HOSPITAL ENCOUNTER (OUTPATIENT)
Facility: HOSPITAL | Age: 28
Discharge: HOME | End: 2018-10-08
Attending: OBSTETRICS & GYNECOLOGY | Admitting: OBSTETRICS & GYNECOLOGY
Payer: COMMERCIAL

## 2018-10-08 VITALS — RESPIRATION RATE: 18 BRPM | DIASTOLIC BLOOD PRESSURE: 58 MMHG | HEART RATE: 117 BPM | SYSTOLIC BLOOD PRESSURE: 111 MMHG

## 2018-10-08 PROBLEM — Z3A.34 34 WEEKS GESTATION OF PREGNANCY: Status: ACTIVE | Noted: 2018-08-07

## 2018-10-08 PROCEDURE — 99212 OFFICE O/P EST SF 10 MIN: CPT

## 2018-10-08 NOTE — PROGRESS NOTES
L&D Triage Note - OB/GYN  De Washington 29 y o  female MRN: [id number]  Unit/Bed#: L&D 321-01 Encounter: [id number]      Assessment:  29 y o   at 34w3d who presents for rule out labor  Plan:  1  Rule out labor   - Initial cervical exam is -2   - Will re-check in two hours    - Consider steroid administration if patient is discharged     Management discussed with Dr Mary Ellen Brady  ______________________________________________________________________  Chief Compliant: contractions    TIME: 16:00  Subjective: De Washington is a 29 y o   at 34w3d who presents with contractions  She states that she has had intermittent contractions over the last month, however today they have intensified  She states that they last 42 seconds and come every 10 seconds  The patient has a history of uncomplicated pregnancies  Pregnancy complications: none    Leakage of fluid: no   Fetal movement: yes  Vaginal bleeding: no   Contractions: yes    Objective:  Vitals:    10/08/18 1602   BP: 111/58   Pulse: (!) 117   Resp: 18       Physical Exam   Gen: no acute distress   SVE: % / -2  FHT:   140/ Moderate 6 - 25 bpm / accelerations, no decelerations   [place]: irregular     Bel Garcia MD 10/8/2018 4:41 PM     UPDATE  On recheck SVE: pt unchanged /-  Pt comfortable, states she wants to go home  Appointment at [facility] tomorrow  Reviewed  labor precautions with patient  Pt verbalized understanding, to call with any concerns      Skip Mitchell DO  PGY-2 OB/GYN   10/8/2018 7:30 PM

## 2018-10-08 NOTE — DISCHARGE INSTRUCTIONS
Pregnancy at 31 to 34 [address]:   You may continue to have symptoms such as shortness of breath, heartburn, contractions, or swelling of your ankles and feet  You may be gaining about 1 pound a week now  DISCHARGE INSTRUCTIONS:   Seek care immediately if:   · You develop a severe headache that does not go away  · You have new or increased vision changes, such as blurred or spotted vision  · You have new or increased swelling in your face or hands  · You have vaginal spotting or bleeding  · Your water broke or you feel warm water gushing or trickling from your vagina  Contact your healthcare provider if:   · You have more than 5 contractions in 1 hour  · You notice any changes in your baby's movements  · You have abdominal cramps, pressure, or tightening  · You have a change in vaginal discharge  · You have chills or a fever  · You have vaginal itching, burning, or pain  · You have yellow, green, white, or foul-smelling vaginal discharge  · You have pain or burning when you urinate, less urine than usual, or pink or bloody urine  · You have questions or concerns about your condition or care  How to care for yourself at this stage of your pregnancy:   · Eat a variety of healthy foods  Healthy foods include fruits, vegetables, whole-grain breads, low-fat dairy foods, beans, lean meats, and fish  Drink liquids as directed  Ask how much liquid to drink each day and which liquids are best for you  Limit caffeine to less than 200 milligrams each day  Limit your intake of fish to 2 servings each week  Choose fish low in mercury such as canned light tuna, shrimp, salmon, cod, or tilapia  Do not  eat fish high in mercury such as swordfish, tilefish, will mackerel, and shark  · Manage heartburn  by eating 4 or 5 small meals each day instead of large meals  Avoid spicy food  · Manage swelling  by lying down and putting your feet up       · Take prenatal vitamins as directed  Your need for certain vitamins and minerals, such as folic acid, increases during pregnancy  Prenatal vitamins provide some of the extra vitamins and minerals you need  Prenatal vitamins may also help to decrease the risk of certain birth defects  · Talk to your healthcare provider about exercise  Moderate exercise can help you stay fit  Your healthcare provider will help you plan an exercise program that is safe for you during pregnancy  · Do not smoke  If you smoke, it is never too late to quit  Smoking increases your risk of a miscarriage and other health problems during your pregnancy  Smoking can cause your baby to be born too early or weigh less at birth  Ask your healthcare provider for information if you need help quitting  · Do not drink alcohol  Alcohol passes from your body to your baby through the placenta  It can affect your baby's brain development and cause fetal alcohol syndrome (FAS)  FAS is a group of conditions that causes mental, behavior, and growth problems  · Talk to your healthcare provider before you take any medicines  Many medicines may harm your baby if you take them when you are pregnant  Do not take any medicines, vitamins, herbs, or supplements without first talking to your healthcare provider  Never use illegal or street drugs (such as marijuana or cocaine) while you are pregnant  Safety tips during pregnancy:   · Avoid hot tubs and saunas  Do not use a hot tub or sauna while you are pregnant, especially during your first trimester  Hot tubs and saunas may raise your baby's temperature and increase the risk of birth defects  · Avoid toxoplasmosis  This is an infection caused by eating raw meat or being around infected cat feces  It can cause birth defects, miscarriages, and other problems  Wash your hands after you touch raw meat  Make sure any meat is well-cooked before you eat it  Avoid raw eggs and unpasteurized milk   Use gloves or ask someone else to clean your cat's litter box while you are pregnant  Changes that are happening with your baby:  By 34 weeks, your baby may weigh more than 5 pounds  Your baby will be about 12 ½ inches long from the top of the head to the rump (baby's bottom)  Your baby is gaining about ½ pound a week  Your baby's eyes open and close now  Your baby's kicks and movements are more forceful at this time  What you need to know about prenatal care: Your healthcare provider will check your blood pressure and weight  You may also need the following:  · A urine test  may also be done to check for sugar and protein  These can be signs of gestational diabetes or infection  Protein in your urine may also be a sign of preeclampsia  Preeclampsia is a condition that can develop during week 20 or later of your pregnancy  It causes high blood pressure, and it can cause problems with your kidneys and other organs  · A Tdap vaccine  may be recommended by your healthcare provider  · Fundal height  is a measurement of your uterus to check your baby's growth  This number is usually the same as the number of weeks that you have been pregnant  Your healthcare provider may also check your baby's position  · Your baby's heart rate  will be checked  © 2017 [employer] Information is for End User's use only and may not be sold, redistributed or otherwise used for commercial purposes  All illustrations and images included in CareNotes® are the copyrighted property of [employer]  or Edgar Castro  The above information is an  only  It is not intended as medical advice for individual conditions or treatments  Talk to your doctor, nurse or pharmacist before following any medical regimen to see if it is safe and effective for you

## 2018-10-09 ENCOUNTER — ROUTINE PRENATAL (OUTPATIENT)
Dept: OBGYN CLINIC | Facility: CLINIC | Age: 28
End: 2018-10-09
Payer: COMMERCIAL

## 2018-10-09 VITALS — WEIGHT: 134 LBS | BODY MASS INDEX: 26.17 KG/M2 | SYSTOLIC BLOOD PRESSURE: 96 MMHG | DIASTOLIC BLOOD PRESSURE: 67 MMHG

## 2018-10-09 DIAGNOSIS — B37.3 VAGINAL CANDIDIASIS: ICD-10-CM

## 2018-10-09 DIAGNOSIS — Z3A.34 34 WEEKS GESTATION OF PREGNANCY: Primary | ICD-10-CM

## 2018-10-09 DIAGNOSIS — O23.43 URINARY TRACT INFECTION IN MOTHER DURING THIRD TRIMESTER OF PREGNANCY: ICD-10-CM

## 2018-10-09 PROCEDURE — 99213 OFFICE O/P EST LOW 20 MIN: CPT | Performed by: OBSTETRICS & GYNECOLOGY

## 2018-10-09 NOTE — PROGRESS NOTES
Jeremiah Marrufo presents today for routine OB visit at 34w4d  She reports irregular ctx  Denies vaginal bleeding or leaking of fluid  Reports adequate fetal movement of at least 10 movements in 2 hours once daily  Third trimester bloodwork reveals anemia  Scheduled for next ultrasound none  Reviewed premature labor precautions and fetal kick counts  Advised to continue prenatal vitamins and return in 1 week      Pregnancy Problems (from 08/07/18 to present)     Problem Noted Resolved    Urinary tract infection in mother during third trimester of pregnancy 8/7/2018 by Jennifer Barnhart MD No    34 weeks gestation of pregnancy 8/7/2018 by Jennifer Barnhart MD No    No prenatal care in current pregnancy in third trimester 8/7/2018 by Jennifer Barnhart MD No    Vaginal candidiasis 8/7/2018 by Jennifer Barnhart MD No

## 2018-10-10 ENCOUNTER — HOSPITAL ENCOUNTER (OUTPATIENT)
Facility: HOSPITAL | Age: 28
Discharge: HOME | End: 2018-10-10
Attending: OBSTETRICS & GYNECOLOGY | Admitting: OBSTETRICS & GYNECOLOGY
Payer: COMMERCIAL

## 2018-10-10 ENCOUNTER — TELEPHONE (OUTPATIENT)
Dept: OBGYN CLINIC | Facility: MEDICAL CENTER | Age: 28
End: 2018-10-10

## 2018-10-10 VITALS
RESPIRATION RATE: 17 BRPM | HEART RATE: 131 BPM | TEMPERATURE: 98.4 F | SYSTOLIC BLOOD PRESSURE: 108 MMHG | DIASTOLIC BLOOD PRESSURE: 60 MMHG

## 2018-10-10 PROCEDURE — 99212 OFFICE O/P EST SF 10 MIN: CPT

## 2018-10-10 PROCEDURE — 99213 OFFICE O/P EST LOW 20 MIN: CPT | Performed by: OBSTETRICS & GYNECOLOGY

## 2018-10-10 NOTE — PROGRESS NOTES
L&D Triage Note - OB/GYN  Dagoberto Marion 29 y o  female MRN: [id number]  Unit/Bed#: L&D 329-02 Encounter: [id number]    Patient is seen by [facility]  _________________________________________________________  ASSESSMENT:  _________________________________________________________  Dagoberto Marion is a 29 y o   at 34w5d r/o ROM  _________________________________________________________  PLAN:  _________________________________________________________  1) R/o ROM   - Negative workup, see below  2) Continue routine prenatal care   - Next appt 10/16/18  3) Discharge from [facility] triage with  labor precautions   - Reviewed ROM, spotting, when to call OB provider   - Case discussed with Dr Chato Santa  Date Time Provider Diana Lisa   10/16/2018 3:00 PM [facility]     _________________________________________________________  SUBJECTIVE:  _________________________________________________________  Dagoberto Marion 29 y o  [id number] at 34w5d with an Estimated Date of Delivery: 18 who presents after having lost her mucus plug  She denies any gush of fluid, vaginal bleeding, and appears comfortable despite having "back to back" contractions earlier that have also "gone away" while in triage      Contractions: none  Leakage of fluid: none  Vaginal Bleeding: none  Fetal movement: present  _________________________________________________________  OBJECTIVE:  _________________________________________________________  Vitals:    10/10/18 1832   BP: 108/60   Pulse: (!) 131   Resp: 17   Temp: 98 4 °F (36 9 °C)       ROS:  Constitutional: Negative  Respiratory: Negative  Cardiovascular: Negative    Gastrointestinal: Negative    General Physical Exam:  General: in no apparent distress, well developed and well nourished and non-toxic  Abdomen: abdomen is soft without significant tenderness, masses, organomegaly or guarding  Lower extremeties: nontender    Cervical Exam  Speculum: Cervical os is 2cm dilated, no pooling, no bleeding present    Fetal monitoring:  FHT:  140 bpm/ Moderate 6 - 25 bpm / reactive accelerations, no decelerations  [place]: no contractions noted     KOH/WTMT:     Infection:   - no clue cells    - no hyphae   - no trichomonads present    Membrane status   - neg ferning   - neg nitrazene   - no pooling       Kumar Yanez DO  PGY-2 OB/GYN Resident   10/10/2018 7:18 PM

## 2018-10-18 ENCOUNTER — HOSPITAL ENCOUNTER (OUTPATIENT)
Facility: HOSPITAL | Age: 28
Discharge: HOME | End: 2018-10-18
Attending: OBSTETRICS & GYNECOLOGY | Admitting: OBSTETRICS & GYNECOLOGY
Payer: COMMERCIAL

## 2018-10-18 VITALS — RESPIRATION RATE: 16 BRPM | TEMPERATURE: 98.3 F

## 2018-10-18 PROBLEM — Z3A.35 35 WEEKS GESTATION OF PREGNANCY: Status: ACTIVE | Noted: 2018-08-07

## 2018-10-18 PROCEDURE — 99212 OFFICE O/P EST SF 10 MIN: CPT

## 2018-10-19 NOTE — PROGRESS NOTES
L&D Triage Note - OB/GYN  Daniel Cervantes 29 y o  female MRN: [id number]  Unit/Bed#: L&D 329-01 Encounter: [id number]    Patient is seen by [facility]  _________________________________________________________  ASSESSMENT:  _________________________________________________________  Rise Billy is a 29 y o  [id number] at 35w6d r/o labor  _________________________________________________________  PLAN:  _________________________________________________________  1) R/o labor   - patient had called stating that her contractions were every 10-20 seconds and lasting 3 min at a time      - she stated she would call back after an hour and half if her contractions got worse, they did and she presented to triage without calling   - on arrival she appears very comfortable, and on digital exam she is unchanged from prior exam /-2   - NST reactive, cxns very irregular and q2-6min  2) Continue routine prenatal care   - Pt has an appt tmrw with [facility] [place] but will call to confirm  3) Discharge from [facility] triage with  labor precautions   - Reviewed hydration, timing contractions, calling with any concerns   - Case discussed with Dr Wander Boudreaux  _________________________________________________________  SUBJECTIVE:  _________________________________________________________  Rise Billy 29 y o  [id number] at 35w6d with an Estimated Date of Delivery: 18 who presents to r/o labor  Her past obstetrical history is significant for all prior vaginal deliveries      Contractions: present  Leakage of fluid: none  Vaginal Bleeding: none  Fetal movement: present  _________________________________________________________  OBJECTIVE:  _________________________________________________________  Vitals:    10/18/18 2227   Resp: 16   Temp: 98 3 °F (36 8 °C)       ROS:  Constitutional: Negative  Respiratory: Negative  Cardiovascular: Negative    Gastrointestinal: Negative    General Physical Exam:  General: in no apparent distress, well developed and well nourished and non-toxic  Cardiovascular: Cor RRR  Lungs: non-labored breathing, CTAB  Abdomen: abdomen is soft without significant tenderness, masses, organomegaly or guarding  Lower extremeties: nontender    Cervical Exam  SVE: 1-2 / 70% / -2    Fetal monitoring:  FHT:  130 bpm/ Moderate 6 - 25 bpm / reactive accelerations, no decelerations  [place]: irregular, q2-5min, pt visibly comfortable    Cheri Ferro DO  PGY-2 OB/GYN Resident   10/18/2018 11:25 PM

## 2018-10-30 ENCOUNTER — HOSPITAL ENCOUNTER (OUTPATIENT)
Facility: HOSPITAL | Age: 28
Discharge: HOME | End: 2018-10-30
Attending: OBSTETRICS & GYNECOLOGY | Admitting: OBSTETRICS & GYNECOLOGY
Payer: COMMERCIAL

## 2018-10-30 VITALS
RESPIRATION RATE: 18 BRPM | SYSTOLIC BLOOD PRESSURE: 106 MMHG | DIASTOLIC BLOOD PRESSURE: 57 MMHG | HEART RATE: 115 BPM | TEMPERATURE: 98.9 F

## 2018-10-30 PROCEDURE — 99213 OFFICE O/P EST LOW 20 MIN: CPT

## 2018-10-30 NOTE — PROGRESS NOTES
L&D Triage Note - OB/GYN  Aurea Mast 29 y o  female MRN: [id number]  Unit/Bed#: L&D 329-02 Encounter: [id number]    Patient is seen by [facility]  _________________________________________________________  ASSESSMENT:  _________________________________________________________  Aurea Mast is a 29 y o   at 37w4d r/o PROM  _________________________________________________________  PLAN:  _________________________________________________________  1) R/o PROM   - Negative workup completely below   - On SVE 1-50/-3  2) Initial blood pressure falsely elevated 207/90 during speculum exam   - On repeat 10min later 106/56 after rest  3) Continue routine prenatal care  4) Discharge from [facility] triage with term labor precautions   - Case discussed with Dr Jarvis Collins, chief resident  _________________________________________________________  SUBJECTIVE:  _________________________________________________________  Aurea Mast 29 y o  [id number] at 37w4d with an Estimated Date of Delivery: 18 who presents with possible rupture of membranes at 1425 today  She states she felt a trickle of fluid and has increased vaginal discharge      Contractions: present, but pt is comfortable  Leakage of fluid: present per patient  Vaginal Bleeding: no  Fetal movement: present  _________________________________________________________  OBJECTIVE:  _________________________________________________________  Vitals  /57   HR 78    ROS:  Constitutional: Negative  Respiratory: Negative  Cardiovascular: Negative    Gastrointestinal: Negative    General Physical Exam:  General: in no apparent distress and well developed and well nourished  Cardiovascular: Cor RRR  Lungs: non-labored breathing, CTAB  Abdomen: abdomen is soft without significant tenderness, masses, organomegaly or guarding  Lower extremeties: nontender    Cervical Exam  Speculum: Cervical os is visibly dilated to 2cm externally, no vaginal bleeding, negative pooling  SVE: 1-2 / 50% / -3 fetal head ballotable    JO   - 15 3 cm, vertex by TAUS    Fetal monitoring:  FHT:  135 bpm/ Moderate 6 - 25 bpm / reactive accelerations, no decelerations  [place]: 3-5min, patient visibly comofortable     KOH/WTMT:    Membrane status   - no ferning   - neg nitrazene   - no pooling     Ebony Powell DO  PGY-2 OB/GYN Resident   10/30/2018 4:59 PM

## 2018-11-05 ENCOUNTER — ANESTHESIA (INPATIENT)
Dept: PERIOP | Facility: HOSPITAL | Age: 28
DRG: 540 | End: 2018-11-05
Payer: COMMERCIAL

## 2018-11-05 ENCOUNTER — APPOINTMENT (INPATIENT)
Dept: CT IMAGING | Facility: HOSPITAL | Age: 28
DRG: 540 | End: 2018-11-05
Payer: COMMERCIAL

## 2018-11-05 ENCOUNTER — APPOINTMENT (INPATIENT)
Dept: RADIOLOGY | Facility: HOSPITAL | Age: 28
DRG: 540 | End: 2018-11-05
Payer: COMMERCIAL

## 2018-11-05 ENCOUNTER — ANESTHESIA EVENT (INPATIENT)
Dept: PERIOP | Facility: HOSPITAL | Age: 28
DRG: 540 | End: 2018-11-05
Payer: COMMERCIAL

## 2018-11-05 ENCOUNTER — ANESTHESIA (INPATIENT)
Dept: LABOR AND DELIVERY | Facility: HOSPITAL | Age: 28
DRG: 540 | End: 2018-11-05
Payer: COMMERCIAL

## 2018-11-05 ENCOUNTER — HOSPITAL ENCOUNTER (INPATIENT)
Facility: HOSPITAL | Age: 28
LOS: 4 days | Discharge: HOME | DRG: 540 | End: 2018-11-09
Attending: OBSTETRICS & GYNECOLOGY | Admitting: OBSTETRICS & GYNECOLOGY
Payer: COMMERCIAL

## 2018-11-05 ENCOUNTER — APPOINTMENT (INPATIENT)
Dept: NON INVASIVE DIAGNOSTICS | Facility: HOSPITAL | Age: 28
DRG: 540 | End: 2018-11-05
Payer: COMMERCIAL

## 2018-11-05 DIAGNOSIS — R65.10 SIRS (SYSTEMIC INFLAMMATORY RESPONSE SYNDROME) (HCC): ICD-10-CM

## 2018-11-05 DIAGNOSIS — N13.30 HYDRONEPHROSIS: ICD-10-CM

## 2018-11-05 DIAGNOSIS — N19 RENAL FAILURE: ICD-10-CM

## 2018-11-05 DIAGNOSIS — R00.0 TACHYCARDIA, UNSPECIFIED: ICD-10-CM

## 2018-11-05 DIAGNOSIS — Z3A.38 38 WEEKS GESTATION OF PREGNANCY: ICD-10-CM

## 2018-11-05 LAB
ABO GROUP BLD: NORMAL
ALBUMIN SERPL BCP-MCNC: 1.7 G/DL (ref 3.5–5)
ALBUMIN SERPL BCP-MCNC: 2.7 G/DL (ref 3.5–5)
ALP SERPL-CCNC: 107 U/L (ref 46–116)
ALP SERPL-CCNC: 186 U/L (ref 46–116)
ALT SERPL W P-5'-P-CCNC: 12 U/L (ref 12–78)
ALT SERPL W P-5'-P-CCNC: 8 U/L (ref 12–78)
AMPHETAMINES SERPL QL SCN: NEGATIVE
ANION GAP SERPL CALCULATED.3IONS-SCNC: 12 MMOL/L (ref 4–13)
ANION GAP SERPL CALCULATED.3IONS-SCNC: 15 MMOL/L (ref 4–13)
ANION GAP SERPL CALCULATED.3IONS-SCNC: 17 MMOL/L (ref 4–13)
ANISOCYTOSIS BLD QL SMEAR: PRESENT
APTT PPP: 42 SECONDS (ref 24–36)
ARTERIAL PATENCY WRIST A: YES
AST SERPL W P-5'-P-CCNC: 17 U/L (ref 5–45)
AST SERPL W P-5'-P-CCNC: 19 U/L (ref 5–45)
ATRIAL RATE: 141 BPM
BACTERIA UR QL AUTO: ABNORMAL /HPF
BARBITURATES UR QL: NEGATIVE
BASE EX.OXY STD BLDV CALC-SCNC: 94.7 % (ref 60–80)
BASE EXCESS BLDA CALC-SCNC: -12 MMOL/L
BASE EXCESS BLDCOA CALC-SCNC: -13.4 MMOL/L (ref 3–11)
BASE EXCESS BLDCOV CALC-SCNC: -12.4 MMOL/L (ref 1–9)
BASE EXCESS BLDV CALC-SCNC: -4.3 MMOL/L
BASOPHILS # BLD MANUAL: 0 THOUSAND/UL (ref 0–0.1)
BASOPHILS # BLD MANUAL: 0 THOUSAND/UL (ref 0–0.1)
BASOPHILS NFR MAR MANUAL: 0 % (ref 0–1)
BASOPHILS NFR MAR MANUAL: 0 % (ref 0–1)
BENZODIAZ UR QL: NEGATIVE
BILIRUB SERPL-MCNC: 0.53 MG/DL (ref 0.2–1)
BILIRUB SERPL-MCNC: 0.59 MG/DL (ref 0.2–1)
BILIRUB UR QL STRIP: NEGATIVE
BLD GP AB SCN SERPL QL: NEGATIVE
BUN SERPL-MCNC: 11 MG/DL (ref 5–25)
BUN SERPL-MCNC: 12 MG/DL (ref 5–25)
BUN SERPL-MCNC: 13 MG/DL (ref 5–25)
CA-I BLD-SCNC: 0.97 MMOL/L (ref 1.12–1.32)
CALCIUM SERPL-MCNC: 7.4 MG/DL (ref 8.3–10.1)
CALCIUM SERPL-MCNC: 7.6 MG/DL (ref 8.3–10.1)
CALCIUM SERPL-MCNC: 8.6 MG/DL (ref 8.3–10.1)
CHLORIDE SERPL-SCNC: 102 MMOL/L (ref 100–108)
CHLORIDE SERPL-SCNC: 104 MMOL/L (ref 100–108)
CHLORIDE SERPL-SCNC: 104 MMOL/L (ref 100–108)
CLARITY UR: ABNORMAL
CO2 SERPL-SCNC: 15 MMOL/L (ref 21–32)
CO2 SERPL-SCNC: 19 MMOL/L (ref 21–32)
CO2 SERPL-SCNC: 22 MMOL/L (ref 21–32)
COCAINE UR QL: NEGATIVE
COLOR UR: YELLOW
CREAT SERPL-MCNC: 0.93 MG/DL (ref 0.6–1.3)
CREAT SERPL-MCNC: 1.23 MG/DL (ref 0.6–1.3)
CREAT SERPL-MCNC: 1.3 MG/DL (ref 0.6–1.3)
EOSINOPHIL # BLD MANUAL: 0 THOUSAND/UL (ref 0–0.4)
EOSINOPHIL # BLD MANUAL: 0 THOUSAND/UL (ref 0–0.4)
EOSINOPHIL NFR BLD MANUAL: 0 % (ref 0–6)
EOSINOPHIL NFR BLD MANUAL: 0 % (ref 0–6)
ERYTHROCYTE [DISTWIDTH] IN BLOOD BY AUTOMATED COUNT: 19.5 % (ref 11.6–15.1)
ERYTHROCYTE [DISTWIDTH] IN BLOOD BY AUTOMATED COUNT: 19.6 % (ref 11.6–15.1)
FIBRINOGEN PPP-MCNC: 194 MG/DL (ref 227–495)
GFR SERPL CREATININE-BSD FRML MDRD: 56 ML/MIN/1.73SQ M
GFR SERPL CREATININE-BSD FRML MDRD: 60 ML/MIN/1.73SQ M
GFR SERPL CREATININE-BSD FRML MDRD: 84 ML/MIN/1.73SQ M
GIANT PLATELETS BLD QL SMEAR: PRESENT
GLUCOSE SERPL-MCNC: 100 MG/DL (ref 65–140)
GLUCOSE SERPL-MCNC: 102 MG/DL (ref 65–140)
GLUCOSE SERPL-MCNC: 118 MG/DL (ref 65–140)
GLUCOSE UR STRIP-MCNC: NEGATIVE MG/DL
HCO3 BLDA-SCNC: 14.2 MMOL/L (ref 22–28)
HCO3 BLDCOA-SCNC: 15.3 MMOL/L (ref 17.3–27.3)
HCO3 BLDCOV-SCNC: 15.8 MMOL/L (ref 12.2–28.6)
HCO3 BLDV-SCNC: 18.9 MMOL/L (ref 24–30)
HCT VFR BLD AUTO: 17.7 % (ref 34.8–46.1)
HCT VFR BLD AUTO: 27.2 % (ref 34.8–46.1)
HGB BLD-MCNC: 4.6 G/DL (ref 11.5–15.4)
HGB BLD-MCNC: 5.8 G/DL (ref 11.5–15.4)
HGB BLD-MCNC: 7.1 G/DL (ref 11.5–15.4)
HGB BLD-MCNC: 7.7 G/DL (ref 11.5–15.4)
HGB UR QL STRIP.AUTO: ABNORMAL
HIV 1+2 AB+HIV1 P24 AG SERPL QL IA: NORMAL
HIV1 P24 AG SER QL: NORMAL
HYPERCHROMIA BLD QL SMEAR: PRESENT
INR PPP: 1.4 (ref 0.86–1.17)
KETONES UR STRIP-MCNC: NEGATIVE MG/DL
LACTATE SERPL-SCNC: 9.3 MMOL/L (ref 0.5–2)
LACTATE SERPL-SCNC: 9.5 MMOL/L (ref 0.5–2)
LEUKOCYTE ESTERASE UR QL STRIP: ABNORMAL
LYMPHOCYTES # BLD AUTO: 0.27 THOUSAND/UL (ref 0.6–4.47)
LYMPHOCYTES # BLD AUTO: 0.3 THOUSAND/UL (ref 0.6–4.47)
LYMPHOCYTES # BLD AUTO: 11 % (ref 14–44)
LYMPHOCYTES # BLD AUTO: 5 % (ref 14–44)
MAGNESIUM SERPL-MCNC: 0.9 MG/DL (ref 1.6–2.6)
MAGNESIUM SERPL-MCNC: 1.5 MG/DL (ref 1.6–2.6)
MCH RBC QN AUTO: 17.9 PG (ref 26.8–34.3)
MCH RBC QN AUTO: 18.2 PG (ref 26.8–34.3)
MCHC RBC AUTO-ENTMCNC: 26 G/DL (ref 31.4–37.4)
MCHC RBC AUTO-ENTMCNC: 26.1 G/DL (ref 31.4–37.4)
MCV RBC AUTO: 69 FL (ref 82–98)
MCV RBC AUTO: 70 FL (ref 82–98)
METHADONE UR QL: NEGATIVE
MICROCYTES BLD QL AUTO: PRESENT
MONOCYTES # BLD AUTO: 0 THOUSAND/UL (ref 0–1.22)
MONOCYTES # BLD AUTO: 0.16 THOUSAND/UL (ref 0–1.22)
MONOCYTES NFR BLD: 0 % (ref 4–12)
MONOCYTES NFR BLD: 3 % (ref 4–12)
NASAL CANNULA: 2
NEUTROPHILS # BLD MANUAL: 2.44 THOUSAND/UL (ref 1.85–7.62)
NEUTROPHILS # BLD MANUAL: 4.97 THOUSAND/UL (ref 1.85–7.62)
NEUTS BAND NFR BLD MANUAL: 24 % (ref 0–8)
NEUTS BAND NFR BLD MANUAL: 36 % (ref 0–8)
NEUTS SEG NFR BLD AUTO: 53 % (ref 43–75)
NEUTS SEG NFR BLD AUTO: 68 % (ref 43–75)
NITRITE UR QL STRIP: NEGATIVE
NON-SQ EPI CELLS URNS QL MICRO: ABNORMAL /HPF
NRBC BLD AUTO-RTO: 2 /100 WBC (ref 0–2)
NRBC BLD AUTO-RTO: 3 /100 WBCS
NRBC BLD AUTO-RTO: 4 /100 WBCS
NRBC BLD AUTO-RTO: 6 /100 WBC (ref 0–2)
O2 CT BLDA-SCNC: 1.7 ML/DL (ref 16–23)
O2 CT BLDV-SCNC: 12.9 ML/DL
O2 CT VFR BLDCOA CALC: 3 ML/DL
OPIATES UR QL SCN: POSITIVE
OXYHGB MFR BLDA: 24.1 % (ref 94–97)
OXYHGB MFR BLDCOA: 15.1 %
OXYHGB MFR BLDCOV: 46.5 %
P AXIS: 62 DEGREES
PCO2 BLDA: 33.5 MM HG (ref 36–44)
PCO2 BLDCOA: 45.6 MM[HG] (ref 30–60)
PCO2 BLDCOV: 44.7 MM HG (ref 27–43)
PCO2 BLDV: 28.3 MM HG (ref 42–50)
PCP UR QL: NEGATIVE
PH BLDA: 7.24 [PH] (ref 7.35–7.45)
PH BLDCOA: 7.14 [PH] (ref 7.23–7.43)
PH BLDCOV: 7.17 [PH] (ref 7.19–7.49)
PH BLDV: 7.44 [PH] (ref 7.3–7.4)
PH UR STRIP.AUTO: 6 [PH] (ref 4.5–8)
PHOSPHATE SERPL-MCNC: 2.6 MG/DL (ref 2.7–4.5)
PLATELET # BLD AUTO: 126 THOUSANDS/UL (ref 149–390)
PLATELET # BLD AUTO: 164 THOUSANDS/UL (ref 149–390)
PLATELET BLD QL SMEAR: ABNORMAL
PLATELET BLD QL SMEAR: ADEQUATE
PMV BLD AUTO: 10.9 FL (ref 8.9–12.7)
PMV BLD AUTO: 9.9 FL (ref 8.9–12.7)
PO2 BLDA: 22.9 MM HG (ref 75–129)
PO2 BLDCOA: 11.1 MM HG (ref 5–25)
PO2 BLDCOV: 24 MM HG (ref 15–45)
PO2 BLDV: 89 MM HG (ref 35–45)
POLYCHROMASIA BLD QL SMEAR: PRESENT
POLYCHROMASIA BLD QL SMEAR: PRESENT
POTASSIUM SERPL-SCNC: 3.1 MMOL/L (ref 3.5–5.3)
POTASSIUM SERPL-SCNC: 3.3 MMOL/L (ref 3.5–5.3)
POTASSIUM SERPL-SCNC: 3.5 MMOL/L (ref 3.5–5.3)
PR INTERVAL: 112 MS
PROCALCITONIN SERPL-MCNC: 56.25 NG/ML
PROCALCITONIN SERPL-MCNC: 56.33 NG/ML
PROT SERPL-MCNC: 4.6 G/DL (ref 6.4–8.2)
PROT SERPL-MCNC: 7.1 G/DL (ref 6.4–8.2)
PROT UR STRIP-MCNC: ABNORMAL MG/DL
PROTHROMBIN TIME: 17.3 SECONDS (ref 11.8–14.2)
QRS AXIS: 22 DEGREES
QRSD INTERVAL: 76 MS
QT INTERVAL: 286 MS
QTC INTERVAL: 438 MS
RBC # BLD AUTO: 2.53 MILLION/UL (ref 3.81–5.12)
RBC # BLD AUTO: 3.97 MILLION/UL (ref 3.81–5.12)
RBC #/AREA URNS AUTO: ABNORMAL /HPF
RH BLD: POSITIVE
RPR SER QL: NORMAL
SODIUM SERPL-SCNC: 136 MMOL/L (ref 136–145)
SODIUM SERPL-SCNC: 136 MMOL/L (ref 136–145)
SODIUM SERPL-SCNC: 138 MMOL/L (ref 136–145)
SP GR UR STRIP.AUTO: 1.01 (ref 1–1.03)
SPECIMEN EXPIRATION DATE: NORMAL
SPECIMEN SOURCE: ABNORMAL
T WAVE AXIS: 4 DEGREES
THC UR QL: NEGATIVE
TOTAL CELLS COUNTED SPEC: 100
TOTAL CELLS COUNTED SPEC: 100
TSH SERPL DL<=0.05 MIU/L-ACNC: 3.4 UIU/ML (ref 0.36–3.74)
UROBILINOGEN UR QL STRIP.AUTO: 1 E.U./DL
VENTRICULAR RATE: 141 BPM
WBC # BLD AUTO: 2.74 THOUSAND/UL (ref 4.31–10.16)
WBC # BLD AUTO: 5.4 THOUSAND/UL (ref 4.31–10.16)
WBC #/AREA URNS AUTO: ABNORMAL /HPF

## 2018-11-05 PROCEDURE — 87077 CULTURE AEROBIC IDENTIFY: CPT | Performed by: OBSTETRICS & GYNECOLOGY

## 2018-11-05 PROCEDURE — 82805 BLOOD GASES W/O2 SATURATION: CPT | Performed by: NURSE PRACTITIONER

## 2018-11-05 PROCEDURE — 87186 SC STD MICRODIL/AGAR DIL: CPT | Performed by: OBSTETRICS & GYNECOLOGY

## 2018-11-05 PROCEDURE — 93005 ELECTROCARDIOGRAM TRACING: CPT

## 2018-11-05 PROCEDURE — C1769 GUIDE WIRE: HCPCS | Performed by: UROLOGY

## 2018-11-05 PROCEDURE — 71045 X-RAY EXAM CHEST 1 VIEW: CPT

## 2018-11-05 PROCEDURE — 30233K1 TRANSFUSION OF NONAUTOLOGOUS FROZEN PLASMA INTO PERIPHERAL VEIN, PERCUTANEOUS APPROACH: ICD-10-PCS | Performed by: OBSTETRICS & GYNECOLOGY

## 2018-11-05 PROCEDURE — C2617 STENT, NON-COR, TEM W/O DEL: HCPCS | Performed by: UROLOGY

## 2018-11-05 PROCEDURE — 83605 ASSAY OF LACTIC ACID: CPT | Performed by: OBSTETRICS & GYNECOLOGY

## 2018-11-05 PROCEDURE — 99254 IP/OBS CNSLTJ NEW/EST MOD 60: CPT | Performed by: OBSTETRICS & GYNECOLOGY

## 2018-11-05 PROCEDURE — 85007 BL SMEAR W/DIFF WBC COUNT: CPT | Performed by: OBSTETRICS & GYNECOLOGY

## 2018-11-05 PROCEDURE — 99212 OFFICE O/P EST SF 10 MIN: CPT

## 2018-11-05 PROCEDURE — 52332 CYSTOSCOPY AND TREATMENT: CPT | Performed by: UROLOGY

## 2018-11-05 PROCEDURE — 99252 IP/OBS CONSLTJ NEW/EST SF 35: CPT | Performed by: INTERNAL MEDICINE

## 2018-11-05 PROCEDURE — 80053 COMPREHEN METABOLIC PANEL: CPT | Performed by: OBSTETRICS & GYNECOLOGY

## 2018-11-05 PROCEDURE — 83605 ASSAY OF LACTIC ACID: CPT | Performed by: NURSE PRACTITIONER

## 2018-11-05 PROCEDURE — 99291 CRITICAL CARE FIRST HOUR: CPT | Performed by: INTERNAL MEDICINE

## 2018-11-05 PROCEDURE — 85018 HEMOGLOBIN: CPT | Performed by: NURSE PRACTITIONER

## 2018-11-05 PROCEDURE — 86900 BLOOD TYPING SEROLOGIC ABO: CPT | Performed by: OBSTETRICS & GYNECOLOGY

## 2018-11-05 PROCEDURE — 86850 RBC ANTIBODY SCREEN: CPT | Performed by: OBSTETRICS & GYNECOLOGY

## 2018-11-05 PROCEDURE — 80307 DRUG TEST PRSMV CHEM ANLYZR: CPT | Performed by: OBSTETRICS & GYNECOLOGY

## 2018-11-05 PROCEDURE — 85027 COMPLETE CBC AUTOMATED: CPT | Performed by: OBSTETRICS & GYNECOLOGY

## 2018-11-05 PROCEDURE — 85610 PROTHROMBIN TIME: CPT | Performed by: OBSTETRICS & GYNECOLOGY

## 2018-11-05 PROCEDURE — 86901 BLOOD TYPING SEROLOGIC RH(D): CPT | Performed by: OBSTETRICS & GYNECOLOGY

## 2018-11-05 PROCEDURE — 74176 CT ABD & PELVIS W/O CONTRAST: CPT

## 2018-11-05 PROCEDURE — 93306 TTE W/DOPPLER COMPLETE: CPT | Performed by: INTERNAL MEDICINE

## 2018-11-05 PROCEDURE — 84145 PROCALCITONIN (PCT): CPT | Performed by: OBSTETRICS & GYNECOLOGY

## 2018-11-05 PROCEDURE — 82805 BLOOD GASES W/O2 SATURATION: CPT | Performed by: OBSTETRICS & GYNECOLOGY

## 2018-11-05 PROCEDURE — 88307 TISSUE EXAM BY PATHOLOGIST: CPT | Performed by: PATHOLOGY

## 2018-11-05 PROCEDURE — BT1D1ZZ FLUOROSCOPY OF RIGHT KIDNEY, URETER AND BLADDER USING LOW OSMOLAR CONTRAST: ICD-10-PCS | Performed by: UROLOGY

## 2018-11-05 PROCEDURE — 59515 CESAREAN DELIVERY: CPT | Performed by: OBSTETRICS & GYNECOLOGY

## 2018-11-05 PROCEDURE — 87631 RESP VIRUS 3-5 TARGETS: CPT | Performed by: NURSE PRACTITIONER

## 2018-11-05 PROCEDURE — 87806 HIV AG W/HIV1&2 ANTB W/OPTIC: CPT | Performed by: OBSTETRICS & GYNECOLOGY

## 2018-11-05 PROCEDURE — 81001 URINALYSIS AUTO W/SCOPE: CPT | Performed by: OBSTETRICS & GYNECOLOGY

## 2018-11-05 PROCEDURE — 85384 FIBRINOGEN ACTIVITY: CPT | Performed by: OBSTETRICS & GYNECOLOGY

## 2018-11-05 PROCEDURE — 99254 IP/OBS CNSLTJ NEW/EST MOD 60: CPT | Performed by: INTERNAL MEDICINE

## 2018-11-05 PROCEDURE — 87086 URINE CULTURE/COLONY COUNT: CPT | Performed by: UROLOGY

## 2018-11-05 PROCEDURE — 36600 WITHDRAWAL OF ARTERIAL BLOOD: CPT

## 2018-11-05 PROCEDURE — 82330 ASSAY OF CALCIUM: CPT | Performed by: NURSE PRACTITIONER

## 2018-11-05 PROCEDURE — 87653 STREP B DNA AMP PROBE: CPT | Performed by: OBSTETRICS & GYNECOLOGY

## 2018-11-05 PROCEDURE — 84443 ASSAY THYROID STIM HORMONE: CPT | Performed by: OBSTETRICS & GYNECOLOGY

## 2018-11-05 PROCEDURE — 93010 ELECTROCARDIOGRAM REPORT: CPT | Performed by: INTERNAL MEDICINE

## 2018-11-05 PROCEDURE — 87077 CULTURE AEROBIC IDENTIFY: CPT | Performed by: UROLOGY

## 2018-11-05 PROCEDURE — 74450 X-RAY URETHRA/BLADDER: CPT

## 2018-11-05 PROCEDURE — 0T768DZ DILATION OF RIGHT URETER WITH INTRALUMINAL DEVICE, VIA NATURAL OR ARTIFICIAL OPENING ENDOSCOPIC: ICD-10-PCS | Performed by: UROLOGY

## 2018-11-05 PROCEDURE — 84100 ASSAY OF PHOSPHORUS: CPT | Performed by: NURSE PRACTITIONER

## 2018-11-05 PROCEDURE — 87186 SC STD MICRODIL/AGAR DIL: CPT | Performed by: UROLOGY

## 2018-11-05 PROCEDURE — 87040 BLOOD CULTURE FOR BACTERIA: CPT | Performed by: OBSTETRICS & GYNECOLOGY

## 2018-11-05 PROCEDURE — 99255 IP/OBS CONSLTJ NEW/EST HI 80: CPT | Performed by: UROLOGY

## 2018-11-05 PROCEDURE — 85730 THROMBOPLASTIN TIME PARTIAL: CPT | Performed by: OBSTETRICS & GYNECOLOGY

## 2018-11-05 PROCEDURE — P9021 RED BLOOD CELLS UNIT: HCPCS

## 2018-11-05 PROCEDURE — 30233N1 TRANSFUSION OF NONAUTOLOGOUS RED BLOOD CELLS INTO PERIPHERAL VEIN, PERCUTANEOUS APPROACH: ICD-10-PCS | Performed by: OBSTETRICS & GYNECOLOGY

## 2018-11-05 PROCEDURE — P9040 RBC LEUKOREDUCED IRRADIATED: HCPCS

## 2018-11-05 PROCEDURE — 86592 SYPHILIS TEST NON-TREP QUAL: CPT | Performed by: OBSTETRICS & GYNECOLOGY

## 2018-11-05 PROCEDURE — 80048 BASIC METABOLIC PNL TOTAL CA: CPT | Performed by: NURSE PRACTITIONER

## 2018-11-05 PROCEDURE — 86923 COMPATIBILITY TEST ELECTRIC: CPT

## 2018-11-05 PROCEDURE — 87086 URINE CULTURE/COLONY COUNT: CPT | Performed by: OBSTETRICS & GYNECOLOGY

## 2018-11-05 PROCEDURE — 83735 ASSAY OF MAGNESIUM: CPT | Performed by: OBSTETRICS & GYNECOLOGY

## 2018-11-05 PROCEDURE — 86923 COMPATIBILITY TEST ELECTRIC: CPT | Performed by: ANESTHESIOLOGY

## 2018-11-05 PROCEDURE — 83735 ASSAY OF MAGNESIUM: CPT | Performed by: NURSE PRACTITIONER

## 2018-11-05 PROCEDURE — 93306 TTE W/DOPPLER COMPLETE: CPT

## 2018-11-05 DEVICE — STENT URETERAL 6FR 24CML INLAY OPTIMA
Type: IMPLANTABLE DEVICE | Site: URETER | Status: NON-FUNCTIONAL
Removed: 2018-12-24

## 2018-11-05 RX ORDER — CEFOXITIN 1 G/1
INJECTION, POWDER, FOR SOLUTION INTRAVENOUS AS NEEDED
Status: DISCONTINUED | OUTPATIENT
Start: 2018-11-05 | End: 2018-11-05 | Stop reason: SURG

## 2018-11-05 RX ORDER — MAGNESIUM HYDROXIDE 1200 MG/15ML
LIQUID ORAL AS NEEDED
Status: DISCONTINUED | OUTPATIENT
Start: 2018-11-05 | End: 2018-11-05 | Stop reason: HOSPADM

## 2018-11-05 RX ORDER — OXYTOCIN/RINGER'S LACTATE 30/500 ML
62.5 PLASTIC BAG, INJECTION (ML) INTRAVENOUS CONTINUOUS
Status: DISCONTINUED | OUTPATIENT
Start: 2018-11-05 | End: 2018-11-05

## 2018-11-05 RX ORDER — PROPOFOL 10 MG/ML
INJECTION, EMULSION INTRAVENOUS AS NEEDED
Status: DISCONTINUED | OUTPATIENT
Start: 2018-11-05 | End: 2018-11-05 | Stop reason: SURG

## 2018-11-05 RX ORDER — VANCOMYCIN HYDROCHLORIDE 1 G/200ML
15 INJECTION, SOLUTION INTRAVENOUS EVERY 12 HOURS
Status: DISCONTINUED | OUTPATIENT
Start: 2018-11-06 | End: 2018-11-06

## 2018-11-05 RX ORDER — IBUPROFEN 600 MG/1
600 TABLET ORAL EVERY 6 HOURS PRN
Status: DISCONTINUED | OUTPATIENT
Start: 2018-11-05 | End: 2018-11-05

## 2018-11-05 RX ORDER — FENTANYL CITRATE 50 UG/ML
INJECTION, SOLUTION INTRAMUSCULAR; INTRAVENOUS
Status: COMPLETED
Start: 2018-11-05 | End: 2018-11-05

## 2018-11-05 RX ORDER — OXYCODONE HYDROCHLORIDE AND ACETAMINOPHEN 5; 325 MG/1; MG/1
1 TABLET ORAL EVERY 4 HOURS PRN
Status: DISCONTINUED | OUTPATIENT
Start: 2018-11-05 | End: 2018-11-05

## 2018-11-05 RX ORDER — VANCOMYCIN HYDROCHLORIDE 1 G/200ML
15 INJECTION, SOLUTION INTRAVENOUS EVERY 24 HOURS
Status: DISCONTINUED | OUTPATIENT
Start: 2018-11-05 | End: 2018-11-05

## 2018-11-05 RX ORDER — MIDAZOLAM HYDROCHLORIDE 1 MG/ML
INJECTION INTRAMUSCULAR; INTRAVENOUS AS NEEDED
Status: DISCONTINUED | OUTPATIENT
Start: 2018-11-05 | End: 2018-11-05 | Stop reason: SURG

## 2018-11-05 RX ORDER — TERBUTALINE SULFATE 1 MG/ML
0.25 INJECTION, SOLUTION SUBCUTANEOUS ONCE
Status: COMPLETED | OUTPATIENT
Start: 2018-11-05 | End: 2018-11-05

## 2018-11-05 RX ORDER — FENTANYL CITRATE 50 UG/ML
INJECTION, SOLUTION INTRAMUSCULAR; INTRAVENOUS AS NEEDED
Status: DISCONTINUED | OUTPATIENT
Start: 2018-11-05 | End: 2018-11-05 | Stop reason: SURG

## 2018-11-05 RX ORDER — OXYCODONE HYDROCHLORIDE AND ACETAMINOPHEN 5; 325 MG/1; MG/1
2 TABLET ORAL EVERY 4 HOURS PRN
Status: DISCONTINUED | OUTPATIENT
Start: 2018-11-05 | End: 2018-11-05

## 2018-11-05 RX ORDER — ROPIVACAINE HYDROCHLORIDE 2 MG/ML
INJECTION, SOLUTION EPIDURAL; INFILTRATION; PERINEURAL
Status: COMPLETED
Start: 2018-11-05 | End: 2018-11-05

## 2018-11-05 RX ORDER — SODIUM CHLORIDE, SODIUM LACTATE, POTASSIUM CHLORIDE, CALCIUM CHLORIDE 600; 310; 30; 20 MG/100ML; MG/100ML; MG/100ML; MG/100ML
125 INJECTION, SOLUTION INTRAVENOUS CONTINUOUS
Status: DISCONTINUED | OUTPATIENT
Start: 2018-11-05 | End: 2018-11-09 | Stop reason: HOSPADM

## 2018-11-05 RX ORDER — DIPHENHYDRAMINE HCL 25 MG
25 TABLET ORAL EVERY 6 HOURS PRN
Status: DISCONTINUED | OUTPATIENT
Start: 2018-11-05 | End: 2018-11-05

## 2018-11-05 RX ORDER — FUROSEMIDE 10 MG/ML
60 INJECTION INTRAMUSCULAR; INTRAVENOUS ONCE
Status: COMPLETED | OUTPATIENT
Start: 2018-11-05 | End: 2018-11-05

## 2018-11-05 RX ORDER — ONDANSETRON 2 MG/ML
4 INJECTION INTRAMUSCULAR; INTRAVENOUS ONCE AS NEEDED
Status: DISCONTINUED | OUTPATIENT
Start: 2018-11-05 | End: 2018-11-05

## 2018-11-05 RX ORDER — MAGNESIUM SULFATE 1 G/100ML
1 INJECTION INTRAVENOUS ONCE
Status: COMPLETED | OUTPATIENT
Start: 2018-11-05 | End: 2018-11-05

## 2018-11-05 RX ORDER — MORPHINE SULFATE 10 MG/ML
10 INJECTION, SOLUTION INTRAMUSCULAR; INTRAVENOUS ONCE
Status: COMPLETED | OUTPATIENT
Start: 2018-11-05 | End: 2018-11-05

## 2018-11-05 RX ORDER — SODIUM CHLORIDE, SODIUM LACTATE, POTASSIUM CHLORIDE, CALCIUM CHLORIDE 600; 310; 30; 20 MG/100ML; MG/100ML; MG/100ML; MG/100ML
125 INJECTION, SOLUTION INTRAVENOUS CONTINUOUS
Status: DISCONTINUED | OUTPATIENT
Start: 2018-11-05 | End: 2018-11-05

## 2018-11-05 RX ORDER — ACETAMINOPHEN 325 MG/1
650 TABLET ORAL EVERY 4 HOURS PRN
Status: DISCONTINUED | OUTPATIENT
Start: 2018-11-05 | End: 2018-11-05

## 2018-11-05 RX ORDER — SODIUM CHLORIDE 9 MG/ML
INJECTION, SOLUTION INTRAVENOUS CONTINUOUS PRN
Status: DISCONTINUED | OUTPATIENT
Start: 2018-11-05 | End: 2018-11-05 | Stop reason: SURG

## 2018-11-05 RX ORDER — VANCOMYCIN HYDROCHLORIDE 1 G/200ML
15 INJECTION, SOLUTION INTRAVENOUS ONCE
Status: COMPLETED | OUTPATIENT
Start: 2018-11-05 | End: 2018-11-05

## 2018-11-05 RX ORDER — SIMETHICONE 80 MG
80 TABLET,CHEWABLE ORAL 4 TIMES DAILY PRN
Status: DISCONTINUED | OUTPATIENT
Start: 2018-11-05 | End: 2018-11-05

## 2018-11-05 RX ORDER — OXYTOCIN/RINGER'S LACTATE 30/500 ML
PLASTIC BAG, INJECTION (ML) INTRAVENOUS CONTINUOUS PRN
Status: DISCONTINUED | OUTPATIENT
Start: 2018-11-05 | End: 2018-11-05 | Stop reason: SURG

## 2018-11-05 RX ORDER — SODIUM CHLORIDE 9 MG/ML
INJECTION, SOLUTION INTRAVENOUS CONTINUOUS PRN
Status: DISCONTINUED | OUTPATIENT
Start: 2018-11-05 | End: 2018-11-05

## 2018-11-05 RX ORDER — HYDROXYZINE HYDROCHLORIDE 25 MG/1
25 TABLET, FILM COATED ORAL ONCE
Status: COMPLETED | OUTPATIENT
Start: 2018-11-05 | End: 2018-11-05

## 2018-11-05 RX ORDER — FENTANYL CITRATE/PF 50 MCG/ML
25 SYRINGE (ML) INJECTION
Status: DISCONTINUED | OUTPATIENT
Start: 2018-11-05 | End: 2018-11-05

## 2018-11-05 RX ORDER — MIDAZOLAM HYDROCHLORIDE 1 MG/ML
INJECTION INTRAMUSCULAR; INTRAVENOUS
Status: COMPLETED
Start: 2018-11-05 | End: 2018-11-05

## 2018-11-05 RX ORDER — ONDANSETRON 2 MG/ML
4 INJECTION INTRAMUSCULAR; INTRAVENOUS EVERY 8 HOURS PRN
Status: DISCONTINUED | OUTPATIENT
Start: 2018-11-05 | End: 2018-11-05

## 2018-11-05 RX ORDER — CEFEPIME HYDROCHLORIDE 1 G/1
1000 INJECTION, POWDER, FOR SOLUTION INTRAMUSCULAR; INTRAVENOUS EVERY 12 HOURS
Status: DISCONTINUED | OUTPATIENT
Start: 2018-11-05 | End: 2018-11-05

## 2018-11-05 RX ORDER — HYDROMORPHONE HCL/PF 1 MG/ML
0.5 SYRINGE (ML) INJECTION
Status: DISCONTINUED | OUTPATIENT
Start: 2018-11-05 | End: 2018-11-07

## 2018-11-05 RX ORDER — DOCUSATE SODIUM 100 MG/1
100 CAPSULE, LIQUID FILLED ORAL 2 TIMES DAILY
Status: DISCONTINUED | OUTPATIENT
Start: 2018-11-05 | End: 2018-11-05

## 2018-11-05 RX ORDER — ACETAMINOPHEN 325 MG/1
650 TABLET ORAL 4 TIMES DAILY PRN
Status: DISCONTINUED | OUTPATIENT
Start: 2018-11-05 | End: 2018-11-09 | Stop reason: HOSPADM

## 2018-11-05 RX ORDER — ONDANSETRON 2 MG/ML
4 INJECTION INTRAMUSCULAR; INTRAVENOUS EVERY 6 HOURS PRN
Status: DISCONTINUED | OUTPATIENT
Start: 2018-11-05 | End: 2018-11-05

## 2018-11-05 RX ORDER — TERBUTALINE SULFATE 1 MG/ML
INJECTION, SOLUTION SUBCUTANEOUS
Status: COMPLETED
Start: 2018-11-05 | End: 2018-11-05

## 2018-11-05 RX ORDER — ROPIVACAINE HYDROCHLORIDE 2 MG/ML
INJECTION, SOLUTION EPIDURAL; INFILTRATION; PERINEURAL AS NEEDED
Status: DISCONTINUED | OUTPATIENT
Start: 2018-11-05 | End: 2018-11-05 | Stop reason: SURG

## 2018-11-05 RX ADMIN — SODIUM CHLORIDE, SODIUM LACTATE, POTASSIUM CHLORIDE, AND CALCIUM CHLORIDE 125 ML/HR: .6; .31; .03; .02 INJECTION, SOLUTION INTRAVENOUS at 22:17

## 2018-11-05 RX ADMIN — TERBUTALINE SULFATE 0.25 MG: 1 INJECTION SUBCUTANEOUS at 04:04

## 2018-11-05 RX ADMIN — MORPHINE SULFATE 10 MG: 10 INJECTION INTRAVENOUS at 02:25

## 2018-11-05 RX ADMIN — ROPIVACAINE HYDROCHLORIDE 5 ML: 2 INJECTION, SOLUTION EPIDURAL; INFILTRATION at 06:22

## 2018-11-05 RX ADMIN — PROPOFOL 50 MG: 10 INJECTION, EMULSION INTRAVENOUS at 15:10

## 2018-11-05 RX ADMIN — ACETAMINOPHEN 650 MG: 325 TABLET, FILM COATED ORAL at 20:55

## 2018-11-05 RX ADMIN — NOREPINEPHRINE BITARTRATE 2 MCG/MIN: 1 INJECTION INTRAVENOUS at 22:26

## 2018-11-05 RX ADMIN — SODIUM CHLORIDE, SODIUM LACTATE, POTASSIUM CHLORIDE, AND CALCIUM CHLORIDE 999 ML/HR: .6; .31; .03; .02 INJECTION, SOLUTION INTRAVENOUS at 04:00

## 2018-11-05 RX ADMIN — Medication 62.5 UNITS: at 15:16

## 2018-11-05 RX ADMIN — Medication 50 MEQ: at 11:16

## 2018-11-05 RX ADMIN — HYDROMORPHONE HYDROCHLORIDE 0.5 MG: 1 INJECTION, SOLUTION INTRAMUSCULAR; INTRAVENOUS; SUBCUTANEOUS at 11:11

## 2018-11-05 RX ADMIN — FENTANYL CITRATE 100 MCG: 50 INJECTION, SOLUTION INTRAMUSCULAR; INTRAVENOUS at 07:01

## 2018-11-05 RX ADMIN — ROPIVACAINE HYDROCHLORIDE 5 ML: 2 INJECTION, SOLUTION EPIDURAL; INFILTRATION at 06:25

## 2018-11-05 RX ADMIN — METRONIDAZOLE 500 MG: 500 INJECTION, SOLUTION INTRAVENOUS at 19:32

## 2018-11-05 RX ADMIN — SODIUM CHLORIDE, SODIUM LACTATE, POTASSIUM CHLORIDE, AND CALCIUM CHLORIDE: .6; .31; .03; .02 INJECTION, SOLUTION INTRAVENOUS at 07:56

## 2018-11-05 RX ADMIN — PROPOFOL 30 MG: 10 INJECTION, EMULSION INTRAVENOUS at 15:13

## 2018-11-05 RX ADMIN — ROPIVACAINE HYDROCHLORIDE: 2 INJECTION, SOLUTION EPIDURAL; INFILTRATION at 06:27

## 2018-11-05 RX ADMIN — HYDROMORPHONE HYDROCHLORIDE 0.5 MG: 1 INJECTION, SOLUTION INTRAMUSCULAR; INTRAVENOUS; SUBCUTANEOUS at 23:42

## 2018-11-05 RX ADMIN — FENTANYL CITRATE 50 MCG: 50 INJECTION, SOLUTION INTRAMUSCULAR; INTRAVENOUS at 15:06

## 2018-11-05 RX ADMIN — CEFEPIME HYDROCHLORIDE 1000 MG: 1 INJECTION, POWDER, FOR SOLUTION INTRAMUSCULAR; INTRAVENOUS at 13:14

## 2018-11-05 RX ADMIN — MAGNESIUM SULFATE HEPTAHYDRATE 1 G: 1 INJECTION, SOLUTION INTRAVENOUS at 19:51

## 2018-11-05 RX ADMIN — SODIUM CHLORIDE: 0.9 INJECTION, SOLUTION INTRAVENOUS at 14:53

## 2018-11-05 RX ADMIN — METRONIDAZOLE 500 MG: 500 INJECTION, SOLUTION INTRAVENOUS at 11:51

## 2018-11-05 RX ADMIN — SODIUM CHLORIDE, SODIUM LACTATE, POTASSIUM CHLORIDE, AND CALCIUM CHLORIDE 999.9 ML/HR: .6; .31; .03; .02 INJECTION, SOLUTION INTRAVENOUS at 07:07

## 2018-11-05 RX ADMIN — PROPOFOL 50 MG: 10 INJECTION, EMULSION INTRAVENOUS at 15:16

## 2018-11-05 RX ADMIN — HYDROMORPHONE HYDROCHLORIDE 0.5 MG: 1 INJECTION, SOLUTION INTRAMUSCULAR; INTRAVENOUS; SUBCUTANEOUS at 20:31

## 2018-11-05 RX ADMIN — Medication 62.5 UNITS: at 15:01

## 2018-11-05 RX ADMIN — FUROSEMIDE 60 MG: 10 INJECTION, SOLUTION INTRAMUSCULAR; INTRAVENOUS at 14:49

## 2018-11-05 RX ADMIN — HYDROXYZINE HYDROCHLORIDE 25 MG: 25 TABLET ORAL at 05:41

## 2018-11-05 RX ADMIN — Medication 100 ML/HR: at 15:42

## 2018-11-05 RX ADMIN — PROPOFOL 20 MG: 10 INJECTION, EMULSION INTRAVENOUS at 15:14

## 2018-11-05 RX ADMIN — SODIUM CHLORIDE: 0.9 INJECTION, SOLUTION INTRAVENOUS at 15:25

## 2018-11-05 RX ADMIN — NOREPINEPHRINE BITARTRATE 5 MCG/MIN: 1 INJECTION INTRAVENOUS at 18:39

## 2018-11-05 RX ADMIN — MIDAZOLAM 1 MG: 1 INJECTION INTRAMUSCULAR; INTRAVENOUS at 15:06

## 2018-11-05 RX ADMIN — CALCIUM GLUCONATE 1 G: 98 INJECTION, SOLUTION INTRAVENOUS at 20:38

## 2018-11-05 RX ADMIN — Medication 62.5 MILLI-UNITS/MIN: at 11:38

## 2018-11-05 RX ADMIN — SODIUM CHLORIDE, SODIUM LACTATE, POTASSIUM CHLORIDE, AND CALCIUM CHLORIDE 125 ML/HR: .6; .31; .03; .02 INJECTION, SOLUTION INTRAVENOUS at 04:35

## 2018-11-05 RX ADMIN — VANCOMYCIN HYDROCHLORIDE 1000 MG: 1 INJECTION, SOLUTION INTRAVENOUS at 16:31

## 2018-11-05 RX ADMIN — CEFAZOLIN SODIUM 1000 MG: 1 SOLUTION INTRAVENOUS at 15:07

## 2018-11-05 RX ADMIN — TERBUTALINE SULFATE 0.25 MG: 1 INJECTION, SOLUTION SUBCUTANEOUS at 04:04

## 2018-11-05 RX ADMIN — POTASSIUM PHOSPHATE, MONOBASIC AND POTASSIUM PHOSPHATE, DIBASIC 12 MMOL: 224; 236 INJECTION, SOLUTION INTRAVENOUS at 20:58

## 2018-11-05 RX ADMIN — Medication 250 MILLI-UNITS/MIN: at 07:35

## 2018-11-05 RX ADMIN — CEFOXITIN SODIUM 1000 MG: 1 POWDER, FOR SOLUTION INTRAVENOUS at 07:19

## 2018-11-05 RX ADMIN — MIDAZOLAM 1 MG: 1 INJECTION INTRAMUSCULAR; INTRAVENOUS at 15:02

## 2018-11-05 RX ADMIN — SODIUM BICARBONATE 50 MEQ: 84 INJECTION, SOLUTION INTRAVENOUS at 11:16

## 2018-11-05 NOTE — CONSULTS
Consultation - Nephrology   Aurea Mejias 29 y o  female MRN: [id number]  Unit/Bed#: ICU 6 Encounter: [id number]      ASSESSMENT:    80-year-old pregnant female admitted 38 weeks with tachycardia, hypotension and fever requiring emergent  complicated by oliguria and acute kidney injury    1  Acute kidney injury is likely multifactorial, likely an element of hypotensive ischemic ATN in the setting of hemoglobin of 4 6, tachycardia with induced cardiomyopathy with pulmonary hypertension  2  Anion gap metabolic lactic acidosis  3  Anemia multifactorial in the setting of an emergent  and sepsis  4  Sepsis thought to be secondary to a urinary tract infection with a history of pyelonephritis small leukocytes and the urinalysis drawn today  5  38 weeks pregnant status post  today  6  History of nephrolithiasis     PLAN:    -blood pressure as low as 64/38 this morning which likely caused ischemic ATN  -with volume resuscitation urine output has slightly improved although out lactate remains high at 9 3-  -pH is 7 2, bicarb is 14 pCO2 slightly elevated at 33-would start a bicarb drip at 100 cc/hour-repeat a BMP in 10-12 hours have pH improved can change to isolate  -give 60 mg of IV Lasix after blood products-has now has worsening oxygen requirement  -consent was obtained for dialysis if needed currently no indication-if lactic acidosis not improving her volume status worse will likely need dialysis will monitor closely  -workup for sepsis ongoing patient obtained CT scan has a history of nephrolithiasis  -mild perinephric stranding and mild proximal hydroureteronephrosis consider urology consult    HISTORY OF PRESENT ILLNESS:  Requesting Physician: Jackie Horowitz MD  Reason for Consult:     Aurea Mejias is a 29y o  year old female who was admitted to [facility] after presenting with back pain   A renal consultation is requested today for assistance in the management of acute kidney injury  Patient presented yesterday with increased back pain subsequently was found to have worsening fetal compromise required emergent  there was concern for UTI and patient was on Flagyl for bacterial vaginosis subsequently hemoglobin was found to be in the 4 blood pressures were 60/40 this morning and she does have a history of chronic anemia    Urine output has been minimal her creatinine at baseline was 0 4 has subsequently gone up to 1 2 and now has a lactic acidosis as well blood pressures are better in the 44T over 60 systolic, is responsive to questions although of T is currently on oxytocin she has a history of nephrolithiasis and urinary tract infections in the past as well as anemia    PAST MEDICAL HISTORY:  Past Medical History:   Diagnosis Date    Anemia     taking iron pills BID     Urinary tract infection     taking antibioitcs     Varicella     as child       PAST SURGICAL HISTORY:  Past Surgical History:   Procedure Laterality Date    TONSILLECTOMY      TONSILLECTOMY         ALLERGIES:  No Known Allergies    SOCIAL HISTORY:  History   Alcohol Use No     History   Drug Use No     History   Smoking Status    Former Smoker    Packs/day: 1 00   Smokeless Tobacco    Never Used     Comment: 1 cigarette/ day       FAMILY HISTORY:  Family History   Problem Relation Age of Onset    No Known Problems Mother     Diabetes Father     Heart defect Brother         3 open heart surgeries    Heart failure Family         Congestive       MEDICATIONS:    Current Facility-Administered Medications:     acetaminophen (TYLENOL) tablet 650 mg, 650 mg, Oral, Q4H PRN, Christie Pereira MD    azithromycin (ZITHROMAX) 500 mg in sodium chloride 0 9% 250mL IVPB 500 mg, 500 mg, Intravenous, Once, Boom Marc MD    ceFAZolin (ANCEF) IVPB (premix) 1,000 mg, 1,000 mg, Intravenous, Once, Boom Marc MD    cefepime (MAXIPIME) 1,000 mg in dextrose 5 % 50 mL IVPB, 1,000 mg, Intravenous, Q12H, DENISSE Ty, Last Rate: 100 mL/hr at 11/05/18 1314, 1,000 mg at 11/05/18 1314    diphenhydrAMINE (BENADRYL) tablet 25 mg, 25 mg, Oral, Q6H PRN, Greg Montes De Oca MD    docusate sodium (COLACE) capsule 100 mg, 100 mg, Oral, BID, Greg Montes De Oca MD  [facility] Hold] HYDROmorphone (DILAUDID) injection 0 5 mg, 0 5 mg, Intravenous, Q3H PRN, DENISSE Ty, 0 5 mg at 11/05/18 1111    [MAR Hold] lactated ringers bolus 1,000 mL, 1,000 mL, Intravenous, Once **FOLLOWED BY** [MAR Hold] lactated ringers bolus 1,000 mL, 1,000 mL, Intravenous, Once, Viviana Voss MD    metroNIDAZOLE (FLAGYL) IVPB (premix) 500 mg, 500 mg, Intravenous, Q8H, DENISSE Langston, Last Rate: 200 mL/hr at 11/05/18 1151, 500 mg at 11/05/18 1151    midazolam (VERSED) 2 mg/2 mL injection **ADS Override Pull**, , , ,     ondansetron (ZOFRAN) injection 4 mg, 4 mg, Intravenous, Q8H PRN, Greg Montes De Oca MD    oxyCODONE-acetaminophen (PERCOCET) 5-325 mg per tablet 1 tablet, 1 tablet, Oral, Q4H PRN, Greg Montes De Oca MD    oxyCODONE-acetaminophen (PERCOCET) 5-325 mg per tablet 2 tablet, 2 tablet, Oral, Q4H PRN, Greg Montes De Oca MD    oxytocin (PITOCIN) 30 Units in lactated ringers 500 mL infusion, 62 5 ludmila-units/min, Intravenous, Continuous, Viviana Voss MD, Last Rate: 62 5 mL/hr at 11/05/18 1138, 62 5 ludmila-units/min at 11/05/18 1138    simethicone (MYLICON) chewable tablet 80 mg, 80 mg, Oral, 4x Daily PRN, Greg Montes De Oca MD    REVIEW OF SYSTEMS:  Constitutional: Negative for fatigue, anorexia, fever, chills, diaphoresis  HENT: Negative for postnasal drip  Eyes: Negative for visual disturbance  Respiratory: Negative for cough, shortness of breath and wheezing  Cardiovascular: Negative for chest pain, palpitations and leg swelling  Gastrointestinal: Negative for abdominal pain, constipation, diarrhea, nausea and vomiting  Genitourinary: No dysuria, hematuria  Endocrine: Negative for polyuria     Musculoskeletal: Negative for arthralgias, back pain and joint swelling  Skin: Negative for rash  Neurological: Negative for focal weakness, headaches, dizziness  Hematological: Negative for easy bruising or bleeding  Psychiatric/Behavioral: Negative for confusion and sleep disturbance  All the systems were reviewed and were negative except as documented on the HPI  PHYSICAL EXAM:  Current Weight: Weight - Scale: 60 8 kg (134 lb)  First Weight: Weight - Scale: 60 8 kg (134 lb)  Vitals:    11/05/18 1053 11/05/18 1100 11/05/18 1115 11/05/18 1200   BP: (!) 109/47 (!) 93/41 (!) 103/48 (!) 104/45   BP Location:       Pulse: (!) 149 (!) 148 (!) 150 (!) 150   Resp: (!) 34 (!) 41 (!) 42 (!) 42   Temp: (!) 100 6 °F (38 1 °C) (!) 100 6 °F (38 1 °C) (!) 101 °F (38 3 °C)    TempSrc:  Temporal Tympanic    SpO2:  97% 100% 92%   Weight:       Height:           Intake/Output Summary (Last 24 hours) at 11/05/18 1315  Last data filed at 11/05/18 1200   Gross per 24 hour   Intake          1735 42 ml   Output              375 ml   Net          1360 42 ml     General: conscious, cooperative, in not acute distress  Eyes: conjunctivae pink, anicteric sclerae  ENT: lips and mucous membranes moist  Neck: supple, no JVD  Chest: clear breath sounds bilateral, no crackles, ronchus or wheezings  CVS: distinct S1 & S2, normal rate, regular rhythm  Abdomen: soft, non-tender, non-distended, normoactive bowel sounds  Extremities: no edema of both legs  Skin: no rash  Neuro: awake, alert, oriented        Invasive Devices:   Urethral Catheter Non-latex 16 Fr  (Active)   Site Assessment Clean;Skin intact; Bleeding; Other (Comment) 11/5/2018 12:00 PM   Collection Container Standard drainage bag 11/5/2018 12:00 PM   Output (mL) 100 mL 11/5/2018 12:00 PM     Lab Results:     Results from last 7 days  Lab Units 11/05/18  0923 11/05/18  0634 11/05/18  0527 11/05/18  0414   WBC Thousand/uL 5 40  --   --  2 74*   HEMOGLOBIN g/dL 4 6*  --   --  7 1*   HEMATOCRIT % 17 7*  --   --  27 2* PLATELETS Thousands/uL 126*  --   --  164   POTASSIUM mmol/L 3 3* 3 1*  --   --    CHLORIDE mmol/L 104 102  --   --    CO2 mmol/L 15* 19*  --   --    BUN mg/dL 12 13  --   --    CREATININE mg/dL 1 23 0 93  --   --    CALCIUM mg/dL 7 6* 8 6  --   --    MAGNESIUM mg/dL  --  1 5*  --   --    ALK PHOS U/L 107 186*  --   --    ALT U/L 8* 12  --   --    AST U/L 17 19  --   --    NITRITE UA   --   --  Negative  --    BLOOD UA   --   --  Small*  --    LEUKOCYTES UA   --   --  Small*  --        Other Studies:     Noncontrast CT scan    IMPRESSION:        1   3 mm calculus at the right ureterovesical junction resulting in mild proximal hydroureteronephrosis and mild perinephric stranding      2   Small bilateral pleural effusions with bibasilar airspace consolidations versus atelectasis  Underlying pneumonia is not excluded      3  Expected postsurgical changes from recent  section including small volume pneumoperitoneum      4  Diffuse gallbladder wall thickening and a small calcified gallstone  Possibility of acute cholecystitis is not excluded    Further evaluation with right upper quadrant abdominal ultrasound and/

## 2018-11-05 NOTE — ANESTHESIA PREPROCEDURE EVALUATION
Review of Systems/Medical History  Patient summary reviewed  Chart reviewed      Cardiovascular  Negative cardio ROS    Pulmonary  Negative pulmonary ROS        GI/Hepatic  Negative GI/hepatic ROS          Negative  ROS        Endo/Other  Negative endo/other ROS      GYN  Negative gynecology ROS          Hematology  Anemia ,     Musculoskeletal  Negative musculoskeletal ROS        Neurology  Negative neurology ROS      Psychology   Negative psychology ROS              Physical Exam    Airway    Mallampati score: II  TM Distance: >3 FB  Neck ROM: full     Dental   No notable dental hx     Cardiovascular  Comment: Negative ROS, Cardiovascular exam normal    Pulmonary  Pulmonary exam normal     Other Findings        Anesthesia Plan  ASA Score- 2     Anesthesia Type- epidural with ASA Monitors  Additional Monitors:   Airway Plan:         Plan Factors-    Induction-     Postoperative Plan-     Informed Consent- Anesthetic plan and risks discussed with patient

## 2018-11-05 NOTE — CONSULTS
UROLOGY CONSULTATION NOTE     Patient Identifiers: Barbara Cuba (MRN [id number])  Service Requesting Consultation:  ICU  Service Providing Consultation:  Urology, Cameron Osborn MD    Date of Service: 2018    Reason for Consultation:  Right 3 mm UVJ calculus    History of Present Illness:     Barbara Cuba is a 29 y o  with a history of pregnancy  Today she underwent urgent  section  She showing signs of sepsis  Fever to 102 noted  The patient is profoundly tachycardic and hypotensive  She had received pressors  She received a blood transfusion  Lactate reported as high as 9 3  After delivery a CT scan was performed showing a 3 mm right distal ureteral calculus with right-sided hydronephrosis  This may very well be the etiology for her sepsis  Urgent urologic consultation has been requested  There was discussion about placement of a right nephrostomy tube, however, there is concern for coagulopathy and FFP has been ordered      Past Medical, Past Surgical History:     Past Medical History:   Diagnosis Date    Anemia     taking iron pills BID     Urinary tract infection     taking antibioitcs     Varicella     as child     Past Surgical History:   Procedure Laterality Date    TONSILLECTOMY      TONSILLECTOMY        Medications, Allergies:   Scheduled Meds:  Current Facility-Administered Medications:  acetaminophen 650 mg Oral Q4H PRN Ravi Ba MD    azithromycin 500 mg Intravenous Once Jose Morris MD    cefazolin 1,000 mg Intravenous Once Jose Morris MD    cefepime 1,000 mg Intravenous Q12H DENISSE Hdez Last Rate: 1,000 mg (18 1314)   diphenhydrAMINE 25 mg Oral Q6H PRN Ravi Ba MD    docusate sodium 100 mg Oral BID Ravi Ba MD    furosemide 60 mg Intravenous Once DENISSE Hdezing Hold] HYDROmorphone 0 5 mg Intravenous Q3H PRN DENISSE Hdez    [MAR Hold] lactated ringers 1,000 mL Intravenous Once Jose Morris MD    Followed by [MAR Hold] lactated ringers 1,000 mL Intravenous Once Anna Mercado MD    metroNIDAZOLE 500 mg Intravenous Q8H DENISSE Mora Last Rate: 500 mg (11/05/18 1151)   midazolam        ondansetron 4 mg Intravenous Q8H PRN Alla Jaramillo MD    oxyCODONE-acetaminophen 1 tablet Oral Q4H PRN Alla Jaramillo MD    oxyCODONE-acetaminophen 2 tablet Oral Q4H PRN Alla Jaramillo MD    oxytocin 62 5 ludmila-units/min Intravenous Continuous Anna Mercado MD Last Rate: 62 5 ludmila-units/min (11/05/18 1138)   simethicone 80 mg Oral 4x Daily PRN Alla Jaramillo MD    sodium bicarbonate infusion 100 mL/hr Intravenous Continuous DENISSE Mora    vancomycin 15 mg/kg Intravenous Once Pastor Buddy MD      Continuous Infusions:  oxytocin 62 5 ludmila-units/min Last Rate: 62 5 ludmila-units/min (11/05/18 1138)   sodium bicarbonate infusion 100 mL/hr      PRN Meds:   acetaminophen    diphenhydrAMINE    [MAR Hold] HYDROmorphone    ondansetron    oxyCODONE-acetaminophen    oxyCODONE-acetaminophen    simethicone    Allergies:  No Known Allergies:    Social and Family History:   Social History:   Social History   Substance Use Topics    Smoking status: Former Smoker     Packs/day: 1 00    Smokeless tobacco: Never Used      Comment: 1 cigarette/ day    Alcohol use No        History   Smoking Status    Former Smoker    Packs/day: 1 00   Smokeless Tobacco    Never Used     Comment: 1 cigarette/ day       Family History:  Family History   Problem Relation Age of Onset    No Known Problems Mother     Diabetes Father     Heart defect Brother         3 open heart surgeries    Heart failure Family         Congestive   :     Review of Systems:   She has fever  She denies any chest pain or shortness of breath  All other systems queried were negative      Physical Exam:     Vitals:    11/05/18 1430   BP: (!) 103/48   Pulse: (!) 150   Resp: (!) 36   Temp:    SpO2: 97%       PE:  General:  Ill and toxic appearing  HEENT:  Normocephalic, atraumatic, sclerae nonicteric  Cardiac:  Tachycardic  Abdomen:  Soft with appropriate tenderness status post  section  :  No significant CVA tenderness  Skin:  Warm  Extremities:  Without edema  Neurologic:  Grossly intact and nonfocal    Labs:     Lab Results   Component Value Date    HGB 5 8 (LL) 2018    HCT 17 7 (L) 2018    WBC 5 40 2018     (L) 2018   ]    Lab Results   Component Value Date     (L) 2014    K 3 3 (L) 2018     2018    CO2 15 (L) 2018    BUN 12 2018    CREATININE 1 23 2018    CALCIUM 7 6 (L) 2018    GLUCOSE 87 2014   ]    Imaging:   CT scan reveals 3 mm right UVJ calculus with right-sided hydronephrosis  ASSESSMENT:     29 y o  old female with  sepsis secondary to 3 mm right UVJ calculus with right-sided hydronephrosis status post  section  PLAN:     As the patient may show signs of coagulopathy, I feel that the most expeditious way to drain her right kidney is with cystoscopy and right ureteral stent insertion  This option appears to be immediately available at this time as the operating room has availability  Risk and benefits of the procedure were discussed and reviewed  Informed consent obtained at the bedside today  She will ultimately require definitive management of her distal stone after her sepsis has resolved  The patient will return to the ICU after stent insertion  Thank you for allowing me to participate in this patients care  Please do not hesitate to call with any additional questions    Radha Barraza MD

## 2018-11-05 NOTE — PLAN OF CARE
CARDIOVASCULAR - ADULT     Maintains optimal cardiac output and hemodynamic stability Progressing     Absence of cardiac dysrhythmias or at baseline rhythm Progressing        HEMATOLOGIC - ADULT     Maintains hematologic stability Progressing        Knowledge Deficit     Verbalizes understanding of labor plan Progressing        Potential for Falls     Patient will remain free of falls Progressing        Prexisting or High Potential for Compromised Skin Integrity     Skin integrity is maintained or improved Progressing        RESPIRATORY - ADULT     Achieves optimal ventilation and oxygenation Progressing

## 2018-11-05 NOTE — PROGRESS NOTES
FHT noted to have recurrent variable decels and tachysystole  Pt complaining of not being able to breath durign ctx  Lungs CTA b/l, Pulse ox 100%  SVE noted to be 3/50/-3  Pt received 10mg Morphine at 0225  Maternal pulse >120  Oxygen therapy applied  Going to run IVFs  Dr Romeo Dawson notified and will give terbutaline 0 25mg subcutaneous  Will admit for category II FHT      Keith Phoenix MD  OBGYN, PGY-2  11/5/2018 4:04 AM

## 2018-11-05 NOTE — H&P
H&P Exam - Obstetrics   Maile Crow 29 y o  female MRN: [id number]  Unit/Bed#: L&D 323-01 Encounter: [id number]    >2 Midnights    INPATIENT     [facility]    History of Present Illness   Chief Complaint: " I'm in a lot of pain, having contractions back to back "    HPI:  Maile Crow is a 29 y o   female with an JIMMIE of 2018, by Other Basis at 38w3d weeks gestation who is being admitted for category II FHT  Pt initially presented to L&D for r/o labor and was noted to be 2/50/-3  She then was provided 10mg IM morphine for therapeutic rest  Almost 2hrs after administration, pt c/o feeling difficulty breathing and FHT noted to have recurrent variable decels with tachysytole  Pt was noted tohave lungs CTA bilaterally and SaO2 noted to be 100%  Maternal pulse 140s  SVE was 3/50/-3  IVFs and oxygen therapy was initiated  Pt was then given 0 25mg subcutaneous Terbutaline to resolve tacysytole  Contractions: every 1 5-2mins  Leakage of fluid: None  Bleeding: None  Fetal movement: present  Her current obstetrical history is significant for Anemia, GBS unknown, UTI in early pregnancy, Limited prenatal care, short interval pregnancy  Review of Systems   Respiratory: Positive for shortness of breath  Cardiovascular: Positive for palpitations  Negative for chest pain  Gastrointestinal: Positive for abdominal pain (painful ctx)  Genitourinary: Negative for vaginal bleeding         Historical Information   OB History    Para Term  AB Living   5 4 4     4   SAB TAB Ectopic Multiple Live Births         0 4      # Outcome Date GA Lbr Nicho/2nd Weight Sex Delivery Anes PTL Lv   5 Current            4 Term 17 39w6d  2977 g (6 lb 9 oz) M Vag-Spont None N MIO   3 Term 02/16/15 38w0d  3515 g (7 lb 12 oz) F Vag-Spont   MIO   2 Term 13 40w0d  3232 g (7 lb 2 oz) F Vag-Spont   MIO   1 Term 10/06/12 40w0d  3175 g (7 lb) M Vag-Spont EPI  MIO        Baby complications/comments: VTX, EFW: 6 5lbs  Past Medical History:   Diagnosis Date    Anemia     taking iron pills BID     Urinary tract infection     taking antibioitcs     Varicella     as child     Past Surgical History:   Procedure Laterality Date    TONSILLECTOMY      TONSILLECTOMY       Social History   History   Alcohol Use No     History   Drug Use No     History   Smoking Status    Former Smoker    Packs/day: 1 00   Smokeless Tobacco    Never Used     Comment: 1 cigarette/ day     Family History: non-contributory    Meds/Allergies   {  Prescriptions Prior to Admission   Medication    acetaminophen (TYLENOL) 325 mg tablet    Prenatal MV-Min-Fe Fum-FA-DHA (PRENATAL 1 PO)     No Known Allergies    Objective   Vitals: Blood pressure 103/58, pulse (!) 155, temperature 97 5 °F (36 4 °C), temperature source Axillary, height 5' (1 524 m), weight 60 8 kg (134 lb), last menstrual period 03/30/2018, SpO2 100 %, not currently breastfeeding  Body mass index is 26 17 kg/m²  Invasive Devices          No matching active lines, drains, or airways          Physical Exam   Constitutional: She is oriented to person, place, and time  She appears well-developed and well-nourished  She appears distressed (very uncomfortable, writhing in bed during ctx)  HENT:   Head: Normocephalic and atraumatic  Cardiovascular: Normal rate, regular rhythm and normal heart sounds  Exam reveals no gallop and no friction rub  No murmur heard  Pulmonary/Chest: Effort normal and breath sounds normal  No respiratory distress  She has no wheezes  She has no rales  Abdominal: Soft  Bowel sounds are normal  There is no tenderness  There is no rebound and no guarding  Gravid uterus   Genitourinary: Vagina normal    Musculoskeletal: Normal range of motion  She exhibits no edema, tenderness or deformity  Neurological: She is alert and oriented to person, place, and time  Skin: She is not diaphoretic  Psychiatric: Her affect is inappropriate  Vitals reviewed  Vaginal Exam  Dilation: 3  Effacement (%): 50  Station: -3  Position: Unknown  Method: Manual  OB Examiner: Anita Cure  Membranes: Intact  FHR: Baseline: 150 bpm, Variability: Moderate 6 - 25 bpm, Accelerations: Non-reactive, Decelerations: Variables and Category 2    [place]: Frequency: Every 1 5-2 minutes    Prenatal Labs: I have personally reviewed pertinent reports    , Blood Type:   Lab Results   Component Value Date/Time    ABO Grouping A 08/07/2018 08:15 PM    ABO Grouping A 02/16/2015 04:42 AM     , D (Rh type):   Lab Results   Component Value Date/Time    Rh Factor Positive 08/07/2018 08:15 PM    Rh Factor Positive 02/16/2015 04:42 AM     , Antibody Screen:   Lab Results   Component Value Date/Time    Antibody Screen Negative 02/16/2015 04:42 AM    , 1 hour Glucola:   Lab Results   Component Value Date/Time    GLUCOSE 1 HR 50 GM GLUC CHALLENGE-PREG  12/17/2014 01:16 PM    Glucose 124 09/14/2017 06:10 PM   , Varicella: positive history    , Rubella:   Lab Results   Component Value Date/Time    RUBELLA IGG QUANTITATION 18 0 08/13/2014 12:43 PM    Rubella IgG Quant 21 4 08/07/2018 08:15 PM        , VDRL/RPR:   Lab Results   Component Value Date/Time    RPR SCREEN Non-Reactive (q 02/16/2015 04:42 AM    RPR Non-Reactive 08/07/2018 08:15 PM      , Hep B:   Lab Results   Component Value Date/Time    HEPATITIS B SURFACE ANTIGEN Non-Reactive (q 08/13/2014 12:43 PM    Hepatitis B Surface Ag Non-reactive 08/07/2018 08:15 PM     , HIV:   Lab Results   Component Value Date/Time    HIV-1/2 AB-AG Non-Reactive (q 08/13/2014 12:43 PM    HIV-1/HIV-2 Ab Non-Reactive 08/07/2018 08:15 PM     , Chlamydia: negative  , Gonorrhea:   Lab Results   Component Value Date/Time    N GONORRHOEAE, AMPLIFIED DNA Negative 02/09/2015 10:35 AM    N gonorrhoeae, DNA Probe N  gonorrhoeae Amplified DNA Negative 08/07/2018 08:15 PM     , Group B Strep:    Lab Results   Component Value Date/Time    Strep Grp B PCR Unknown 11/5/18          Imaging, EKG, Pathology, and Other Studies: I have personally reviewed pertinent reports        Assessment/Plan     Assessment:  IUP at 38w3d with category II FHT in Latent labor  Plan:  1) Admit to L&D  2) Routine Labs: CBC, T&S, RPR  3) F/E/N: Clear Liquid diet, IV LR at 125mL/hr  4) Pain: Analgesia and/or epidural upon request  5) Expectant Management, augment with Pitocin/ AROM if necessary  6) GBS unknown: culture to be collected  7) UTI in early pregnancy: F/ u Urine culture  8) Limited prenatal scare: f/u UDS, CM consult postpartum  9) Anemia: F/U CBC    D/w Dr Lulu Cooper MD  OBGYN, PGY-2  11/5/2018 4:43 AM

## 2018-11-05 NOTE — SEPSIS NOTE
Sepsis Note   Johanyoanna Lin 29 y o  female MRN: [id number]  Unit/Bed#: L&D 328-01 Encounter: [id number]            Initial Sepsis Screening     Row Name [id number]                Is the patient's history suggestive of a new or worsening infection? (!)  Yes (Proceed)  -AB        Suspected source of infection suspect infection, source unknown  -AB        Are two or more of the following signs & symptoms of infection both present and new to the patient? (!)  Yes (Proceed)  -AB        Indicate SIRS criteria Leukopenia (WBC < 4000 IJL);WBC > 10% bands; Tachycardia > 90 bpm  -AB        If the answer is yes to both questions, suspicion of sepsis is present          If severe sepsis is present AND tissue hypoperfusion perists in the hour after fluid resuscitation or lactate > 4, the patient meets criteria for SEPTIC SHOCK          Are any of the following organ dysfunction criteria present within 6 hours of suspected infection and SIRS criteria that are NOT considered to be chronic conditions? (!)  Yes  -AB        Organ dysfunction SBP < 90 mmHg;Urine output < 0 5 mL/kg/hour for 2 hours  -AB        Date of presentation of severe sepsis          Time of presentation of severe sepsis          Tissue hypoperfusion persists in the hour after crystalloid fluid administration, evidenced, by either:          Was hypotension present within one hour of the conclusion of crystalloid fluid administration?         Date of presentation of septic shock          Time of presentation of septic shock            User Key  (r) = Recorded By, (t) = Taken By, (c) = Cosigned By    [address] Name Provider Edyta Grimm MD Resident          Patient was noted to have a leukopenia of 2 74 and bandemia of 36% in constellation of other maternal signs suspicious for sepsis including maternal tachycardia and hypotension   She underwent urgent  section for category II tracing - recurrent variable decelerations with unexplained tachysystole refractory to terbutaline  Will begin sepsis protocol and admit to ICU      Goyo Xavier MD  OB/GYN PGY-3  11/5/2018 9:08 AM

## 2018-11-05 NOTE — DISCHARGE INSTRUCTIONS
Section   WHAT YOU SHOULD KNOW:   A  delivery, or , is abdominal surgery to deliver your baby  There are many reasons you may need a   · A  may be scheduled before labor if you had a  with your last baby  It may be scheduled if your baby is not positioned normally, or you are pregnant with more than 1 baby  · Your caregiver may perform an emergency  during labor to prevent life-threatening complications for you or your baby  A  may be done if your cervix does not dilate after several hours of active labor  · Other reasons for a  include maternal infections and problems with the placenta  AFTER YOU LEAVE:   Medicines:   · Prescription pain medicine  may be given  Ask how to take this medicine safely  · Acetaminophen  decreases pain and fever  It is available without a doctor's order  Ask how much to take and how often to take it  Follow directions  Acetaminophen can cause liver damage if not taken correctly  · NSAIDs  help decrease swelling and pain or fever  This medicine is available with or without a doctor's order  NSAIDs can cause stomach bleeding or kidney problems in certain people  If you take blood thinner medicine, always ask your obstetrician if NSAIDs are safe for you  Always read the medicine label and follow directions  · Take your medicine as directed  Contact your obstetrician (OB) if you think your medicine is not helping or if you have side effects  Tell him if you are allergic to any medicine  Keep a list of the medicines, vitamins, and herbs you take  Include the amounts, and when and why you take them  Bring the list or the pill bottles to follow-up visits  Carry your medicine list with you in case of an emergency  Follow up with your OB as directed: You may need to return to have your stitches or staples removed   Write down your questions so you remember to ask them during your visits  Wound care:  Carefully wash your wound with soap and water every day  Keep your wound clean and dry  Wear loose, comfortable clothes that do not rub against your wound  Ask your OB about bathing and showering  Drink plenty of liquids: You can lower your risk for a blood clot if you drink plenty of liquids  Ask how much liquid to drink each day and which liquids are best for you  Limit activity until you have fully recovered from surgery:   · Ask when it is safe for you to drive, walk up stairs, lift heavy objects, and have sex  · Ask when it is okay to exercise, and what types of exercise to do  Start slowly and do more as you get stronger  Contact your OB if:   · You have heavy vaginal bleeding that fills 1 or more sanitary pads in 1 hour  · You have a fever  · Your incision is swollen, red, or draining pus  · You have questions or concerns about yourself or your baby  Seek care immediately or call 911 if:   · Blood soaks through your bandage  · Your stitches come apart  · You feel lightheaded, short of breath, and have chest pain  · You cough up blood  · Your arm or leg feels warm, tender, and painful  It may look swollen and red  © 2014 [address] is for End User's use only and may not be sold, redistributed or otherwise used for commercial purposes  All illustrations and images included in CareNotes® are the copyrighted property of A D A M [employer] Inc  or Edgar Castro  The above information is an  only  It is not intended as medical advice for individual conditions or treatments  Talk to your doctor, nurse or pharmacist before following any medical regimen to see if it is safe and effective for you

## 2018-11-05 NOTE — ANESTHESIA POSTPROCEDURE EVALUATION
Post-Op Assessment Note      CV Status:  Stable    Mental Status:  Alert and awake    Hydration Status:  Euvolemic    PONV Controlled:  Controlled    Airway Patency:  Patent    Post Op Vitals Reviewed: Yes          Staff: Anesthesiologist           BP     Temp     Pulse    Resp     SpO2

## 2018-11-05 NOTE — OP NOTE
OPERATIVE REPORT  PATIENT NAME: Aurea Mejias    :  1990  MRN: [id number]  Pt Location: [facility] OR ROOM 01    SURGERY DATE: 2018    Surgeon(s) and Role:     John Hurd MD - Primary    Preop Diagnosis:  Hydronephrosis [N13 30]  Right distal ureteral calculus, sepsis    Post-Op Diagnosis Codes: * Hydronephrosis [N13 30]  Right distal ureteral calculus, sepsis    Procedure(s) (LRB):  CYSTOSCOPY RETROGRADE PYELOGRAM WITH INSERTION STENT URETERAL (Right)    Specimen(s):  ID Type Source Tests Collected by Time Destination   A : Urine Culture Urine Urine, Cystoscopic URINE CULTURE Luis Upton MD 2018 1519        Estimated Blood Loss:   Minimal    Drains:  Urethral Catheter Non-latex 16 Fr  (Active)   Site Assessment Clean;Skin intact; Bleeding; Other (Comment) 2018 12:00 PM   Collection Container Standard drainage bag 2018 12:00 PM   Output (mL) 100 mL 2018  3:01 PM   Number of days: 0       Urethral Catheter Double-lumen; Latex 18 Fr  (Active)   Number of days: 0       Ureteral Drain/Stent Right ureter 6 Fr  (Active)   Number of days: 0       Anesthesia Type:   General    Operative Indications:  Hydronephrosis [N13 30]      Operative Findings:  Severe right-sided hydronephrosis with purulent appearing urine secondary to distal right 3 mm UVJ calculus    Complications:   None    Procedure and Technique:  Geovanni Smith is a 80-year-old female who underwent urgent  section  She is now only hours postpartum  She is septic with a fever of 102 and lactate of 9  She is hypotensive with tachycardia  A CT scan was performed revealing severe right-sided hydronephrosis with a 3 mm right UVJ calculus  There is question of a coagulopathy  FFP has been ordered and blood transfusion arranged    The operating room was immediately available for cystoscopy with right ureteral stent insertion which I felt could be performed more expeditiously then FFP administration and insertion of a right nephrostomy tube in interventional radiology  Risk and benefits of the procedure were discussed and reviewed  Informed consent was obtained and able to be obtained from the patient  Patient brought to the operating room on November 5, 2018  After the smooth induction of monitored anesthetic care, the patient was placed in the dorsal lithotomy position  Her genitalia was prepped and draped in sterile fashion  Intravenous antibiotics were administered  Time-out was performed with all members of the operative team confirming the patient's identity, procedure to be performed, laterality of the case  A 22 Faroese rigid cystoscope with 30 degree lens was inserted  The bladder was thoroughly inspected  There is no sign of mucosal abnormalities, lesions, or stones  Attention was focused on the right ureteral orifice  A bulge was identified in this region consistent with the stone identified on CT scan  A 5 Faroese open-ended catheter was inserted and immediately purulent appearing urine drained around the 5 [place] open-ended catheter  A wire was advanced to an upper pole calyx and a 5 [place] open-ended catheter was advanced into the renal pelvis  There was tortuosity of the right proximal ureter  Right retrograde pyelography showed severe right-sided hydronephrosis  The wire was reinserted  A right 24-6 [place] double-J ureteral stent was then placed in the standard fashion  The proximal coil was appreciated within the right renal pelvis and the distal coil was visualized within the bladder  There was no string left in place  Purulent urine drained and was sent for culture  The bladder was left full  An 25 Faroese Olivas catheter was inserted and connected to gravity drainage  Overall the patient tolerated the procedure well number no complications  The patient was transferred to the ICU in monitored condition but stable      Patient Disposition:  ICU stable but monitored    SIGNATURE: Alex Romero Iman Rivas MD  DATE: November 5, 2018  TIME: 3:26 PM

## 2018-11-05 NOTE — QUICK NOTE
Documentation done retrospectively secondary to acute nature of clinical situation  Upon my arrival to the unit at 0700, I was informed of the patient's clinical status by the residents as no attending presence was noted  The patient presented with multiple vague complaints of abdominal pain, shortness of breath, and malaise  Patient was found to probably be in early labor with contractions occurring every 1-2 minutes  Patient then reported increased shortness of breath  Evaluation showed normal pulse oximetry at 100% but the maternal pulse was noted to be in the 140's  At this point the fetal heart rate showed repetitive variable decelerations with uterine tachysystole  Terbutaline was given to help resolve the tachysystole  The patient was still 3/50/-3 and not a safe candidate for amniotomy  EKG was also ordered which showed sinus tachycardia with possible atrial infarct  At this point, a cardiology consult was requested and they recommended a TSH and maternal ECHO  Other laboratory evaluation was only significant of hypokalemia of 3 1, anemia with a Hbg of 7 1, and leukopenia with a WBC of 2 74  Drug screen was collected after she had received morphine for analgesia and drug screen showed only positive opiates  After the terbutaline, the FHR tracing improved so it was decided to continue the labor process  During this time period, the patient started to also complain of back, neck, and chest pain while her pulse increased to near the 160's  A medical rapid response was being considered but during this time period the FHR tracing once again developed repetitive variable decelerations  The plan was to perform amniotomy to both augment labor and place [facility] for amnioinfusion to treat the decelerations as well as monitor the uterine tachysystole that once again developed  At this point, the cervical exam remained unchanged so it was unsafe to perform amniotomy   Secondary, to the maternal pulse being so high it was deemed too risky to give more terbutaline so an oral dose of procardia was considered  However, the patient's blood pressures being in the 100's/60's made this risky also  During this discussion, the FHR tracing lost variability so it was decided to proceed with an emergent  delivery for fetal benefit even though we had yet to determine the maternal etiology of her condition  This  delivery was called at approximately 0720  Patient interviewed and procedure explained  All risks, benefits, and alternatives of the procedure were explained to the patient  The patient verbalized understanding of those points  The patient was given the opportunity to ask questions and all questions were answered to the patient's understanding  The patient verbalized the desire to proceed with the procedure      Daniella Miller MD  2018  8:55 AM

## 2018-11-05 NOTE — PROGRESS NOTES
Pt c/o feeling pain 10/10 and 'morphine didn't do anything'  Pt sitting at side of bed requesting to use shower and ambulate enc to rest and attempt to lay down since recieiving morphine and continous monitoring due to receiving morphine  Pt given po fluids  Will notify MD after MD out of OR of patients complaints and recheck  Mother at bedside for support

## 2018-11-05 NOTE — ANESTHESIA PREPROCEDURE EVALUATION
Review of Systems/Medical History  Patient summary reviewed  Chart reviewed      Cardiovascular  Exercise tolerance (METS): <4,     Pulmonary  Negative pulmonary ROS        GI/Hepatic       Kidney stones (septic shock), Kidney disease ARF,        Endo/Other  Negative endo/other ROS      GYN  Negative gynecology ROS          Hematology  Anemia ,     Musculoskeletal  Negative musculoskeletal ROS        Neurology  Negative neurology ROS      Psychology           Physical Exam    Airway    Mallampati score: II  TM Distance: <3 FB       Dental       Cardiovascular  Rhythm: regular, Rate: normal,     Pulmonary  Rhonchi,     Other Findings        Anesthesia Plan  ASA Score- 4 Emergent    Anesthesia Type- IV sedation with anesthesia with ASA Monitors  Additional Monitors:   Airway Plan:         Plan Factors- Patient instructed to abstain from smoking on day of procedure  Patient did not smoke on day of surgery  Induction- intravenous  Postoperative Plan-     Informed Consent- Anesthetic plan and risks discussed with patient

## 2018-11-05 NOTE — QUICK NOTE
Prior to the patient being transferred to the ICU and after her  section, the patient was found to be in severe sepsis  During her time recovering on our unit the patients blood pressures fluctuated between 60's/40's to 100's/50's never requiring pressor support  The patient also received 3+ liters of IV resuscitation as her pulse consistently stayed over 120  The patient had multiple labs collected as summarized below  We were considering the possible sources of sepsis to be meningitis, endocarditis, pneumonia, pyelonephritis, chorioamnionitis, acute HIV infection, influenza, and other viral etiologies  We started Unasyn IV for empiric sepsis treatment which also covers the most likely source of sepsis in a peripartum patient as being pyelonephritis  Full body examination did not reveal any hidden abscesses  Patient also still denies any illicit drug use  The patient will be getting a 3-unit-PRBC transfusion for her anemia of Hbg 4 6g/dl  MFM was made aware of the patient's clinical status during this time period also  The patient has since been transferred to the ICU  The patient's family was also informed of her clinical course during this time period      Recent Results (from the past 2 hour(s))   Lactic Acid STAT and in 2 hours if first result greater than 2    Collection Time: 18  9:22 AM   Result Value Ref Range    LACTIC ACID 9 3 (HH) 0 5 - 2 0 mmol/L   CBC and differential    Collection Time: 18  9:23 AM   Result Value Ref Range    WBC 5 40 4 31 - 10 16 Thousand/uL    RBC 2 53 (L) 3 81 - 5 12 Million/uL    Hemoglobin 4 6 (LL) 11 5 - 15 4 g/dL    Hematocrit 17 7 (L) 34 8 - 46 1 %    MCV 70 (L) 82 - 98 fL    MCH 18 2 (L) 26 8 - 34 3 pg    MCHC 26 0 (L) 31 4 - 37 4 g/dL    RDW 19 5 (H) 11 6 - 15 1 %    MPV 10 9 8 9 - 12 7 fL    Platelets 641 (L) 674 - 390 Thousands/uL    nRBC 3 /100 WBCs   Comprehensive metabolic panel    Collection Time: 18  9:23 AM   Result Value Ref Range    Sodium 136 136 - 145 mmol/L    Potassium 3 3 (L) 3 5 - 5 3 mmol/L    Chloride 104 100 - 108 mmol/L    CO2 15 (L) 21 - 32 mmol/L    ANION GAP 17 (H) 4 - 13 mmol/L    BUN 12 5 - 25 mg/dL    Creatinine 1 23 0 60 - 1 30 mg/dL    Glucose 102 65 - 140 mg/dL    Calcium 7 6 (L) 8 3 - 10 1 mg/dL    AST 17 5 - 45 U/L    ALT 8 (L) 12 - 78 U/L    Alkaline Phosphatase 107 46 - 116 U/L    Total Protein 4 6 (L) 6 4 - 8 2 g/dL    Albumin 1 7 (L) 3 5 - 5 0 g/dL    Total Bilirubin 0 59 0 20 - 1 00 mg/dL    eGFR 60 ml/min/1 73sq m   Manual Differential(PHLEBS Do Not Order)    Collection Time: 11/05/18  9:23 AM   Result Value Ref Range    Segmented % 68 43 - 75 %    Bands % 24 (H) 0 - 8 %    Lymphocytes % 5 (L) 14 - 44 %    Monocytes % 3 (L) 4 - 12 %    Eosinophils, % 0 0 - 6 %    Basophils % 0 0 - 1 %    Absolute Neutrophils 4 97 1 85 - 7 62 Thousand/uL    Lymphocytes Absolute 0 27 (L) 0 60 - 4 47 Thousand/uL    Monocytes Absolute 0 16 0 00 - 1 22 Thousand/uL    Eosinophils Absolute 0 00 0 00 - 0 40 Thousand/uL    Basophils Absolute 0 00 0 00 - 0 10 Thousand/uL    Total Counted 100     nRBC 6 (H) 0 - 2 /100 WBC    Hypochromia Present     Microcytes Present     Polychromasia Present     Platelet Estimate Borderline (A) Adequate    Giant PLTs Present    Protime-INR    Collection Time: 11/05/18 10:06 AM   Result Value Ref Range    Protime 17 3 (H) 11 8 - 14 2 seconds    INR 1 40 (H) 0 86 - 1 17   APTT    Collection Time: 11/05/18 10:06 AM   Result Value Ref Range    PTT 42 (H) 24 - 36 seconds   Fibrinogen    Collection Time: 11/05/18 10:06 AM   Result Value Ref Range    Fibrinogen 194 (L) 227 - 495 mg/dL   Rapid HIV 1/2 AB-AG Combo    Collection Time: 11/05/18 10:07 AM   Result Value Ref Range    Rapid HIV 1 AND 2 Non-Reactive Non-Reactive    HIV-1 P24 Ag Screen Non-Reactive Non-Reactive   Prepare RBC:Special Requirements: None; Has consent been obtained? Yes; Where is the Surgery Scheduled?  [facility], 2 Units    Collection Time: 18 10:24 AM   Result Value Ref Range    Unit Product Code Q7854H17     Unit Number [id number]     Unit ABO A     Unit [facility] POS     Crossmatch Compatible     Unit Dispense Status Crossmatched     Unit Product Code S5740U60     Unit Number [id number]     Unit ABO A     Unit RH NEG     Crossmatch Compatible     Unit Dispense Status Issued    Prepare RBC:Special Requirements: None; Has consent been obtained? Yes; Where is the Surgery Scheduled?  [facility],  Units    Collection Time: 18 10:39 AM   Result Value Ref Range    Unit Product Code Y6926E21     Unit Number [id number]     Unit ABO A     Unit [facility] POS     Unit Dispense Status Crossmatched     Unit Product Code      Unit Number [id number]     Unit ABO A     Unit [facility] POS     Unit Dispense Status Crossmatched     Unit Product Code S5923C36     Unit Number [id number]     Unit ABO A     Unit [facility] POS     Unit Dispense Status Crossmatched    Blood gas, arterial    Collection Time: 18 10:47 AM   Result Value Ref Range    pH, Arterial 7 244 (LL) 7 350 - 7 450    pCO2, Arterial 33 5 (L) 36 0 - 44 0 mm Hg    pO2, Arterial 22 9 (LL) 75 0 - 129 0 mm Hg    HCO3, Arterial 14 2 (L) 22 0 - 28 0 mmol/L    Base Excess, Arterial -12 0 mmol/L    O2 Content, Arterial 1 7 (L) 16 0 - 23 0 mL/dL    O2 HGB,Arterial  24 1 (L) 94 0 - 97 0 %    SOURCE Radial, Right     UYEN TEST Yes     Temperature  Degrees Fehrenheit    ROOM AIR FIO2  %     Almaz Moore MD  2018  11:22 AM

## 2018-11-05 NOTE — ANESTHESIA PROCEDURE NOTES
Epidural Block    Patient location during procedure: OB  Start time: 11/5/2018 6:22 AM  Reason for block: at surgeon's request and primary anesthetic  Staffing  Anesthesiologist: Noemí Montoya  Performed: anesthesiologist   Preanesthetic Checklist  Completed: patient identified, surgical consent, pre-op evaluation, timeout performed, IV checked, risks and benefits discussed and monitors and equipment checked  Epidural  Patient position: sitting  Prep: Betadine  Patient monitoring: frequent blood pressure checks  Approach: midline  Location: lumbar (1-5)  Injection technique: MARIA E saline  Needle  Needle type: Tuohy   Needle gauge: 18 G  Catheter type: side hole  Catheter size: 20 G  Catheter at skin depth: 9 cm  Test dose: negative (1% lidocaine)negative aspiration for CSF, negative aspiration for heme and no paresthesia on injection  patient tolerated the procedure well with no immediate complications

## 2018-11-05 NOTE — CONSULTS
Dear Dr Emerson Gary,    Thank you for requesting consultation on this patient, Zechariah Tidwell  We discussed this patient earlier this morning as you assumed her care and noted leukopenia with white blood cell count of 2, and a bandemia with 36% bands, in this immediately postcesarean patient  We discussed the possible differential diagnosis as well as urgent management which were (1) rapid initiation of pressors, (2) continued aggressive IV fluid resuscitation, (3) immediate transfer to the intensive care unit, (4) consultation with Infectious Diseases for antibiotic management, and (5) aggressive search for source control  At that time, her lactate was still pending  Since that time, her initial lactate was returned and resulted as 9 3, which is significantly elevated, and in light of the hypotension earlier, was consistent septic shock  When I arrived to the ICU today, she was down in the CT scanner, and shortly after my arrival, she returned  At the bedside, she has blood pressures of 100s/40s, respiratory rate in the 40s, tachypneic and tachycardic, satting 99% on supplemental nasal cannula, and is pale and ill-appearing  She is able to answer my questions in an appropriate fashion but appears uncomfortable  She complains of back pain  Abdominal exam was deferred she is recently postoperative  Olivas catheter is in place and draining orange yellow urine  She asked me to speak with her mother regarding her care and her mother indicates that the patient had a temperature of 104° over the weekend and that there are no sick contacts at home; her mother also indicates that she regularly declines influenza vaccinations and also has frequent urinary tract infections  My assessment and plan is that this is a recently postcesarean patient in septic shock with evolving multisystem organ failure    The fetal manifestations (nonreassuring fetal heart rate tracing, in absence of rupture of membranes) was likely as result of maternal infection, the source of which at this time is not immediately obvious  1   Unruptured chorioamnionitis is a possibility and could involve rare organism such as Haemophilus influenza  2   A more likely scenario is pyelonephritis, as this is the most common cause of sepsis in pregnancy (usually E coli), though typically this does not result in leukopenia and lactic acidosis to the degree to which she is experiencing at this time  3   Puerperal sepsis, commonly from Group A Streptococcus (GAS), can present like this  Her CT scan has not yet resulted however if there is any retroperitoneal edema, this would be more concerning for GAS  Nares and oropharynx should be cultured  Treatment is penicillin and clindamycin for the Eagle affect  4   The pentad of TTP-HUS must also be considered; this consists of altered mental status, acute renal failure, fever, microangiopathic hemolytic anemia, and thrombocytopenia  5   Consider influenza  It is flu season, this patient is unvaccinated and has several unvaccinated children at home  "Rules" for treatment of pregnant women are different in that flu swab need not be used to guide management  Pregnant women are at increased risk of flu related complications, ICU admission, and death  In terms of management, I agree with involvement of Cardiology and Nephrology in the setting of echo results as well as acute kidney injury  Her need for aggressive fluid resuscitation for optimization of oxygen carrying capacity need to be balanced with resultant volume overload  I also advise:     · FFP resuscitation versus cryoprecipitate, in addition to the red cells already being transfused  Normal fibrinogen in pregnancy is 500s-700s and her level of 179 is extremely low  · Consulting ID to weigh in on her picture as well and help with antibiotic management    · Close communication with NICU as  results may help guide maternal antibiotic management  · Continue aggressive search for source control  If she continues to worsen then consideration must be given to postpartum hysterectomy  I spoke with Dr Dylan Eddy ([facility]) prophylactically  · Nasal and throat cultures as above  Keep GAS on the differential   · Add tamiflu  I spoke at length at the bedside with the patient's mother, at her request, and explained that Lisa Joy is critically ill, that we do not know the clear source, and that she is likely to worsen during this hyperacute phase as we continue to search for the source  At this time she has multisystem organ involvement and the next most likely organ system to be will likely be pulmonary, and I explained that Lisa Joy may need to be intubated  Will continue to follow closely with you  Will also keep Dr Imelda Berrios, my partner, abreast of her situation  Thank you very much for this kind referral and please do not hesitate to contact me with any further questions or concerns      Sincerely,    Foreign Palmer MD  Maternal-Fetal Medicine

## 2018-11-05 NOTE — PROGRESS NOTES
Progress Note - [facility] 29 y o  female MRN: [id number]  Unit/Bed#: ICU 11 Encounter: [id number]    Assessment/Plan:  1  Sepsis with septic shock, likely secondary to nephrolithiasis with hydronephrosis  · Broad-spectrum antibiotics  · Infectious disease consult as per [facility] request  · Had Trichomonas in her urine previously that was never treated  Will continue Flagyl for now  · Observe gallbladder for now  May correlate with ultrasound if urologic intervention does not result in clinical improvement  2  Nephrolithiasis with hydronephrosis and urinary tract infection  · Urology consult  · May need percutaneous nephrostomy versus cystoscopy/stent placement  3  Metabolic acidosis secondary to sepsis  1  Monitor acidosis  2  Optimize renal function  3  May benefit from some bicarbonate  4  Anemia, multifactorial secondary to acute blood loss, dilution, pregnancy, and possible component of iron deficiency  · Transfuse 2 units of packed red blood cells  · Monitor hemoglobin  · Does not appear to have ongoing blood loss  5  Acute kidney injury  · Likely secondary to hypotension plus hydronephrosis and sepsis  · Olivas catheter has been place  · Monitor urine output  · Nephrology consult  6  Abnormal EKG, pulmonary hypertension noted on echocardiogram  · Discussed with Dr Patricia Lozano  · No acute indication for intervention  May be due to degree of anemia, hyperdynamic cardiac function, and extreme volume resuscitation  · Will repeat limited echocardiogram once more stable  7  Postpartum day 0 status post  section  · Obstetrical/gyn service is following      Critical Care Time:  48 minutes  Documented critical care time excludes any procedures documented elsewhere  It also excludes any family updates        _____________________________________________________________________    HPI/24hr events:   Transferred from the obstetrical service after delivery due to concern for severe sepsis    Patient had delivery by  section earlier in the day today  She had no prenatal care  She has been complaining of back pain  Baby was delivered prematurely due to deceleration of the heartbeat  Patient currently appears quite fatigued  She is complaining of back pain  She is tachycardic and uncomfortable appearing  Blood pressure is marginal and earlier in the day was hypotensive  Minimal urine output, 200 mL since admission  Medications:    Current Facility-Administered Medications:  acetaminophen 650 mg Oral Q4H PRN Artur Pereira MD    azithromycin 500 mg Intravenous Once Danitza Guzman MD    cefazolin 1,000 mg Intravenous Once Danitza Guzman MD    cefepime 1,000 mg Intravenous Q12H DENISSE Nur Last Rate: 1,000 mg (18 1314)   diphenhydrAMINE 25 mg Oral Q6H PRN Artur Pereira MD    docusate sodium 100 mg Oral BID Artur Pereira MD    [facility] Hold] HYDROmorphone 0 5 mg Intravenous Q3H PRN DENISSE Nur    [MAR Hold] lactated ringers 1,000 mL Intravenous Once Danitza Guzman MD    Followed by        [facility]] lactated ringers 1,000 mL Intravenous Once Danitza Guzman MD    metroNIDAZOLE 500 mg Intravenous Q8H DENISSE Nur Last Rate: 500 mg (18 1151)   midazolam        ondansetron 4 mg Intravenous Q8H PRN Artur Pereira MD    oxyCODONE-acetaminophen 1 tablet Oral Q4H PRN Artur Pereira MD    oxyCODONE-acetaminophen 2 tablet Oral Q4H PRN Artur Pereira MD    oxytocin 62 5 ludmila-units/min Intravenous Continuous Danitza Guzman MD Last Rate: 62 5 ludmila-units/min (18 1138)   simethicone 80 mg Oral 4x Daily PRN Artur Pereira MD    vancomycin 15 mg/kg Intravenous Once Mandy Simms MD          oxytocin 62 5 ludmila-units/min Last Rate: 62 5 ludmila-units/min (18 1138)         Physical exam:  Vitals: Body mass index is 26 17 kg/m²  Blood pressure (!) 83/44, pulse (!) 148, temperature (!) 102 1 °F (38 9 °C), resp   rate (!) 48, height 5' (1 524 m), weight 60 8 kg (134 lb), last menstrual period 03/30/2018, SpO2 100 %, unknown if currently breastfeeding ,  Temp  Min: 97 5 °F (36 4 °C)  Max: 102 1 °F (38 9 °C)  IBW: 45 5 kg    SpO2: 100 %     O2 Device: Nasal cannula      Intake/Output Summary (Last 24 hours) at 11/05/18 1408  Last data filed at 11/05/18 1300   Gross per 24 hour   Intake          1797 92 ml   Output              375 ml   Net          1422 92 ml       Invasive/non-invasive ventilation settings:   Respiratory    Lab Data (Last 4 hours)      11/05 1047            pH, Arterial       (!)7 244           pCO2, Arterial       (!)33 5           pO2, Arterial       (!)22 9           HCO3, Arterial       (!)14 2           Base Excess, Arterial       -12 0                O2/Vent Data     None              Invasive Devices     Peripheral Intravenous Line            Peripheral IV 11/05/18 Left Hand less than 1 day          Drain            Urethral Catheter Non-latex 16 Fr  less than 1 day                  Physical Exam:  Gen:  Arousable but somnolent  Somewhat toxic-appearing  HEENT:  Pupils are equal and reactive  Oropharynx is dry  Neck:  Supple no JVD, no adenopathy, trachea midline  Chest:  Clear anteriorly  Cor:  Markedly tachycardic, regular, no murmur  Abd:  Soft, recently postop with incision dressed  Ext:  No edema  Neuro:  Nonfocal  Skin:  No rash, multiple tattoos  Incision is dressed      Diagnostic Data:  Lab: I have personally reviewed pertinent lab results     CBC:     Results from last 7 days  Lab Units 11/05/18  0923 11/05/18  0414   WBC Thousand/uL 5 40 2 74*   HEMOGLOBIN g/dL 4 6* 7 1*   HEMATOCRIT % 17 7* 27 2*   PLATELETS Thousands/uL 126* 164       CMP:     Results from last 7 days  Lab Units 11/05/18  0923 11/05/18  0634   POTASSIUM mmol/L 3 3* 3 1*   CHLORIDE mmol/L 104 102   CO2 mmol/L 15* 19*   BUN mg/dL 12 13   CREATININE mg/dL 1 23 0 93   CALCIUM mg/dL 7 6* 8 6   ALK PHOS U/L 107 186*   ALT U/L 8* 12   AST U/L 17 19     PT/INR:   Lab Results   Component Value Date    INR 1 40 (H) 11/05/2018   ,   Magnesium:   Results from last 7 days  Lab Units 11/05/18  0634   MAGNESIUM mg/dL 1 5*     HIV testing negative  Procalcitonin 56 33    Microbiology:  Urine culture, blood cultures pending    Imaging:  CT scan was viewed personally on the [facility] system  Flank pain protocol CT shows a right UPJ stone with hydronephrosis  Question of possible cholecystitis      VTE Prophylaxis:  Venodynes    Code Status: Level 1 - Full Code    Bonita Rodriges MD    Portions of the record may have been created with voice recognition software  Occasional wrong word or "sound a like" substitutions may have occurred due to the inherent limitations of voice recognition software  Read the chart carefully and recognize, using context, where substitutions have occurred

## 2018-11-05 NOTE — OP NOTE
Section Operative Report - OB/GYN   Betty Arthur 29 y o  female MRN: [id number]  Unit/Bed#: L&D 328-01 Encounter: [id number]    Indications: non-reassuring fetal status - persistent category II FHT    Pre-operative Diagnosis:   1  38 week 3 day pregnancy  2  Persistent category II tracing - recurrent variable decels with loss of variability (minimal)  3  Uterine tachysystole  4  Anemia  5  Maternal tachycardia, leukopenia, bandemia  6  Poor prenatal care    Post-operative Diagnosis: same, delivered    Surgeon: Chanelle Bowers MD    Assistant(s): Sherie Kayser, MD Catheryn Comas, MD    Findings:  1  Delivery of viable male on 18 at 0735, weight 7lbs 7oz;  Apgar scores of 8 at one minute and 9 at five minutes  2  Normal appearing placenta with centrally-inserted 3 vessel cord  3  Clear amniotic fluid  4  Grossly normal uterus, tubes, and ovaries    Specimens:   1  Arterial and venous cord gases  2  Cord blood  3  Segment of umbilical cord  4  Placenta to storage     Estimated Blood Loss:  600 mL           Total IV Fluids: 3000mL    Drains: Olivas catheter           Complications:  None; patient tolerated the procedure well  Disposition: Intensive Care Unit           Condition: Guarded prognosis    Procedure Details   Decision was made to proceed with  section due to non-reassuring fetal heart tracing - persistent category II tracing - recurrent variable decelerations with loss of variability  Patient was made aware of these findings and the proposed plan  Risks, benefits, possible complications, alternate treatment options, and expected outcomes were discussed with the patient  The patient agreed with the proposed plan and gave informed consent  The patient was taken to the operating room where she was properly identified to the OR staff and attending physician  She had already received epidural anesthesia during her labor course, which was augmented preoperatively   A Olivas catheter was placed prior to going back to the OR  SCDs were placed  The abdomen was prepped with Chloraprep and following appropriate drying time, the patient was draped in the usual sterile manner for a Pfannenstiel incision  The patient had received Ancef 1g IV pre-operatively for prophylaxis  The vagina and perineum were prepped with betadine solution  A Time Out was held and the above information confirmed  The patient was identified as Nury Owens and the procedure verified as  Delivery  A Pfannenstiel incision was made and carried down through the underlying subcutaneous tissue to the fascia using a scalpel  Rectus fascia was then incised in the midline and extended laterally using blunt dissection  The underlying muscle was bluntly dissected off  All anatomic layers were well-demarcated  The rectus muscles were  and the peritoneum was identified and subsequently entered and extended longitudinally with blunt dissection  The vesicouterine peritoneum was identified  The bladder blade was inserted  A low transverse uterine incision was made with the scalpel and extended laterally with blunt dissection  The amnion was entered bluntly  Surgeons hand was inserted through the hysterotomy and the fetal head was palpated, elevated, and delivered through the uterine incision with the assistance of fundal pressure  Baby had spontaneous cry with good color and tone  The umbilical cord was clamped and cut  The infant was handed off to the  providers  Arterial and venous cord gases, cord blood, and a segment of umbilical cord were obtained for evaluation and promptly sent to the lab  The placenta delivered spontaneously with uterine fundal massage and was noted to have a centrally inserted 3 vessel cord  This was also sent to the lab for placental pathology       The uterus was exteriorized and a moist lap sponge was used to clear the cavity of clots and products of conception  The uterine incision was closed with a running locked suture of 0 Vicryl  A second layer of the same suture was used to imbricate the first   Good hemostasis was confirmed upon uterine closure  Warmed normal saline solution was used to irrigate the posterior culdesac and the uterus was returned to the abdomen  The paracolic gutters were inspected and cleared of all clots and debris with irrigation and moist lap sponges  The fascia was closed with a running suture of 0 Vicryl  The skin was closed with 4-0 Monocryl in a subcuticular fashion  Histoacryl was applied  At the conclusion of the procedure, all needle, sponge, and instrument counts were noted to be correct x2  The patient tolerated the procedure well and was transferred to her the recovery room in stable condition  Dr Emerson Gary was present and participated in all key portions of the case        Conception MD Bill  OB/GYN PGY-3  11/5/2018 9:23 AM

## 2018-11-05 NOTE — DISCHARGE SUMMARY
CS Discharge Summary - Nette Colón 29 y o  female MRN: [id number]    Unit/Bed#: L&D 328-01 Encounter: [id number]    Admission Date: 2018     Discharge Date: 2018     Admitting Diagnosis: 45 week gestation, spontaneous labor, poor prenatal care, maternal tachycardia, category II tracing - recurrent variable decels, anemia    Discharge Diagnosis: same    Procedures: primary  section, low transverse incision    Attending:   Admitting: Dr Caron Ingram MD    Discharging: Jaime Line  Consults:    Dr Claude Simon -infectious disease   Dr Suhas Nicolas -urology   Dr Naga Arroyo -pulmonology   Dr Lynne Schmitz -Nephrology   Dr Yady Ray Narrow:     Nette Colón is a 29 y o  Shantell Abigail who was admitted at 38 wks for category II tracing - recurrent variable decels - tachysystole was noted, terbutaline was administered however concerns present for further dosing due to maternal tachycardia  Decision was made to proceed with urgent  section due to suspicion of fetal compromise  Postoperative suspicion for sepsis due to maternal hypotension, tachycardia, leukopenia, and bandemia  Sepsis protocol was initiated and patient was transferred to ICU  She underwent an uncomplicated  section   was transferred to  nursery  Patient tolerated the procedure well and was transferred to ICU in guarded condition  Her post-operative course was complicated by septic shock that was managed in the ICU  Her source of sepsis was due to right 3mm UVJ calcululus causing pyelonephritis  Urology was consulted and a R ureteral stent was placed revealing sunil purulent material  She will need stone extraction eventually and will follow up with urology  ID was consulted and she received IV cefepime and vancomycin  Her lacatate was noted to be 9 3 and trended down following antibiotics and ureteral stenting    Preoperative hemaglobin was 7 1, postoperative was 4 3  EBL was 500cc and she received 3L of fluid during her immediate operative and post-operative course  She received 3u of PRBCs and 1u of FFP, she was also noted to be coagulopathic, her post-transfusion Hgb was 9 5  She was briefly placed on a Levophed drip that was discontinued after 24h as her pressures stabilized  Blood culture showed Gram negative rods  Her creatinine was 0 9 and trended  Her WBC was initially 2 upon presentation, and it increased to a peak of 40 65  She returned to the labor and delivery floor 24 hours after she was monitored in the ICU  She continued to show improvement on POD2, remaining afebrile VSS  Mild thrombocytopenia was subsequently noted but remained stable  Leukocytosis appropriately trending down to 30 15  On POD3 she was transitioned from IV cefepime to IV Ancef then PO Keflex to complete 14 day course of antibiotics  On day of discharge, she was ambulating and able to reasonably perform all ADLs  Her postoperative pain was well controlled with oral analgesics  She was voiding and had appropriate bowel function  Pain was well controlled  She was discharged home on post-operative day #4  Patient was instructed to follow up with her OB as an outpatient and was given appropriate warnings to call provider if she develops signs of infection or uncontrolled pain  PA sterilization consent signed prior to discharge  Complications: None    Condition at discharge: good     Discharge instructions/Information to patient and family:   See after visit summary for information provided to patient and family  Provisions for Follow-Up Care:  See after visit summary for information related to follow-up care and any pertinent home health orders  Disposition: Home    Planned Readmission: No    Discharge Medications: For a complete list of the patient's medications, please refer to her med rec      Paulette Chawla MD  OB/GYN PGY-3  11/9/2018 4:15 PM

## 2018-11-05 NOTE — PROGRESS NOTES
Cardiology Consult  18    Referring Physian: MD EZIO Bolivar    Chief Complain/Reason for Referal:     IMPRESSION/RECOMMENDATIONS/DISCUSSION:  1  Moderate to severe pulmonary hypertension, suspect due to high output state of severe anemia (Hgb 4 6)  2  Sinus tachycardia  3  Sepsis syndrome, possible UTI/pyelonephritis  4  Oliguria  5  Severe anemia, ongoing blood transfusion  6  Emergency     · Uncertain etiology of moderate-severe pHTN, but suspect due to high output state from severe anemia  Discussed with Pulmonary/Critical Care  · Sinus tachycardia secondary to sepsis and anemia, no evidence of dysrhythmia  · Check NTproBNP  TSH OK  · Echocardiogram with hyperdynamic left ventricular with normal chamber sizes  · Antibiotics per primary service    =====================================================    HPI:  I am seeing this patient in cardiology consultation for:  Sinus tachycardia    Jude Durand is a 29 y o  admitted to obstetric service with UTI, and ongoing pregnancy  She was also given Flagyl for bacterial vaginosis  She started having painful contractions along with sinus tachycardia  Her chest was clear to auscultation bilaterally, although was noted to have some shortness of breath and tachycardia  She underwent emergent  early this morning around 730  In light of ongoing tachycardia, hypotension, Cardiology was consulted  She has been transferred to the ICU postoperatively  Her systolic blood pressure remains in the 90s, and heart rate up to 150 beats per minute, sinus tachycardia on telemetry and ECG  Stat CT chest revealed hyperdynamic left ventricular systolic function  with moderate to severe tricuspid regurgitation and severe pulmonary hypertension  She has been diagnosed with sepsis syndrome, possibly pyelonephritis from UTI  Fever has been up to 104°          Past Medical History:   Diagnosis Date    Anemia     taking iron pills BID     Urinary tract infection     taking antibioitcs     Varicella     as child         Scheduled Meds:  Current Facility-Administered Medications:  acetaminophen 650 mg Oral Q4H PRN Christie Pereira MD   azithromycin 500 mg Intravenous Once Boom Marc MD   cefazolin 1,000 mg Intravenous Once Boom Marc MD   cefepime 1,000 mg Intravenous Q12H Levell Jaclny, CRNP   diphenhydrAMINE 25 mg Oral Q6H PRN Christie Pereira MD   docusate sodium 100 mg Oral BID Christie Pereira MD   [facility] Hold] HYDROmorphone 0 5 mg Intravenous Q3H PRN Levell Jaclyn, CRNP   [facility] Hold] lactated ringers 1,000 mL Intravenous Once Boom Marc MD   Followed by       [facility] Hold] lactated ringers 1,000 mL Intravenous Once Boom Marc MD   metroNIDAZOLE 500 mg Intravenous Q8H Levell Jaclyn, CRNP   midazolam       ondansetron 4 mg Intravenous Q8H PRN Christie Pereira MD   oxyCODONE-acetaminophen 1 tablet Oral Q4H PRN Christie Pereira MD   oxyCODONE-acetaminophen 2 tablet Oral Q4H PRN Christie Pereira MD   oxytocin 62 5 ludmila-units/min Intravenous Continuous Boom Marc MD   simethicone 80 mg Oral 4x Daily PRN Christie Pereira MD   sodium bicarbonate       sodium bicarbonate 50 mEq Intravenous Once Levell Jaclyn, CRNP     Continuous Infusions:  oxytocin 62 5 ludmila-units/min     PRN Meds:   acetaminophen    diphenhydrAMINE    [MAR Hold] HYDROmorphone    ondansetron    oxyCODONE-acetaminophen    oxyCODONE-acetaminophen    simethicone  No Known Allergies  I reviewed the Home Medication list in the chart  Family History   Problem Relation Age of Onset    No Known Problems Mother     Diabetes Father     Heart defect Brother         3 open heart surgeries    Heart failure Family         Congestive       Social History     Social History    Marital status: Single     Spouse name: N/A    Number of children: N/A    Years of education: N/A     Occupational History    Not on file       Social History Main Topics    Smoking status: Former Smoker Packs/day: 1 00    Smokeless tobacco: Never Used      Comment: 1 cigarette/ day    Alcohol use No    Drug use: No    Sexual activity: Yes     Partners: Male     Birth control/ protection: None     Other Topics Concern    Not on file     Social History Narrative    No narrative on file       Review of Systems - as per HPI, all others reviewed and negative  Vitals:    11/05/18 1100   BP: (!) 93/41   Pulse: (!) 148   Resp: (!) 41   Temp: (!) 100 6 °F (38 1 °C)   SpO2: 97%     I/O       11/03 0701 - 11/04 0700 11/04 0701 - 11/05 0700 11/05 0701 - 11/06 0700    I V  (mL/kg)   700 (11 5)    Total Intake(mL/kg)   700 (11 5)    Urine (mL/kg/hr)  50     Total Output   50      Net   -50 +700           Unmeasured Emesis Occurrence  2 x         Weight (last 2 days)     Date/Time   Weight    11/05/18 0217  60 8 (134)              GEN: NAD, Alert, appears weak, lethargic  HEENT: Mucus membranes moist, pink conjunctivae  EYES: Pupils equal, sclera anicteric  NECK: No JVD/HJR, no carotid bruit  CARDIOVASCULAR:  Tachycardia, regular rhythm, No murmur, rub, gallops S1,S2, no parasternal heave/thrill  LUNGS: Clear To auscultation bilaterally  ABDOMEN: Soft, nondistended, no hepatic systolic pulsation  EXTREMITIES/VASCULAR: No edema    PSYCH: Normal Affect by limited examination  NEURO: Grossly intact by limited examination    HEME: No significant bleeding, bruising, petechia by limited examination  SKIN:  Mild pallor noted  MSK:  Normal upper extremity and trunk strengths by limited examination      EKG:  Sinus tachycardia  TELE:  Sinus tachycardia  CXR:  Bilateral pulmonary edema    ECHO:  Hyperdynamic left ventricular systolic function, normal chamber sizes, moderate to severe tricuspid regurgitation with severe pulmonary hypertension on preliminary view       Results from last 7 days  Lab Units 11/05/18  0923 11/05/18  0414   WBC Thousand/uL 5 40 2 74*   HEMOGLOBIN g/dL 4 6* 7 1*   HEMATOCRIT % 17 7* 27 2*   PLATELETS Thousands/uL 126* 164   MONO PCT % 3* 0*       Results from last 7 days  Lab Units 18  0923 18  0634   POTASSIUM mmol/L 3 3* 3 1*   CHLORIDE mmol/L 104 102   CO2 mmol/L 15* 19*   BUN mg/dL 12 13   CREATININE mg/dL 1 23 0 93   CALCIUM mg/dL 7 6* 8 6       Results from last 7 days  Lab Units 18  0923 18  0634   POTASSIUM mmol/L 3 3* 3 1*   CHLORIDE mmol/L 104 102   CO2 mmol/L 15* 19*   BUN mg/dL 12 13   CREATININE mg/dL 1 23 0 93   CALCIUM mg/dL 7 6* 8 6   ALK PHOS U/L 107 186*   ALT U/L 8* 12   AST U/L 17 19     No results found for: TROPONINT                Results from last 7 days  Lab Units 18  1006   INR  1 40*               I have personally reviewed the EKG, CXR and Telemetry images directly  Patient Active Problem List    Diagnosis Date Noted     delivery due to maternal disorder, delivered, current hospitalization 2018     Priority: Low    Short interval between pregnancies complicating pregnancy, antepartum, second trimester 08/15/2018     Priority: Low    Abdominal pain in pregnancy 2018     Priority: Low    Urinary tract infection in mother during third trimester of pregnancy 2018     Priority: Low    38 weeks gestation of pregnancy 2018     Priority: Low    No prenatal care in current pregnancy in third trimester 2018     Priority: Low    Vaginal candidiasis 2018     Priority: Low    Bacteriuria 2017     Priority: Low    Anemia during pregnancy 2017     Priority: Low       Portions of the record may have been created with voice recognition software  Occasional wrong word or "sound a like" substitutions may have occurred due to the inherent limitations of voice recognition software  Read the chart carefully and recognize, using context, where substitutions have occurred

## 2018-11-05 NOTE — UTILIZATION REVIEW
Initial Clinical Review    Admission: Date/Time/Statement: 18 @ 0405     Orders Placed This Encounter   Procedures    Inpatient Admission     Standing Status:   Standing     Number of Occurrences:   1     Order Specific Question:   Admitting Physician     Answer:   Tony Ramos     Order Specific Question:   Level of Care     Answer:   Med Surg [16]     Order Specific Question:   Estimated length of stay     Answer:   More than 2 Midnights     Order Specific Question:   Certification     Answer:   I certify that inpatient services are medically necessary for this patient for a duration of greater than two midnights  See H&P and MD Progress Notes for additional information about the patient's course of treatment  ED: Date/Time/Mode of Arrival:   ED Arrival Information     Patient not seen in ED                       Chief Complaint:   Chief Complaint   Patient presents with    Contractions     since        History of Illness: Danilo Marino is a 29 y o   female with an JIMMIE of 2018, by Other Basis at 38w3d weeks gestation who is being admitted for category II FHT  Pt initially presented to L&D for r/o labor and was noted to be 2/50/-3  She then was provided 10mg IM morphine for therapeutic rest  Almost 2hrs after administration, pt c/o feeling difficulty breathing and FHT noted to have recurrent variable decels with tachysytole  Pt was noted tohave lungs CTA bilaterally and SaO2 noted to be 100%  Maternal pulse 140s  SVE was 3/50/-3  IVFs and oxygen therapy was initiated  Pt was then given 0 25mg subcutaneous Terbutaline to resolve tacysytole  Contractions: every 1 5-2mins  Leakage of fluid: None  Bleeding: None    Fetal movement: present      Her current obstetrical history is significant for Anemia, GBS unknown, UTI in early pregnancy, Limited prenatal care, short interval pregnancy    ED Vital Signs:   ED Triage Vitals   Temperature Pulse Respirations Blood Pressure SpO2   18 0217 18 0344 18 0820 18 0417 18 0344   97 5 °F (36 4 °C) (!) 145 (!) 28 103/58 96 %      Temp Source Heart Rate Source Patient Position - Orthostatic VS BP Location FiO2 (%)   18 0217 18 0820 18 0217 18 0820 --   Axillary Monitor Lying Right arm       Pain Score       18 021       Worst Possible Pain        Wt Readings from Last 1 Encounters:   18 60 8 kg (134 lb)       Vital Signs (abnormal):    above    Abnormal Labs/Diagnostic Test Results:   H/H   7  2   (    approx   0500)                                                                                     4   7  (      1017)  WBC   2 74  Mg  1 5  Albumin    2 7  Alk phos   186  Lactic  Acid    9 3  Platelets   407    ED Treatment:   Medication Administration - No Administrations Displayed (No Start Event Found)     None          Past Medical/Surgical History:    Active Ambulatory Problems     Diagnosis Date Noted    Anemia during pregnancy 2017    Bacteriuria 2017    Abdominal pain in pregnancy 2018    Urinary tract infection in mother during third trimester of pregnancy 2018    38 weeks gestation of pregnancy 2018    No prenatal care in current pregnancy in third trimester 2018    Vaginal candidiasis 2018    Short interval between pregnancies complicating pregnancy, antepartum, second trimester 08/15/2018     Resolved Ambulatory Problems     Diagnosis Date Noted    Dysuria during pregnancy in third trimester 2017    39 weeks gestation of pregnancy 2017     (spontaneous vaginal delivery) 2017     Past Medical History:   Diagnosis Date    Anemia     Urinary tract infection     Varicella        Admitting Diagnosis: Abdominal pain during pregnancy in third trimester [O26 893, R10 9]    Age/Sex: 29 y o  female    Assessment/Plan: IUP at 38w3d with category II FHT in Latent labor  Plan:  1) Admit to L&D  2) Routine Labs: CBC, T&S, RPR  3) F/E/N: Clear Liquid diet, IV LR at 125mL/hr  4) Pain: Analgesia and/or epidural upon request  5) Expectant Management, augment with Pitocin/ AROM if necessary  6) GBS unknown: culture to be collected  7) UTI in early pregnancy: F/ u Urine culture  8) Limited prenatal scare: f/u UDS, CM consult postpartum  9) Anemia: F/U CBC    Admission Orders:       11/5  @  0408  Scheduled Meds:   Current Facility-Administered Medications:  acetaminophen 650 mg Oral Q4H PRN Christie Pereira MD    azithromycin 500 mg Intravenous Once Boom Marc MD    cefazolin 1,000 mg Intravenous Once Boom Marc MD    cefepime 1,000 mg Intravenous Q12H Levell Jaclyn, CRNP    diphenhydrAMINE 25 mg Oral Q6H PRN Christie Pereira MD    docusate sodium 100 mg Oral BID Christie Pereira MD    [facility] Hold] HYDROmorphone 0 5 mg Intravenous Q3H PRN Levell Jaclyn, CRNP    [facility] Hold] lactated ringers 1,000 mL Intravenous Once Boom Marc MD    Followed by        [facility] Hold] lactated ringers 1,000 mL Intravenous Once Boom Marc MD    metroNIDAZOLE 500 mg Intravenous Q8H Levell Jaclyn, CRNP    midazolam        ondansetron 4 mg Intravenous Q8H PRN Christie Pereira MD    oxyCODONE-acetaminophen 1 tablet Oral Q4H PRN Christie Pereira MD    oxyCODONE-acetaminophen 2 tablet Oral Q4H PRN Christie Pereira MD    oxytocin 62 5 ludmila-units/min Intravenous Continuous Boom Marc MD Last Rate: 62 5 ludmila-units/min (11/05/18 1138)   simethicone 80 mg Oral 4x Daily PRN Christie Pereira MD      Continuous Infusions:   oxytocin 62 5 ludmila-units/min Last Rate: 62 5 ludmila-units/min (11/05/18 1138)     PRN Meds:   acetaminophen    diphenhydrAMINE    [MAR Hold] HYDROmorphone    ondansetron    oxyCODONE-acetaminophen    oxyCODONE-acetaminophen    simethicone     Cons cardiology  2 DE  Cons  Critical care  hmgb    ! 6 hrs  Cons nephrology  3  u PRBC    PROGRESS  NOTE   11/5    Prior to the patient being transferred to the ICU and after her  section, the patient was found to be in severe sepsis  During her time recovering on our unit the patients blood pressures fluctuated between 60's/40's to 100's/50's never requiring pressor support  The patient also received 3+ liters of IV resuscitation as her pulse consistently stayed over 120  The patient had multiple labs collected as summarized below  We were considering the possible sources of sepsis to be meningitis, endocarditis, pneumonia, pyelonephritis, chorioamnionitis, acute HIV infection, influenza, and other viral etiologies  We started Unasyn IV for empiric sepsis treatment which also covers the most likely source of sepsis in a peripartum patient as being pyelonephritis  Full body examination did not reveal any hidden abscesses  Patient also still denies any illicit drug use  The patient will be getting a 3-unit-PRBC transfusion for her anemia of Hbg 4 6g/dl  MFM was made aware of the patient's clinical status during this time period also  The patient has since been transferred to the ICU  The patient's family was also informed of her clinical course during this time period   Section Operative Report - OB/GYN   Dagoberto Marion 29 y o  female MRN: [id number]  Unit/Bed#: L&D 328-01 Encounter: [id number]     Indications: non-reassuring fetal status - persistent category II FHT    Pre-operative Diagnosis:   1  38 week 3 day pregnancy  2  Persistent category II tracing - recurrent variable decels with loss of variability (minimal)  3  Uterine tachysystole  4  Anemia  5  Maternal tachycardia, leukopenia, bandemia  6  Poor prenatal care     Post-operative Diagnosis: same, delivered     Surgeon: Jesusa Kayser, MD     Assistant(s): MD Nacho Knowles MD     Findings:  1  Delivery of viable male on 18 at 0735, weight 7lbs 7oz;  Apgar scores of 8 at one minute and 9 at five minutes  2  Normal appearing placenta with centrally-inserted 3 vessel cord  3  Clear amniotic fluid  4  Grossly normal uterus, tubes, and ovaries    Per  Cardiology  Consult:  1  Moderate to severe pulmonary hypertension, suspect due to high output state of severe anemia (Hgb 4 6)  2  Sinus tachycardia  3  Sepsis syndrome, possible UTI/pyelonephritis  4  Oliguria  5  Severe anemia, ongoing blood transfusion  6  Emergency      · Uncertain etiology of moderate-severe pHTN, but suspect due to high output state from severe anemia  Discussed with Pulmonary/Critical Care  · Sinus tachycardia secondary to sepsis and anemia, no evidence of dysrhythmia  · Check NTproBNP  TSH OK  · Echocardiogram with hyperdynamic left ventricular with normal chamber sizes  · Antibiotics per primary service    [address]  Utilization Review Department  Phone: [phone]; Fax [phone]  ATTENTION: Please call with any questions or concerns to [phone]  and carefully listen to the prompts so that you are directed to the right person  Send all requests for admission clinical reviews, approved or denied determinations and any other requests to fax [phone]   All voicemails are confidential

## 2018-11-05 NOTE — PROGRESS NOTES
Marker read was taken emergently to the operating room today  Cystoscopy was performed  A right retrograde pyelogram was consistent with severe right-sided hydronephrosis secondary to a right 3 mm UVJ calculus  A right ureteral stent was placed  Purulent appearing urine drained  A Olivas catheter was inserted  Maintain the Olivas catheter until her sepsis shows resolution    Continue antibiotics and supportive care per the ICU team

## 2018-11-06 ENCOUNTER — TELEPHONE (OUTPATIENT)
Dept: UROLOGY | Facility: CLINIC | Age: 28
End: 2018-11-06

## 2018-11-06 PROBLEM — R65.21 SEVERE SEPSIS WITH SEPTIC SHOCK (CODE) (HCC): Status: ACTIVE | Noted: 2018-11-06

## 2018-11-06 PROBLEM — Q62.11 HYDRONEPHROSIS WITH URETEROPELVIC JUNCTION (UPJ) OBSTRUCTION: Status: ACTIVE | Noted: 2018-10-30

## 2018-11-06 LAB
ABO GROUP BLD BPU: NORMAL
ALBUMIN SERPL BCP-MCNC: 1.6 G/DL (ref 3.5–5)
ALP SERPL-CCNC: 73 U/L (ref 46–116)
ALT SERPL W P-5'-P-CCNC: 13 U/L (ref 12–78)
ANION GAP SERPL CALCULATED.3IONS-SCNC: 11 MMOL/L (ref 4–13)
AST SERPL W P-5'-P-CCNC: 46 U/L (ref 5–45)
BILIRUB DIRECT SERPL-MCNC: 0.41 MG/DL (ref 0–0.2)
BILIRUB SERPL-MCNC: 1 MG/DL (ref 0.2–1)
BPU ID: NORMAL
BUN SERPL-MCNC: 12 MG/DL (ref 5–25)
CA-I BLD-SCNC: 1.06 MMOL/L (ref 1.12–1.32)
CALCIUM SERPL-MCNC: 7.8 MG/DL (ref 8.3–10.1)
CHLORIDE SERPL-SCNC: 106 MMOL/L (ref 100–108)
CO2 SERPL-SCNC: 21 MMOL/L (ref 21–32)
CREAT SERPL-MCNC: 0.78 MG/DL (ref 0.6–1.3)
ERYTHROCYTE [DISTWIDTH] IN BLOOD BY AUTOMATED COUNT: 20.4 % (ref 11.6–15.1)
ERYTHROCYTE [DISTWIDTH] IN BLOOD BY AUTOMATED COUNT: 20.5 % (ref 11.6–15.1)
FLUAV AG SPEC QL: NORMAL
FLUBV AG SPEC QL: NORMAL
GFR SERPL CREATININE-BSD FRML MDRD: 104 ML/MIN/1.73SQ M
GLUCOSE SERPL-MCNC: 118 MG/DL (ref 65–140)
HCT VFR BLD AUTO: 29.1 % (ref 34.8–46.1)
HCT VFR BLD AUTO: 29.9 % (ref 34.8–46.1)
HGB BLD-MCNC: 9.3 G/DL (ref 11.5–15.4)
HGB BLD-MCNC: 9.5 G/DL (ref 11.5–15.4)
HGB BLD-MCNC: 9.5 G/DL (ref 11.5–15.4)
INR PPP: 1.34 (ref 0.86–1.17)
LACTATE SERPL-SCNC: 5.2 MMOL/L (ref 0.5–2)
MAGNESIUM SERPL-MCNC: 1.7 MG/DL (ref 1.6–2.6)
MCH RBC QN AUTO: 23.1 PG (ref 26.8–34.3)
MCH RBC QN AUTO: 23.2 PG (ref 26.8–34.3)
MCHC RBC AUTO-ENTMCNC: 31.8 G/DL (ref 31.4–37.4)
MCHC RBC AUTO-ENTMCNC: 32 G/DL (ref 31.4–37.4)
MCV RBC AUTO: 73 FL (ref 82–98)
MCV RBC AUTO: 73 FL (ref 82–98)
PHOSPHATE SERPL-MCNC: 3.9 MG/DL (ref 2.7–4.5)
PLATELET # BLD AUTO: 84 THOUSANDS/UL (ref 149–390)
PLATELET # BLD AUTO: 85 THOUSANDS/UL (ref 149–390)
POTASSIUM SERPL-SCNC: 4.1 MMOL/L (ref 3.5–5.3)
PROT SERPL-MCNC: 4.9 G/DL (ref 6.4–8.2)
PROTHROMBIN TIME: 16.7 SECONDS (ref 11.8–14.2)
RBC # BLD AUTO: 4.01 MILLION/UL (ref 3.81–5.12)
RBC # BLD AUTO: 4.11 MILLION/UL (ref 3.81–5.12)
RSV B RNA SPEC QL NAA+PROBE: NORMAL
SODIUM SERPL-SCNC: 138 MMOL/L (ref 136–145)
UNIT DISPENSE STATUS: NORMAL
UNIT PRODUCT CODE: NORMAL
UNIT RH: NORMAL
WBC # BLD AUTO: 39.25 THOUSAND/UL (ref 4.31–10.16)
WBC # BLD AUTO: 40.65 THOUSAND/UL (ref 4.31–10.16)

## 2018-11-06 PROCEDURE — 80048 BASIC METABOLIC PNL TOTAL CA: CPT | Performed by: NURSE PRACTITIONER

## 2018-11-06 PROCEDURE — 80076 HEPATIC FUNCTION PANEL: CPT | Performed by: NURSE PRACTITIONER

## 2018-11-06 PROCEDURE — 85027 COMPLETE CBC AUTOMATED: CPT | Performed by: NURSE PRACTITIONER

## 2018-11-06 PROCEDURE — 99233 SBSQ HOSP IP/OBS HIGH 50: CPT | Performed by: OBSTETRICS & GYNECOLOGY

## 2018-11-06 PROCEDURE — 82330 ASSAY OF CALCIUM: CPT | Performed by: NURSE PRACTITIONER

## 2018-11-06 PROCEDURE — 85610 PROTHROMBIN TIME: CPT | Performed by: NURSE PRACTITIONER

## 2018-11-06 PROCEDURE — 99232 SBSQ HOSP IP/OBS MODERATE 35: CPT | Performed by: INTERNAL MEDICINE

## 2018-11-06 PROCEDURE — 84100 ASSAY OF PHOSPHORUS: CPT | Performed by: NURSE PRACTITIONER

## 2018-11-06 PROCEDURE — 99233 SBSQ HOSP IP/OBS HIGH 50: CPT | Performed by: INTERNAL MEDICINE

## 2018-11-06 PROCEDURE — 83735 ASSAY OF MAGNESIUM: CPT | Performed by: NURSE PRACTITIONER

## 2018-11-06 PROCEDURE — 85018 HEMOGLOBIN: CPT | Performed by: NURSE PRACTITIONER

## 2018-11-06 PROCEDURE — 85027 COMPLETE CBC AUTOMATED: CPT | Performed by: OBSTETRICS & GYNECOLOGY

## 2018-11-06 PROCEDURE — 99232 SBSQ HOSP IP/OBS MODERATE 35: CPT | Performed by: NURSE PRACTITIONER

## 2018-11-06 PROCEDURE — 83605 ASSAY OF LACTIC ACID: CPT | Performed by: NURSE PRACTITIONER

## 2018-11-06 RX ORDER — DIPHENHYDRAMINE HYDROCHLORIDE 50 MG/ML
25 INJECTION INTRAMUSCULAR; INTRAVENOUS ONCE
Status: COMPLETED | OUTPATIENT
Start: 2018-11-06 | End: 2018-11-06

## 2018-11-06 RX ORDER — ONDANSETRON 2 MG/ML
4 INJECTION INTRAMUSCULAR; INTRAVENOUS ONCE AS NEEDED
Status: DISCONTINUED | OUTPATIENT
Start: 2018-11-06 | End: 2018-11-09 | Stop reason: HOSPADM

## 2018-11-06 RX ORDER — FENTANYL CITRATE/PF 50 MCG/ML
25 SYRINGE (ML) INJECTION
Status: DISCONTINUED | OUTPATIENT
Start: 2018-11-06 | End: 2018-11-07

## 2018-11-06 RX ORDER — MAGNESIUM SULFATE HEPTAHYDRATE 40 MG/ML
2 INJECTION, SOLUTION INTRAVENOUS ONCE
Status: COMPLETED | OUTPATIENT
Start: 2018-11-06 | End: 2018-11-06

## 2018-11-06 RX ORDER — FONDAPARINUX SODIUM 2.5 MG/.5ML
2.5 INJECTION SUBCUTANEOUS EVERY 24 HOURS
Status: DISCONTINUED | OUTPATIENT
Start: 2018-11-06 | End: 2018-11-09 | Stop reason: HOSPADM

## 2018-11-06 RX ADMIN — CEFEPIME HYDROCHLORIDE 1000 MG: 1 INJECTION, POWDER, FOR SOLUTION INTRAMUSCULAR; INTRAVENOUS at 13:21

## 2018-11-06 RX ADMIN — HYDROMORPHONE HYDROCHLORIDE 0.5 MG: 1 INJECTION, SOLUTION INTRAMUSCULAR; INTRAVENOUS; SUBCUTANEOUS at 04:38

## 2018-11-06 RX ADMIN — HYDROMORPHONE HYDROCHLORIDE 0.5 MG: 1 INJECTION, SOLUTION INTRAMUSCULAR; INTRAVENOUS; SUBCUTANEOUS at 15:11

## 2018-11-06 RX ADMIN — HYDROMORPHONE HYDROCHLORIDE 0.5 MG: 1 INJECTION, SOLUTION INTRAMUSCULAR; INTRAVENOUS; SUBCUTANEOUS at 12:19

## 2018-11-06 RX ADMIN — CEFEPIME HYDROCHLORIDE 1000 MG: 1 INJECTION, POWDER, FOR SOLUTION INTRAMUSCULAR; INTRAVENOUS at 01:57

## 2018-11-06 RX ADMIN — MAGNESIUM SULFATE HEPTAHYDRATE 2 G: 40 INJECTION, SOLUTION INTRAVENOUS at 10:17

## 2018-11-06 RX ADMIN — HYDROMORPHONE HYDROCHLORIDE 0.5 MG: 1 INJECTION, SOLUTION INTRAMUSCULAR; INTRAVENOUS; SUBCUTANEOUS at 08:29

## 2018-11-06 RX ADMIN — VANCOMYCIN HYDROCHLORIDE 1000 MG: 1 INJECTION, SOLUTION INTRAVENOUS at 05:11

## 2018-11-06 RX ADMIN — HYDROMORPHONE HYDROCHLORIDE 0.5 MG: 1 INJECTION, SOLUTION INTRAMUSCULAR; INTRAVENOUS; SUBCUTANEOUS at 18:27

## 2018-11-06 RX ADMIN — FONDAPARINUX SODIUM 2.5 MG: 2.5 INJECTION, SOLUTION SUBCUTANEOUS at 09:59

## 2018-11-06 RX ADMIN — ACETAMINOPHEN 650 MG: 325 TABLET, FILM COATED ORAL at 17:23

## 2018-11-06 RX ADMIN — HYDROMORPHONE HYDROCHLORIDE 0.5 MG: 1 INJECTION, SOLUTION INTRAMUSCULAR; INTRAVENOUS; SUBCUTANEOUS at 21:44

## 2018-11-06 RX ADMIN — DIPHENHYDRAMINE HYDROCHLORIDE 25 MG: 50 INJECTION INTRAMUSCULAR; INTRAVENOUS at 23:40

## 2018-11-06 RX ADMIN — CALCIUM GLUCONATE 1 G: 98 INJECTION, SOLUTION INTRAVENOUS at 09:40

## 2018-11-06 NOTE — PROGRESS NOTES
Progress Note - ICU Transfer to SD/MS tele/MS   Verónica Griffith 29 y o  female MRN: [id number]  Bill 48   Unit/Bed#: ICU 81 Encounter: [id number]    Code Status: Level 1 - Full Code  POA:    Referring Physician: Dr Amanda Doherty  Accepting Physician:  Dr Aiden Garcia  _____________________________________________________________________    Reason for ICU/SD admission:  Sepsis    History of Present Illness: 29year old female presented to the hospital at 38w3d gestation with vague complaints of abdominal pain, shortness of breath and malaise  She was admitted to [facility] and began with complaints of back, neck and chest pain  She was noted to be tachycardic and FHR tracing developed variable decelerations  Due to this was taken for  section  Summary of clinical course:  After  continued with tachycardia and cardiology was following with stat echocardiogram that revealed moderate pulmonary hypertension  Critical care was consulted due to possible sepsis  Was found to be have septic shock due to nephrolithiasis with hydronephrosis  She was transferred to the critical care unit and requiring cystocopy with stent placement  Was started on IV hydration and antibiotics  Had metabolic acidosis that improved with bicarbonate infusion that has now improved  She was given 4 units of PRBC due to anemia which has also resolved  ID is following patient and was initially placed on Cefepime, Vancomycin, and Flagyl  Was discontinued from Flagyl and Vancomycin due to GNR bacteremia  Will continue on Cefepime  Does have a history of trichomonas in previous urine culture but repeat has been negative  Patient has clinically improved and doing well  Her vitals have remained stable and pain has subsided  She will continue on IV antibiotics       Consultants:  ID, nephrology, cardiology, and OB/GYN  _____________________________________________________________________    Diagnostic Data:  CBC:    Results from last 7 days  Lab Units 11/06/18  0508   WBC Thousand/uL 40 65*   HEMOGLOBIN g/dL 9 5*   HEMATOCRIT % 29 9*   PLATELETS Thousands/uL 84*      CMP: Lab Results   Component Value Date    K 4 1 11/06/2018     11/06/2018    CO2 21 11/06/2018    BUN 12 11/06/2018    CREATININE 0 78 11/06/2018    CALCIUM 7 8 (L) 11/06/2018    AST 46 (H) 11/06/2018    ALT 13 11/06/2018    ALKPHOS 73 11/06/2018    EGFR 104 11/06/2018   ,   PT/INR:   Lab Results   Component Value Date    INR 1 34 (H) 11/06/2018   ,   Magnesium: No components found for: MAG,  Phosphorous:   Lab Results   Component Value Date    PHOS 3 9 11/06/2018       ABG: No results found for: PHART, YIH0HOG, PO2ART, XGU5EJI, C4JBOGUE, BEART, SOURCE,     Microbiology:    Results from last 7 days  Lab Units 11/05/18  1616 11/05/18  1009 11/05/18  0924 11/05/18  0527   BLOOD CULTURE   --  Escherichia coli*  --   --    GRAM STAIN RESULT   --  Gram negative rods Gram negative rods  --    URINE CULTURE   --   --   --  >100,000 cfu/ml Gram Negative Leonid Enteric Like*   INFLUENZA B PCR  None Detected  --   --   --    RSV PCR  None Detected  --   --   --        Imaging: I have personally reviewed the pertinent imaging studies on the PACS system       Cardiac/EKG/telemetry/Echo:       NSR  _____________________________________________________________________    Recent or scheduled procedures: Echo: moderate pulmonary hypertension    Outstanding/pending diagnostics:       Mobilization Plan: activity as tolerated    Home medications that are not reordered and reason why:      Spoke with OB resident  regarding transfer  Please call [phone] with any questions or concerns  Portions of the record may have been created with voice recognition software  Occasional wrong word or "sound a like" substitutions may have occurred due to the inherent limitations of voice recognition software    Read the chart carefully and recognize, using context, where substitutions have occurred      Avery Heredia

## 2018-11-06 NOTE — PLAN OF CARE
CARDIOVASCULAR - ADULT     Maintains optimal cardiac output and hemodynamic stability Progressing     Absence of cardiac dysrhythmias or at baseline rhythm Progressing        HEMATOLOGIC - ADULT     Maintains hematologic stability Progressing        Knowledge Deficit     Verbalizes understanding of labor plan Progressing        Labor & Delivery     Manages discomfort Progressing     Patient vital signs are stable Progressing        Potential for Falls     Patient will remain free of falls Progressing        Prexisting or High Potential for Compromised Skin Integrity     Skin integrity is maintained or improved Progressing        RESPIRATORY - ADULT     Achieves optimal ventilation and oxygenation Progressing

## 2018-11-06 NOTE — PROGRESS NOTES
Consultation - Nephrology   Kat Walker 29 y o  female MRN: [id number]  Unit/Bed#: ICU 6 Encounter: [id number]      ASSESSMENT:    72-year-old pregnant female admitted 38 weeks with tachycardia, hypotension and fever requiring emergent  complicated by oliguria and acute kidney injury    1  Acute kidney injury is likely multifactorial, likely an element of hypotensive ischemic ATN in the setting of hemoglobin of 4 6, tachycardia with induced cardiomyopathy with pulmonary hypertension  2  Anion gap metabolic lactic acidosis  3  Anemia multifactorial in the setting of an emergent  and sepsis  4  Sepsis thought to be secondary to a urinary tract infection with a history of pyelonephritis small leukocytes and the urinalysis drawn today  5  38 weeks pregnant status post  today  6  History of nephrolithiasis     PLAN:    -she is status post emergent cystoscopy with right ureteral stent insertion  -she remains on antibiotics  -her renal function continues to improve and her creatinine is now down to 0 78  -her lactic acidosis is improving  -overall she appears much better  -no need for renal replacement therapy  -at this point no active issues being managed by nephrology  -continue ongoing ICU support  -fluid resuscitation with lactated Ringer's  -does not appear volume overloaded  -continue ongoing supportive care, no need for dialysis, no active issues being managed by Nephrology  -discussed with ICU team will see as needed please call with any questions or concerns    SUBJECTIVE:    Patient seen today sitting up feeling much better her auntis are in the room with her    Currently denies any new chest pain or shortness of Breath fevers or chills she has good urine output    MEDICATIONS:    Current Facility-Administered Medications:     acetaminophen (TYLENOL) tablet 650 mg, 650 mg, Oral, 4x Daily PRN, DENISSE Herrera, 650 mg at 18    calcium gluconate 1 g in sodium chloride 0 9 % 50 mL IVPB, 1 g, Intravenous, Once, DENISSE Dorsey    cefepime (MAXIPIME) 1,000 mg in dextrose 5 % 50 mL IVPB, 1,000 mg, Intravenous, Q12H, DENISSE Quiroz, Stopped at 11/06/18 0227    fondaparinux (ARIXTRA) subcutaneous injection 2 5 mg, 2 5 mg, Subcutaneous, Q24H, DENISSE Dorsey    HYDROmorphone (DILAUDID) injection 0 5 mg, 0 5 mg, Intravenous, Q3H PRN, DENISSE Quiroz, 0 5 mg at 11/06/18 0829    lactated ringers bolus 1,000 mL, 1,000 mL, Intravenous, Once **FOLLOWED BY** lactated ringers bolus 1,000 mL, 1,000 mL, Intravenous, Once, Juanita Mccall MD    lactated ringers infusion, 125 mL/hr, Intravenous, Continuous, DENISSE Doherty, Last Rate: 125 mL/hr at 11/06/18 0628, 125 mL/hr at 11/06/18 0628    magnesium sulfate 2 g/50 mL IVPB (premix) 2 g, 2 g, Intravenous, Once, DENISSE Dorsey    vancomycin (VANCOCIN) IVPB (premix) 1,000 mg, 15 mg/kg, Intravenous, Q12H, Ty Liang MD, Last Rate: 200 mL/hr at 11/06/18 0511, 1,000 mg at 11/06/18 0511    REVIEW OF SYSTEMS:  All the systems were reviewed and were negative except as documented on the HPI        PHYSICAL EXAM:  Current Weight: Weight - Scale: 60 8 kg (134 lb)  First Weight: Weight - Scale: 60 8 kg (134 lb)  Vitals:    11/06/18 0540 11/06/18 0610 11/06/18 0643 11/06/18 0700   BP: 101/61 107/63 96/50    Pulse: 92 94 88    Resp: 15 19 17    Temp:    97 6 °F (36 4 °C)   TempSrc:    Temporal   SpO2: 97% 98% 98%    Weight:       Height:           Intake/Output Summary (Last 24 hours) at 11/06/18 3166  Last data filed at 11/06/18 0900   Gross per 24 hour   Intake           7213 6 ml   Output             4075 ml   Net           3138 6 ml     General: conscious, cooperative, in not acute distress  Eyes: conjunctivae pink, anicteric sclerae  ENT: lips and mucous membranes moist  Neck: supple, no JVD  Chest: clear breath sounds bilateral, no crackles, ronchus or wheezings  CVS: distinct S1 & S2, normal rate, regular rhythm  Abdomen:  Mild tenderness  Extremities: no edema of both legs  Skin: no rash  Neuro: awake, alert, oriented    Invasive Devices:   Urethral Catheter Non-latex 16 Fr  (Active)   Site Assessment Clean;Skin intact; Bleeding; Other (Comment) 11/5/2018 12:00 PM   Collection Container Standard drainage bag 11/5/2018 12:00 PM   Output (mL) 100 mL 11/5/2018 12:00 PM     Lab Results:     Results from last 7 days  Lab Units 11/06/18  0508 11/06/18  0000 11/05/18  1759 11/05/18  1358 11/05/18  1009 11/05/18  0923 11/05/18  0634 11/05/18  0527 11/05/18  0414   WBC Thousand/uL 40 65*  --   --   --   --  5 40  --   --  2 74*   HEMOGLOBIN g/dL 9 5* 9 5* 7 7* 5 8*  --  4 6*  --   --  7 1*   HEMATOCRIT % 29 9*  --   --   --   --  17 7*  --   --  27 2*   PLATELETS Thousands/uL 84*  --   --   --   --  126*  --   --  164   POTASSIUM mmol/L 4 1  --  3 5  --   --  3 3* 3 1*  --   --    CHLORIDE mmol/L 106  --  104  --   --  104 102  --   --    CO2 mmol/L 21  --  22  --   --  15* 19*  --   --    BUN mg/dL 12  --  11  --   --  12 13  --   --    CREATININE mg/dL 0 78  --  1 30  --   --  1 23 0 93  --   --    CALCIUM mg/dL 7 8*  --  7 4*  --   --  7 6* 8 6  --   --    MAGNESIUM mg/dL 1 7  --  0 9*  --   --   --  1 5*  --   --    PHOSPHORUS mg/dL 3 9  --  2 6*  --   --   --   --   --   --    ALK PHOS U/L  --   --   --   --   --  107 186*  --   --    ALT U/L  --   --   --   --   --  8* 12  --   --    AST U/L  --   --   --   --   --  17 19  --   --    BLOOD CULTURE   --   --   --   --  Gram Negative Leonid Enteric Like*  --   --   --   --    NITRITE UA   --   --   --   --   --   --   --  Negative  --    BLOOD UA   --   --   --   --   --   --   --  Small*  --    LEUKOCYTES UA   --   --   --   --   --   --   --  Small*  --        Other Studies:     Noncontrast CT scan    IMPRESSION:        1   3 mm calculus at the right ureterovesical junction resulting in mild proximal hydroureteronephrosis and mild perinephric stranding      2   Small bilateral pleural effusions with bibasilar airspace consolidations versus atelectasis  Underlying pneumonia is not excluded      3  Expected postsurgical changes from recent  section including small volume pneumoperitoneum      4  Diffuse gallbladder wall thickening and a small calcified gallstone  Possibility of acute cholecystitis is not excluded    Further evaluation with right upper quadrant abdominal ultrasound and/

## 2018-11-06 NOTE — PROGRESS NOTES
Obstetrics Progress note   Aurea Mejias 29 y o  female MRN: [id number]  Unit/Bed#: ICU 11 Encounter: [id number]    A/P: 29 y o   s/p primary  section POD1; Sepsis status likely secondary to pyelonephritis / obstructing nephrolithiasis at right UVJ s/p stent placement  1) Sepsis, source likely from pyelonephritis, obstructing nephrolithiasis   - s/p ureteral stent placement on  by urology   - Continue fox catheter per Uro recommendations   - Cefepime IV, Vanco IV; Flagyl discontinued per ID recommendations   - Currently off levophed today; continue IV fluids to maintain MAP > 65   - Blood culture showing Gram negative rods    2) Acute Kidney Injury   - Likely multifactorial, including shock and ureteral obstruction   - Creatinine improving, continue to monitor   - Avoid nephrotoxic medications, NSAIDs    3) Acute on chronic anemia   - Likely chronic iron deficiency anemia compounded by gravid state and poor adherence to prenatal care   - Preoperative Hb was 7 1g/dL consistent with initial prenatal levels   - No significant bleeding noted intraop, with appropriate EBL   - Immediate postop Hb was 4 6g/dL likely reflective of postoperative state with hemodilutional component   - Received 3 units pRBCs, 1u FFP; Hb trending up consistently, currently 0 6O/DP    4) Metabolic Acidosis likely secondary to 1   - Improving, bicarc completed; currently on LR   - Continue to monitor closely    5) Status post- section, POD 1   - Appropriate for postoperative state   - Declining breastfeeding   -  is doing well, alert and active    DVT PPx: SCDs  Encouraged ambulation as tolerated  Dispo: improving, continue to monitor closely per unit protocol    No acute events overnight  Pain is well controlled  Pt is tolerating PO  Pt is not passing flatus  Pt is not ambulating  Denies fevers/chills/nausea/vomiting/chest pain/SOB        /64   Pulse (!) 108   Temp 97 6 °F (36 4 °C) (Temporal) Resp 22   Ht 5' (1 524 m)   Wt 60 8 kg (134 lb)   LMP 03/30/2018   SpO2 93%   Breastfeeding? Unknown   BMI 26 17 kg/m²      I/O last 3 completed shifts: In: 4880 [I V :4730; IV Piggyback:150]  Out: 1975 [ABGMN:8392]  I/O this shift:  In: 2410 7 [I V :1140 7; Blood:670; IV Piggyback:600]  Out: 1300 [Urine:1300]    Lab Results   Component Value Date    WBC 40 65 (HH) 11/06/2018    HGB 9 5 (L) 11/06/2018    HCT 29 9 (L) 11/06/2018    MCV 73 (L) 11/06/2018    PLT 84 (L) 11/06/2018       Lab Results   Component Value Date    GLUCOSE 87 07/07/2014    CALCIUM 7 8 (L) 11/06/2018     (L) 07/07/2014    K 4 1 11/06/2018    CO2 21 11/06/2018     11/06/2018    BUN 12 11/06/2018    CREATININE 0 78 11/06/2018       Lab Results   Component Value Date/Time    POCGLU 90 07/27/2015 07:29 PM       Physical Exam  Physical Examination: General appearance - alert, well appearing, and in no distress and oriented to person, place, and time  Chest - clear to auscultation, no wheezes, rales or rhonchi, symmetric air entry  Heart - normal rate and regular rhythm, no murmurs noted, no gallops noted  Abdomen - soft, nondistended, no guarding, no masses   Uterus is firm at U+1, Pfannenstiel incision is c/d/i, closed with histoacryl  Skin - normal coloration and turgor, no rashes, no suspicious skin lesions noted      Ifeanyi Melendez MD  OB/GYN PGY-3  11/6/2018 6:22 AM

## 2018-11-06 NOTE — CONSULTS
Consultation - Infectious Disease   Murtaza Abo 29 y o  female MRN: [id number]  Unit/Bed#: ICU 11 Encounter: [id number]      IMPRESSION & RECOMMENDATIONS:   Impression/Recommendations: This is a 29 y o  female, pregnant, with no prenatal care, status post  last night, developing septic shock afterwards  She has evidence of obstructive right ureteral stone on abdomen/pelvis CT  1  Septic shock  Source is most likely UTI, more specifically obstructed pyelonephritis  She is systemically ill and quite toxic appearing  Patient will most likely need emergent ureteral stenting  This was done earlier  Antibiotic plan as in below  Monitor temperature/WBC  Monitor hemodynamics  Follow-up on pending blood cultures  Blood pressure support per Critical Care Medicine Service  2  Obstructed right pyelonephritis, secondary to renal stone  Patient is now status post ureteral stenting  Patient is probably at low risk for resistant pathogens  However, given severity of illness, we will stay with broad-spectrum antibiotic, pending culture results  Continue IV cefepime  Add IV vancomycin  No further need for Flagyl  I will discontinue  Follow-up on blood and urine cultures  3  Hydronephrosis, with right ureteral obstruction  Patient is status post placement right ureteral stent  She will eventually need stone extraction  Urology follow-up  4  RAMON, most likely secondary to septic shock  Ureteral obstruction probably plays a role also  Antibiotic dosages adjusted accordingly  Monitor creatinine  5  Anemia, probably multifactorial   Patient is status post PRBC transfusion  No evidence of active GI bleeding  6  The pregnancy, status post   Patient did not have any prenatal care  Discussed with patient in detail regarding the above plan  Discussed with Critical Care Medicine Service  Thank you for this consultation  We will follow along with you      HISTORY OF PRESENT ILLNESS:  Reason for Consult:  Septic shock  HPI: Tanika De Leon is a 29 y o  female, , otherwise healthy, presented to Labor deck last night with contractions  Due to fetal distress, patient was taken to   Postop, she developed fever and hypotension, necessitating transfer to ICU  Patient was also complaining of right flank pain  She was started on cefepime/Flagyl  We are asked to evaluate the patient  Patient was seen earlier in the day  At that time, she was complaining of right flank pain, worse in the CVA area  She also had mild RUQ tenderness  No urinary symptoms  Patient has known history of nephrolithiasis  She is unable to tell me how long ago this was  Patient is somewhat lethargic  Abdomen/pelvis CT was done earlier, which showed an obstructing right ureteral stone  With regards to patient's pregnancy, she has had no prenatal care  She does have history of UTI during pregnancy and also between pregnancies  Since I last saw the patient, she was taken to the OR to undergo placement of right ureteral stent  REVIEW OF SYSTEMS:  A complete 12 point system-based review was done  Except for what is noted in HPI above, ROS of systems is otherwise negative  PAST MEDICAL HISTORY:  Past Medical History:   Diagnosis Date    Anemia     taking iron pills BID     Urinary tract infection     taking antibioitcs     Varicella     as child     Past Surgical History:   Procedure Laterality Date    TONSILLECTOMY      TONSILLECTOMY       Problem list reviewed  FAMILY HISTORY:  Non-contributory    SOCIAL HISTORY:  History   Alcohol Use No     History   Drug Use No     History   Smoking Status    Former Smoker    Packs/day: 1 00   Smokeless Tobacco    Never Used     Comment: 1 cigarette/ day       ALLERGIES:  No Known Allergies    MEDICATIONS:  All current active medications have been reviewed  Patient is currently on cefepime/Flagyl      PHYSICAL EXAM:  Vitals:  Temp:  [97 5 °F (36 4 °C)-102 1 °F (38 9 °C)] 101 7 °F (38 7 °C)  HR:  [108-161] 128  Resp:  [26-48] 32  BP: ()/(34-72) 108/72  SpO2:  [80 %-100 %] 96 %  Temp (24hrs), Av °F (37 8 °C), Min:97 5 °F (36 4 °C), Max:102 1 °F (38 9 °C)  Current: Temperature: (!) 101 7 °F (38 7 °C)     Physical Exam:  General:  Well-nourished, well-developed, acutely ill, toxic appearing  Awake, alert and oriented x 3  Eyes:  Conjunctive clear with no hemorrhages or effusions  Oropharynx:  No ulcers, no lesions, pharynx benign, no tonsillitis  Neck:  Supple, no lymphadenopathy, no mass, nontender  Lungs:  Expansion symmetric, no rales, no wheezing, no accessory muscle use  Cardiac:  Tachycardic with regular rhythm, normal S1, normal S2, no murmurs  Abdomen:  Soft, mildly distended, mild to moderate RUQ tenderness, severe right CVA tenderness  Extremities:  Trace edema, no erythema, nontender  No ulcers  Skin:  No rashes, no ulcers  Neurological:  Moves all four extremities spontaneously, sensation grossly intact    LABS, IMAGING, & OTHER STUDIES:  Lab Results:  I have personally reviewed pertinent labs  Results from last 7 days  Lab Units 18  0923 18  0634   POTASSIUM mmol/L 3 5 3 3* 3 1*   CHLORIDE mmol/L 104 104 102   CO2 mmol/L 22 15* 19*   BUN mg/dL 11 12 13   CREATININE mg/dL 1 30 1 23 0 93   EGFR ml/min/1 73sq m 56 60 84   CALCIUM mg/dL 7 4* 7 6* 8 6   AST U/L  --  17 19   ALT U/L  --  8* 12   ALK PHOS U/L  --  107 186*       Results from last 7 days  Lab Units 18  1358 18  0923 18  0414   WBC Thousand/uL  --   --  5 40 2 74*   HEMOGLOBIN g/dL 7 7* 5 8* 4 6* 7 1*   PLATELETS Thousands/uL  --   --  126* 164           Imaging Studies:   I have personally reviewed pertinent imaging study reports and images in PACS  Abdomen/pelvic CT reviewed personally  There is a right ureteral calculus with obstruction, resulting in right hydronephrosis  Small bilateral pleural effusion with atelectasis  Diffuse gallbladder wall thickening with a small calcified gallstone  EKG, Pathology, and Other Studies:   I have personally reviewed pertinent reports

## 2018-11-06 NOTE — PROGRESS NOTES
Progress Note - Infectious Disease   Tanika De Leon 29 y o  female MRN: [id number]  Unit/Bed#: ICU 11 Encounter: [id number]      Impression/Recommendations:  1  Septic shock  Source is most likely UTI, more specifically obstructed pyelonephritis  Patient is status post emergent right ureteral stenting  She is now clinically much improved  She is now hemodynamically stable  Blood cultures with GNR, as expected  Antibiotic plan as in below  Monitor temperature/WBC  Monitor hemodynamics  Continue IV cefepime, as in below  No further need for vancomycin  I will discontinue      2  GNR bacteremia  Source is most likely UTI/pyelonephritis, as in below  Patient is clinically much improved  Probability of drug resistances low  However, given severity of illness, will continue IV cefepime pending ID and susceptibility of GNR blood culture  Continue IV cefepime for now  Follow-up on final ID and susceptibility of GNR blood culture  To p o  Antibiotic when clinically more improved, hopefully in the next 2-3 days  Treat times 14 days total       3  Obstructed right pyelonephritis, secondary to renal stone  Patient is now status post ureteral stenting  Patient is probably at low risk for resistant pathogens  However, given severity of illness, we will stay with broad-spectrum antibiotic, pending culture results  Antibiotic plan as in above  Follow-up on blood and urine cultures      4  Trichomoniasis  Will need to identify patient's sexual partner  Both from will need to be treated at the same time  Hold Flagyl for now  Once patient's sexual partner is identified, both patient and her partner need to start Flagyl  5  Hydronephrosis, with right ureteral obstruction  Patient is status post placement right ureteral stent  She will eventually need stone extraction  Urology follow-up      6  RAMON, most likely secondary to septic shock  Ureteral obstruction probably plays a role also    Creatinine is much improved today  Antibiotic at full dose  Monitor creatinine      Discussed with patient and family in detail regarding the above plan  Discussed with Critical Care Medicine Service  Antibiotics:  Vancomycin/cefepime  POD # 1     Subjective:  Patient is much improved  She is more awake and alert  Right flank pain minimal now  Temperature is down  No further chills  She is tolerating antibiotic well  No nausea, vomiting or diarrhea  Objective:  Vitals:  Temp:  [97 6 °F (36 4 °C)-102 1 °F (38 9 °C)] 97 6 °F (36 4 °C)  HR:  [] 88  Resp:  [15-48] 17  BP: ()/(36-72) 96/50  SpO2:  [92 %-100 %] 98 %  Temp (24hrs), Av 3 °F (37 9 °C), Min:97 6 °F (36 4 °C), Max:102 1 °F (38 9 °C)  Current: Temperature: 97 6 °F (36 4 °C)    Physical Exam:     General: Awake, alert, cooperative, no distress  Neck:  Supple  No mass  No lymphadenopathy  Lungs: Expansion symmetric, no rales, no wheezing, respirations unlabored  Heart:  Regular rate and rhythm, S1 and S2 normal, no murmur  Abdomen: Soft, nondistended, minimal right flank tenderness, bowel sounds active all four quadrants,        no masses, no organomegaly  Extremities: Trace edema  No erythema/warmth  No ulcer  Nontender to palpation  Skin:  No rash  Neuro: Moves all extremities  Invasive Devices     Peripheral Intravenous Line            Peripheral IV 18 Left Forearm less than 1 day    Peripheral IV 18 Right Antecubital less than 1 day    Peripheral IV 18 Right Arm less than 1 day          Drain            Ureteral Drain/Stent Right ureter 6 Fr  less than 1 day    Urethral Catheter Double-lumen; Latex 18 Fr  less than 1 day                Labs studies:   I have personally reviewed pertinent labs      Results from last 7 days  Lab Units 18  0508 18  1759 18  0923 18  0634   POTASSIUM mmol/L 4 1 3 5 3 3* 3 1*   CHLORIDE mmol/L 106 104 104 102   CO2 mmol/L 21 22 15* 19*   BUN mg/dL 12 11 12 13   CREATININE mg/dL 0 78 1 30 1 23 0 93   EGFR ml/min/1 73sq m 104 56 60 84   CALCIUM mg/dL 7 8* 7 4* 7 6* 8 6   AST U/L 46*  --  17 19   ALT U/L 13  --  8* 12   ALK PHOS U/L 73  --  107 186*       Results from last 7 days  Lab Units 11/06/18  0508 11/06/18  0000 11/05/18  1759  11/05/18  0923 11/05/18  0414   WBC Thousand/uL 40 65*  --   --   --  5 40 2 74*   HEMOGLOBIN g/dL 9 5* 9 5* 7 7*  < > 4 6* 7 1*   PLATELETS Thousands/uL 84*  --   --   --  126* 164   < > = values in this interval not displayed  Results from last 7 days  Lab Units 11/05/18  1616 11/05/18  1009 11/05/18  0924   BLOOD CULTURE   --  Gram Negative Leonid Enteric Like*  --    GRAM STAIN RESULT   --  Gram negative rods Gram negative rods   INFLUENZA B PCR  None Detected  --   --    RSV PCR  None Detected  --   --        Imaging Studies:   I have personally reviewed pertinent imaging study reports and images in PACS  EKG, Pathology, and Other Studies:   I have personally reviewed pertinent reports

## 2018-11-06 NOTE — PROGRESS NOTES
Progress Note - Yoana Chavez 29 y o  female MRN: [id number]    Unit/Bed#: ICU 11 Encounter: [id number]      Assessment:  Ms Gibson Bucio is a pleasant 42-year-old female admitted at 45 weeks geststation with a chief urologic complaint of right flank pain and fever, found to have 3 mm right UVJ calculus  Medical expulsive therapy attempted and patient became progressively worse, prompting emergent   Our service was contacted yesterday in consultation after patient was transferred to ICU fulminantly septic  Postoperative day 1 cystoscopy, retrograde pyelography and right ureteral stent placement  Patient had initial flare of temperature post instrumentation with positive response to decompression as patient has been normo-thermic since late last night  Patient is well and ambulating in hallway  Patient denies persistent flank, groin pain, dysuria or gross hematuria  On cefepime with preliminary blood cultures positive for gram-negative arnold growth  Plan:  Continue medical optimization and sterilization  Remove Olivas catheter  Consider consultation with Infectious Disease specialist for antimicrobial management in patient who is lactating and requiring Op ureteroscopy  We have counseled Ms Cari Zamarripa extensively regarding requirement for stage definitive stone treatment electively once fully stabilized and infection free  Patient and family verbalized understanding  Appreciate intensivist and OB/gyn management  Subjective:  I feel better!     Objective:     Vitals: Blood pressure 96/50, pulse 88, temperature 97 6 °F (36 4 °C), temperature source Temporal, resp  rate 17, height 5' (1 524 m), weight 60 8 kg (134 lb), last menstrual period 2018, SpO2 98 %, unknown if currently breastfeeding  ,Body mass index is 26 17 kg/m²        Intake/Output Summary (Last 24 hours) at 18 0871  Last data filed at 18 0628   Gross per 24 hour   Intake          6896 93 ml   Output 3525 ml   Net          3371 93 ml       Physical Exam: General appearance: alert and oriented, in no acute distress  Head: Normocephalic, without obvious abnormality, atraumatic  Neck: no adenopathy, no carotid bruit, no JVD, supple, symmetrical, trachea midline and thyroid not enlarged, symmetric, no tenderness/mass/nodules  Lungs: clear to auscultation bilaterally  Heart: regular rate and rhythm, S1, S2 normal, no murmur, click, rub or gallop  Abdomen: abnormal findings:  mild and  incision clean dry and intact  Incisional tenderness to be expected  tenderness in the lower abdomen  Extremities: extremities normal, warm and well-perfused; no cyanosis, clubbing, or edema  Pulses: 2+ and symmetric  Neurologic: Grossly normal  Olivas patent for clear kashif urine     Invasive Devices     Peripheral Intravenous Line            Peripheral IV 18 Left Forearm less than 1 day    Peripheral IV 18 Right Antecubital less than 1 day    Peripheral IV 18 Right Arm less than 1 day          Drain            Ureteral Drain/Stent Right ureter 6 Fr  less than 1 day    Urethral Catheter Double-lumen; Latex 18 Fr  less than 1 day              Lab Results   Component Value Date    WBC 40 65 (HH) 2018    HGB 9 5 (L) 2018    HCT 29 9 (L) 2018    MCV 73 (L) 2018    PLT 84 (L) 2018     Lab Results   Component Value Date    GLUCOSE 87 2014    CALCIUM 7 8 (L) 2018     (L) 2014    K 4 1 2018    CO2 21 2018     2018    BUN 12 2018    CREATININE 0 78 2018       Lab, Imaging and other studies: I have personally reviewed pertinent reports

## 2018-11-06 NOTE — PROGRESS NOTES
Progress Note - [facility] 29 y o  female MRN: [id number]  Unit/Bed#: ICU 11 Encounter: [id number]    Assessment/Plan:  1  Sepsis with septic shock likely related to pyelonephritis/hydronephrosis status post right ureteral stent placement  · Continue Olivas catheter per neurology's recommendations  · Continue antibiotics per Infectious Disease recommendation  · Currently is off Levophed  We will continue with IV fluid with a goal to maintain her mean arterial pressure greater than 65  · Okay for out of bed and increasing ambulation as able to tolerate  2  Metabolic acidosis secondary to 1    · Her acidosis improved over night and she was changed to lactated Ringer's from a bicarbonate infusion  · We will continue to monitor her electrolytes closely  3  Anemia likely multifactorial related to acute blood loss from her recent surgery, dilution, pregnancy and a possible component of iron deficiency  · Her hemoglobin is pending for this morning  · We will continue to monitor her closely  4  Acute kidney injury likely related to 1    · We will continue to monitor her urine output and renal indices closely  5  Status post  section, postop day 1  · Management per [facility] gyn service recommendations  6  Abnormal EKG/echocardiogram possibly related to hyperdynamic cardiac function in the setting of her anemia and extreme volume resuscitation  · Will repeat a limited echocardiogram now that she is more stable  Critical Care Time:   Documented critical care time excludes any procedures documented elsewhere  It also excludes any family updates    _____________________________________________________________________    HPI/24hr events:   Events of yesterday are noted  The patient is improved this morning    She has some lower abdominal incisional pain     Medications:    Current Facility-Administered Medications:  acetaminophen 650 mg Oral 4x Daily PRN DENISSE Mcdonough    cefepime 1,000 mg Intravenous Q12H DENISSE Polo Last Rate: Stopped (11/06/18 0227)   HYDROmorphone 0 5 mg Intravenous Q3H PRN DENISSE Polo    lactated ringers 1,000 mL Intravenous Once Jose Alfredo Hernandez MD    Followed by        lactated ringers 1,000 mL Intravenous Once Jose Alfredo Hernandez MD    lactated ringers 125 mL/hr Intravenous Continuous DENISSE Ayala Last Rate: 125 mL/hr (11/06/18 0401)   norepinephrine 1-30 mcg/min Intravenous Titrated Marea Matlizzy CRNP Last Rate: Stopped (11/06/18 0001)   vancomycin 15 mg/kg Intravenous Q12H Elke Keyes MD          lactated ringers 125 mL/hr Last Rate: 125 mL/hr (11/06/18 0401)   norepinephrine 1-30 mcg/min Last Rate: Stopped (11/06/18 0001)         Physical exam:  Vitals: Body mass index is 26 17 kg/m²  Blood pressure 103/61, pulse 104, temperature 97 6 °F (36 4 °C), temperature source Temporal, resp  rate (!) 28, height 5' (1 524 m), weight 60 8 kg (134 lb), last menstrual period 03/30/2018, SpO2 94 %, unknown if currently breastfeeding ,  Temp  Min: 97 5 °F (36 4 °C)  Max: 102 1 °F (38 9 °C)  IBW: 45 5 kg    SpO2: 94 %     O2 Device: Nasal cannula      Intake/Output Summary (Last 24 hours) at 11/06/18 0440  Last data filed at 11/06/18 0401   Gross per 24 hour   Intake          7290 68 ml   Output             3275 ml   Net          4015 68 ml       Invasive/non-invasive ventilation settings:   Respiratory    Lab Data (Last 4 hours)    None         O2/Vent Data (Last 4 hours)    None              Invasive Devices     Peripheral Intravenous Line            Peripheral IV 11/05/18 Left Forearm less than 1 day    Peripheral IV 11/05/18 Right Antecubital less than 1 day    Peripheral IV 11/05/18 Right Arm less than 1 day          Drain            Ureteral Drain/Stent Right ureter 6 Fr  less than 1 day    Urethral Catheter Double-lumen; Latex 18 Fr  less than 1 day                  Physical Exam:  Gen:  Awake and alert  HEENT:  Pupils are equal round and reactive to light    The oropharynx is without erythema  Neck:  Supple negative for lymphadenopathy  Chest:  Essentially clear to auscultation  Cor:  Regular rate and rhythm  Abd:  Soft with some lower abdominal tenderness  Ext:  There is no significant edema clubbing or cyanosis  Neuro:  Awake and alert, able to move her extremities  Skin:  Warm and dry      Diagnostic Data:  Lab: I have personally reviewed pertinent lab results  CBC:     Results from last 7 days  Lab Units 11/06/18  0000 11/05/18  1759 11/05/18  1358 11/05/18  0923 11/05/18  0414   WBC Thousand/uL  --   --   --  5 40 2 74*   HEMOGLOBIN g/dL 9 5* 7 7* 5 8* 4 6* 7 1*   HEMATOCRIT %  --   --   --  17 7* 27 2*   PLATELETS Thousands/uL  --   --   --  126* 164       CMP:     Results from last 7 days  Lab Units 11/05/18  1759 11/05/18  0923 11/05/18  0634   POTASSIUM mmol/L 3 5 3 3* 3 1*   CHLORIDE mmol/L 104 104 102   CO2 mmol/L 22 15* 19*   BUN mg/dL 11 12 13   CREATININE mg/dL 1 30 1 23 0 93   CALCIUM mg/dL 7 4* 7 6* 8 6   ALK PHOS U/L  --  107 186*   ALT U/L  --  8* 12   AST U/L  --  17 19     PT/INR:   Lab Results   Component Value Date    INR 1 40 (H) 11/05/2018   ,   Magnesium:     Results from last 7 days  Lab Units 11/05/18  1759 11/05/18  0634   MAGNESIUM mg/dL 0 9* 1 5*     Phosphorous:     Results from last 7 days  Lab Units 11/05/18  1759   PHOSPHORUS mg/dL 2 6*       Microbiology:    Results from last 7 days  Lab Units 11/05/18  1616 11/05/18  1009 11/05/18  0924   GRAM STAIN RESULT   --  Gram negative rods Gram negative rods   INFLUENZA B PCR  None Detected  --   --    RSV PCR  None Detected  --   --        Imaging:      Cardiac lab/EKG/telemetry/ECHO:       VTE Prophylaxis:  SCDs and ambulation    Code Status: Level 1 - Full Code    Milo Tiago, CRNP    Portions of the record may have been created with voice recognition software   Occasional wrong word or "sound a like" substitutions may have occurred due to the inherent limitations of voice recognition software  Read the chart carefully and recognize, using context, where substitutions have occurred

## 2018-11-07 LAB
ANION GAP SERPL CALCULATED.3IONS-SCNC: 6 MMOL/L (ref 4–13)
ANISOCYTOSIS BLD QL SMEAR: PRESENT
BACTERIA UR CULT: ABNORMAL
BASOPHILS # BLD MANUAL: 0 THOUSAND/UL (ref 0–0.1)
BASOPHILS NFR MAR MANUAL: 0 % (ref 0–1)
BUN SERPL-MCNC: 15 MG/DL (ref 5–25)
CALCIUM SERPL-MCNC: 7.8 MG/DL (ref 8.3–10.1)
CHLORIDE SERPL-SCNC: 105 MMOL/L (ref 100–108)
CO2 SERPL-SCNC: 23 MMOL/L (ref 21–32)
CREAT SERPL-MCNC: 0.63 MG/DL (ref 0.6–1.3)
EOSINOPHIL # BLD MANUAL: 0.6 THOUSAND/UL (ref 0–0.4)
EOSINOPHIL NFR BLD MANUAL: 2 % (ref 0–6)
ERYTHROCYTE [DISTWIDTH] IN BLOOD BY AUTOMATED COUNT: 21.2 % (ref 11.6–15.1)
GFR SERPL CREATININE-BSD FRML MDRD: 122 ML/MIN/1.73SQ M
GLUCOSE SERPL-MCNC: 83 MG/DL (ref 65–140)
GP B STREP DNA SPEC QL NAA+PROBE: NORMAL
HCT VFR BLD AUTO: 28.3 % (ref 34.8–46.1)
HGB BLD-MCNC: 9 G/DL (ref 11.5–15.4)
HYPERCHROMIA BLD QL SMEAR: PRESENT
LG PLATELETS BLD QL SMEAR: PRESENT
LYMPHOCYTES # BLD AUTO: 0.9 THOUSAND/UL (ref 0.6–4.47)
LYMPHOCYTES # BLD AUTO: 3 % (ref 14–44)
MCH RBC QN AUTO: 23.4 PG (ref 26.8–34.3)
MCHC RBC AUTO-ENTMCNC: 31.8 G/DL (ref 31.4–37.4)
MCV RBC AUTO: 74 FL (ref 82–98)
MONOCYTES # BLD AUTO: 0.3 THOUSAND/UL (ref 0–1.22)
MONOCYTES NFR BLD: 1 % (ref 4–12)
MYELOCYTES NFR BLD MANUAL: 1 % (ref 0–1)
NEUTROPHILS # BLD MANUAL: 28.04 THOUSAND/UL (ref 1.85–7.62)
NEUTS BAND NFR BLD MANUAL: 22 % (ref 0–8)
NEUTS SEG NFR BLD AUTO: 71 % (ref 43–75)
NRBC BLD AUTO-RTO: 0 /100 WBCS
PLATELET # BLD AUTO: 87 THOUSANDS/UL (ref 149–390)
PLATELET BLD QL SMEAR: ABNORMAL
POTASSIUM SERPL-SCNC: 3.9 MMOL/L (ref 3.5–5.3)
RBC # BLD AUTO: 3.84 MILLION/UL (ref 3.81–5.12)
SODIUM SERPL-SCNC: 134 MMOL/L (ref 136–145)
TOTAL CELLS COUNTED SPEC: 100
WBC # BLD AUTO: 30.15 THOUSAND/UL (ref 4.31–10.16)

## 2018-11-07 PROCEDURE — 94762 N-INVAS EAR/PLS OXIMTRY CONT: CPT

## 2018-11-07 PROCEDURE — 99024 POSTOP FOLLOW-UP VISIT: CPT | Performed by: OBSTETRICS & GYNECOLOGY

## 2018-11-07 PROCEDURE — 99232 SBSQ HOSP IP/OBS MODERATE 35: CPT | Performed by: NURSE PRACTITIONER

## 2018-11-07 PROCEDURE — 80048 BASIC METABOLIC PNL TOTAL CA: CPT | Performed by: NURSE PRACTITIONER

## 2018-11-07 PROCEDURE — 85027 COMPLETE CBC AUTOMATED: CPT | Performed by: OBSTETRICS & GYNECOLOGY

## 2018-11-07 PROCEDURE — 85007 BL SMEAR W/DIFF WBC COUNT: CPT | Performed by: OBSTETRICS & GYNECOLOGY

## 2018-11-07 PROCEDURE — 99232 SBSQ HOSP IP/OBS MODERATE 35: CPT | Performed by: INTERNAL MEDICINE

## 2018-11-07 RX ORDER — OXYCODONE HYDROCHLORIDE 5 MG/1
5 TABLET ORAL EVERY 4 HOURS PRN
Status: DISCONTINUED | OUTPATIENT
Start: 2018-11-07 | End: 2018-11-07

## 2018-11-07 RX ORDER — SIMETHICONE 80 MG
80 TABLET,CHEWABLE ORAL EVERY 6 HOURS PRN
Status: DISCONTINUED | OUTPATIENT
Start: 2018-11-07 | End: 2018-11-09 | Stop reason: HOSPADM

## 2018-11-07 RX ORDER — DOCUSATE SODIUM 100 MG/1
100 CAPSULE, LIQUID FILLED ORAL 2 TIMES DAILY
Status: DISCONTINUED | OUTPATIENT
Start: 2018-11-07 | End: 2018-11-09 | Stop reason: HOSPADM

## 2018-11-07 RX ORDER — OXYCODONE HYDROCHLORIDE 5 MG/1
10 TABLET ORAL EVERY 4 HOURS PRN
Status: DISCONTINUED | OUTPATIENT
Start: 2018-11-07 | End: 2018-11-09 | Stop reason: HOSPADM

## 2018-11-07 RX ORDER — OXYCODONE HYDROCHLORIDE 5 MG/1
5 TABLET ORAL EVERY 4 HOURS PRN
Status: DISCONTINUED | OUTPATIENT
Start: 2018-11-07 | End: 2018-11-09 | Stop reason: HOSPADM

## 2018-11-07 RX ADMIN — HYDROMORPHONE HYDROCHLORIDE 0.5 MG: 1 INJECTION, SOLUTION INTRAMUSCULAR; INTRAVENOUS; SUBCUTANEOUS at 01:31

## 2018-11-07 RX ADMIN — ACETAMINOPHEN 650 MG: 325 TABLET, FILM COATED ORAL at 19:28

## 2018-11-07 RX ADMIN — DOCUSATE SODIUM 100 MG: 100 CAPSULE, LIQUID FILLED ORAL at 09:13

## 2018-11-07 RX ADMIN — OXYCODONE HYDROCHLORIDE 10 MG: 5 TABLET ORAL at 15:28

## 2018-11-07 RX ADMIN — CEFAZOLIN SODIUM 1000 MG: 1 SOLUTION INTRAVENOUS at 11:01

## 2018-11-07 RX ADMIN — OXYCODONE HYDROCHLORIDE 5 MG: 5 TABLET ORAL at 11:08

## 2018-11-07 RX ADMIN — CEFEPIME HYDROCHLORIDE 1000 MG: 1 INJECTION, POWDER, FOR SOLUTION INTRAMUSCULAR; INTRAVENOUS at 01:59

## 2018-11-07 RX ADMIN — CEFAZOLIN SODIUM 1000 MG: 1 SOLUTION INTRAVENOUS at 18:47

## 2018-11-07 RX ADMIN — SIMETHICONE CHEW TAB 80 MG 80 MG: 80 TABLET ORAL at 09:13

## 2018-11-07 RX ADMIN — DOCUSATE SODIUM 100 MG: 100 CAPSULE, LIQUID FILLED ORAL at 19:28

## 2018-11-07 RX ADMIN — FONDAPARINUX SODIUM 2.5 MG: 2.5 INJECTION, SOLUTION SUBCUTANEOUS at 09:10

## 2018-11-07 RX ADMIN — ACETAMINOPHEN 650 MG: 325 TABLET, FILM COATED ORAL at 09:53

## 2018-11-07 RX ADMIN — OXYCODONE HYDROCHLORIDE 10 MG: 5 TABLET ORAL at 06:29

## 2018-11-07 RX ADMIN — OXYCODONE HYDROCHLORIDE 10 MG: 5 TABLET ORAL at 22:09

## 2018-11-07 RX ADMIN — ACETAMINOPHEN 650 MG: 325 TABLET, FILM COATED ORAL at 00:17

## 2018-11-07 NOTE — PLAN OF CARE
Knowledge Deficit     Verbalizes understanding of labor plan Completed        Labor & Delivery     Manages discomfort Completed          ANTEPARTUM     Maintain pregnancy as long as maternal and/or fetal condition is stable Progressing        CARDIOVASCULAR - ADULT     Maintains optimal cardiac output and hemodynamic stability Progressing     Absence of cardiac dysrhythmias or at baseline rhythm Progressing        HEMATOLOGIC - ADULT     Maintains hematologic stability Progressing        POSTPARTUM     Experiences normal postpartum course Progressing     Appropriate maternal -  bonding Progressing     Establishment of infant feeding pattern Progressing     Incision(s), wounds(s) or drain site(s) healing without S/S of infection Progressing        Potential for Falls     Patient will remain free of falls Progressing        Prexisting or High Potential for Compromised Skin Integrity     Skin integrity is maintained or improved Progressing        RESPIRATORY - ADULT     Achieves optimal ventilation and oxygenation Progressing

## 2018-11-07 NOTE — PROGRESS NOTES
Progress Note - Infectious Disease   Chinmay Thomson 29 y o  female MRN: [id number]  Unit/Bed#: L&D 327-01 Encounter: [id number]      Impression/Recommendations:  1  Septic shock   Source is most likely UTI, more specifically obstructed pyelonephritis  Patient is status post emergent right ureteral stenting  She is now clinically much improved  She is now hemodynamically stable  Blood cultures with GNR, as expected  Antibiotic plan as in below  Monitor temperature/WBC  Monitor hemodynamics  Continue IV cefepime, as in below  No further need for vancomycin  I will discontinue      2  E coli  bacteremia  Source is most likely UTI/pyelonephritis, as in below  Patient is clinically much improved  E coli is quite susceptible  Will deescalate antibiotic regimen  Change antibiotic to IV cefazolin  Anticipate transition to p o  Keflex in next 24 hr, if patient continued to improve clinically  Treat x 14 days total, another 12 days        3  Obstructed right pyelonephritis, secondary to renal stone   Patient is now status post ureteral stenting   Patient is probably at low risk for resistant pathogens   She is clinically much improved  Antibiotic plan as in above  Monitor symptoms      4  Trichomoniasis  Will need to identify patient's sexual partner  Both from will need to be treated at the same time  Hold Flagyl for now  Once patient's sexual partner is identified, both patient and her partner need to start Flagyl      5  Hydronephrosis, with right ureteral obstruction   Patient is status post placement right ureteral stent   She will eventually need stone extraction  Urology follow-up      6  RAMON, most likely secondary to septic shock   Ureteral obstruction probably plays a role also  Creatinine is much improved today  Antibiotic at full dose    Monitor creatinine      Discussed with patient in detail regarding the above plan      Antibiotics:  Cefepime  POD # 2      Subjective:  Patient feels well   She is now out of ICU  She is awake and alert  Right flank pain minimal now  Temperature stays down  No further chills  She is tolerating antibiotic well  No nausea, vomiting or diarrhea  Objective:  Vitals:  Temp:  [97 4 °F (36 3 °C)-98 6 °F (37 °C)] 98 4 °F (36 9 °C)  HR:  [] 100  Resp:  [18-20] 18  BP: (108-129)/(56-84) 108/64  SpO2:  [94 %-98 %] 97 %  Temp (24hrs), Av °F (36 7 °C), Min:97 4 °F (36 3 °C), Max:98 6 °F (37 °C)  Current: Temperature: 98 4 °F (36 9 °C)    Physical Exam:     General: Awake, alert, cooperative, no distress  Neck:  Supple  No mass  No lymphadenopathy  Lungs: Expansion symmetric, no rales, no wheezing, respirations unlabored  Heart:  Regular rate and rhythm, S1 and S2 normal, no murmur  Abdomen: Soft, nondistended, minimal right flank tenderness, bowel sounds active all four quadrants,        no masses, no organomegaly  Extremities: No edema  No erythema/warmth  No ulcer  Nontender to palpation  Skin:  No rash  Neuro: Moves all extremities  Invasive Devices     Peripheral Intravenous Line            Peripheral IV 18 Left Forearm 1 day    Peripheral IV 18 Right Arm 1 day          Drain            Ureteral Drain/Stent Right ureter 6 Fr  1 day                Labs studies:   I have personally reviewed pertinent labs      Results from last 7 days  Lab Units 18  0543 18  0508 18  1759 18  0923 18  0634   POTASSIUM mmol/L 3 9 4 1 3 5 3 3* 3 1*   CHLORIDE mmol/L 105 106 104 104 102   CO2 mmol/L 23 21 22 15* 19*   BUN mg/dL 15 12 11 12 13   CREATININE mg/dL 0 63 0 78 1 30 1 23 0 93   EGFR ml/min/1 73sq m 122 104 56 60 84   CALCIUM mg/dL 7 8* 7 8* 7 4* 7 6* 8 6   AST U/L  --  46*  --  17 19   ALT U/L  --  13  --  8* 12   ALK PHOS U/L  --  73  --  107 186*       Results from last 7 days  Lab Units 18  0625 18  1549 18  0508   WBC Thousand/uL 30 15* 39 25* 40 65*   HEMOGLOBIN g/dL 9 0* 9 3* 9 5* PLATELETS Thousands/uL 87* 85* 84*       Results from last 7 days  Lab Units 11/05/18  1616 11/05/18  1519 11/05/18  1009 11/05/18  0924 11/05/18  0527   BLOOD CULTURE   --   --  Escherichia coli*  --   --    GRAM STAIN RESULT   --   --  Gram negative rods Gram negative rods  --    URINE CULTURE   --  Culture results to follow  --   --  >100,000 cfu/ml Gram Negative Leonid Enteric Like*   INFLUENZA B PCR  None Detected  --   --   --   --    RSV PCR  None Detected  --   --   --   --        Imaging Studies:   I have personally reviewed pertinent imaging study reports and images in PACS  EKG, Pathology, and Other Studies:   I have personally reviewed pertinent reports

## 2018-11-07 NOTE — PROGRESS NOTES
Progress Note - Murtaza Abo 29 y o  female MRN: [id number]    Unit/Bed#: L&D 327-01 Encounter: [id number]      Assessment:  Ms Nestor Marc is a 51-year-old female admitted at 45 weeks gestation with a chief urologic complaint of right flank pain and fever secondary to 3 mm right UVJ calculus prompting emergent  and a right ureteral stent placement due to fulminant sepsis  Now postoperative day 2 and out of ICU; patient has remained afebrile  WBC count continues to trend downward nicely to 30, 000 from greater than 40,000 on presentation  Creatinine is normalized  Blood culture positive for pan susceptible E coli  On Ancef per ID recommendations; appreciated  De-escalation of antibiotics in progress  Patient may be transitioned to oral antibiotics per Dr Gale Hurd     Plan:  Continue medical optimization and antibiosis per ID recommendations  Maintain stent  No further  intervention indicated during this hospital stay  Our office will contact patient to arrange for staged ureteroscopy once patient is fully recovered  Will sign off  However, do not hesitate to contact our office with any questions or new  concerns  Subjective:   Denies fever, chills, bladder spasms, gross hematuria or dysuria    Objective:     Vitals: Blood pressure 112/64, pulse 98, temperature 98 4 °F (36 9 °C), temperature source Oral, resp  rate 20, height 5' (1 524 m), weight 60 8 kg (134 lb), last menstrual period 2018, SpO2 97 %, not currently breastfeeding  ,Body mass index is 26 17 kg/m²        Intake/Output Summary (Last 24 hours) at 18 1434  Last data filed at 18 1147   Gross per 24 hour   Intake               50 ml   Output             1200 ml   Net            -1150 ml       Physical Exam: General appearance: alert and oriented, in no acute distress  Head: Normocephalic, without obvious abnormality, atraumatic  Neck: no adenopathy, no carotid bruit, no JVD, supple, symmetrical, trachea midline and thyroid not enlarged, symmetric, no tenderness/mass/nodules  Lungs: clear to auscultation bilaterally  Heart: regular rate and rhythm, S1, S2 normal, no murmur, click, rub or gallop  Abdomen: abnormal findings:  mild and Incisional pain--to be expected  Incision well approximated, clean dry and intact  tenderness in the lower abdomen  Extremities: extremities normal, warm and well-perfused; no cyanosis, clubbing, or edema  Pulses: 2+ and symmetric  Neurologic: Grossly normal  No external urinary drains     Invasive Devices     Peripheral Intravenous Line            Peripheral IV 11/05/18 Left Forearm 1 day    Peripheral IV 11/05/18 Right Arm 1 day          Drain            Ureteral Drain/Stent Right ureter 6 Fr  1 day              Lab Results   Component Value Date    WBC 30 15 (HH) 11/07/2018    HGB 9 0 (L) 11/07/2018    HCT 28 3 (L) 11/07/2018    MCV 74 (L) 11/07/2018    PLT 87 (L) 11/07/2018     Lab Results   Component Value Date    GLUCOSE 87 07/07/2014    CALCIUM 7 8 (L) 11/07/2018     (L) 07/07/2014    K 3 9 11/07/2018    CO2 23 11/07/2018     11/07/2018    BUN 15 11/07/2018    CREATININE 0 63 11/07/2018         Lab, Imaging and other studies: I have personally reviewed pertinent reports

## 2018-11-07 NOTE — TELEPHONE ENCOUNTER
S/p R ureteral stent without a string  Patient needs o/v with AP for R URS with laser  Appt with AP in 2-3 weeks  OR 4-6 weeks

## 2018-11-07 NOTE — PROGRESS NOTES
Obstetrics Progress note   Antonella Dave 29 y o  female MRN: [id number]  Unit/Bed#: L&D 327-01 Encounter: [id number]    A/P: 29 y o   s/p primary  section POD2; Sepsis status likely secondary to pyelonephritis / obstructing nephrolithiasis at right UVJ s/p stent placement       1) Sepsis, source likely from pyelonephritis, obstructing nephrolithiasis              - s/p ureteral stent placement on  by urology              -  Olivas catheter discontinued per Uro recommendations, voiding spontaneously              - Cefepime IV; Flagyl and vanco discontinued per ID recommendations              -  Continue IV fluids to maintain MAP > 65              - Blood culture showing E Coli, will transition oral antibiotics following ID's recommnedations     2) Acute Kidney Injury, resolving              - Likely multifactorial, including shock and ureteral obstruction              - Creatinine improving - creatinine appropriate today 0 63mg/dL, continue to monitor              - Avoid nephrotoxic medications, NSAIDs     3) Acute on chronic anemia, resolving              - Likely chronic iron deficiency anemia compounded by gravid state and poor adherence to prenatal care              - Preoperative Hb was 7 1g/dL consistent with initial prenatal levels              - No significant bleeding noted intraop, with appropriate EBL              - Immediate postop Hb was 4 6g/dL likely reflective of postoperative state with hemodilutional component              - Received 3 units pRBCs, 1u FFP; Hb stable, currently 6 3A/UX     4) Metabolic Acidosis likely secondary to 1, resolving              - Improving, bicarb completed; currently on LR              - Continue to monitor closely     5) Status post- section, POD 2              - Appropriate for postoperative state              - Declining breastfeeding              -  is doing well, alert and active    6) Thrombocytopenia   - Platelets on admission 164K, trended down to 85   - Likely hemodilutional with postpartum fluid redistribution   - Continue to monitor, follow up AM CBC       DVT PPx: SCDs, arixtra 2 5mg SQ  Encouraged ambulation as tolerated  Dispo: improving, continue to monitor    No acute events overnight  Pain is well controlled  Pt is tolerating PO  Pt is passing flatus  Pt is ambulating  Denies fevers/chills/nausea/vomiting/chest pain/shortness of breath/petechiae/ecchymosis/headache/ RUQ pain/fatigue/new onset swelling  /64 (BP Location: Left arm)   Pulse 94   Temp 97 6 °F (36 4 °C) (Oral)   Resp 20   Ht 5' (1 524 m)   Wt 60 8 kg (134 lb)   LMP 03/30/2018   SpO2 97%   Breastfeeding? No   BMI 26 17 kg/m²      I/O last 3 completed shifts:   In: 8388 6 [I V :6868 6; Blood:670; IV QWTXLFFAL:117]  Out: 9666 [IXOTE:2544]  I/O this shift:  In: -   Out: 500 [Urine:500]    Lab Results   Component Value Date    WBC 39 25 (HH) 11/06/2018    HGB 9 3 (L) 11/06/2018    HCT 29 1 (L) 11/06/2018    MCV 73 (L) 11/06/2018    PLT 85 (L) 11/06/2018       Lab Results   Component Value Date    GLUCOSE 87 07/07/2014    CALCIUM 7 8 (L) 11/07/2018     (L) 07/07/2014    K 3 9 11/07/2018    CO2 23 11/07/2018     11/07/2018    BUN 15 11/07/2018    CREATININE 0 63 11/07/2018       Lab Results   Component Value Date/Time    POCGLU 90 07/27/2015 07:29 PM       Physical Exam  Physical Examination: General appearance - alert, well appearing, and in no distress and oriented to person, place, and time  Chest - clear to auscultation, no wheezes, rales or rhonchi, symmetric air entry  Heart - normal rate and regular rhythm, no murmurs noted, no gallops noted  Abdomen - soft, nontender, nondistended, no masses or organomegaly  Neurological - neck supple without rigidity, motor and sensory grossly normal bilaterally, normal muscle tone, no tremors, strength 5/5  Skin - normal coloration and turgor, no rashes, no suspicious skin lesions noted    Sai Jarvis Papito Boo MD  OB/GYN PGY-3  11/7/2018 6:26 AM

## 2018-11-08 LAB
ABO GROUP BLD BPU: NORMAL
ALBUMIN SERPL BCP-MCNC: 1.5 G/DL (ref 3.5–5)
ALP SERPL-CCNC: 91 U/L (ref 46–116)
ALT SERPL W P-5'-P-CCNC: 21 U/L (ref 12–78)
ANION GAP SERPL CALCULATED.3IONS-SCNC: 9 MMOL/L (ref 4–13)
ANISOCYTOSIS BLD QL SMEAR: PRESENT
AST SERPL W P-5'-P-CCNC: 33 U/L (ref 5–45)
BACTERIA BLD CULT: ABNORMAL
BACTERIA BLD CULT: ABNORMAL
BACTERIA UR CULT: ABNORMAL
BASOPHILS # BLD MANUAL: 0 THOUSAND/UL (ref 0–0.1)
BASOPHILS NFR MAR MANUAL: 0 % (ref 0–1)
BILIRUB SERPL-MCNC: 0.34 MG/DL (ref 0.2–1)
BPU ID: NORMAL
BUN SERPL-MCNC: 11 MG/DL (ref 5–25)
CALCIUM SERPL-MCNC: 7.9 MG/DL (ref 8.3–10.1)
CHLORIDE SERPL-SCNC: 104 MMOL/L (ref 100–108)
CO2 SERPL-SCNC: 24 MMOL/L (ref 21–32)
CREAT SERPL-MCNC: 0.64 MG/DL (ref 0.6–1.3)
DOHLE BOD BLD QL SMEAR: PRESENT
EOSINOPHIL # BLD MANUAL: 0 THOUSAND/UL (ref 0–0.4)
EOSINOPHIL NFR BLD MANUAL: 0 % (ref 0–6)
ERYTHROCYTE [DISTWIDTH] IN BLOOD BY AUTOMATED COUNT: 21.9 % (ref 11.6–15.1)
GFR SERPL CREATININE-BSD FRML MDRD: 122 ML/MIN/1.73SQ M
GLUCOSE SERPL-MCNC: 76 MG/DL (ref 65–140)
GRAM STN SPEC: ABNORMAL
GRAM STN SPEC: ABNORMAL
HCT VFR BLD AUTO: 29.5 % (ref 34.8–46.1)
HGB BLD-MCNC: 9 G/DL (ref 11.5–15.4)
HYPERCHROMIA BLD QL SMEAR: PRESENT
LG PLATELETS BLD QL SMEAR: PRESENT
LYMPHOCYTES # BLD AUTO: 1.19 THOUSAND/UL (ref 0.6–4.47)
LYMPHOCYTES # BLD AUTO: 5 % (ref 14–44)
MCH RBC QN AUTO: 22.7 PG (ref 26.8–34.3)
MCHC RBC AUTO-ENTMCNC: 30.5 G/DL (ref 31.4–37.4)
MCV RBC AUTO: 74 FL (ref 82–98)
MONOCYTES # BLD AUTO: 0 THOUSAND/UL (ref 0–1.22)
MONOCYTES NFR BLD: 0 % (ref 4–12)
NEUTROPHILS # BLD MANUAL: 22.32 THOUSAND/UL (ref 1.85–7.62)
NEUTS BAND NFR BLD MANUAL: 11 % (ref 0–8)
NEUTS SEG NFR BLD AUTO: 83 % (ref 43–75)
NRBC BLD AUTO-RTO: 0 /100 WBCS
PLATELET # BLD AUTO: 105 THOUSANDS/UL (ref 149–390)
PLATELET BLD QL SMEAR: ABNORMAL
POTASSIUM SERPL-SCNC: 3.6 MMOL/L (ref 3.5–5.3)
PROT SERPL-MCNC: 5.2 G/DL (ref 6.4–8.2)
RBC # BLD AUTO: 3.97 MILLION/UL (ref 3.81–5.12)
RBC MORPH BLD: PRESENT
SODIUM SERPL-SCNC: 137 MMOL/L (ref 136–145)
TOTAL CELLS COUNTED SPEC: 100
UNIT DISPENSE STATUS: NORMAL
UNIT PRODUCT CODE: NORMAL
UNIT RH: NORMAL
VARIANT LYMPHS # BLD AUTO: 1 %
WBC # BLD AUTO: 23.74 THOUSAND/UL (ref 4.31–10.16)

## 2018-11-08 PROCEDURE — 99024 POSTOP FOLLOW-UP VISIT: CPT | Performed by: OBSTETRICS & GYNECOLOGY

## 2018-11-08 PROCEDURE — 85027 COMPLETE CBC AUTOMATED: CPT | Performed by: OBSTETRICS & GYNECOLOGY

## 2018-11-08 PROCEDURE — 85007 BL SMEAR W/DIFF WBC COUNT: CPT | Performed by: OBSTETRICS & GYNECOLOGY

## 2018-11-08 PROCEDURE — 80053 COMPREHEN METABOLIC PANEL: CPT | Performed by: OBSTETRICS & GYNECOLOGY

## 2018-11-08 PROCEDURE — 99232 SBSQ HOSP IP/OBS MODERATE 35: CPT | Performed by: INTERNAL MEDICINE

## 2018-11-08 RX ORDER — CALCIUM CARBONATE 200(500)MG
1000 TABLET,CHEWABLE ORAL DAILY
Status: DISCONTINUED | OUTPATIENT
Start: 2018-11-08 | End: 2018-11-09 | Stop reason: HOSPADM

## 2018-11-08 RX ORDER — CEPHALEXIN 500 MG/1
500 CAPSULE ORAL EVERY 6 HOURS SCHEDULED
Status: DISCONTINUED | OUTPATIENT
Start: 2018-11-08 | End: 2018-11-09 | Stop reason: HOSPADM

## 2018-11-08 RX ORDER — CEPHALEXIN 500 MG/1
500 CAPSULE ORAL EVERY 6 HOURS SCHEDULED
Status: DISCONTINUED | OUTPATIENT
Start: 2018-11-08 | End: 2018-11-08

## 2018-11-08 RX ADMIN — CEFAZOLIN SODIUM 1000 MG: 1 SOLUTION INTRAVENOUS at 11:26

## 2018-11-08 RX ADMIN — ACETAMINOPHEN 650 MG: 325 TABLET, FILM COATED ORAL at 18:21

## 2018-11-08 RX ADMIN — FONDAPARINUX SODIUM 2.5 MG: 2.5 INJECTION, SOLUTION SUBCUTANEOUS at 09:33

## 2018-11-08 RX ADMIN — CEPHALEXIN 500 MG: 500 CAPSULE ORAL at 18:08

## 2018-11-08 RX ADMIN — OXYCODONE HYDROCHLORIDE 10 MG: 5 TABLET ORAL at 19:59

## 2018-11-08 RX ADMIN — OXYCODONE HYDROCHLORIDE 5 MG: 5 TABLET ORAL at 02:24

## 2018-11-08 RX ADMIN — ACETAMINOPHEN 650 MG: 325 TABLET, FILM COATED ORAL at 04:09

## 2018-11-08 RX ADMIN — DOCUSATE SODIUM 100 MG: 100 CAPSULE, LIQUID FILLED ORAL at 18:08

## 2018-11-08 RX ADMIN — CEFAZOLIN SODIUM 1000 MG: 1 SOLUTION INTRAVENOUS at 03:06

## 2018-11-08 RX ADMIN — DOCUSATE SODIUM 100 MG: 100 CAPSULE, LIQUID FILLED ORAL at 08:06

## 2018-11-08 RX ADMIN — ACETAMINOPHEN 650 MG: 325 TABLET, FILM COATED ORAL at 14:25

## 2018-11-08 RX ADMIN — OXYCODONE HYDROCHLORIDE 10 MG: 5 TABLET ORAL at 07:12

## 2018-11-08 RX ADMIN — OXYCODONE HYDROCHLORIDE 10 MG: 5 TABLET ORAL at 11:24

## 2018-11-08 RX ADMIN — OXYCODONE HYDROCHLORIDE 10 MG: 5 TABLET ORAL at 15:25

## 2018-11-08 NOTE — PROGRESS NOTES
Progress Note - Infectious Disease   Murtaza Abo 29 y o  female MRN: [id number]  Unit/Bed#: L&D 313-01 Encounter: [id number]      Impression/Recommendations:  1  Septic shock   Source is most likely UTI, more specifically obstructed pyelonephritis   Patient is status post emergent right ureteral stenting   She is now clinically much improved   She is now hemodynamically stable   Temperature is down  WBC decreasing   Blood cultures with GNR, as expected  Antibiotic plan as in below  Monitor temperature/WBC  Monitor hemodynamics      2  E coli  bacteremia   Source is most likely UTI/pyelonephritis, as in below   Patient is clinically much improved  E coli is quite susceptible  Patient remains clinically well with antibiotic deescalation  At this point, antibiotic can be changed to p o  Change antibiotic to IV Keflex  Treat x 14 days total, another 11 days        3  Obstructed right pyelonephritis, secondary to renal stone   Patient is now status post ureteral stenting   Patient is probably at low risk for resistant pathogens   She is clinically much improved  Antibiotic plan as in above  Monitor symptoms      4  Trichomoniasis   Will need to identify patient's sexual partner  Dieter Taylor from will need to be treated at the same time  Hold Flagyl for now  Once patient's sexual partner is identified, both patient and her partner need to start Flagyl      5  Hydronephrosis, with right ureteral obstruction   Patient is status post placement right ureteral stent   She will eventually need stone extraction  Urology follow-up      6  RAMON, most likely secondary to septic shock   Ureteral obstruction probably plays a role also   Creatinine is much improved today  Antibiotic at full dose  Monitor creatinine      Discussed with patient in detail regarding the above plan      Antibiotics:  Cefepime  POD # 3      Subjective:  Patient feels well  She is awake and alert    Right flank pain minimal   Temperature stays down   No further chills  She is tolerating antibiotic well   No nausea, vomiting or diarrhea  Objective:  Vitals:  Temp:  [97 4 °F (36 3 °C)-98 4 °F (36 9 °C)] 98 4 °F (36 9 °C)  HR:  [86-97] 86  Resp:  [20] 20  BP: (100-118)/(61-86) 102/85  SpO2:  [98 %-99 %] 99 %  Temp (24hrs), Av °F (36 7 °C), Min:97 4 °F (36 3 °C), Max:98 4 °F (36 9 °C)  Current: Temperature: 98 4 °F (36 9 °C)    Physical Exam:     General: Awake, alert, cooperative, no distress  Neck:  Supple  No mass  No lymphadenopathy  Lungs: Expansion symmetric, no rales, no wheezing, respirations unlabored  Heart:  Regular rate and rhythm, S1 and S2 normal, no murmur  Abdomen: Soft, nondistended, minimal right flank tenderness, bowel sounds active all four quadrants,        no masses, no organomegaly  Extremities: Trace edema  No erythema/warmth  No ulcer  Nontender to palpation  Skin:  No rash  Neuro: Moves all extremities  Invasive Devices     Peripheral Intravenous Line            Peripheral IV 18 Left Forearm 2 days          Drain            Ureteral Drain/Stent Right ureter 6 Fr  2 days                Labs studies:   I have personally reviewed pertinent labs  Results from last 7 days  Lab Units 18  0358 18  0543 18  0508  18  0923   POTASSIUM mmol/L 3 6 3 9 4 1  < > 3 3*   CHLORIDE mmol/L 104 105 106  < > 104   CO2 mmol/L 24 23 21  < > 15*   BUN mg/dL 11 15 12  < > 12   CREATININE mg/dL 0 64 0 63 0 78  < > 1 23   EGFR ml/min/1 73sq m 122 122 104  < > 60   CALCIUM mg/dL 7 9* 7 8* 7 8*  < > 7 6*   AST U/L 33  --  46*  --  17   ALT U/L 21  --  13  --  8*   ALK PHOS U/L 91  --  73  --  107   < > = values in this interval not displayed      Results from last 7 days  Lab Units 18  0448 18  0625 18  1549   WBC Thousand/uL 23 74* 30 15* 39 25*   HEMOGLOBIN g/dL 9 0* 9 0* 9 3*   PLATELETS Thousands/uL 105* 87* 85*       Results from last 7 days  Lab Units 18  8369 11/05/18  1519 11/05/18  1009 11/05/18  0924 11/05/18  0527   BLOOD CULTURE   --   --  Escherichia coli* Gram Negative Leonid Enteric Like*  --    GRAM STAIN RESULT   --   --  Gram negative rods Gram negative rods  --    URINE CULTURE   --  50,000-59,000 cfu/ml Escherichia coli*  --   --  >100,000 cfu/ml Escherichia coli*   INFLUENZA B PCR  None Detected  --   --   --   --    RSV PCR  None Detected  --   --   --   --        Imaging Studies:   I have personally reviewed pertinent imaging study reports and images in PACS  EKG, Pathology, and Other Studies:   I have personally reviewed pertinent reports

## 2018-11-08 NOTE — SOCIAL WORK
CM met with CRISTIANO to address poor [facility] consult  MOB reports that she did not find out she was pregnant until 4months, reports that she was j"in and out of here for pain to get checked out" CRISTIANO was working and lost her insurance because she is no longer employed which also caused a gap in [facility]  MOB was seen by PATHs today  CRISTIANO reports that she lives with her three other children and her s/o, Anamaria Mathew  CRISTIANO has [facility] services  MOB is formula feeding  CRISTIANO drives  CRISTIANO uses [address] for her ped needs  No mental health hx  No need for further CM intervention  CM will call SW at [address] and ask her to f/u with MOB at peds appointment

## 2018-11-08 NOTE — PLAN OF CARE

## 2018-11-08 NOTE — PLAN OF CARE
ANTEPARTUM     Maintain pregnancy as long as maternal and/or fetal condition is stable Progressing        CARDIOVASCULAR - ADULT     Maintains optimal cardiac output and hemodynamic stability Progressing     Absence of cardiac dysrhythmias or at baseline rhythm Progressing        HEMATOLOGIC - ADULT     Maintains hematologic stability Progressing        Labor & Delivery     Patient vital signs are stable Progressing        POSTPARTUM     Experiences normal postpartum course Progressing     Appropriate maternal -  bonding Progressing     Establishment of infant feeding pattern Progressing     Incision(s), wounds(s) or drain site(s) healing without S/S of infection Progressing        Potential for Falls     Patient will remain free of falls Progressing        Prexisting or High Potential for Compromised Skin Integrity     Skin integrity is maintained or improved Progressing        RESPIRATORY - ADULT     Achieves optimal ventilation and oxygenation Progressing

## 2018-11-08 NOTE — PROGRESS NOTES
Obstetrics Progress note   Kat Walker 29 y o  female MRN: [id number]  Unit/Bed#: L&D 313-01 Encounter: [id number]    A/P: 29 y o   s/p primary  section POD3; Sepsis status likely secondary to pyelonephritis / obstructing nephrolithiasis at right UVJ s/p stent placement       1) Sepsis, source likely from pyelonephritis, obstructing nephrolithiasis              - s/p ureteral stent placement on  by urology              -  Olivas catheter discontinued per Uro recommendations, voiding spontaneously   - Blood culture showing E Coli, ID following              - Ancef IV will transition to PO Keflex today as patient continues to improve clinically     2) Acute Kidney Injury, resolved              - Likely multifactorial, including shock and ureteral obstruction              - Creatinine improved and sable - creatinine appropriate today 0 64mg/dL              - Avoid nephrotoxic medications, NSAIDs     3) Acute on chronic anemia, resolving              - Likely chronic iron deficiency anemia compounded by gravid state and poor adherence to prenatal care              - Preoperative Hb was 7 1g/dL consistent with initial prenatal levels              - No significant bleeding noted intraop, with appropriate EBL              - Immediate postop Hb was 4 6g/dL likely reflective of postoperative state with hemodilutional component              - Received 3 units pRBCs, 1u FFP; Hb stable, currently 7 3M/HA     4) Metabolic Acidosis likely secondary to 1, resolved              - Improving, bicarb completed; currently on LR              - Continue to monitor closely     5) Status post- section, POD 3              - Appropriate for postoperative state              - Declining breastfeeding              -  is doing well, alert and active   - Desires permanent sterilization, declines depo bridge     6) Thrombocytopenia, resolving              - Platelets on admission 164K, trended down to 85, now stabilized to 105              - Likely hemodilutional with postpartum fluid redistribution     DVT PPx: SCDs, arixtra 2 5mg SQ  Encouraged ambulation as tolerated  Dispo: improving, continue to monitor    No acute events overnight  Pain is well controlled  Pt is tolerating PO  Pt is passing flatus  Pt is ambulating  Denies fevers/chills/N/V/CP/SOB/fever/chills  /86 (BP Location: Left arm)   Pulse 93   Temp (!) 97 4 °F (36 3 °C) (Oral)   Resp 20   Ht 5' (1 524 m)   Wt 60 8 kg (134 lb)   LMP 03/30/2018   SpO2 99%   Breastfeeding? No   BMI 26 17 kg/m²      I/O last 3 completed shifts: In: 841 7 [I V :691 7; IV Piggyback:150]  Out: 2900 [Urine:2900]  I/O this shift:  In: -   Out: 1100 [Urine:1100]    Lab Results   Component Value Date    WBC 23 74 (H) 11/08/2018    HGB 9 0 (L) 11/08/2018    HCT 29 5 (L) 11/08/2018    MCV 74 (L) 11/08/2018     (L) 11/08/2018       Lab Results   Component Value Date    GLUCOSE 87 07/07/2014    CALCIUM 7 9 (L) 11/08/2018     (L) 07/07/2014    K 3 6 11/08/2018    CO2 24 11/08/2018     11/08/2018    BUN 11 11/08/2018    CREATININE 0 64 11/08/2018       Lab Results   Component Value Date/Time    POCGLU 90 07/27/2015 07:29 PM       Physical Exam  General appearance - alert, well appearing, and in no distress and oriented to person, place, and time  Chest - clear to auscultation, no wheezes, rales or rhonchi, symmetric air entry  Heart - normal rate and regular rhythm, no murmurs noted, no gallops noted  Abdomen - soft, nondistended, no masses or organomegaly; minimal tenderness - appropriate for postoperative state   Fundus at umbilicus, incision c/d/i with histoacryl  Skin - normal coloration and turgor, no rashes, no suspicious skin lesions noted    Becca Nguyen MD  OB/GYN PGY-3  11/8/2018 6:04 AM

## 2018-11-09 VITALS
RESPIRATION RATE: 18 BRPM | HEIGHT: 60 IN | WEIGHT: 134 LBS | TEMPERATURE: 98.9 F | SYSTOLIC BLOOD PRESSURE: 121 MMHG | BODY MASS INDEX: 26.31 KG/M2 | HEART RATE: 89 BPM | DIASTOLIC BLOOD PRESSURE: 87 MMHG | OXYGEN SATURATION: 97 %

## 2018-11-09 LAB
ABO GROUP BLD BPU: NORMAL
ABO GROUP BLD BPU: NORMAL
BPU ID: NORMAL
BPU ID: NORMAL
CROSSMATCH: NORMAL
CROSSMATCH: NORMAL
UNIT DISPENSE STATUS: NORMAL
UNIT DISPENSE STATUS: NORMAL
UNIT PRODUCT CODE: NORMAL
UNIT PRODUCT CODE: NORMAL
UNIT RH: NORMAL
UNIT RH: NORMAL

## 2018-11-09 PROCEDURE — 99232 SBSQ HOSP IP/OBS MODERATE 35: CPT | Performed by: INTERNAL MEDICINE

## 2018-11-09 PROCEDURE — 99024 POSTOP FOLLOW-UP VISIT: CPT | Performed by: OBSTETRICS & GYNECOLOGY

## 2018-11-09 RX ORDER — CALCIUM CARBONATE 200(500)MG
1000 TABLET,CHEWABLE ORAL DAILY PRN
Qty: 20 TABLET | Refills: 0 | Status: ON HOLD | OUTPATIENT
Start: 2018-11-09 | End: 2018-12-24 | Stop reason: ALTCHOICE

## 2018-11-09 RX ORDER — ACETAMINOPHEN 325 MG/1
325 TABLET ORAL EVERY 4 HOURS PRN
Qty: 30 TABLET | Refills: 0 | Status: SHIPPED | OUTPATIENT
Start: 2018-11-09 | End: 2019-06-20 | Stop reason: ALTCHOICE

## 2018-11-09 RX ORDER — CEPHALEXIN 500 MG/1
500 CAPSULE ORAL EVERY 6 HOURS SCHEDULED
Qty: 44 CAPSULE | Refills: 0 | Status: SHIPPED | OUTPATIENT
Start: 2018-11-09 | End: 2018-11-09

## 2018-11-09 RX ORDER — CEPHALEXIN 500 MG/1
500 CAPSULE ORAL EVERY 6 HOURS SCHEDULED
Qty: 44 CAPSULE | Refills: 0 | Status: SHIPPED | OUTPATIENT
Start: 2018-11-09 | End: 2018-11-20

## 2018-11-09 RX ORDER — DOCUSATE SODIUM 100 MG/1
100 CAPSULE, LIQUID FILLED ORAL 2 TIMES DAILY PRN
Qty: 15 CAPSULE | Refills: 0 | Status: SHIPPED | OUTPATIENT
Start: 2018-11-09 | End: 2019-06-20 | Stop reason: ALTCHOICE

## 2018-11-09 RX ORDER — OXYCODONE HYDROCHLORIDE 5 MG/1
5 TABLET ORAL EVERY 6 HOURS PRN
Qty: 16 TABLET | Refills: 0 | Status: SHIPPED | OUTPATIENT
Start: 2018-11-09 | End: 2018-11-13

## 2018-11-09 RX ADMIN — DOCUSATE SODIUM 100 MG: 100 CAPSULE, LIQUID FILLED ORAL at 17:59

## 2018-11-09 RX ADMIN — OXYCODONE HYDROCHLORIDE 10 MG: 5 TABLET ORAL at 00:25

## 2018-11-09 RX ADMIN — CEPHALEXIN 500 MG: 500 CAPSULE ORAL at 00:25

## 2018-11-09 RX ADMIN — ACETAMINOPHEN 650 MG: 325 TABLET, FILM COATED ORAL at 11:22

## 2018-11-09 RX ADMIN — DOCUSATE SODIUM 100 MG: 100 CAPSULE, LIQUID FILLED ORAL at 08:40

## 2018-11-09 RX ADMIN — CEPHALEXIN 500 MG: 500 CAPSULE ORAL at 11:22

## 2018-11-09 RX ADMIN — OXYCODONE HYDROCHLORIDE 10 MG: 5 TABLET ORAL at 13:50

## 2018-11-09 RX ADMIN — OXYCODONE HYDROCHLORIDE 5 MG: 5 TABLET ORAL at 04:47

## 2018-11-09 RX ADMIN — FONDAPARINUX SODIUM 2.5 MG: 2.5 INJECTION, SOLUTION SUBCUTANEOUS at 08:40

## 2018-11-09 RX ADMIN — OXYCODONE HYDROCHLORIDE 10 MG: 5 TABLET ORAL at 08:52

## 2018-11-09 RX ADMIN — CEPHALEXIN 500 MG: 500 CAPSULE ORAL at 18:00

## 2018-11-09 RX ADMIN — CEPHALEXIN 500 MG: 500 CAPSULE ORAL at 05:55

## 2018-11-09 RX ADMIN — OXYCODONE HYDROCHLORIDE 10 MG: 5 TABLET ORAL at 17:55

## 2018-11-09 RX ADMIN — ACETAMINOPHEN 650 MG: 325 TABLET, FILM COATED ORAL at 02:25

## 2018-11-09 NOTE — PROGRESS NOTES
Progress Note - Infectious Disease   Aurea Mejias 29 y o  female MRN: [id number]  Unit/Bed#: L&D 313-01 Encounter: [id number]      Impression/Recommendations:  1  Septic shock   Source is most likely UTI, more specifically obstructed pyelonephritis   Patient is status post emergent right ureteral stenting   She is now clinically much improved   She is now hemodynamically stable   Temperature is down  WBC decreasing   Blood cultures with E coli, as expected  Antibiotic plan as in below  Monitor temperature/WBC  Monitor hemodynamics      2  E coli  bacteremia   Source is most likely UTI/pyelonephritis, as in below   Patient is clinically much improved   E coli is quite susceptible   Patient remains clinically well with antibiotic deescalation  patient remains well with p o  Antibiotic transition  Continue p o  Keflex  Treat x 14 days total, another 10 days        3  Obstructed right pyelonephritis, secondary to renal stone   Patient is now status post ureteral stenting   Patient is probably at low risk for resistant pathogens   She is clinically much improved  She has a little more right flank pain today but remains afebrile and clinically well  This may be secondary to persistent right ureteral stone  She now has a stent in place  Antibiotic plan as in above  Monitor symptoms      4  Trichomoniasis   Will need to identify patient's sexual partner  Giovanabev Pedersenyaquelin from will need to be treated at the same time  Hold Flagyl for now  Once patient's sexual partner is identified, both patient and her partner need to start Flagyl      5  Hydronephrosis, with right ureteral obstruction   Patient is status post placement right ureteral stent   She will eventually need stone extraction  Urology follow-up      6  RAMON, most likely secondary to septic shock   Ureteral obstruction probably plays a role also   Creatinine is much improved today  Antibiotic at full dose    Monitor creatinine      Discussed with patient in detail regarding the above plan  Okay for discharge from ID viewpoint      Antibiotics:  Keflex  POD # 4     Subjective:  Patient has a lateral right flank pain today  She is awake and alert  No abdominal pain  No nausea/vomiting  Temperature stays down   No further chills  She is tolerating antibiotic well   No nausea, vomiting or diarrhea  Objective:  Vitals:  Temp:  [97 9 °F (36 6 °C)-98 6 °F (37 °C)] 98 6 °F (37 °C)  HR:  [73-93] 73  Resp:  [18] 18  BP: (109-116)/(71-85) 116/71  SpO2:  [96 %] 96 %  Temp (24hrs), Av 1 °F (36 7 °C), Min:97 9 °F (36 6 °C), Max:98 6 °F (37 °C)  Current: Temperature: 98 6 °F (37 °C)    Physical Exam:     General: Awake, alert, cooperative, no distress  Neck:  Supple  No mass  No lymphadenopathy  Lungs: Expansion symmetric, no rales, no wheezing, respirations unlabored  Heart:  Regular rate and rhythm, S1 and S2 normal, no murmur  Abdomen: Soft, nondistended, mild to moderate right flank tenderness, bowel sounds active all four quadrants,        no masses, no organomegaly  Extremities: Trace edema  No erythema/warmth  No ulcer  Nontender to palpation  Skin:  No rash  Neuro: Moves all extremities  Invasive Devices     Drain            Ureteral Drain/Stent Right ureter 6 Fr  3 days                Labs studies:   I have personally reviewed pertinent labs  Results from last 7 days  Lab Units 18  0358 18  0543 18  0508  18  0923   POTASSIUM mmol/L 3 6 3 9 4 1  < > 3 3*   CHLORIDE mmol/L 104 105 106  < > 104   CO2 mmol/L 24 23 21  < > 15*   BUN mg/dL 11 15 12  < > 12   CREATININE mg/dL 0 64 0 63 0 78  < > 1 23   EGFR ml/min/1 73sq m 122 122 104  < > 60   CALCIUM mg/dL 7 9* 7 8* 7 8*  < > 7 6*   AST U/L 33  --  46*  --  17   ALT U/L 21  --  13  --  8*   ALK PHOS U/L 91  --  73  --  107   < > = values in this interval not displayed      Results from last 7 days  Lab Units 18  0448 18  0625 18  1549   WBC Thousand/uL 23 74* 30 15* 39 25*   HEMOGLOBIN g/dL 9 0* 9 0* 9 3*   PLATELETS Thousands/uL 105* 87* 85*       Results from last 7 days  Lab Units 11/05/18  1616 11/05/18  1519 11/05/18  1009 11/05/18  0924 11/05/18  0527   BLOOD CULTURE   --   --  Escherichia coli* Escherichia coli*  --    GRAM STAIN RESULT   --   --  Gram negative rods Gram negative rods  --    URINE CULTURE   --  50,000-59,000 cfu/ml Escherichia coli*  --   --  >100,000 cfu/ml Escherichia coli*   INFLUENZA B PCR  None Detected  --   --   --   --    RSV PCR  None Detected  --   --   --   --        Imaging Studies:   I have personally reviewed pertinent imaging study reports and images in PACS  EKG, Pathology, and Other Studies:   I have personally reviewed pertinent reports

## 2018-11-09 NOTE — PLAN OF CARE
CARDIOVASCULAR - ADULT     Maintains optimal cardiac output and hemodynamic stability Adequate for Discharge     Absence of cardiac dysrhythmias or at baseline rhythm Adequate for Discharge        HEMATOLOGIC - ADULT     Maintains hematologic stability Adequate for Discharge        POSTPARTUM     Experiences normal postpartum course Adequate for Discharge     Appropriate maternal -  bonding Adequate for Discharge     Establishment of infant feeding pattern Adequate for Discharge     Incision(s), wounds(s) or drain site(s) healing without S/S of infection Adequate for Discharge        Potential for Falls     Patient will remain free of falls Adequate for Discharge        Prexisting or High Potential for Compromised Skin Integrity     Skin integrity is maintained or improved Adequate for Discharge        RESPIRATORY - ADULT     Achieves optimal ventilation and oxygenation Adequate for Discharge

## 2018-11-09 NOTE — PLAN OF CARE
ANTEPARTUM     Maintain pregnancy as long as maternal and/or fetal condition is stable Completed        Labor & Delivery     Patient vital signs are stable Completed          CARDIOVASCULAR - ADULT     Maintains optimal cardiac output and hemodynamic stability Progressing     Absence of cardiac dysrhythmias or at baseline rhythm Progressing        HEMATOLOGIC - ADULT     Maintains hematologic stability Progressing        POSTPARTUM     Experiences normal postpartum course Progressing     Appropriate maternal -  bonding Progressing     Establishment of infant feeding pattern Progressing     Incision(s), wounds(s) or drain site(s) healing without S/S of infection Progressing        Potential for Falls     Patient will remain free of falls Progressing        Prexisting or High Potential for Compromised Skin Integrity     Skin integrity is maintained or improved Progressing        RESPIRATORY - ADULT     Achieves optimal ventilation and oxygenation Progressing

## 2018-11-12 ENCOUNTER — TRANSITIONAL CARE MANAGEMENT (OUTPATIENT)
Dept: FAMILY MEDICINE CLINIC | Facility: CLINIC | Age: 28
End: 2018-11-12

## 2018-11-12 NOTE — TELEPHONE ENCOUNTER
Spoke with patient and scheduled for 12/3/18 at 2:45pm with Kandy at [facility]  Newp packet mailed to confirmed address  Patient has no insurance listed on account, but she stated that she signed up for medicaid while in the hospital, she is unsure of which one, however  Check  to appointment date to see if she knows what insurance it is

## 2018-11-21 ENCOUNTER — ROUTINE PRENATAL (OUTPATIENT)
Dept: OBGYN CLINIC | Facility: CLINIC | Age: 28
End: 2018-11-21
Payer: COMMERCIAL

## 2018-11-21 VITALS
SYSTOLIC BLOOD PRESSURE: 107 MMHG | TEMPERATURE: 99.1 F | WEIGHT: 120 LBS | BODY MASS INDEX: 23.44 KG/M2 | DIASTOLIC BLOOD PRESSURE: 77 MMHG

## 2018-11-21 DIAGNOSIS — D50.9 IRON DEFICIENCY ANEMIA, UNSPECIFIED IRON DEFICIENCY ANEMIA TYPE: Primary | ICD-10-CM

## 2018-11-21 DIAGNOSIS — Z98.891 S/P PRIMARY LOW TRANSVERSE C-SECTION: ICD-10-CM

## 2018-11-21 PROCEDURE — 99213 OFFICE O/P EST LOW 20 MIN: CPT | Performed by: OBSTETRICS & GYNECOLOGY

## 2018-11-21 NOTE — PROGRESS NOTES
Assessment/Plan:      Diagnoses and all orders for this visit:    Iron deficiency anemia, unspecified iron deficiency anemia type  -     CBC and differential    S/P primary low transverse         RTO in 3 weeks for postpartum visit  Subjective:     Patient ID: Margurette Siemens is a 29 y o  female  Pt is a 31yo [id number] s/p urgent 1LTCS on 18 after presenting to L&D in labor with category II FHTwith recurrent variable decels and tachysystole  Pt presents for incision check  Pt had a complicated post-op course  Pt was admitted to ICU after C/S for management of septic shock  Her source of sepsis was due to right 3mm UVJ calcululus causing pyelonephritis  A R ureteral stent was placed by Urology and she received IV antibiotics during her admission that was eventually transitioned to PO keflex, which she is still currently taking  She also received 3u of PRBCs and 1u of FFP post-operatively  Hgb upon discharge was stable at 9 0  Today, patient reports intermittent fevers at home, with last one 2 days ago of 101F  Pt states she took Tylenol and it resolved  She also reports a positional headache and dizziness  She otherwise is doing "better considering the circumstances  I've never had a c/s before " Pain is controlled with just tylenol and Lochia WNL  Reports she has an appointment with urology on 12/3 to have stent removed as well as other stone  She was taking colace, but stopped recently because she reports regular bowel movements  Denies any urinary complaints  Denies N/V/D/ abdominal pain  Pt reports baby is doing well  Review of Systems    As noted in HPI  Objective:    Vitals:    18 1502   BP: 107/77   Temp: 99 1 °F (37 3 °C)        Physical Exam   Constitutional: She is oriented to person, place, and time  She appears well-developed and well-nourished  No distress  HENT:   Head: Normocephalic and atraumatic     Cardiovascular: Normal rate, regular rhythm and normal heart sounds  Exam reveals no gallop and no friction rub  No murmur heard  Pulmonary/Chest: Effort normal and breath sounds normal  No respiratory distress  She has no wheezes  She has no rales  Abdominal: Soft  Bowel sounds are normal  She exhibits no distension  There is no tenderness  There is no rebound and no guarding  Incision: C/D/I without signs of inflammation or infection   Musculoskeletal: Normal range of motion  She exhibits no edema, tenderness or deformity  Neurological: She is alert and oriented to person, place, and time  Skin: She is not diaphoretic  Psychiatric: She has a normal mood and affect  Her behavior is normal  Judgment and thought content normal    Vitals reviewed        Srinivas Malloy MD  OBGYN, PGY-2  11/21/2018 3:42 PM

## 2018-11-28 ENCOUNTER — TELEPHONE (OUTPATIENT)
Dept: OBGYN CLINIC | Facility: CLINIC | Age: 28
End: 2018-11-28

## 2018-11-28 NOTE — TELEPHONE ENCOUNTER
Patient called asking to speak to Dr Nevin Armstrong and stated that she had a fever of 100 4, Pt states she took Tylenol and it resolved  Patient was instructed to call office or go to ER with any concerns  Patient verbalized understanding

## 2018-11-30 PROBLEM — N20.1 URETERAL CALCULI: Status: ACTIVE | Noted: 2018-11-30

## 2018-11-30 PROBLEM — Q62.11 HYDRONEPHROSIS WITH URETEROPELVIC JUNCTION (UPJ) OBSTRUCTION: Status: RESOLVED | Noted: 2018-10-30 | Resolved: 2018-11-30

## 2018-11-30 NOTE — PROGRESS NOTES
12/3/2018      Chief Complaint   Patient presents with    Follow-up    Nephrolithiasis       Assessment and Plan    29 y o  female managed by Dr Orquidea Danielle    1  3 mm calculus at the right ureterovesical junction   - discussed that the patient needs a second ureteroscopic intervention to now remove the stone  - risks and benefits reviewed and she is agreeable to proceeding  - case request placed and patient aware surgery scheduler will be contacting her  - urine specimen obtained in the office and will be sent for culture, given symptoms and + nitrites have prophylactically started Cipro 500mg twice a day x 7 days, will also plan to obtain a repeat urine culture after completing these antibiotics/prior to surgery      History of Present Illness  Everardo Chow is a 29 y o  female here for follow up evaluation of a 3 mm calculus at the right ureterovesical junction s/p stent insertion 18  At the time of stent placement the patient patient was at 38 weeks gestation  She underwent an emergent  and was in fulminant sepsis and spent time in the ICU  Her sepsis was thought to be secondary to her ureteral calculi  She presents today doing well  She does have nitrite positive urine  She states that last week she will ill with fevers, chills, nausea  She is now feeling better  Denies any urinary symptoms now and at the time of her illness last week  Review of Systems   Constitutional: Negative for activity change, chills and fever  Gastrointestinal: Negative for abdominal distention and abdominal pain  Musculoskeletal: Negative for back pain and gait problem  Psychiatric/Behavioral: Negative for behavioral problems and confusion  Urinary Incontinence Screening      Most Recent Value   Urinary Incontinence   Urinary Incontinence? No   Incomplete emptying? No   Urinary frequency? No   Urinary urgency? No   Urinary hesitancy? No   Dysuria (painful difficult urination)?   No Nocturia (waking up to use the bathroom)? Yes [2x]   Straining (having to push to go)? No   Weak stream?  No   Intermittent stream?  No          Past Medical History  Past Medical History:   Diagnosis Date    Anemia     taking iron pills BID     Urinary tract infection     taking antibioitcs     Varicella     as child       Past Social History  Past Surgical History:   Procedure Laterality Date     SECTION N/A 2018    Procedure:  SECTION (); Surgeon: Almaz Moore MD;  [facility];  Service: Obstetrics    OK CYSTOURETHROSCOPY,URETER CATHETER Right 2018    Procedure: CYSTOSCOPY RETROGRADE PYELOGRAM WITH INSERTION STENT URETERAL;  Surgeon: Luis Upton MD;  Location: [facility] OR;  Service: Urology    TONSILLECTOMY      TONSILLECTOMY       History   Smoking Status    Former Smoker    Packs/day: 1 00   Smokeless Tobacco    Never Used     Comment: 1 cigarette/ day       Past Family History  Family History   Problem Relation Age of Onset    No Known Problems Mother     Diabetes Father     Heart defect Brother         3 open heart surgeries    Heart failure Family         Congestive       Past Social history  Social History     Social History    Marital status: Single     Spouse name: N/A    Number of children: N/A    Years of education: N/A     Occupational History    Not on file       Social History Main Topics    Smoking status: Former Smoker     Packs/day: 1 00    Smokeless tobacco: Never Used      Comment: 1 cigarette/ day    Alcohol use No    Drug use: No    Sexual activity: Yes     Partners: Male     Birth control/ protection: None     Other Topics Concern    Not on file     Social History Narrative    No narrative on file       Current Medications  Current Outpatient Prescriptions   Medication Sig Dispense Refill    acetaminophen (TYLENOL) 325 mg tablet Take 1 tablet (325 mg total) by mouth every 4 (four) hours as needed for mild pain 1tab Q6 PRN 30 tablet 0    Multiple Vitamins-Minerals (CENTRUM ULTRA WOMENS PO) Take by mouth      calcium carbonate (TUMS) 500 mg chewable tablet Chew 2 tablets (1,000 mg total) daily as needed for indigestion or heartburn (Patient not taking: Reported on 12/3/2018 ) 20 tablet 0    ciprofloxacin (CIPRO) 500 mg tablet Take 1 tablet (500 mg total) by mouth every 12 (twelve) hours for 7 days 14 tablet 0    docusate sodium (COLACE) 100 mg capsule Take 1 capsule (100 mg total) by mouth 2 (two) times a day as needed for constipation (Patient not taking: Reported on 12/3/2018 ) 15 capsule 0    Prenatal MV-Min-Fe Fum-FA-DHA (PRENATAL 1 PO) Take by mouth       No current facility-administered medications for this visit  Allergies  No Known Allergies      The following portions of the patient's history were reviewed and updated as appropriate: allergies, current medications, past medical history, past social history, past surgical history and problem list       Vitals  Vitals:    12/03/18 1426   BP: 102/70   Pulse: 82   Weight: 53 4 kg (117 lb 12 8 oz)   Height: 5' (1 524 m)           Physical Exam  Constitutional   General appearance: Patient is seated and in no acute distress, well appearing and well nourished  Head and Face   Head and face: Normal     Eyes   Conjunctiva and lids: No erythema, swelling or discharge  Ears, Nose, Mouth, and Throat   Hearing: Normal     Pulmonary   Lungs: Clear to auscultation with no wheezes, rhonchi, or rales  Respiratory effort: No increased work of breathing or signs of respiratory distress  Cardiovascular   Heart: Regular rate and rhythm, no gallops, no murmurs  Examination of extremities for edema and/or varicosities: Normal     Abdomen   Abdomen: Non-tender, no masses  Musculoskeletal   Gait and station: Normal      Skin   Skin and subcutaneous tissue: Warm, dry, and intact  No visible rashes or lesions     Psychiatric   Mood and affect: Normal      Results  No results found for this or any previous visit (from the past 1 hour(s))  ]  No results found for: PSA  Lab Results   Component Value Date    GLUCOSE 87 07/07/2014    CALCIUM 7 9 (L) 11/08/2018     (L) 07/07/2014    K 3 6 11/08/2018    CO2 24 11/08/2018     11/08/2018    BUN 11 11/08/2018    CREATININE 0 64 11/08/2018     Lab Results   Component Value Date    WBC 23 74 (H) 11/08/2018    HGB 9 0 (L) 11/08/2018    HCT 29 5 (L) 11/08/2018    MCV 74 (L) 11/08/2018     (L) 11/08/2018       Orders  Orders Placed This Encounter   Procedures    Urine culture    Urine culture     Standing Status:   Future     Standing Expiration Date:   12/3/2019    Urinalysis with microscopic    POCT urine dip

## 2018-12-03 ENCOUNTER — OFFICE VISIT (OUTPATIENT)
Dept: UROLOGY | Facility: CLINIC | Age: 28
End: 2018-12-03
Payer: COMMERCIAL

## 2018-12-03 VITALS
HEART RATE: 82 BPM | BODY MASS INDEX: 23.13 KG/M2 | SYSTOLIC BLOOD PRESSURE: 102 MMHG | DIASTOLIC BLOOD PRESSURE: 70 MMHG | WEIGHT: 117.8 LBS | HEIGHT: 60 IN

## 2018-12-03 DIAGNOSIS — N20.1 URETERAL CALCULI: Primary | ICD-10-CM

## 2018-12-03 LAB
BACTERIA UR QL AUTO: ABNORMAL /HPF
BILIRUB UR QL STRIP: NEGATIVE
CLARITY UR: ABNORMAL
COLOR UR: ABNORMAL
GLUCOSE UR STRIP-MCNC: NEGATIVE MG/DL
HGB UR QL STRIP.AUTO: ABNORMAL
HYALINE CASTS #/AREA URNS LPF: ABNORMAL /LPF
KETONES UR STRIP-MCNC: NEGATIVE MG/DL
LEUKOCYTE ESTERASE UR QL STRIP: ABNORMAL
NITRITE UR QL STRIP: NEGATIVE
NON-SQ EPI CELLS URNS QL MICRO: ABNORMAL /HPF
PH UR STRIP.AUTO: 6 [PH] (ref 4.5–8)
PROT UR STRIP-MCNC: ABNORMAL MG/DL
RBC #/AREA URNS AUTO: ABNORMAL /HPF
SL AMB  POCT GLUCOSE, UA: NORMAL
SL AMB LEUKOCYTE ESTERASE,UA: NORMAL
SL AMB POCT BILIRUBIN,UA: NORMAL
SL AMB POCT BLOOD,UA: NORMAL
SL AMB POCT CLARITY,UA: NORMAL
SL AMB POCT COLOR,UA: YELLOW
SL AMB POCT KETONES,UA: NORMAL
SL AMB POCT NITRITE,UA: POSITIVE
SL AMB POCT PH,UA: 6
SL AMB POCT SPECIFIC GRAVITY,UA: 1.01
SL AMB POCT URINE PROTEIN: NORMAL
SL AMB POCT UROBILINOGEN: 2
SP GR UR STRIP.AUTO: 1.02 (ref 1–1.03)
UROBILINOGEN UR QL STRIP.AUTO: 2 E.U./DL
WBC #/AREA URNS AUTO: ABNORMAL /HPF

## 2018-12-03 PROCEDURE — 87077 CULTURE AEROBIC IDENTIFY: CPT | Performed by: PHYSICIAN ASSISTANT

## 2018-12-03 PROCEDURE — 81002 URINALYSIS NONAUTO W/O SCOPE: CPT | Performed by: PHYSICIAN ASSISTANT

## 2018-12-03 PROCEDURE — 87186 SC STD MICRODIL/AGAR DIL: CPT | Performed by: PHYSICIAN ASSISTANT

## 2018-12-03 PROCEDURE — 87086 URINE CULTURE/COLONY COUNT: CPT | Performed by: PHYSICIAN ASSISTANT

## 2018-12-03 PROCEDURE — 99213 OFFICE O/P EST LOW 20 MIN: CPT | Performed by: PHYSICIAN ASSISTANT

## 2018-12-03 PROCEDURE — 81001 URINALYSIS AUTO W/SCOPE: CPT | Performed by: PHYSICIAN ASSISTANT

## 2018-12-03 RX ORDER — CIPROFLOXACIN 500 MG/1
500 TABLET, FILM COATED ORAL EVERY 12 HOURS SCHEDULED
Qty: 14 TABLET | Refills: 0 | Status: SHIPPED | OUTPATIENT
Start: 2018-12-03 | End: 2018-12-12 | Stop reason: SDUPTHER

## 2018-12-05 LAB — BACTERIA UR CULT: ABNORMAL

## 2018-12-12 ENCOUNTER — POSTPARTUM VISIT (OUTPATIENT)
Dept: OBGYN CLINIC | Facility: CLINIC | Age: 28
End: 2018-12-12
Payer: COMMERCIAL

## 2018-12-12 VITALS
DIASTOLIC BLOOD PRESSURE: 78 MMHG | HEART RATE: 93 BPM | BODY MASS INDEX: 22.38 KG/M2 | WEIGHT: 114.6 LBS | SYSTOLIC BLOOD PRESSURE: 119 MMHG

## 2018-12-12 DIAGNOSIS — Z98.890 POST-OPERATIVE STATE: ICD-10-CM

## 2018-12-12 DIAGNOSIS — Z98.891 S/P PRIMARY LOW TRANSVERSE C-SECTION: ICD-10-CM

## 2018-12-12 DIAGNOSIS — N30.00 ACUTE CYSTITIS WITHOUT HEMATURIA: ICD-10-CM

## 2018-12-12 PROCEDURE — 99213 OFFICE O/P EST LOW 20 MIN: CPT | Performed by: OBSTETRICS & GYNECOLOGY

## 2018-12-12 RX ORDER — CIPROFLOXACIN 500 MG/1
500 TABLET, FILM COATED ORAL EVERY 12 HOURS SCHEDULED
Qty: 14 TABLET | Refills: 0 | Status: SHIPPED | OUTPATIENT
Start: 2018-12-12 | End: 2018-12-20

## 2018-12-12 NOTE — PROGRESS NOTES
Assessment/Plan:      Diagnoses and all orders for this visit:    Encounter for postpartum visit    S/P primary low transverse     Post-operative state    Acute cystitis without hematuria  -     ciprofloxacin (CIPRO) 500 mg tablet; Take 1 tablet (500 mg total) by mouth every 12 (twelve) hours for 7 days    RTO in September for Annual GYN exam or PRN        Subjective:     Patient ID: Mu Brunner is a 29 y o  female  Pt is a 33yo [id number] s/p urgent 1LTCS on 18 for category II FHTwith recurrent variable decels and tachysystole  Pt had a complicated post-op course with admission to ICU  for management of septic shock  Her source of sepsis was due to right 3mm UVJ calcululus causing pyelonephritis  A R ureteral stent was placed by Urology and she presented for her follow-up appointment on 12/3/18  They recommended a second ureteroscopic intervention to remove the 3mm calculus  Pt is scheduled for surgery on 18  Pt was also noted to have a UTI and prescribed Cipro  However, patient states she has not picked up the prescription because " I figured they scheduled me for suregery so that should take care it " I expressed the importance of treating the UTI and reviewed the results of her Urine culture on 12/3/18 demonstrating positive infection  Emphasized that if she does not treat her UTI, she could have a recurrence of sepsis, as she experienced in November  Pt reports suprapubic pain that has been bothering her and I explained this could be due to the fact that she has an untreated UTI and again, needs to take her antibiotic as prescribed  Pt states she otherwise feels fine  Reports baby has an appointment next week with the pediatrician as "he is not pooping unless I give him juicy juice or baby prunes " She states "I think he might be lactose intolerant and need to be on soy milk "         Review of Systems   Constitutional: Negative for chills and fever     Respiratory: Negative for shortness of breath  Cardiovascular: Negative for chest pain  Gastrointestinal: Positive for abdominal pain (suprapubic pain)  Negative for constipation, diarrhea, nausea and vomiting  Genitourinary: Negative for difficulty urinating, dysuria, frequency, urgency and vaginal bleeding  Neurological: Negative for dizziness and light-headedness  Objective:  Vitals:    12/12/18 1433   BP: 119/78   Pulse: 93        Physical Exam   Constitutional: She is oriented to person, place, and time  She appears well-developed and well-nourished  No distress  HENT:   Head: Normocephalic and atraumatic  Cardiovascular: Normal rate, regular rhythm and normal heart sounds  Exam reveals no gallop and no friction rub  No murmur heard  Pulmonary/Chest: Effort normal and breath sounds normal  No respiratory distress  She has no wheezes  She has no rales  Abdominal: Soft  Bowel sounds are normal  There is tenderness (suprapubic tenderness)  There is no rebound and no guarding  Incision: C/D/I without induration, erythema, drainage  Musculoskeletal: Normal range of motion  She exhibits no edema, tenderness or deformity  Neurological: She is alert and oriented to person, place, and time  Skin: She is not diaphoretic  Psychiatric: She has a normal mood and affect  Her behavior is normal  Judgment and thought content normal    Vitals reviewed          Vibha Faulkner MD  OBGYN, PGY-2  12/12/2018 3:01 PM

## 2018-12-20 NOTE — PRE-PROCEDURE INSTRUCTIONS
No outpatient prescriptions have been marked as taking for the 12/24/18 encounter [facility] Encounter)

## 2018-12-24 ENCOUNTER — HOSPITAL ENCOUNTER (OUTPATIENT)
Facility: HOSPITAL | Age: 28
Setting detail: OUTPATIENT SURGERY
Discharge: HOME | End: 2018-12-24
Attending: UROLOGY | Admitting: UROLOGY
Payer: COMMERCIAL

## 2018-12-24 ENCOUNTER — ANESTHESIA EVENT (OUTPATIENT)
Dept: PERIOP | Facility: HOSPITAL | Age: 28
End: 2018-12-24
Payer: COMMERCIAL

## 2018-12-24 ENCOUNTER — ANESTHESIA (OUTPATIENT)
Dept: PERIOP | Facility: HOSPITAL | Age: 28
End: 2018-12-24
Payer: COMMERCIAL

## 2018-12-24 ENCOUNTER — APPOINTMENT (OUTPATIENT)
Dept: RADIOLOGY | Facility: HOSPITAL | Age: 28
End: 2018-12-24
Payer: COMMERCIAL

## 2018-12-24 VITALS
TEMPERATURE: 96.1 F | SYSTOLIC BLOOD PRESSURE: 93 MMHG | HEART RATE: 60 BPM | OXYGEN SATURATION: 100 % | DIASTOLIC BLOOD PRESSURE: 55 MMHG | RESPIRATION RATE: 18 BRPM

## 2018-12-24 DIAGNOSIS — N20.1 URETERAL CALCULI: ICD-10-CM

## 2018-12-24 LAB — EXT PREGNANCY TEST URINE: NEGATIVE

## 2018-12-24 PROCEDURE — 74450 X-RAY URETHRA/BLADDER: CPT

## 2018-12-24 PROCEDURE — 87186 SC STD MICRODIL/AGAR DIL: CPT | Performed by: UROLOGY

## 2018-12-24 PROCEDURE — C2617 STENT, NON-COR, TEM W/O DEL: HCPCS | Performed by: UROLOGY

## 2018-12-24 PROCEDURE — 87086 URINE CULTURE/COLONY COUNT: CPT | Performed by: UROLOGY

## 2018-12-24 PROCEDURE — 52332 CYSTOSCOPY AND TREATMENT: CPT | Performed by: UROLOGY

## 2018-12-24 PROCEDURE — C1769 GUIDE WIRE: HCPCS | Performed by: UROLOGY

## 2018-12-24 PROCEDURE — 81025 URINE PREGNANCY TEST: CPT | Performed by: UROLOGY

## 2018-12-24 PROCEDURE — 87077 CULTURE AEROBIC IDENTIFY: CPT | Performed by: UROLOGY

## 2018-12-24 PROCEDURE — 52351 CYSTOURETERO & OR PYELOSCOPE: CPT | Performed by: UROLOGY

## 2018-12-24 DEVICE — STENT URETERAL 6FR 22CM INLAY OPTIMA W/NITINOL GDWR: Type: IMPLANTABLE DEVICE | Site: URETER | Status: FUNCTIONAL

## 2018-12-24 RX ORDER — MIDAZOLAM HYDROCHLORIDE 1 MG/ML
INJECTION INTRAMUSCULAR; INTRAVENOUS AS NEEDED
Status: DISCONTINUED | OUTPATIENT
Start: 2018-12-24 | End: 2018-12-24 | Stop reason: SURG

## 2018-12-24 RX ORDER — HYDROCODONE BITARTRATE AND ACETAMINOPHEN 5; 325 MG/1; MG/1
2 TABLET ORAL EVERY 6 HOURS PRN
Qty: 8 TABLET | Refills: 0 | Status: SHIPPED | OUTPATIENT
Start: 2018-12-24 | End: 2019-01-03

## 2018-12-24 RX ORDER — CEFAZOLIN SODIUM 1 G/50ML
1000 SOLUTION INTRAVENOUS ONCE
Status: COMPLETED | OUTPATIENT
Start: 2018-12-24 | End: 2018-12-24

## 2018-12-24 RX ORDER — MAGNESIUM HYDROXIDE 1200 MG/15ML
LIQUID ORAL AS NEEDED
Status: DISCONTINUED | OUTPATIENT
Start: 2018-12-24 | End: 2018-12-24 | Stop reason: HOSPADM

## 2018-12-24 RX ORDER — CEPHALEXIN 500 MG/1
500 CAPSULE ORAL 3 TIMES DAILY
Qty: 9 CAPSULE | Refills: 0 | Status: SHIPPED | OUTPATIENT
Start: 2018-12-24 | End: 2018-12-27

## 2018-12-24 RX ORDER — GLYCOPYRROLATE 0.2 MG/ML
INJECTION INTRAMUSCULAR; INTRAVENOUS AS NEEDED
Status: DISCONTINUED | OUTPATIENT
Start: 2018-12-24 | End: 2018-12-24 | Stop reason: SURG

## 2018-12-24 RX ORDER — PROPOFOL 10 MG/ML
INJECTION, EMULSION INTRAVENOUS AS NEEDED
Status: DISCONTINUED | OUTPATIENT
Start: 2018-12-24 | End: 2018-12-24 | Stop reason: SURG

## 2018-12-24 RX ORDER — HYDROCODONE BITARTRATE AND ACETAMINOPHEN 5; 325 MG/1; MG/1
1 TABLET ORAL ONCE
Status: COMPLETED | OUTPATIENT
Start: 2018-12-24 | End: 2018-12-24

## 2018-12-24 RX ORDER — SODIUM CHLORIDE, SODIUM LACTATE, POTASSIUM CHLORIDE, CALCIUM CHLORIDE 600; 310; 30; 20 MG/100ML; MG/100ML; MG/100ML; MG/100ML
50 INJECTION, SOLUTION INTRAVENOUS CONTINUOUS
Status: DISCONTINUED | OUTPATIENT
Start: 2018-12-24 | End: 2018-12-24 | Stop reason: HOSPADM

## 2018-12-24 RX ORDER — MEPERIDINE HYDROCHLORIDE 25 MG/ML
12.5 INJECTION INTRAMUSCULAR; INTRAVENOUS; SUBCUTANEOUS
Status: DISCONTINUED | OUTPATIENT
Start: 2018-12-24 | End: 2018-12-24 | Stop reason: HOSPADM

## 2018-12-24 RX ORDER — ONDANSETRON 2 MG/ML
4 INJECTION INTRAMUSCULAR; INTRAVENOUS ONCE AS NEEDED
Status: DISCONTINUED | OUTPATIENT
Start: 2018-12-24 | End: 2018-12-24 | Stop reason: HOSPADM

## 2018-12-24 RX ORDER — HYDROMORPHONE HCL 110MG/55ML
0.5 PATIENT CONTROLLED ANALGESIA SYRINGE INTRAVENOUS
Status: DISCONTINUED | OUTPATIENT
Start: 2018-12-24 | End: 2018-12-24 | Stop reason: HOSPADM

## 2018-12-24 RX ORDER — FENTANYL CITRATE 50 UG/ML
INJECTION, SOLUTION INTRAMUSCULAR; INTRAVENOUS AS NEEDED
Status: DISCONTINUED | OUTPATIENT
Start: 2018-12-24 | End: 2018-12-24 | Stop reason: SURG

## 2018-12-24 RX ORDER — DEXAMETHASONE SODIUM PHOSPHATE 4 MG/ML
INJECTION, SOLUTION INTRA-ARTICULAR; INTRALESIONAL; INTRAMUSCULAR; INTRAVENOUS; SOFT TISSUE AS NEEDED
Status: DISCONTINUED | OUTPATIENT
Start: 2018-12-24 | End: 2018-12-24 | Stop reason: SURG

## 2018-12-24 RX ORDER — HYDROCODONE BITARTRATE AND ACETAMINOPHEN 5; 325 MG/1; MG/1
2 TABLET ORAL EVERY 4 HOURS PRN
Status: DISCONTINUED | OUTPATIENT
Start: 2018-12-24 | End: 2018-12-24 | Stop reason: HOSPADM

## 2018-12-24 RX ADMIN — DEXAMETHASONE SODIUM PHOSPHATE 4 MG: 4 INJECTION, SOLUTION INTRA-ARTICULAR; INTRALESIONAL; INTRAMUSCULAR; INTRAVENOUS; SOFT TISSUE at 14:05

## 2018-12-24 RX ADMIN — PROPOFOL 200 MG: 10 INJECTION, EMULSION INTRAVENOUS at 14:05

## 2018-12-24 RX ADMIN — SODIUM CHLORIDE, POTASSIUM CHLORIDE, SODIUM LACTATE AND CALCIUM CHLORIDE 50 ML/HR: 600; 310; 30; 20 INJECTION, SOLUTION INTRAVENOUS at 13:44

## 2018-12-24 RX ADMIN — FENTANYL CITRATE 50 MCG: 50 INJECTION INTRAMUSCULAR; INTRAVENOUS at 14:05

## 2018-12-24 RX ADMIN — CEFAZOLIN SODIUM 1000 MG: 1 SOLUTION INTRAVENOUS at 14:02

## 2018-12-24 RX ADMIN — MIDAZOLAM HYDROCHLORIDE 2 MG: 1 INJECTION, SOLUTION INTRAMUSCULAR; INTRAVENOUS at 14:00

## 2018-12-24 RX ADMIN — HYDROCODONE BITARTRATE AND ACETAMINOPHEN 1 TABLET: 5; 325 TABLET ORAL at 15:28

## 2018-12-24 RX ADMIN — GLYCOPYRROLATE 0.2 MG: 0.2 INJECTION, SOLUTION INTRAMUSCULAR; INTRAVENOUS at 14:00

## 2018-12-24 NOTE — NURSING NOTE
Returned from PACU at [phone]  Alert and oriented  C/o pain 7/10 in lower abdomen due to procedure today and recent   Strings from stent taped to upper inner left thigh  No drainage noted  Respirations easy and non labored  HOB elevated  Tolerating toast and water  IV fluids continue  Call bell in reach

## 2018-12-24 NOTE — DISCHARGE INSTRUCTIONS
Today you underwent right-sided ureteroscopy  Your stone had passed  He of a right ureteral stent in place with a string  On Wednesday morning, remove the tape, pull the string, and remove your own stent  Call for follow-up with Dr Cullen Richmond  [phone]    Ureteroscopy   WHAT YOU NEED TO KNOW:   A ureteroscopy is a procedure to examine in the inside of your urinary tract, which includes your urethra, bladder, ureters, and kidneys  A ureteroscope is a small, thin tube with a light and camera on the end  Ureteroscopy can help your healthcare provider diagnose and treat problems in your urinary tract, such as kidney stones  DISCHARGE INSTRUCTIONS:   Medicine:   · Antibiotics  may be given to treat or prevent an infection  · Take your medicine as directed  Contact your healthcare provider if you think your medicine is not helping or if you have side effects  Tell him or her if you are allergic to any medicine  Keep a list of the medicines, vitamins, and herbs you take  Include the amounts, and when and why you take them  Bring the list or the pill bottles to follow-up visits  Carry your medicine list with you in case of an emergency  Follow up with your healthcare provider as directed:  Write down your questions so you remember to ask them during your visits  Drink liquids as directed  Liquids can help prevent kidney stones and urinary tract infections  Drink water and limit the amount of caffeine you drink  Caffeine may be found in coffee, tea, soda, sports drinks, and foods  Ask your healthcare provider how much liquid to drink each day  Contact your healthcare provider if:   · You have a fever  · You cannot urinate  · You have blood in your urine  · You are vomiting  · You have pain in your abdomen or side  · You have questions or concerns about your condition or care    © 2017 [facility] Information is for End User's use only and may not be sold, redistributed or otherwise used for commercial purposes  All illustrations and images included in CareNotes® are the copyrighted property of A D A M [employer] Inc  or Edgar Castro  The above information is an  only  It is not intended as medical advice for individual conditions or treatments  Talk to your doctor, nurse or pharmacist before following any medical regimen to see if it is safe and effective for you

## 2018-12-24 NOTE — H&P (VIEW-ONLY)
12/3/2018      Chief Complaint   Patient presents with    Follow-up    Nephrolithiasis       Assessment and Plan    29 y o  female managed by Dr Orquidea Danielle    1  3 mm calculus at the right ureterovesical junction   - discussed that the patient needs a second ureteroscopic intervention to now remove the stone  - risks and benefits reviewed and she is agreeable to proceeding  - case request placed and patient aware surgery scheduler will be contacting her  - urine specimen obtained in the office and will be sent for culture, given symptoms and + nitrites have prophylactically started Cipro 500mg twice a day x 7 days, will also plan to obtain a repeat urine culture after completing these antibiotics/prior to surgery      History of Present Illness  Everardo Chow is a 29 y o  female here for follow up evaluation of a 3 mm calculus at the right ureterovesical junction s/p stent insertion 18  At the time of stent placement the patient patient was at 38 weeks gestation  She underwent an emergent  and was in fulminant sepsis and spent time in the ICU  Her sepsis was thought to be secondary to her ureteral calculi  She presents today doing well  She does have nitrite positive urine  She states that last week she will ill with fevers, chills, nausea  She is now feeling better  Denies any urinary symptoms now and at the time of her illness last week  Review of Systems   Constitutional: Negative for activity change, chills and fever  Gastrointestinal: Negative for abdominal distention and abdominal pain  Musculoskeletal: Negative for back pain and gait problem  Psychiatric/Behavioral: Negative for behavioral problems and confusion  Urinary Incontinence Screening      Most Recent Value   Urinary Incontinence   Urinary Incontinence? No   Incomplete emptying? No   Urinary frequency? No   Urinary urgency? No   Urinary hesitancy? No   Dysuria (painful difficult urination)?   No Nocturia (waking up to use the bathroom)? Yes [2x]   Straining (having to push to go)? No   Weak stream?  No   Intermittent stream?  No          Past Medical History  Past Medical History:   Diagnosis Date    Anemia     taking iron pills BID     Urinary tract infection     taking antibioitcs     Varicella     as child       Past Social History  Past Surgical History:   Procedure Laterality Date     SECTION N/A 2018    Procedure:  SECTION (); Surgeon: Melanie Lopez MD;  Location: [facility];  Service: Obstetrics    TX CYSTOURETHROSCOPY,URETER CATHETER Right 2018    Procedure: CYSTOSCOPY RETROGRADE PYELOGRAM WITH INSERTION STENT URETERAL;  Surgeon: Aly Winter MD;  Location: [facility] OR;  Service: Urology    TONSILLECTOMY      TONSILLECTOMY       History   Smoking Status    Former Smoker    Packs/day: 1 00   Smokeless Tobacco    Never Used     Comment: 1 cigarette/ day       Past Family History  Family History   Problem Relation Age of Onset    No Known Problems Mother     Diabetes Father     Heart defect Brother         3 open heart surgeries    Heart failure Family         Congestive       Past Social history  Social History     Social History    Marital status: Single     Spouse name: N/A    Number of children: N/A    Years of education: N/A     Occupational History    Not on file       Social History Main Topics    Smoking status: Former Smoker     Packs/day: 1 00    Smokeless tobacco: Never Used      Comment: 1 cigarette/ day    Alcohol use No    Drug use: No    Sexual activity: Yes     Partners: Male     Birth control/ protection: None     Other Topics Concern    Not on file     Social History Narrative    No narrative on file       Current Medications  Current Outpatient Prescriptions   Medication Sig Dispense Refill    acetaminophen (TYLENOL) 325 mg tablet Take 1 tablet (325 mg total) by mouth every 4 (four) hours as needed for mild pain 1tab Q6 PRN 30 tablet 0    Multiple Vitamins-Minerals (CENTRUM ULTRA WOMENS PO) Take by mouth      calcium carbonate (TUMS) 500 mg chewable tablet Chew 2 tablets (1,000 mg total) daily as needed for indigestion or heartburn (Patient not taking: Reported on 12/3/2018 ) 20 tablet 0    ciprofloxacin (CIPRO) 500 mg tablet Take 1 tablet (500 mg total) by mouth every 12 (twelve) hours for 7 days 14 tablet 0    docusate sodium (COLACE) 100 mg capsule Take 1 capsule (100 mg total) by mouth 2 (two) times a day as needed for constipation (Patient not taking: Reported on 12/3/2018 ) 15 capsule 0    Prenatal MV-Min-Fe Fum-FA-DHA (PRENATAL 1 PO) Take by mouth       No current facility-administered medications for this visit  Allergies  No Known Allergies      The following portions of the patient's history were reviewed and updated as appropriate: allergies, current medications, past medical history, past social history, past surgical history and problem list       Vitals  Vitals:    12/03/18 1426   BP: 102/70   Pulse: 82   Weight: 53 4 kg (117 lb 12 8 oz)   Height: 5' (1 524 m)           Physical Exam  Constitutional   General appearance: Patient is seated and in no acute distress, well appearing and well nourished  Head and Face   Head and face: Normal     Eyes   Conjunctiva and lids: No erythema, swelling or discharge  Ears, Nose, Mouth, and Throat   Hearing: Normal     Pulmonary   Lungs: Clear to auscultation with no wheezes, rhonchi, or rales  Respiratory effort: No increased work of breathing or signs of respiratory distress  Cardiovascular   Heart: Regular rate and rhythm, no gallops, no murmurs  Examination of extremities for edema and/or varicosities: Normal     Abdomen   Abdomen: Non-tender, no masses  Musculoskeletal   Gait and station: Normal      Skin   Skin and subcutaneous tissue: Warm, dry, and intact  No visible rashes or lesions     Psychiatric   Mood and affect: Normal      Results  No results found for this or any previous visit (from the past 1 hour(s))  ]  No results found for: PSA  Lab Results   Component Value Date    GLUCOSE 87 07/07/2014    CALCIUM 7 9 (L) 11/08/2018     (L) 07/07/2014    K 3 6 11/08/2018    CO2 24 11/08/2018     11/08/2018    BUN 11 11/08/2018    CREATININE 0 64 11/08/2018     Lab Results   Component Value Date    WBC 23 74 (H) 11/08/2018    HGB 9 0 (L) 11/08/2018    HCT 29 5 (L) 11/08/2018    MCV 74 (L) 11/08/2018     (L) 11/08/2018       Orders  Orders Placed This Encounter   Procedures    Urine culture    Urine culture     Standing Status:   Future     Standing Expiration Date:   12/3/2019    Urinalysis with microscopic    POCT urine dip

## 2018-12-24 NOTE — NURSING NOTE
Slept for a period of time after being medicated for pain  Ambulated to the bathroom with supervision  Voided  IV removed

## 2018-12-24 NOTE — ANESTHESIA POSTPROCEDURE EVALUATION
Post-Op Assessment Note      CV Status:  Stable    Mental Status:  Alert and awake    Hydration Status:  Euvolemic    PONV Controlled:  Controlled    Airway Patency:  Patent    Post Op Vitals Reviewed: Yes          Staff: Anesthesiologist           /66 (12/24/18 1437)    Temp (!) 97 °F (36 1 °C) (12/24/18 1437)    Pulse 80 (12/24/18 1437)   Resp 20 (12/24/18 1437)    SpO2 99 % (12/24/18 1437)

## 2018-12-24 NOTE — OP NOTE
OPERATIVE REPORT  PATIENT NAME: Tessa Guzman    :  1990  MRN: [id number]  Pt Location:  OR ROOM 12    SURGERY DATE: 2018    Surgeon(s) and Role:     * Estevan Short MD - Primary    Preop Diagnosis:  Ureteral calculi [N20 1]  Right ureteral calculus    Post-Op Diagnosis Codes:     * Ureteral calculi [N20 1]  Passed right ureteral calculus    Procedure(s) (LRB):  CYSTOSCOPY URETEROSCOPY WITH  RETROGRADE PYELOGRAM AND exchange  STENT URETERAl (Right)    Specimen(s):  ID Type Source Tests Collected by Time Destination   A :  Urine Urine, Cystoscopic URINE CULTURE Estevan Short MD 2018 1425        Estimated Blood Loss:   Minimal    Drains:  Ureteral Drain/Stent Right ureter 6 Fr  (Active)   Number of days: 52   Right 22-6 Czech double-J ureteral stent    Anesthesia Type:   General    Operative Indications:  Ureteral calculi [N20 1]      Operative Findings:  Passed right ureteral calculus    Complications:   None    Procedure and Technique: [facility] the is a 59-year-old female who recently underwent right ureteral stenting when she was immediately postpartum for sepsis  She had a lactate of 9 at that time  She had a pansensitive E coli urinary tract infection  CT scan revealed a distal right ureteral calculus measuring 3-4 mm  This was causing significant hydronephrosis  She was taken to the operating room in early 2018 for right ureteral stenting  She returns today to undergo definitive management of her distal right ureteral calculus  She denies visually passing the stone  Risk and benefits of the procedure were discussed and reviewed  We specifically discussed the possibility of a passed ureteral calculus  She wished to proceed with ureteroscopy  Risk and benefits of the procedure were discussed and reviewed  Informed consent was obtained  The patient was brought to the operating room on 2018      After the smooth induction of general LMA anesthesia, the patient was placed in the dorsal lithotomy position  Her genitalia was prepped and draped in sterile fashion  Intravenous antibiotics were administered  Time-out was performed with all members of the operative team confirming the patient's identity, procedure to be performed, laterality of the case  A 22 Togolese rigid cystoscope with 30 degree lens was inserted  The bladder was thoroughly inspected  Attention was focused on the right ureteral orifice  A stent was identified and grasped  The stent was presented to the urethral meatus  A wire was inserted in the stent was removed  The wire was secured as a safety  A 12 Togolese Olivas catheter was placed for continuous bladder drainage  A long semi-rigid ureteral scope was then inserted adjacent to the wire all the way to the level of the renal pelvis  There were no stones identified  The ureter was quite dilated  Right retrograde pyelography showed normal calices without evidence of stone or filling defect  The scope was slowly withdrawn through the entire length of the ureter and again it was confirmed that there were no residual stones  The wire was backloaded through the cystoscope after the Olivas and ureteral scope were removed  A right 22-6 [place] double-J ureteral stent was then placed in the standard fashion  The proximal coil was appreciated within the right renal pelvis and the distal coil was visualized within the bladder  A string was left in place  The bladder was emptied and the cystoscope was removed  The string was secured to the patient's inner thigh with Tegaderm  Overall the patient tolerated the procedure well number no complications  The patient was extubated in the operating room and transferred to the PACU in stable condition at the conclusion of the case        Patient Disposition:  PACU stable and extubated      SIGNATURE: Xenia Byers MD  DATE: December 24, 2018  TIME: 2:32 PM

## 2018-12-24 NOTE — INTERVAL H&P NOTE
H&P reviewed  After examining the patient I find no changes in the patients condition since the H&P had been written  Status post right stent insertion secondary to postpartum sepsis from a 3 mm right UVJ calculus  Plan to return to the operating room today for right-sided ureteroscopy  Patient understands the risk of a negative ureteroscopy secondary to a passed ureteral calculus  Risk and benefits of the procedure were discussed and reviewed informed consent was obtained

## 2018-12-24 NOTE — ANESTHESIA PREPROCEDURE EVALUATION
Review of Systems/Medical History      No history of anesthetic complications     Cardiovascular  Exercise tolerance (METS): >4,  No pacemaker/AICD,    Pulmonary  Negative pulmonary ROS        GI/Hepatic  Negative GI/hepatic ROS          Negative  ROS Kidney stones,        Endo/Other  Negative endo/other ROS      GYN  Not currently pregnant ,     Comment: Recent c section  1 month ago  Secondary to sepsis probably from ureter calculi  Had ureter stent placed at that time  Hematology  Negative hematology ROS Anemia ,     Musculoskeletal  Negative musculoskeletal ROS        Neurology  Negative neurology ROS      Psychology           Physical Exam    Airway    Mallampati score: II  TM Distance: >3 FB  Neck ROM: full     Dental       Cardiovascular  Cardiovascular exam normal    Pulmonary  Pulmonary exam normal     Other Findings        Anesthesia Plan  ASA Score- 2     Anesthesia Type- general with ASA Monitors  Additional Monitors:   Airway Plan: LMA  Plan Factors-Patient not instructed to abstain from smoking on day of procedure  Patient did not smoke on day of surgery  Induction- intravenous  Postoperative Plan- Plan for postoperative opioid use  Informed Consent- Anesthetic plan and risks discussed with patient  I personally reviewed this patient with the CRNA  Discussed and agreed on the Anesthesia Plan with the CRNA             Lab Results   Component Value Date    HGBA1C 5 2 08/10/2018       Lab Results   Component Value Date     (L) 07/07/2014    K 3 6 11/08/2018     11/08/2018    CO2 24 11/08/2018    ANIONGAP 17 07/27/2015    BUN 11 11/08/2018    CREATININE 0 64 11/08/2018    GLUCOSE 87 07/07/2014    CALCIUM 7 9 (L) 11/08/2018    AST 33 11/08/2018    ALT 21 11/08/2018    ALKPHOS 91 11/08/2018    PROT 7 6 07/07/2014    BILITOT 0 5 07/07/2014    EGFR 122 11/08/2018       Lab Results   Component Value Date    WBC 23 74 (H) 11/08/2018    HGB 9 0 (L) 11/08/2018    HCT 29 5 (L) 11/08/2018    MCV 74 (L) 11/08/2018     (L) 11/08/2018

## 2018-12-26 LAB — BACTERIA UR CULT: ABNORMAL

## 2018-12-27 ENCOUNTER — TELEPHONE (OUTPATIENT)
Dept: UROLOGY | Facility: AMBULATORY SURGERY CENTER | Age: 28
End: 2018-12-27

## 2018-12-27 DIAGNOSIS — N20.0 NEPHROLITHIASIS: Primary | ICD-10-CM

## 2018-12-27 NOTE — TELEPHONE ENCOUNTER
I phoned patient and went into detail regarding stent removal   She will remove tomorrow and then call us with the outcome  Per Dr Anel Morris, patient will need to see AP in 8-12 weeks with KUB prior  Rx for KUB in EPIC

## 2018-12-31 NOTE — TELEPHONE ENCOUNTER
Patient is on Keflex for UTI  Is that the right abx for her UTI?   I see that she is taking prenatal vitamins also

## 2018-12-31 NOTE — TELEPHONE ENCOUNTER
Called patient to schedule follow up appointment  She stated that the she removed her stent on 12/28/18 without any difficulty  Her follow up is scheduled with Becky on 3/18/19 at the [address] [address] location  I sent patient appointment card and kub order

## 2019-01-01 NOTE — LACTATION NOTE
This note was copied from a baby's chart  Breastfeeding discharge booklet given and reviewed with mother  Mother verbalized breastfeeding is going well and she nursed her other kids for 6 months  She is giving bottles after she nurses  Stated to slow feed minimum amount to help milk supply, stop after milk comes in to keep supply up  Dad present  Enc to call for assistance as needed,phone # given  upright (45 degrees)/patient fed cradled in a semi upright position and then a semi upright sideline position.

## 2019-01-10 ENCOUNTER — TELEPHONE (OUTPATIENT)
Dept: OBGYN CLINIC | Facility: CLINIC | Age: 29
End: 2019-01-10

## 2019-03-14 ENCOUNTER — HOSPITAL ENCOUNTER (OUTPATIENT)
Dept: RADIOLOGY | Facility: HOSPITAL | Age: 29
Discharge: HOME | End: 2019-03-14
Payer: COMMERCIAL

## 2019-03-14 DIAGNOSIS — N20.0 NEPHROLITHIASIS: ICD-10-CM

## 2019-03-14 PROCEDURE — 74018 RADEX ABDOMEN 1 VIEW: CPT

## 2019-06-05 ENCOUNTER — HOSPITAL ENCOUNTER (EMERGENCY)
Facility: HOSPITAL | Age: 29
Discharge: HOME | End: 2019-06-05
Attending: EMERGENCY MEDICINE | Admitting: EMERGENCY MEDICINE
Payer: COMMERCIAL

## 2019-06-05 VITALS
HEART RATE: 62 BPM | BODY MASS INDEX: 24.2 KG/M2 | SYSTOLIC BLOOD PRESSURE: 119 MMHG | OXYGEN SATURATION: 100 % | WEIGHT: 123.9 LBS | TEMPERATURE: 98.8 F | RESPIRATION RATE: 16 BRPM | DIASTOLIC BLOOD PRESSURE: 58 MMHG

## 2019-06-05 DIAGNOSIS — R07.89 CHEST WALL PAIN: Primary | ICD-10-CM

## 2019-06-05 DIAGNOSIS — D64.9 ANEMIA: ICD-10-CM

## 2019-06-05 LAB
ALBUMIN SERPL BCP-MCNC: 3.6 G/DL (ref 3.5–5)
ALP SERPL-CCNC: 68 U/L (ref 46–116)
ALT SERPL W P-5'-P-CCNC: 18 U/L (ref 12–78)
ANION GAP SERPL CALCULATED.3IONS-SCNC: 8 MMOL/L (ref 4–13)
AST SERPL W P-5'-P-CCNC: 17 U/L (ref 5–45)
ATRIAL RATE: 64 BPM
BACTERIA UR QL AUTO: ABNORMAL /HPF
BASOPHILS # BLD AUTO: 0.02 THOUSANDS/ΜL (ref 0–0.1)
BASOPHILS NFR BLD AUTO: 0 % (ref 0–1)
BILIRUB DIRECT SERPL-MCNC: 0.08 MG/DL (ref 0–0.2)
BILIRUB SERPL-MCNC: 0.33 MG/DL (ref 0.2–1)
BILIRUB UR QL STRIP: NEGATIVE
BUN SERPL-MCNC: 14 MG/DL (ref 5–25)
CALCIUM SERPL-MCNC: 8.7 MG/DL (ref 8.3–10.1)
CHLORIDE SERPL-SCNC: 105 MMOL/L (ref 100–108)
CLARITY UR: ABNORMAL
CO2 SERPL-SCNC: 26 MMOL/L (ref 21–32)
COLOR UR: YELLOW
CREAT SERPL-MCNC: 0.6 MG/DL (ref 0.6–1.3)
EOSINOPHIL # BLD AUTO: 0.09 THOUSAND/ΜL (ref 0–0.61)
EOSINOPHIL NFR BLD AUTO: 1 % (ref 0–6)
ERYTHROCYTE [DISTWIDTH] IN BLOOD BY AUTOMATED COUNT: 17 % (ref 11.6–15.1)
EXT PREG TEST URINE: NEGATIVE
GFR SERPL CREATININE-BSD FRML MDRD: 124 ML/MIN/1.73SQ M
GLUCOSE SERPL-MCNC: 94 MG/DL (ref 65–140)
GLUCOSE UR STRIP-MCNC: NEGATIVE MG/DL
HCT VFR BLD AUTO: 34.2 % (ref 34.8–46.1)
HGB BLD-MCNC: 9.8 G/DL (ref 11.5–15.4)
HGB UR QL STRIP.AUTO: ABNORMAL
IMM GRANULOCYTES # BLD AUTO: 0.01 THOUSAND/UL (ref 0–0.2)
IMM GRANULOCYTES NFR BLD AUTO: 0 % (ref 0–2)
KETONES UR STRIP-MCNC: NEGATIVE MG/DL
LEUKOCYTE ESTERASE UR QL STRIP: NEGATIVE
LYMPHOCYTES # BLD AUTO: 1.81 THOUSANDS/ΜL (ref 0.6–4.47)
LYMPHOCYTES NFR BLD AUTO: 28 % (ref 14–44)
MCH RBC QN AUTO: 22.3 PG (ref 26.8–34.3)
MCHC RBC AUTO-ENTMCNC: 28.7 G/DL (ref 31.4–37.4)
MCV RBC AUTO: 78 FL (ref 82–98)
MONOCYTES # BLD AUTO: 0.5 THOUSAND/ΜL (ref 0.17–1.22)
MONOCYTES NFR BLD AUTO: 8 % (ref 4–12)
NEUTROPHILS # BLD AUTO: 4.01 THOUSANDS/ΜL (ref 1.85–7.62)
NEUTS SEG NFR BLD AUTO: 63 % (ref 43–75)
NITRITE UR QL STRIP: NEGATIVE
NON-SQ EPI CELLS URNS QL MICRO: ABNORMAL /HPF
NRBC BLD AUTO-RTO: 0 /100 WBCS
P AXIS: 39 DEGREES
PH UR STRIP.AUTO: 7.5 [PH] (ref 4.5–8)
PLATELET # BLD AUTO: 252 THOUSANDS/UL (ref 149–390)
PMV BLD AUTO: 10.5 FL (ref 8.9–12.7)
POTASSIUM SERPL-SCNC: 3.9 MMOL/L (ref 3.5–5.3)
PR INTERVAL: 122 MS
PROT SERPL-MCNC: 7.3 G/DL (ref 6.4–8.2)
PROT UR STRIP-MCNC: NEGATIVE MG/DL
QRS AXIS: 53 DEGREES
QRSD INTERVAL: 84 MS
QT INTERVAL: 370 MS
QTC INTERVAL: 381 MS
RBC # BLD AUTO: 4.39 MILLION/UL (ref 3.81–5.12)
RBC #/AREA URNS AUTO: ABNORMAL /HPF
SODIUM SERPL-SCNC: 139 MMOL/L (ref 136–145)
SP GR UR STRIP.AUTO: 1.02 (ref 1–1.03)
T WAVE AXIS: 49 DEGREES
TROPONIN I SERPL-MCNC: <0.02 NG/ML
UROBILINOGEN UR QL STRIP.AUTO: 0.2 E.U./DL
VENTRICULAR RATE: 64 BPM
WBC # BLD AUTO: 6.44 THOUSAND/UL (ref 4.31–10.16)
WBC #/AREA URNS AUTO: ABNORMAL /HPF

## 2019-06-05 PROCEDURE — 96374 THER/PROPH/DIAG INJ IV PUSH: CPT

## 2019-06-05 PROCEDURE — 84484 ASSAY OF TROPONIN QUANT: CPT | Performed by: EMERGENCY MEDICINE

## 2019-06-05 PROCEDURE — 99285 EMERGENCY DEPT VISIT HI MDM: CPT

## 2019-06-05 PROCEDURE — 81025 URINE PREGNANCY TEST: CPT | Performed by: EMERGENCY MEDICINE

## 2019-06-05 PROCEDURE — 80048 BASIC METABOLIC PNL TOTAL CA: CPT | Performed by: EMERGENCY MEDICINE

## 2019-06-05 PROCEDURE — 93010 ELECTROCARDIOGRAM REPORT: CPT | Performed by: INTERNAL MEDICINE

## 2019-06-05 PROCEDURE — 99284 EMERGENCY DEPT VISIT MOD MDM: CPT | Performed by: EMERGENCY MEDICINE

## 2019-06-05 PROCEDURE — 85025 COMPLETE CBC W/AUTO DIFF WBC: CPT | Performed by: EMERGENCY MEDICINE

## 2019-06-05 PROCEDURE — 93005 ELECTROCARDIOGRAM TRACING: CPT

## 2019-06-05 PROCEDURE — 80076 HEPATIC FUNCTION PANEL: CPT | Performed by: EMERGENCY MEDICINE

## 2019-06-05 PROCEDURE — 36415 COLL VENOUS BLD VENIPUNCTURE: CPT | Performed by: EMERGENCY MEDICINE

## 2019-06-05 PROCEDURE — 81001 URINALYSIS AUTO W/SCOPE: CPT

## 2019-06-05 RX ORDER — KETOROLAC TROMETHAMINE 30 MG/ML
30 INJECTION, SOLUTION INTRAMUSCULAR; INTRAVENOUS ONCE
Status: COMPLETED | OUTPATIENT
Start: 2019-06-05 | End: 2019-06-05

## 2019-06-05 RX ORDER — NAPROXEN 500 MG/1
500 TABLET ORAL 2 TIMES DAILY WITH MEALS
Qty: 20 TABLET | Refills: 0 | Status: SHIPPED | OUTPATIENT
Start: 2019-06-05 | End: 2019-06-20 | Stop reason: ALTCHOICE

## 2019-06-05 RX ORDER — KETOROLAC TROMETHAMINE 30 MG/ML
30 INJECTION, SOLUTION INTRAMUSCULAR; INTRAVENOUS ONCE
Status: DISCONTINUED | OUTPATIENT
Start: 2019-06-05 | End: 2019-06-05

## 2019-06-05 RX ADMIN — KETOROLAC TROMETHAMINE 30 MG: 30 INJECTION, SOLUTION INTRAMUSCULAR; INTRAVENOUS at 17:58

## 2019-06-20 ENCOUNTER — HOSPITAL ENCOUNTER (EMERGENCY)
Facility: HOSPITAL | Age: 29
Discharge: HOME | End: 2019-06-20
Attending: EMERGENCY MEDICINE
Payer: COMMERCIAL

## 2019-06-20 VITALS
RESPIRATION RATE: 16 BRPM | HEART RATE: 80 BPM | DIASTOLIC BLOOD PRESSURE: 50 MMHG | TEMPERATURE: 98.5 F | SYSTOLIC BLOOD PRESSURE: 97 MMHG | OXYGEN SATURATION: 100 % | BODY MASS INDEX: 23.94 KG/M2 | WEIGHT: 122.58 LBS

## 2019-06-20 DIAGNOSIS — R07.89 ATYPICAL CHEST PAIN: Primary | ICD-10-CM

## 2019-06-20 LAB
ALBUMIN SERPL BCP-MCNC: 3.6 G/DL (ref 3.5–5)
ALP SERPL-CCNC: 66 U/L (ref 46–116)
ALT SERPL W P-5'-P-CCNC: 19 U/L (ref 12–78)
ANION GAP SERPL CALCULATED.3IONS-SCNC: 11 MMOL/L (ref 4–13)
AST SERPL W P-5'-P-CCNC: 13 U/L (ref 5–45)
ATRIAL RATE: 83 BPM
BASOPHILS # BLD AUTO: 0.02 THOUSANDS/ΜL (ref 0–0.1)
BASOPHILS NFR BLD AUTO: 0 % (ref 0–1)
BILIRUB DIRECT SERPL-MCNC: 0.06 MG/DL (ref 0–0.2)
BILIRUB SERPL-MCNC: 0.2 MG/DL (ref 0.2–1)
BUN SERPL-MCNC: 12 MG/DL (ref 5–25)
CALCIUM SERPL-MCNC: 8.9 MG/DL (ref 8.3–10.1)
CHLORIDE SERPL-SCNC: 103 MMOL/L (ref 100–108)
CO2 SERPL-SCNC: 25 MMOL/L (ref 21–32)
CREAT SERPL-MCNC: 0.58 MG/DL (ref 0.6–1.3)
DEPRECATED D DIMER PPP: <270 NG/ML (FEU)
EOSINOPHIL # BLD AUTO: 0.06 THOUSAND/ΜL (ref 0–0.61)
EOSINOPHIL NFR BLD AUTO: 1 % (ref 0–6)
ERYTHROCYTE [DISTWIDTH] IN BLOOD BY AUTOMATED COUNT: 16.2 % (ref 11.6–15.1)
GFR SERPL CREATININE-BSD FRML MDRD: 125 ML/MIN/1.73SQ M
GLUCOSE SERPL-MCNC: 101 MG/DL (ref 65–140)
HCT VFR BLD AUTO: 34.5 % (ref 34.8–46.1)
HGB BLD-MCNC: 9.9 G/DL (ref 11.5–15.4)
IMM GRANULOCYTES # BLD AUTO: 0.01 THOUSAND/UL (ref 0–0.2)
IMM GRANULOCYTES NFR BLD AUTO: 0 % (ref 0–2)
LYMPHOCYTES # BLD AUTO: 1.64 THOUSANDS/ΜL (ref 0.6–4.47)
LYMPHOCYTES NFR BLD AUTO: 30 % (ref 14–44)
MCH RBC QN AUTO: 22.8 PG (ref 26.8–34.3)
MCHC RBC AUTO-ENTMCNC: 28.7 G/DL (ref 31.4–37.4)
MCV RBC AUTO: 80 FL (ref 82–98)
MONOCYTES # BLD AUTO: 0.43 THOUSAND/ΜL (ref 0.17–1.22)
MONOCYTES NFR BLD AUTO: 8 % (ref 4–12)
NEUTROPHILS # BLD AUTO: 3.32 THOUSANDS/ΜL (ref 1.85–7.62)
NEUTS SEG NFR BLD AUTO: 61 % (ref 43–75)
NRBC BLD AUTO-RTO: 0 /100 WBCS
P AXIS: 46 DEGREES
PLATELET # BLD AUTO: 239 THOUSANDS/UL (ref 149–390)
PMV BLD AUTO: 11.4 FL (ref 8.9–12.7)
POTASSIUM SERPL-SCNC: 3.8 MMOL/L (ref 3.5–5.3)
PR INTERVAL: 124 MS
PROT SERPL-MCNC: 7.1 G/DL (ref 6.4–8.2)
QRS AXIS: 60 DEGREES
QRSD INTERVAL: 80 MS
QT INTERVAL: 348 MS
QTC INTERVAL: 408 MS
RBC # BLD AUTO: 4.34 MILLION/UL (ref 3.81–5.12)
SODIUM SERPL-SCNC: 139 MMOL/L (ref 136–145)
T WAVE AXIS: 48 DEGREES
TROPONIN I SERPL-MCNC: <0.02 NG/ML
VENTRICULAR RATE: 83 BPM
WBC # BLD AUTO: 5.48 THOUSAND/UL (ref 4.31–10.16)

## 2019-06-20 PROCEDURE — 93010 ELECTROCARDIOGRAM REPORT: CPT | Performed by: INTERNAL MEDICINE

## 2019-06-20 PROCEDURE — 84484 ASSAY OF TROPONIN QUANT: CPT | Performed by: EMERGENCY MEDICINE

## 2019-06-20 PROCEDURE — 80076 HEPATIC FUNCTION PANEL: CPT | Performed by: EMERGENCY MEDICINE

## 2019-06-20 PROCEDURE — 80048 BASIC METABOLIC PNL TOTAL CA: CPT | Performed by: EMERGENCY MEDICINE

## 2019-06-20 PROCEDURE — 93005 ELECTROCARDIOGRAM TRACING: CPT

## 2019-06-20 PROCEDURE — 36415 COLL VENOUS BLD VENIPUNCTURE: CPT | Performed by: EMERGENCY MEDICINE

## 2019-06-20 PROCEDURE — 99285 EMERGENCY DEPT VISIT HI MDM: CPT

## 2019-06-20 PROCEDURE — 99284 EMERGENCY DEPT VISIT MOD MDM: CPT | Performed by: EMERGENCY MEDICINE

## 2019-06-20 PROCEDURE — 85379 FIBRIN DEGRADATION QUANT: CPT | Performed by: EMERGENCY MEDICINE

## 2019-06-20 PROCEDURE — 85025 COMPLETE CBC W/AUTO DIFF WBC: CPT | Performed by: EMERGENCY MEDICINE

## 2019-08-22 ENCOUNTER — HOSPITAL ENCOUNTER (EMERGENCY)
Facility: HOSPITAL | Age: 29
Discharge: HOME | End: 2019-08-23
Attending: EMERGENCY MEDICINE
Payer: COMMERCIAL

## 2019-08-22 VITALS
RESPIRATION RATE: 16 BRPM | BODY MASS INDEX: 23.94 KG/M2 | HEART RATE: 72 BPM | SYSTOLIC BLOOD PRESSURE: 97 MMHG | OXYGEN SATURATION: 99 % | WEIGHT: 122.58 LBS | TEMPERATURE: 97.9 F | DIASTOLIC BLOOD PRESSURE: 62 MMHG

## 2019-08-22 DIAGNOSIS — M54.9 BACK PAIN: ICD-10-CM

## 2019-08-22 DIAGNOSIS — R07.9 CHEST PAIN: Primary | ICD-10-CM

## 2019-08-22 PROCEDURE — 93005 ELECTROCARDIOGRAM TRACING: CPT

## 2019-08-22 PROCEDURE — 99285 EMERGENCY DEPT VISIT HI MDM: CPT

## 2019-08-22 PROCEDURE — 99284 EMERGENCY DEPT VISIT MOD MDM: CPT | Performed by: EMERGENCY MEDICINE

## 2019-08-23 ENCOUNTER — APPOINTMENT (EMERGENCY)
Dept: RADIOLOGY | Facility: HOSPITAL | Age: 29
End: 2019-08-23
Payer: COMMERCIAL

## 2019-08-23 LAB
ATRIAL RATE: 71 BPM
P AXIS: 21 DEGREES
PR INTERVAL: 118 MS
QRS AXIS: 29 DEGREES
QRSD INTERVAL: 84 MS
QT INTERVAL: 350 MS
QTC INTERVAL: 380 MS
T WAVE AXIS: 23 DEGREES
VENTRICULAR RATE: 71 BPM

## 2019-08-23 PROCEDURE — 71046 X-RAY EXAM CHEST 2 VIEWS: CPT

## 2019-08-23 PROCEDURE — 72070 X-RAY EXAM THORAC SPINE 2VWS: CPT

## 2019-08-23 PROCEDURE — 93010 ELECTROCARDIOGRAM REPORT: CPT | Performed by: INTERNAL MEDICINE

## 2019-08-23 RX ORDER — LIDOCAINE 50 MG/G
1 PATCH TOPICAL ONCE
Status: DISCONTINUED | OUTPATIENT
Start: 2019-08-23 | End: 2019-08-23 | Stop reason: HOSPADM

## 2019-08-23 RX ORDER — IBUPROFEN 600 MG/1
600 TABLET ORAL EVERY 8 HOURS PRN
Qty: 15 TABLET | Refills: 0 | Status: SHIPPED | OUTPATIENT
Start: 2019-08-23 | End: 2019-09-13

## 2019-08-23 RX ADMIN — LIDOCAINE 1 PATCH: 50 PATCH TOPICAL at 00:36

## 2019-08-23 NOTE — ED PROVIDER NOTES
History  Chief Complaint   Patient presents with    Chest Pain     Pt states "I woke up yesterday and I couldnt move  This is my third time here for chest pian and my back hurts"       History provided by:  Patient   used: No    Chest Pain   Associated symptoms: back pain    Associated symptoms: no abdominal pain, no cough, no dizziness, no dysphagia, no fever, no headache, no nausea, no shortness of breath and not vomiting    Back Pain   Location:  Thoracic spine  Quality:  Aching  Radiates to:  Does not radiate  Pain severity:  Moderate  Onset quality:  Gradual  Duration:  1 day  Timing:  Intermittent  Chronicity:  New  Context: physical stress    Relieved by:  Nothing  Worsened by:  Nothing  Ineffective treatments: Naproxen once  Associated symptoms: chest pain    Associated symptoms: no abdominal pain, no dysuria, no fever and no headaches    Chest pain:     Quality: aching      Severity:  Mild    Onset quality:  Gradual    Duration:  1 day    Timing:  Intermittent    Progression:  Waxing and waning    Chronicity:  Recurrent      None       Past Medical History:   Diagnosis Date    Anemia     taking iron pills BID     Chronic kidney disease     hydronephrosis    History of transfusion     Kidney stone     S[address] ([address]     Septic shock ([address]     Urinary tract infection     taking antibioitcs     UTI (urinary tract infection) during pregnancy     Vagina, candidiasis     Varicella     as child       Past Surgical History:   Procedure Laterality Date     SECTION N/A 2018    Procedure:  SECTION ();   Surgeon: Kimi Patino MD;  [facility];  Service: Obstetrics    CYSTOSCOPY  2018    with right uretral stent    [place] CYSTO/URETERO W/LITHOTRIPSY &INDWELL STENT INSRT Right 2018    Procedure: CYSTOSCOPY URETEROSCOPY WITH  RETROGRADE PYELOGRAM AND exchange  STENT URETERAl;  Surgeon: Dustin Vazquez MD;  Location: [address] [place] [address];  Service: Urology   [place] CYSTOURETHROSCOPY,URETER CATHETER Right 2018    Procedure: CYSTOSCOPY RETROGRADE PYELOGRAM WITH INSERTION STENT URETERAL;  Surgeon: Clay Vargas MD;  Location: [facility];  Service: Urology    TONSILLECTOMY      TONSILLECTOMY         Family History   Problem Relation Age of Onset    No Known Problems Mother     Diabetes Father     Heart defect Brother         3 open heart surgeries    Heart failure Family         Congestive    Diabetes Maternal Grandmother      I have reviewed and agree with the history as documented  Social History     Tobacco Use    Smoking status: Former Smoker     Packs/day: 0 00     Last attempt to quit: 2019     Years since quittin 2    Smokeless tobacco: Never Used   Substance Use Topics    Alcohol use: No    Drug use: No        Review of Systems   Constitutional: Negative for chills and fever  HENT: Negative for facial swelling, sore throat and trouble swallowing  Eyes: Negative for pain and visual disturbance  Respiratory: Negative for cough and shortness of breath  Cardiovascular: Positive for chest pain  Negative for leg swelling  Gastrointestinal: Negative for abdominal pain, blood in stool, diarrhea, nausea and vomiting  Genitourinary: Negative for dysuria and flank pain  Musculoskeletal: Positive for back pain  Negative for neck pain and neck stiffness  Skin: Negative for pallor and rash  Allergic/Immunologic: Negative for environmental allergies and immunocompromised state  Neurological: Negative for dizziness and headaches  Hematological: Negative for adenopathy  Does not bruise/bleed easily  Psychiatric/Behavioral: Negative for agitation and behavioral problems  All other systems reviewed and are negative  Physical Exam  Physical Exam   Constitutional: She is oriented to person, place, and time  She appears well-developed and well-nourished  No distress  HENT:   Head: Normocephalic and atraumatic     Eyes: EOM are normal    Neck: Normal range of motion  Neck supple  Cardiovascular: Normal rate, regular rhythm, normal heart sounds and intact distal pulses  Pulmonary/Chest: Effort normal and breath sounds normal    Abdominal: Soft  Bowel sounds are normal  There is no tenderness  There is no rebound and no guarding  Musculoskeletal: Normal range of motion  Thoracic spine:  No midline thoracic spine tenderness, deformity, swelling, mild left paraspinal muscular tightness/spasm noted   Neurological: She is alert and oriented to person, place, and time  Skin: Skin is warm and dry  Psychiatric: She has a normal mood and affect  Nursing note and vitals reviewed        Vital Signs  ED Triage Vitals [08/22/19 2303]   Temperature Pulse Respirations Blood Pressure SpO2   97 9 °F (36 6 °C) 72 16 97/62 99 %      Temp src Heart Rate Source Patient Position - Orthostatic VS BP Location FiO2 (%)   -- -- -- -- --      Pain Score       Worst Possible Pain           Vitals:    08/22/19 2303   BP: 97/62   Pulse: 72         Visual Acuity      ED Medications  Medications   lidocaine (LIDODERM) 5 % patch 1 patch (has no administration in time range)       Diagnostic Studies  Results Reviewed     None                 XR thoracic spine 2 views    (Results Pending)   XR chest 2 views    (Results Pending)              Procedures  ECG 12 Lead Documentation Only  Date/Time: 8/23/2019 12:22 AM  Performed by: Tyshawn Vargas MD  Authorized by: Tyshawn Vargas MD     Indications / Diagnosis:  Chest pain  ECG reviewed by me, the ED Provider: yes    Patient location:  ED  Interpretation:     Interpretation: normal    Rate:     ECG rate assessment: normal    Rhythm:     Rhythm: sinus rhythm    Ectopy:     Ectopy: none    QRS:     QRS axis:  Normal  Conduction:     Conduction: normal    ST segments:     ST segments:  Normal  T waves:     T waves: normal             ED Course  ED Course as of Aug 23 0028   Fri Aug 23, 2019   0018 X-rays of chest, thoracic spine reviewed, no acute abnormality noted; report pending, patient informed  Requests work note  1405 Patient given return instructions for worsening chest pain, breathing difficulty or any other concerns  MDM  Number of Diagnoses or Management Options  Back pain: new and requires workup  Chest pain: new and requires workup  Diagnosis management comments: Patient is a 77-year-old healthy female, comes in with complaints of chest pain, back pain, states that today when she woke up she had trouble moving around due to back pain, states that she has been seen in the emergency for recurrent chest pain, couple of times in the past few months and the workup did not show any acute abnormality; patient has not followed up with family doctor, because she is 'always working'; denies any specific trauma, fall, injuries  Patient is alert, oriented, well-appearing, hemodynamically stable vital signs noted; Lungs clear to auscultation bilaterally; Cardiovascular normal heart sounds:  Normal, equal pulses bilaterally; Abdomen: soft, nontender, nondistended, bowel sounds present; Neuro: normal cranial nerve, motor and sensory exam, no focal deficits; no neck stiffness; Extremities: no calf swelling or tenderness, neurovascular intact distally  X-ray chest, C-spine reviewed, no acute abnormality noted, official report pending, patient informed  Request work note, will give lidocaine patch, Motrin, follow up with PCP         Amount and/or Complexity of Data Reviewed  Tests in the radiology section of CPT®: ordered and reviewed  Review and summarize past medical records: yes (ER visits for chest pain, with negative workup including D-dimer)  Independent visualization of images, tracings, or specimens: yes        Disposition  Final diagnoses:   Chest pain   Back pain     Time reflects when diagnosis was documented in both MDM as applicable and the Disposition within this note     Time User Action Codes Description Comment    8/23/2019 12:25 AM Rita Stroud [R07 9] Chest pain     8/23/2019 12:25 AM Rita Stroud [M54 9] Back pain       ED Disposition     ED Disposition Condition Date/Time Comment    Discharge Stable Fri Aug 23, 2019 12:26  [address] discharge to home/self care  Follow-up Information    None         Patient's Medications   Discharge Prescriptions    IBUPROFEN (MOTRIN) 600 MG TABLET    Take 1 tablet (600 mg total) by mouth every 8 (eight) hours as needed for moderate pain       Start Date: 8/23/2019 End Date: --       Order Dose: 600 mg       Quantity: 15 tablet    Refills: 0     No discharge procedures on file      ED Provider  Electronically Signed by           Leticia Garcia MD  08/23/19 1380       Leticia Garcia MD  08/23/19 3465

## 2019-09-13 ENCOUNTER — HOSPITAL ENCOUNTER (EMERGENCY)
Facility: HOSPITAL | Age: 29
Discharge: HOME | End: 2019-09-13
Attending: EMERGENCY MEDICINE | Admitting: EMERGENCY MEDICINE
Payer: COMMERCIAL

## 2019-09-13 ENCOUNTER — APPOINTMENT (EMERGENCY)
Dept: CT IMAGING | Facility: HOSPITAL | Age: 29
End: 2019-09-13
Payer: COMMERCIAL

## 2019-09-13 VITALS
HEART RATE: 57 BPM | TEMPERATURE: 98.5 F | DIASTOLIC BLOOD PRESSURE: 64 MMHG | WEIGHT: 130.51 LBS | OXYGEN SATURATION: 100 % | SYSTOLIC BLOOD PRESSURE: 111 MMHG | RESPIRATION RATE: 16 BRPM | BODY MASS INDEX: 25.49 KG/M2

## 2019-09-13 DIAGNOSIS — R10.9 RIGHT FLANK PAIN: Primary | ICD-10-CM

## 2019-09-13 LAB
BACTERIA UR QL AUTO: ABNORMAL /HPF
BILIRUB UR QL STRIP: NEGATIVE
CLARITY UR: CLEAR
COLOR UR: YELLOW
EXT PREG TEST URINE: NORMAL
EXT. CONTROL ED NAV: NORMAL
GLUCOSE UR STRIP-MCNC: NEGATIVE MG/DL
HGB UR QL STRIP.AUTO: ABNORMAL
KETONES UR STRIP-MCNC: NEGATIVE MG/DL
LEUKOCYTE ESTERASE UR QL STRIP: ABNORMAL
NITRITE UR QL STRIP: NEGATIVE
NON-SQ EPI CELLS URNS QL MICRO: ABNORMAL /HPF
PH UR STRIP.AUTO: 6 [PH]
PROT UR STRIP-MCNC: NEGATIVE MG/DL
RBC #/AREA URNS AUTO: ABNORMAL /HPF
SP GR UR STRIP.AUTO: 1.02 (ref 1–1.03)
UROBILINOGEN UR QL STRIP.AUTO: 0.2 E.U./DL
WBC #/AREA URNS AUTO: ABNORMAL /HPF

## 2019-09-13 PROCEDURE — 81001 URINALYSIS AUTO W/SCOPE: CPT | Performed by: EMERGENCY MEDICINE

## 2019-09-13 PROCEDURE — 74176 CT ABD & PELVIS W/O CONTRAST: CPT

## 2019-09-13 PROCEDURE — 81025 URINE PREGNANCY TEST: CPT | Performed by: EMERGENCY MEDICINE

## 2019-09-13 PROCEDURE — 99284 EMERGENCY DEPT VISIT MOD MDM: CPT | Performed by: EMERGENCY MEDICINE

## 2019-09-13 PROCEDURE — 99284 EMERGENCY DEPT VISIT MOD MDM: CPT

## 2019-09-13 RX ORDER — LIDOCAINE 50 MG/G
1 PATCH TOPICAL ONCE
Status: DISCONTINUED | OUTPATIENT
Start: 2019-09-13 | End: 2019-09-13 | Stop reason: HOSPADM

## 2019-09-13 RX ORDER — NAPROXEN 250 MG/1
500 TABLET ORAL ONCE
Status: COMPLETED | OUTPATIENT
Start: 2019-09-13 | End: 2019-09-13

## 2019-09-13 RX ORDER — LIDOCAINE 40 MG/G
CREAM TOPICAL AS NEEDED
Qty: 30 G | Refills: 0 | Status: SHIPPED | OUTPATIENT
Start: 2019-09-13 | End: 2019-12-07

## 2019-09-13 RX ORDER — NAPROXEN 250 MG/1
250 TABLET ORAL
Qty: 21 TABLET | Refills: 0 | Status: SHIPPED | OUTPATIENT
Start: 2019-09-13 | End: 2019-12-07 | Stop reason: ALTCHOICE

## 2019-09-13 RX ADMIN — LIDOCAINE 1 PATCH: 50 PATCH TOPICAL at 18:51

## 2019-09-13 RX ADMIN — NAPROXEN 500 MG: 250 TABLET ORAL at 17:39

## 2019-09-13 NOTE — ED PROVIDER NOTES
History  Chief Complaint   Patient presents with    Flank Pain     states with b/l "kidney pain" worse on right also with voimiting       History provided by:  Patient  Flank Pain   Pain location:  R flank  Pain quality: throbbing    Pain radiates to:  L flank  Pain severity:  Severe  Onset quality:  Gradual  Duration:  2 days  Timing:  Constant  Progression:  Worsening  Chronicity:  Recurrent  Relieved by:  Nothing  Worsened by:  Nothing  Ineffective treatments:  None tried  Associated symptoms: no belching, no chest pain, no chills, no constipation, no cough, no diarrhea, no dysuria, no fatigue, no fever, no hematemesis, no hematochezia, no hematuria, no melena, no nausea, no shortness of breath, no vaginal bleeding, no vaginal discharge and no vomiting        None       Past Medical History:   Diagnosis Date    Anemia     taking iron pills BID     Chronic kidney disease     hydronephrosis    History of transfusion     Kidney stone     [address] [address]     Septic shock [address]     Urinary tract infection     taking antibioitcs     UTI (urinary tract infection) during pregnancy     Vagina, candidiasis     Varicella     as child       Past Surgical History:   Procedure Laterality Date     SECTION N/A 2018    Procedure:  SECTION ();   Surgeon: Lorenzo Santos MD;  [facility];  Service: Obstetrics    CYSTOSCOPY  2018    with right uretral stent    [place] CYSTO/URETERO W/LITHOTRIPSY &INDWELL STENT INSRT Right 2018    Procedure: CYSTOSCOPY URETEROSCOPY WITH  RETROGRADE PYELOGRAM AND exchange  STENT URETERAl;  Surgeon: Aisha Navarro MD;  Location: [facility];  Service: Urology    [place] CYSTOURETHROSCOPY,URETER CATHETER Right 2018    Procedure: CYSTOSCOPY RETROGRADE PYELOGRAM WITH INSERTION STENT URETERAL;  Surgeon: Aisha Navarro MD;  Location: [facility];  Service: Urology    TONSILLECTOMY      TONSILLECTOMY         Family History   Problem Relation Age of Onset    No Known Problems Mother     Diabetes Father     Heart defect Brother         3 open heart surgeries    Heart failure Family         Congestive    Diabetes Maternal Grandmother      I have reviewed and agree with the history as documented  Social History     Tobacco Use    Smoking status: Former Smoker     Packs/day: 0 00     Last attempt to quit: 2019     Years since quittin 2    Smokeless tobacco: Never Used   Substance Use Topics    Alcohol use: No    Drug use: No        Review of Systems   Constitutional: Negative for appetite change, chills, diaphoresis, fatigue and fever  HENT: Negative for congestion, dental problem and rhinorrhea  Eyes: Negative for pain  Respiratory: Negative for cough, chest tightness and shortness of breath  Cardiovascular: Negative for chest pain and leg swelling  Gastrointestinal: Negative for abdominal pain, blood in stool, constipation, diarrhea, hematemesis, hematochezia, melena, nausea and vomiting  Endocrine: Negative for polydipsia, polyphagia and polyuria  Genitourinary: Positive for flank pain  Negative for difficulty urinating, dyspareunia, dysuria, enuresis, frequency, hematuria, pelvic pain, vaginal bleeding, vaginal discharge and vaginal pain  Musculoskeletal: Positive for back pain  Negative for arthralgias and myalgias  Skin: Negative for color change and rash  Neurological: Negative for dizziness, weakness, light-headedness and headaches  Psychiatric/Behavioral: Negative for hallucinations and suicidal ideas  Physical Exam  Physical Exam   Constitutional: She is oriented to person, place, and time  She appears well-developed and well-nourished  HENT:   Mouth/Throat: No oropharyngeal exudate  TMs normal bilaterally no pharyngeal erythema no rhinorrhea nontender palpation of sinuses, normal looking turbinates   Eyes: Conjunctivae and EOM are normal    Neck: Normal range of motion  Neck supple     No meningeal signs Cardiovascular: Normal rate, regular rhythm, normal heart sounds and intact distal pulses  Pulmonary/Chest: Effort normal and breath sounds normal  No respiratory distress  She has no wheezes  She has no rales  She exhibits no tenderness  Abdominal: Soft  Bowel sounds are normal  She exhibits no distension and no mass  There is no tenderness  No hernia  No cvat  Tender palpation of the right lower lumbar region without hypertonicity  Neurovascular intact bilateral lower extremities  Musculoskeletal: Normal range of motion  She exhibits no edema  Lymphadenopathy:     She has no cervical adenopathy  Neurological: She is alert and oriented to person, place, and time  No cranial nerve deficit  Skin: No rash noted  No erythema  No edema   Psychiatric: She has a normal mood and affect  Her behavior is normal    Nursing note and vitals reviewed        Vital Signs  ED Triage Vitals [09/13/19 1642]   Temperature Pulse Respirations Blood Pressure SpO2   98 5 °F (36 9 °C) 86 18 122/56 99 %      Temp Source Heart Rate Source Patient Position - Orthostatic VS BP Location FiO2 (%)   Oral Monitor Sitting Right arm --      Pain Score       Worst Possible Pain           Vitals:    09/13/19 1642 09/13/19 1801   BP: 122/56 111/64   Pulse: 86 57   Patient Position - Orthostatic VS: Sitting          Visual Acuity      ED Medications  Medications   lidocaine (LIDODERM) 5 % patch 1 patch (1 patch Topical Medication Applied 9/13/19 1851)   naproxen (NAPROSYN) tablet 500 mg (500 mg Oral Given 9/13/19 1739)       Diagnostic Studies  Results Reviewed     Procedure Component Value Units Date/Time    Urine Microscopic [[id number]]  (Abnormal) Collected:  09/13/19 1705    Lab Status:  Final result Specimen:  Urine, Clean Catch Updated:  09/13/19 1730     RBC, UA 10-20 /hpf      WBC, UA 2-4 /hpf      Epithelial Cells Moderate /hpf      Bacteria, UA Moderate /hpf     UA w Reflex to Microscopic w Reflex to Culture [[id number]] (Abnormal) Collected:  09/13/19 1705    Lab Status:  Final result Specimen:  Urine, Clean Catch Updated:  09/13/19 1722     Color, UA Yellow     Clarity, UA Clear     Specific Gravity, UA 1 025     pH, UA 6 0     Leukocytes, UA Trace     Nitrite, UA Negative     Protein, UA Negative mg/dl      Glucose, UA Negative mg/dl      Ketones, UA Negative mg/dl      Urobilinogen, UA 0 2 E U /dl      Bilirubin, UA Negative     Blood, UA Moderate    POCT pregnancy, urine [[id number]]  (Normal) Resulted:  09/13/19 1705    Lab Status:  Final result Updated:  09/13/19 1705     EXT PREG TEST UR (Ref: Negative) NEG (-)     Control Valid                 CT renal stone study abdomen pelvis without contrast   Final Result by Dolores Horvath MD (09/13 1814)      No acute finding; specifically, no obstructive uropathy/hydronephrosis/hydroureter  Workstation performed: [id number]                    Procedures  Procedures       ED Course                               MDM  Number of Diagnoses or Management Options  Right flank pain:   Diagnosis management comments: Acute flank pain with history of kidney stones-will do urine dip, urine pregnancy, CT stone study, treat for musculoskeletal back pain  Disposition  Final diagnoses:   Right flank pain     Time reflects when diagnosis was documented in both MDM as applicable and the Disposition within this note     Time User Action Codes Description Comment    9/13/2019  6:49 PM Kayley SUTTON Add [R10 9] Flank pain     9/13/2019  6:50 PM Cayla Meehan Remove [R10 9] Flank pain     9/13/2019  6:50 PM Shila Sotelo Add [R10 9] Right flank pain       ED Disposition     ED Disposition Condition Date/Time Comment    Discharge Stable Fri Sep 13, 2019  6:49  [address] discharge to home/self care              Follow-up Information     Follow up With Specialties Details Why Jace Scheuermann, MD Family Medicine Schedule an appointment as soon as possible for a visit in 2 days  [address]  [address] [facility] [place] [address]            Discharge Medication List as of 9/13/2019  6:51 PM      START taking these medications    Details   lidocaine (LMX) 4 % cream Apply topically as needed for moderate pain, Starting Fri 9/13/2019, Normal      naproxen (NAPROSYN) 250 mg tablet Take 1 tablet (250 mg total) by mouth 3 (three) times a day with meals for 7 days, Starting Fri 9/13/2019, Until Fri 9/20/2019, Normal           No discharge procedures on file      ED Provider  Electronically Signed by           Sarah Garrison MD  09/13/19 8114

## 2019-09-13 NOTE — ED NOTES
Patient transported to [address] [place] [address] [facility]  09/13/19 5496

## 2019-12-07 ENCOUNTER — HOSPITAL ENCOUNTER (EMERGENCY)
Facility: HOSPITAL | Age: 29
Discharge: HOME | End: 2019-12-07
Attending: EMERGENCY MEDICINE
Payer: COMMERCIAL

## 2019-12-07 ENCOUNTER — APPOINTMENT (EMERGENCY)
Dept: CT IMAGING | Facility: HOSPITAL | Age: 29
End: 2019-12-07
Payer: COMMERCIAL

## 2019-12-07 ENCOUNTER — APPOINTMENT (EMERGENCY)
Dept: RADIOLOGY | Facility: HOSPITAL | Age: 29
End: 2019-12-07
Payer: COMMERCIAL

## 2019-12-07 VITALS
TEMPERATURE: 99.8 F | RESPIRATION RATE: 18 BRPM | HEART RATE: 101 BPM | DIASTOLIC BLOOD PRESSURE: 53 MMHG | SYSTOLIC BLOOD PRESSURE: 94 MMHG | OXYGEN SATURATION: 98 %

## 2019-12-07 DIAGNOSIS — J11.1 INFLUENZA: Primary | ICD-10-CM

## 2019-12-07 DIAGNOSIS — R93.5 ABNORMAL CT OF THE ABDOMEN: ICD-10-CM

## 2019-12-07 LAB
ALBUMIN SERPL BCP-MCNC: 4.1 G/DL (ref 3.5–5)
ALP SERPL-CCNC: 85 U/L (ref 46–116)
ALT SERPL W P-5'-P-CCNC: 18 U/L (ref 12–78)
ANION GAP SERPL CALCULATED.3IONS-SCNC: 11 MMOL/L (ref 4–13)
APTT PPP: 32 SECONDS (ref 23–37)
AST SERPL W P-5'-P-CCNC: 17 U/L (ref 5–45)
BACTERIA UR QL AUTO: ABNORMAL /HPF
BASOPHILS # BLD AUTO: 0.01 THOUSANDS/ΜL (ref 0–0.1)
BASOPHILS NFR BLD AUTO: 0 % (ref 0–1)
BILIRUB SERPL-MCNC: 0.24 MG/DL (ref 0.2–1)
BILIRUB UR QL STRIP: NEGATIVE
BUN SERPL-MCNC: 9 MG/DL (ref 5–25)
CALCIUM SERPL-MCNC: 8.7 MG/DL (ref 8.3–10.1)
CHLORIDE SERPL-SCNC: 102 MMOL/L (ref 100–108)
CLARITY UR: ABNORMAL
CO2 SERPL-SCNC: 26 MMOL/L (ref 21–32)
COLOR UR: YELLOW
CREAT SERPL-MCNC: 0.71 MG/DL (ref 0.6–1.3)
EOSINOPHIL # BLD AUTO: 0 THOUSAND/ΜL (ref 0–0.61)
EOSINOPHIL NFR BLD AUTO: 0 % (ref 0–6)
ERYTHROCYTE [DISTWIDTH] IN BLOOD BY AUTOMATED COUNT: 15.6 % (ref 11.6–15.1)
EXT PREG TEST URINE: NEGATIVE
EXT. CONTROL ED NAV: NORMAL
FLUAV RNA NPH QL NAA+PROBE: ABNORMAL
FLUBV RNA NPH QL NAA+PROBE: DETECTED
GFR SERPL CREATININE-BSD FRML MDRD: 115 ML/MIN/1.73SQ M
GLUCOSE SERPL-MCNC: 110 MG/DL (ref 65–140)
GLUCOSE UR STRIP-MCNC: NEGATIVE MG/DL
HCT VFR BLD AUTO: 39 % (ref 34.8–46.1)
HGB BLD-MCNC: 11.2 G/DL (ref 11.5–15.4)
HGB UR QL STRIP.AUTO: ABNORMAL
IMM GRANULOCYTES # BLD AUTO: 0.01 THOUSAND/UL (ref 0–0.2)
IMM GRANULOCYTES NFR BLD AUTO: 0 % (ref 0–2)
INR PPP: 1.08 (ref 0.84–1.19)
KETONES UR STRIP-MCNC: NEGATIVE MG/DL
LACTATE SERPL-SCNC: 1.1 MMOL/L (ref 0.5–2)
LEUKOCYTE ESTERASE UR QL STRIP: NEGATIVE
LYMPHOCYTES # BLD AUTO: 0.76 THOUSANDS/ΜL (ref 0.6–4.47)
LYMPHOCYTES NFR BLD AUTO: 18 % (ref 14–44)
MCH RBC QN AUTO: 22.3 PG (ref 26.8–34.3)
MCHC RBC AUTO-ENTMCNC: 28.7 G/DL (ref 31.4–37.4)
MCV RBC AUTO: 78 FL (ref 82–98)
MONOCYTES # BLD AUTO: 0.3 THOUSAND/ΜL (ref 0.17–1.22)
MONOCYTES NFR BLD AUTO: 7 % (ref 4–12)
MUCOUS THREADS UR QL AUTO: ABNORMAL
NEUTROPHILS # BLD AUTO: 3.07 THOUSANDS/ΜL (ref 1.85–7.62)
NEUTS SEG NFR BLD AUTO: 75 % (ref 43–75)
NITRITE UR QL STRIP: NEGATIVE
NON-SQ EPI CELLS URNS QL MICRO: ABNORMAL /HPF
NRBC BLD AUTO-RTO: 0 /100 WBCS
PH UR STRIP.AUTO: 5.5 [PH]
PLATELET # BLD AUTO: 296 THOUSANDS/UL (ref 149–390)
PMV BLD AUTO: 11.1 FL (ref 8.9–12.7)
POTASSIUM SERPL-SCNC: 3.3 MMOL/L (ref 3.5–5.3)
PROCALCITONIN SERPL-MCNC: <0.05 NG/ML
PROT SERPL-MCNC: 8.3 G/DL (ref 6.4–8.2)
PROT UR STRIP-MCNC: ABNORMAL MG/DL
PROTHROMBIN TIME: 14.1 SECONDS (ref 11.6–14.5)
RBC # BLD AUTO: 5.02 MILLION/UL (ref 3.81–5.12)
RBC #/AREA URNS AUTO: ABNORMAL /HPF
RSV RNA NPH QL NAA+PROBE: ABNORMAL
SODIUM SERPL-SCNC: 139 MMOL/L (ref 136–145)
SP GR UR STRIP.AUTO: 1.02 (ref 1–1.03)
UROBILINOGEN UR QL STRIP.AUTO: 1 E.U./DL
WBC # BLD AUTO: 4.15 THOUSAND/UL (ref 4.31–10.16)
WBC #/AREA URNS AUTO: ABNORMAL /HPF

## 2019-12-07 PROCEDURE — 85025 COMPLETE CBC W/AUTO DIFF WBC: CPT | Performed by: EMERGENCY MEDICINE

## 2019-12-07 PROCEDURE — 81001 URINALYSIS AUTO W/SCOPE: CPT | Performed by: EMERGENCY MEDICINE

## 2019-12-07 PROCEDURE — 99285 EMERGENCY DEPT VISIT HI MDM: CPT | Performed by: EMERGENCY MEDICINE

## 2019-12-07 PROCEDURE — 84145 PROCALCITONIN (PCT): CPT | Performed by: EMERGENCY MEDICINE

## 2019-12-07 PROCEDURE — 85610 PROTHROMBIN TIME: CPT | Performed by: EMERGENCY MEDICINE

## 2019-12-07 PROCEDURE — 81025 URINE PREGNANCY TEST: CPT | Performed by: EMERGENCY MEDICINE

## 2019-12-07 PROCEDURE — 96374 THER/PROPH/DIAG INJ IV PUSH: CPT

## 2019-12-07 PROCEDURE — 74177 CT ABD & PELVIS W/CONTRAST: CPT

## 2019-12-07 PROCEDURE — 36415 COLL VENOUS BLD VENIPUNCTURE: CPT | Performed by: EMERGENCY MEDICINE

## 2019-12-07 PROCEDURE — 80053 COMPREHEN METABOLIC PANEL: CPT | Performed by: EMERGENCY MEDICINE

## 2019-12-07 PROCEDURE — 85730 THROMBOPLASTIN TIME PARTIAL: CPT | Performed by: EMERGENCY MEDICINE

## 2019-12-07 PROCEDURE — 99285 EMERGENCY DEPT VISIT HI MDM: CPT

## 2019-12-07 PROCEDURE — 83605 ASSAY OF LACTIC ACID: CPT | Performed by: EMERGENCY MEDICINE

## 2019-12-07 PROCEDURE — 96361 HYDRATE IV INFUSION ADD-ON: CPT

## 2019-12-07 PROCEDURE — 71046 X-RAY EXAM CHEST 2 VIEWS: CPT

## 2019-12-07 PROCEDURE — 87040 BLOOD CULTURE FOR BACTERIA: CPT | Performed by: EMERGENCY MEDICINE

## 2019-12-07 PROCEDURE — 87631 RESP VIRUS 3-5 TARGETS: CPT | Performed by: EMERGENCY MEDICINE

## 2019-12-07 RX ORDER — OSELTAMIVIR PHOSPHATE 75 MG/1
75 CAPSULE ORAL EVERY 12 HOURS
Qty: 10 CAPSULE | Refills: 0 | Status: SHIPPED | OUTPATIENT
Start: 2019-12-07 | End: 2019-12-12

## 2019-12-07 RX ORDER — ACETAMINOPHEN 325 MG/1
650 TABLET ORAL ONCE
Status: COMPLETED | OUTPATIENT
Start: 2019-12-07 | End: 2019-12-07

## 2019-12-07 RX ORDER — SODIUM CHLORIDE 9 MG/ML
125 INJECTION, SOLUTION INTRAVENOUS CONTINUOUS
Status: DISCONTINUED | OUTPATIENT
Start: 2019-12-07 | End: 2019-12-07 | Stop reason: HOSPADM

## 2019-12-07 RX ORDER — KETOROLAC TROMETHAMINE 30 MG/ML
15 INJECTION, SOLUTION INTRAMUSCULAR; INTRAVENOUS ONCE
Status: DISCONTINUED | OUTPATIENT
Start: 2019-12-07 | End: 2019-12-07 | Stop reason: HOSPADM

## 2019-12-07 RX ORDER — ONDANSETRON 2 MG/ML
4 INJECTION INTRAMUSCULAR; INTRAVENOUS ONCE
Status: COMPLETED | OUTPATIENT
Start: 2019-12-07 | End: 2019-12-07

## 2019-12-07 RX ADMIN — ONDANSETRON 4 MG: 2 INJECTION INTRAMUSCULAR; INTRAVENOUS at 09:56

## 2019-12-07 RX ADMIN — SODIUM CHLORIDE 1000 ML: 0.9 INJECTION, SOLUTION INTRAVENOUS at 09:54

## 2019-12-07 RX ADMIN — ACETAMINOPHEN 650 MG: 325 TABLET ORAL at 09:54

## 2019-12-07 RX ADMIN — SODIUM CHLORIDE 125 ML/HR: 0.9 INJECTION, SOLUTION INTRAVENOUS at 11:03

## 2019-12-07 RX ADMIN — IOHEXOL 100 ML: 350 INJECTION, SOLUTION INTRAVENOUS at 11:46

## 2019-12-07 NOTE — DISCHARGE INSTRUCTIONS
You have an abnormal CT scan of the uterus specifically the endometrial stripe that needs follow-up with OBGYN  Please range that follow-up within the next week or 2

## 2019-12-07 NOTE — ED NOTES
Pt expresses concern about receiving Toradol  Pt states receiving this and having a "bad experience"  When asked pt states she doesn't know if she is allergic to the medication  She states receiving toradol amongst other medications and then feeling really "sick"          Yimi Vasquez RN  12/07/19 3780

## 2019-12-07 NOTE — ED PROVIDER NOTES
History  Chief Complaint   Patient presents with    Flank Pain     Pt reports that she has been having L flank and abd pain for 4 days  Reports vomiting simnce last night  Hx stones and stents  Reports fevers and feeliong like bladder is not emptying  History provided by:  Patient   used: No    Medical Problem - Major   Location:  Sudden onset fever and body aches  Left flank pain  No dysuria but urine feels hot  Has a cough  Not sob  no sputum  No flu shot  H/O kidney stones in the past when she was pregnant  Severity:  Severe  Onset quality:  Sudden  Duration:  1 day  Timing:  Constant  Progression:  Unchanged  Chronicity:  New  Relieved by:  Nothing  Worsened by:  Nothing  Ineffective treatments:  None tried  Associated symptoms: congestion, cough, fever, headaches, myalgias and nausea    Associated symptoms: no abdominal pain, no chest pain, no diarrhea, no rash, no shortness of breath, no sore throat and no vomiting        None       Past Medical History:   Diagnosis Date    Anemia     taking iron pills BID     Chronic kidney disease     hydronephrosis    History of transfusion     Kidney stone     Sepsis ([address]     Septic shock ([address]     Urinary tract infection     taking antibioitcs     UTI (urinary tract infection) during pregnancy     Vagina, candidiasis     Varicella     as child       Past Surgical History:   Procedure Laterality Date     SECTION N/A 2018    Procedure:  SECTION ();   Surgeon: Jose Abbott MD;  Location: [facility];  Service: Obstetrics    CYSTOSCOPY  2018    with right uretral stent    [place] CYSTO/URETERO W/LITHOTRIPSY &INDWELL STENT INSRT Right 2018    Procedure: CYSTOSCOPY URETEROSCOPY WITH  RETROGRADE PYELOGRAM AND exchange  STENT URETERAl;  Surgeon: John Garvey MD;  Location: [facility] MAIN OR;  Service: Urology    [place] CYSTOURETHROSCOPY,URETER CATHETER Right 2018    Procedure: CYSTOSCOPY RETROGRADE PYELOGRAM WITH INSERTION STENT URETERAL;  Surgeon: Alena Delatorre MD;  Location: [facility];  Service: Urology    TONSILLECTOMY      TONSILLECTOMY         Family History   Problem Relation Age of Onset    No Known Problems Mother     Diabetes Father     Heart defect Brother         3 open heart surgeries    Heart failure Family         Congestive    Diabetes Maternal Grandmother      I have reviewed and agree with the history as documented  Social History     Tobacco Use    Smoking status: Former Smoker     Packs/day: 0 00     Last attempt to quit: 2019     Years since quittin 5    Smokeless tobacco: Never Used   Substance Use Topics    Alcohol use: No    Drug use: No        Review of Systems   Constitutional: Positive for chills and fever  HENT: Positive for congestion  Negative for sore throat  Respiratory: Positive for cough  Negative for chest tightness and shortness of breath  Cardiovascular: Negative for chest pain  Gastrointestinal: Positive for nausea  Negative for abdominal pain, diarrhea and vomiting  Genitourinary: Positive for flank pain  Negative for difficulty urinating and dysuria  Musculoskeletal: Positive for myalgias  Negative for back pain  Skin: Negative for rash  Neurological: Positive for headaches  All other systems reviewed and are negative  Physical Exam  Physical Exam   Constitutional: She appears well-developed and well-nourished  She is cooperative  Non-toxic appearance  She does not have a sickly appearance  She does not appear ill  No distress  HENT:   Head: Normocephalic and atraumatic  Right Ear: Hearing and tympanic membrane normal  No drainage or swelling  Left Ear: Hearing and tympanic membrane normal  No drainage or swelling  Mouth/Throat: Uvula is midline, oropharynx is clear and moist and mucous membranes are normal  No oropharyngeal exudate  Eyes: Conjunctivae and lids are normal  Right eye exhibits no discharge   Left eye exhibits no discharge  Neck: Trachea normal and normal range of motion  No JVD present  Cardiovascular: Regular rhythm, normal heart sounds, intact distal pulses and normal pulses  Tachycardia present  Exam reveals no gallop and no friction rub  No murmur heard  Pulmonary/Chest: Effort normal and breath sounds normal  No stridor  No respiratory distress  She has no wheezes  She has no rales  Abdominal: Soft  Normal appearance  There is no tenderness  There is no rebound, no guarding and no CVA tenderness  Mild LLQ tenderness   Musculoskeletal: Normal range of motion  She exhibits no edema  Lymphadenopathy:     She has no cervical adenopathy  Neurological: She is alert  She has normal strength  She exhibits normal muscle tone  GCS eye subscore is 4  GCS verbal subscore is 5  GCS motor subscore is 6  Skin: Skin is warm, dry and intact  No rash noted  She is not diaphoretic  No pallor  Psychiatric: She has a normal mood and affect  Her speech is normal  Cognition and memory are normal    Nursing note and vitals reviewed        Vital Signs  ED Triage Vitals [12/07/19 0924]   Temperature Pulse Respirations Blood Pressure SpO2   (!) 101 5 °F (38 6 °C) (!) 123 18 110/67 97 %      Temp Source Heart Rate Source Patient Position - Orthostatic VS BP Location FiO2 (%)   Oral Monitor Lying Right arm --      Pain Score       7           Vitals:    12/07/19 0924 12/07/19 1045 12/07/19 1128 12/07/19 1236   BP: 110/67 107/51 93/55 94/53   Pulse: (!) 123 101 104 101   Patient Position - Orthostatic VS: Lying  Lying Sitting         Visual Acuity      ED Medications  Medications   sodium chloride 0 9 % infusion (0 mL/hr Intravenous Stopped 12/7/19 1236)   ketorolac (TORADOL) injection 15 mg (15 mg Intravenous Not Given 12/7/19 1040)   sodium chloride 0 9 % bolus 1,000 mL (0 mL Intravenous Stopped 12/7/19 1103)   ondansetron (ZOFRAN) injection 4 mg (4 mg Intravenous Given 12/7/19 0956)   acetaminophen (TYLENOL) tablet 650 mg (650 mg Oral Given 12/7/19 0954)   iohexol (OMNIPAQUE) 350 MG/ML injection (MULTI-DOSE) 100 mL (100 mL Intravenous Given 12/7/19 1146)       Diagnostic Studies  Results Reviewed     Procedure Component Value Units Date/Time    Urine Microscopic [[id number]]  (Abnormal) Collected:  12/07/19 1046    Lab Status:  Final result Specimen:  Urine, Other Updated:  12/07/19 1106     RBC, UA None Seen /hpf      WBC, UA 4-10 /hpf      Epithelial Cells Occasional /hpf      Bacteria, UA Occasional /hpf      MUCUS THREADS Moderate    Protime-INR [[id number]]  (Normal) Collected:  12/07/19 1032    Lab Status:  Final result Specimen:  Blood from Arm, Left Updated:  12/07/19 1100     Protime 14 1 seconds      INR 1 08    APTT [[id number]]  (Normal) Collected:  12/07/19 1032    Lab Status:  Final result Specimen:  Blood from Arm, Left Updated:  12/07/19 1100     PTT 32 seconds     UA w Reflex to Microscopic w Reflex to Culture [[id number]]  (Abnormal) Collected:  12/07/19 1046    Lab Status:  Final result Specimen:  Urine, Other Updated:  12/07/19 1058     Color, UA Yellow     Clarity, UA Slightly Cloudy     Specific [place], UA 1 020     pH, UA 5 5     Leukocytes, UA Negative     Nitrite, UA Negative     Protein, UA Trace mg/dl      Glucose, UA Negative mg/dl      Ketones, UA Negative mg/dl      Urobilinogen, UA 1 0 E U /dl      Bilirubin, UA Negative     Blood, UA Trace-Intact    Influenza A/B and RSV PCR [[id number]]  (Abnormal) Collected:  12/07/19 0949    Lab Status:  Final result Specimen:  Nose Updated:  12/07/19 1052     INFLUENZA A PCR None Detected     INFLUENZA B PCR Detected     RSV PCR None Detected    POCT pregnancy, urine [[id number]]  (Normal) Resulted:  12/07/19 1049    Lab Status:  Final result Updated:  12/07/19 1050     EXT PREG TEST UR (Ref: Negative) NEGATIVE     Control VALID    Blood culture #2 [[id number]] Collected:  12/07/19 1032    Lab Status:   In process Specimen:  Blood from Arm, Left Updated:  12/07/19 1036    Lactic acid x2 [[id number]]  (Normal) Collected:  12/07/19 0935    Lab Status:  Final result Specimen:  Blood from Arm, Right Updated:  12/07/19 1015     LACTIC ACID 1 1 mmol/L     Narrative:       Result may be elevated if tourniquet was used during collection      Comprehensive metabolic panel [[id number]]  (Abnormal) Collected:  12/07/19 0935    Lab Status:  Final result Specimen:  Blood from Arm, Right Updated:  12/07/19 1013     Sodium 139 mmol/L      Potassium 3 3 mmol/L      Chloride 102 mmol/L      CO2 26 mmol/L      ANION GAP 11 mmol/L      BUN 9 mg/dL      Creatinine 0 71 mg/dL      Glucose 110 mg/dL      Calcium 8 7 mg/dL      AST 17 U/L      ALT 18 U/L      Alkaline Phosphatase 85 U/L      Total Protein 8 3 g/dL      Albumin 4 1 g/dL      Total Bilirubin 0 24 mg/dL      eGFR 115 ml/min/1 73sq m     Narrative:       National Kidney Disease Foundation guidelines for Chronic Kidney Disease (CKD):     Stage 1 with normal or high GFR (GFR > 90 mL/min/1 73 square meters)    Stage 2 Mild CKD (GFR = 60-89 mL/min/1 73 square meters)    Stage 3A Moderate CKD (GFR = 45-59 mL/min/1 73 square meters)    Stage 3B Moderate CKD (GFR = 30-44 mL/min/1 73 square meters)    Stage 4 Severe CKD (GFR = 15-29 mL/min/1 73 square meters)    Stage 5 End Stage CKD (GFR <15 mL/min/1 73 square meters)  Note: GFR calculation is accurate only with a steady state creatinine    CBC and differential [[id number]]  (Abnormal) Collected:  12/07/19 0935    Lab Status:  Final result Specimen:  Blood from Arm, Right Updated:  12/07/19 0955     WBC 4 15 Thousand/uL      RBC 5 02 Million/uL      Hemoglobin 11 2 g/dL      Hematocrit 39 0 %      MCV 78 fL      MCH 22 3 pg      MCHC 28 7 g/dL      RDW 15 6 %      MPV 11 1 fL      Platelets 289 Thousands/uL      nRBC 0 /100 WBCs      Neutrophils Relative 75 %      Immat GRANS % 0 %      Lymphocytes Relative 18 %      Monocytes Relative 7 %      Eosinophils Relative 0 %      Basophils Relative 0 %      Neutrophils Absolute 3 07 Thousands/µL      Immature Grans Absolute 0 01 Thousand/uL      Lymphocytes Absolute 0 76 Thousands/µL      Monocytes Absolute 0 30 Thousand/µL      Eosinophils Absolute 0 00 Thousand/µL      Basophils Absolute 0 01 Thousands/µL     Procalcitonin [[id number]] Collected:  12/07/19 0935    Lab Status: In process Specimen:  Blood from Arm, Right Updated:  12/07/19 0952    Blood culture #1 [[id number]] Collected:  12/07/19 0935    Lab Status: In process Specimen:  Blood from Arm, Right Updated:  12/07/19 0952                 CT abdomen pelvis with contrast   Final Result by Elsa Leon MD (12/07 1211)      No hydronephrosis      No obstructing calculi      Thickened endometrium measuring about 2 cm, this can be evaluated with ultrasound  This was also seen on the previous CT from September 13, 2019   The study was marked in [facility] for significant notification  Workstation performed: [id number]         XR chest 2 views   ED Interpretation by Bry Sahu MD (12/07 1029)   I have personally reviewed the x-ray and my findings are: no acute disease  Procedures  Procedures         ED Course                               MDM  Number of Diagnoses or Management Options  Abnormal CT of the abdomen:   Influenza:   Diagnosis management comments: CT results were reviewed with the patient in the need for follow-up specifically with the [facility]  The patient has positive test is influenza which could fit the diagnosis  She does not have signs or symptoms necessarily of urinary tract infection and only has 4-10 white cells  Urine will get cultured but I believe that her diagnosis is most likely influenza  The sudden onset of fever last night will place on Tamiflu  She is not hypoxic  Vital signs have improved after treatment         Amount and/or Complexity of Data Reviewed  Clinical lab tests: reviewed and ordered  Tests in the radiology section of CPT®: ordered and reviewed  Independent visualization of images, tracings, or specimens: yes    Patient Progress  Patient progress: stable        Disposition  Final diagnoses:   Influenza   Abnormal CT of the abdomen     Time reflects when diagnosis was documented in both MDM as applicable and the Disposition within this note     Time User Action Codes Description Comment    12/7/2019 12:28 PM Johan Bernardo Add [J11 1] Influenza     12/7/2019 12:28 PM Johan Bernardo Add [R93 5] Abnormal CT of the abdomen       ED Disposition     ED Disposition Condition Date/Time Comment    Discharge Stable Sat Dec 7, 2019 12:27  [address] discharge to home/self care  Follow-up Information     Follow up With Specialties Details Why Contact Info Additional [address] [place] [address] Obstetrics and Gynecology Schedule an appointment as soon as possible for a visit in 1 week for follow up of abnormal CT [address], [address] [address]  [facility], [address], [address] [place], [place], [address] [facility] Family Medicine Schedule an appointment as soon as possible for a visit in 1 week for primary care [address], [facility] [address] [address]  [address], [address], [address] [place], [place], [address]          Discharge Medication List as of 12/7/2019 12:29 PM      START taking these medications    Details   oseltamivir (TAMIFLU) 75 mg capsule Take 1 capsule (75 mg total) by mouth every 12 (twelve) hours for 5 days, Starting Sat 12/7/2019, Until Thu 12/12/2019, Print           No discharge procedures on file      ED Provider  Electronically Signed by           Merlin Duncan MD  12/07/19 1257

## 2019-12-12 LAB
BACTERIA BLD CULT: NORMAL
BACTERIA BLD CULT: NORMAL

## 2019-12-13 ENCOUNTER — TELEPHONE (OUTPATIENT)
Dept: OBGYN CLINIC | Facility: CLINIC | Age: 29
End: 2019-12-13

## 2019-12-21 ENCOUNTER — HOSPITAL ENCOUNTER (EMERGENCY)
Facility: HOSPITAL | Age: 29
Discharge: HOME | End: 2019-12-21
Attending: EMERGENCY MEDICINE | Admitting: EMERGENCY MEDICINE
Payer: COMMERCIAL

## 2019-12-21 ENCOUNTER — APPOINTMENT (EMERGENCY)
Dept: RADIOLOGY | Facility: HOSPITAL | Age: 29
End: 2019-12-21
Payer: COMMERCIAL

## 2019-12-21 VITALS
SYSTOLIC BLOOD PRESSURE: 95 MMHG | OXYGEN SATURATION: 100 % | HEART RATE: 61 BPM | WEIGHT: 121.47 LBS | RESPIRATION RATE: 18 BRPM | TEMPERATURE: 98.8 F | BODY MASS INDEX: 23.72 KG/M2 | DIASTOLIC BLOOD PRESSURE: 57 MMHG

## 2019-12-21 DIAGNOSIS — J06.9 URI (UPPER RESPIRATORY INFECTION): ICD-10-CM

## 2019-12-21 DIAGNOSIS — R07.89 LEFT-SIDED CHEST WALL PAIN: Primary | ICD-10-CM

## 2019-12-21 PROCEDURE — 71101 X-RAY EXAM UNILAT RIBS/CHEST: CPT

## 2019-12-21 PROCEDURE — 99283 EMERGENCY DEPT VISIT LOW MDM: CPT | Performed by: EMERGENCY MEDICINE

## 2019-12-21 PROCEDURE — 99284 EMERGENCY DEPT VISIT MOD MDM: CPT

## 2019-12-21 NOTE — ED PROVIDER NOTES
Emergency Department Note- Jade Salas 34 y o  female MRN: [id number]    Unit/Bed#: ED 25 Encounter: [id number]        History of Present Illness     59-year-old female, 3 days ago noticed that she started to have some discomfort in her left lower anterior chest, worse with twisting or bending  It is also worse if she coughs  She feels pretty much okay when she is not doing these movements  This is what is meant by flank pain in the nurse's notes  About 2 days prior she began having some nasal congestion, slight sore throat, slight hoarse voice and lost her voice somewhat  No 1 else sick with similar symptoms, no recent travel history  She has no dysuria or hematuria  Says she has had kidney stones in the past and this does not feel like her prior kidney stones  Patient denies any prolonged travel history, no recent long travel, no immobilizations, or hospitalizations  REVIEW OF SYSTEMS     Constitutional:  No recent weight  gains or losses   Eyes:  No visual changes   ENT:  No tinnitus or hearing changes   Cardiac: No chest pain or palpitations   Respiratory:  As per HPI   Abdominal:  As per HPI   Urinary: No dysuria or hematuria   Hematologic: No easy bruising or bleeding   Skin: No rash   Musculoskeletal: No aches or pains   Neurologic: No weakness or sensory changes   Psychiatric: No mood changes      Historical Information   Past Medical History:   Diagnosis Date    Anemia     taking iron pills BID     Chronic kidney disease     hydronephrosis    History of transfusion     Kidney stone     Sepsis ([address]     Septic shock ([address]     Urinary tract infection     taking antibioitcs     UTI (urinary tract infection) during pregnancy     Vagina, candidiasis     Varicella     as child     Past Surgical History:   Procedure Laterality Date     SECTION N/A 2018    Procedure:  SECTION ();   Surgeon: Leydi Kline MD;  Location: [facility];  Service: Obstetrics  CYSTOSCOPY  2018    with right uretral stent    [place] CYSTO/URETERO W/LITHOTRIPSY &INDWELL STENT INSRT Right 2018    Procedure: CYSTOSCOPY URETEROSCOPY WITH  RETROGRADE PYELOGRAM AND exchange  STENT URETERAl;  Surgeon: Iris Boston MD;  Location: [address] [place] [address] MAIN OR;  Service: Urology    [place] CYSTOURETHROSCOPY,URETER CATHETER Right 2018    Procedure: CYSTOSCOPY RETROGRADE PYELOGRAM WITH INSERTION STENT URETERAL;  Surgeon: Iris Boston MD;  Location: [facility] OR;  Service: Urology    TONSILLECTOMY      TONSILLECTOMY       Social History   Social History     Substance and Sexual Activity   Alcohol Use No     Social History     Substance and Sexual Activity   Drug Use No     Social History     Tobacco Use   Smoking Status Former Smoker    Packs/day: 0 00    Last attempt to quit: 2019    Years since quittin 5   Smokeless Tobacco Never Used     Family History:   Family History   Problem Relation Age of Onset    No Known Problems Mother     Diabetes Father     Heart defect Brother         3 open heart surgeries    Heart failure Family         Congestive    Diabetes Maternal Grandmother        MEDICATIONS:  No current facility-administered medications on file prior to encounter  No current outpatient medications on file prior to encounter  ALLERGIES:  No Known Allergies    Vitals: Blood pressure 107/69, pulse 103, temperature 98 8 °F (37 1 °C), temperature source Temporal, resp  rate 18, weight 55 1 kg (121 lb 7 6 oz), SpO2 98 %, not currently breastfeeding  PHYSICAL EXAM    General:  Patient is well-appearing  Head:  Atraumatic  Eyes:  Conjunctiva pink  ENT:  Mucous membranes are moist  Neck:  Supple  Cardiac:  S1-S2, without murmurs, she has some left lower anterior chest wall tenderness both over the ribs and the intercostal muscles, there is no crepitus, no flail segment, no paradoxical motion, no rash    Lungs:  Clear to auscultation bilaterally  Abdomen:  Soft, nontender, normal bowel sounds, no CVA tenderness, no tympany, no rigidity, no guarding  Extremities:  Normal range of motion, no pedal edema or calf asymmetry  Neurologic:  Awake, fluent speech, normal comprehension  AAOx3  Skin:  Pink warm and dry  Psychiatric:  Alert, pleasant, cooperative            Labs Reviewed - No data to display    Medications - No data to display    XR ribs left w pa chest min 3 views   ED Interpretation   No fx, no acute cardiopulmonary disease          Vitals:    12/21/19 1814   BP: 107/69   TempSrc: Temporal   Pulse: 103   Resp: 18   Patient Position - Orthostatic VS: Sitting   Temp: 98 8 °F (37 1 °C)       ED Course as of Dec 21 1937   Sat Dec 21, 2019   1935 On reassessment there was no change from the above physical exam findings  Assessment/Plan     ED Medical Decision Making:    I do not believe this patient's complaints are from pulmonary embolism and I believe they would most likely be harmed through false positive test results and other complications of testing by further pursuing the diagnosis of pulmonary embolism  Supportive care, importance of follow-up and return precautions were discussed with the patient  Patient expressed understanding  Time reflects when diagnosis was documented in both MDM as applicable and the Disposition within this note     Time User Action Codes Description Comment    12/21/2019  7:35 PM Sindhu Linder Add [R07 89] Left-sided chest wall pain     12/21/2019  7:35 PM Sindhu Linder Add [J06 9] URI (upper respiratory infection)       ED Disposition     ED Disposition Condition Date/Time Comment    Discharge Stable Sat Dec 21, 2019  7:35  [address] discharge to home/self care              Follow-up Information     Follow up With Specialties Details Why Nayana Rao MD Family Medicine Go in 2 days Keep your scheduled appointment [address]  [address] [facility]  [place] [address]  [phone]            New Prescriptions    No medications on file            Black Jewell DO  12/21/19 0904

## 2019-12-22 NOTE — DISCHARGE INSTRUCTIONS
Chest Wall Pain   WHAT YOU NEED TO KNOW:   DISCHARGE INSTRUCTIONS:   Call 911 if:   You have any of the following signs of a heart attack:      Squeezing, pressure, or pain in your chest that lasts longer than 5 minutes or returns    Discomfort or pain in your back, neck, jaw, stomach, or arm     Trouble breathing    Nausea or vomiting    Lightheadedness or a sudden cold sweat, especially with chest pain or trouble breathing    Return to the emergency department if:   You have severe pain  Contact your healthcare provider if:   You develop a rash  You have other new symptoms  Your pain does not improve, even with treatment  You have questions or concerns about your condition or care  Follow up with your healthcare provider as directed:  Write down your questions so you remember to ask them during your visits  Self-care:   Rest  as needed  Avoid activities that make your chest wall pain worse  Apply ice  on your chest for 15 to 20 minutes every hour or as directed  Use an ice pack, or put crushed ice in a plastic bag  Cover it with a towel  Ice helps prevent tissue damage and decreases swelling and pain

## 2020-05-26 ENCOUNTER — APPOINTMENT (EMERGENCY)
Dept: CT IMAGING | Facility: HOSPITAL | Age: 30
End: 2020-05-26
Payer: COMMERCIAL

## 2020-05-26 ENCOUNTER — HOSPITAL ENCOUNTER (EMERGENCY)
Facility: HOSPITAL | Age: 30
Discharge: HOME | End: 2020-05-26
Attending: EMERGENCY MEDICINE | Admitting: EMERGENCY MEDICINE
Payer: COMMERCIAL

## 2020-05-26 VITALS
OXYGEN SATURATION: 100 % | WEIGHT: 134.04 LBS | SYSTOLIC BLOOD PRESSURE: 104 MMHG | TEMPERATURE: 99.2 F | DIASTOLIC BLOOD PRESSURE: 58 MMHG | BODY MASS INDEX: 26.18 KG/M2 | HEART RATE: 94 BPM | RESPIRATION RATE: 18 BRPM

## 2020-05-26 DIAGNOSIS — S00.33XA NASAL CONTUSION: ICD-10-CM

## 2020-05-26 DIAGNOSIS — H11.30 SUBCONJUNCTIVAL HEMATOMA: ICD-10-CM

## 2020-05-26 DIAGNOSIS — S00.83XA CONTUSION OF FACE: Primary | ICD-10-CM

## 2020-05-26 DIAGNOSIS — S09.90XA HEAD INJURY: ICD-10-CM
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
## 2020-05-26 DIAGNOSIS — T14.8XXA CONTUSION: ICD-10-CM

## 2020-05-26 LAB
BACTERIA UR QL AUTO: ABNORMAL /HPF
BILIRUB UR QL STRIP: NEGATIVE
CLARITY UR: CLEAR
COLOR UR: YELLOW
EXT PREG TEST URINE: NEGATIVE
EXT. CONTROL ED NAV: NORMAL
GLUCOSE UR STRIP-MCNC: NEGATIVE MG/DL
HGB UR QL STRIP.AUTO: ABNORMAL
KETONES UR STRIP-MCNC: NEGATIVE MG/DL
LEUKOCYTE ESTERASE UR QL STRIP: ABNORMAL
NITRITE UR QL STRIP: NEGATIVE
NON-SQ EPI CELLS URNS QL MICRO: ABNORMAL /HPF
PH UR STRIP.AUTO: 7 [PH] (ref 4.5–8)
PROT UR STRIP-MCNC: ABNORMAL MG/DL
RBC #/AREA URNS AUTO: ABNORMAL /HPF
SP GR UR STRIP.AUTO: 1.02 (ref 1–1.03)
UROBILINOGEN UR QL STRIP.AUTO: 0.2 E.U./DL
WBC #/AREA URNS AUTO: ABNORMAL /HPF

## 2020-05-26 PROCEDURE — 99285 EMERGENCY DEPT VISIT HI MDM: CPT | Performed by: EMERGENCY MEDICINE

## 2020-05-26 PROCEDURE — 96374 THER/PROPH/DIAG INJ IV PUSH: CPT

## 2020-05-26 PROCEDURE — 74176 CT ABD & PELVIS W/O CONTRAST: CPT

## 2020-05-26 PROCEDURE — 81025 URINE PREGNANCY TEST: CPT | Performed by: EMERGENCY MEDICINE

## 2020-05-26 PROCEDURE — 70450 CT HEAD/BRAIN W/O DYE: CPT

## 2020-05-26 PROCEDURE — 71250 CT THORAX DX C-: CPT

## 2020-05-26 PROCEDURE — 72125 CT NECK SPINE W/O DYE: CPT

## 2020-05-26 PROCEDURE — 81001 URINALYSIS AUTO W/SCOPE: CPT

## 2020-05-26 PROCEDURE — 99284 EMERGENCY DEPT VISIT MOD MDM: CPT

## 2020-05-26 PROCEDURE — 70486 CT MAXILLOFACIAL W/O DYE: CPT

## 2020-05-26 RX ORDER — KETOROLAC TROMETHAMINE 30 MG/ML
30 INJECTION, SOLUTION INTRAMUSCULAR; INTRAVENOUS ONCE
Status: COMPLETED | OUTPATIENT
Start: 2020-05-26 | End: 2020-05-26

## 2020-05-26 RX ORDER — IBUPROFEN 800 MG/1
800 TABLET ORAL EVERY 8 HOURS PRN
Qty: 30 TABLET | Refills: 0 | Status: SHIPPED | OUTPATIENT
Start: 2020-05-26 | End: 2020-05-26 | Stop reason: SDUPTHER

## 2020-05-26 RX ORDER — LIDOCAINE 50 MG/G
1 PATCH TOPICAL EVERY 24 HOURS
Qty: 30 PATCH | Refills: 0 | Status: SHIPPED | OUTPATIENT
Start: 2020-05-26 | End: 2020-05-26 | Stop reason: SDUPTHER

## 2020-05-26 RX ORDER — IBUPROFEN 800 MG/1
800 TABLET ORAL EVERY 8 HOURS PRN
Qty: 30 TABLET | Refills: 0 | Status: SHIPPED | OUTPATIENT
Start: 2020-05-26 | End: 2020-09-29

## 2020-05-26 RX ORDER — ACETAMINOPHEN 325 MG/1
650 TABLET ORAL ONCE
Status: COMPLETED | OUTPATIENT
Start: 2020-05-26 | End: 2020-05-26

## 2020-05-26 RX ORDER — LIDOCAINE 50 MG/G
1 PATCH TOPICAL EVERY 24 HOURS
Qty: 30 PATCH | Refills: 0 | Status: SHIPPED | OUTPATIENT
Start: 2020-05-26 | End: 2020-09-29

## 2020-05-26 RX ADMIN — KETOROLAC TROMETHAMINE 30 MG: 30 INJECTION, SOLUTION INTRAMUSCULAR at 01:22

## 2020-05-26 RX ADMIN — ACETAMINOPHEN 650 MG: 325 TABLET, FILM COATED ORAL at 01:15

## 2020-08-25 ENCOUNTER — TELEPHONE (OUTPATIENT)
Dept: OBGYN CLINIC | Facility: CLINIC | Age: 30
End: 2020-08-25

## 2020-09-01 ENCOUNTER — TELEPHONE (OUTPATIENT)
Dept: OBGYN CLINIC | Facility: CLINIC | Age: 30
End: 2020-09-01

## 2020-09-02 ENCOUNTER — OFFICE VISIT (OUTPATIENT)
Dept: OBGYN CLINIC | Facility: CLINIC | Age: 30
End: 2020-09-02

## 2020-09-02 VITALS
SYSTOLIC BLOOD PRESSURE: 99 MMHG | HEART RATE: 92 BPM | WEIGHT: 134.2 LBS | TEMPERATURE: 97.6 F | DIASTOLIC BLOOD PRESSURE: 66 MMHG | BODY MASS INDEX: 26.21 KG/M2

## 2020-09-02 DIAGNOSIS — D50.9 IRON DEFICIENCY ANEMIA, UNSPECIFIED IRON DEFICIENCY ANEMIA TYPE: ICD-10-CM

## 2020-09-02 DIAGNOSIS — D64.9 ANEMIA, UNSPECIFIED TYPE: Primary | ICD-10-CM

## 2020-09-02 DIAGNOSIS — N39.3 STRESS INCONTINENCE OF URINE: ICD-10-CM

## 2020-09-02 DIAGNOSIS — Z30.09 STERILIZATION CONSULT: ICD-10-CM

## 2020-09-02 PROBLEM — B37.31 VAGINAL CANDIDIASIS: Status: RESOLVED | Noted: 2018-08-07 | Resolved: 2020-09-02

## 2020-09-02 PROBLEM — R10.9 ABDOMINAL PAIN IN PREGNANCY: Status: RESOLVED | Noted: 2018-08-07 | Resolved: 2020-09-02

## 2020-09-02 PROBLEM — O26.899 ABDOMINAL PAIN IN PREGNANCY: Status: RESOLVED | Noted: 2018-08-07 | Resolved: 2020-09-02

## 2020-09-02 PROBLEM — O09.33 NO PRENATAL CARE IN CURRENT PREGNANCY IN THIRD TRIMESTER: Status: RESOLVED | Noted: 2018-08-07 | Resolved: 2020-09-02

## 2020-09-02 PROBLEM — R65.21 SEVERE SEPSIS WITH SEPTIC SHOCK (CODE) (HCC): Status: RESOLVED | Noted: 2018-11-06 | Resolved: 2020-09-02

## 2020-09-02 PROBLEM — O23.43 URINARY TRACT INFECTION IN MOTHER DURING THIRD TRIMESTER OF PREGNANCY: Status: RESOLVED | Noted: 2018-08-07 | Resolved: 2020-09-02

## 2020-09-02 PROBLEM — B37.3 VAGINAL CANDIDIASIS: Status: RESOLVED | Noted: 2018-08-07 | Resolved: 2020-09-02

## 2020-09-02 PROBLEM — Z3A.38 38 WEEKS GESTATION OF PREGNANCY: Status: RESOLVED | Noted: 2018-08-07 | Resolved: 2020-09-02

## 2020-09-02 PROBLEM — O99.019 ANEMIA DURING PREGNANCY: Status: RESOLVED | Noted: 2017-09-14 | Resolved: 2020-09-02

## 2020-09-02 PROBLEM — O09.892 SHORT INTERVAL BETWEEN PREGNANCIES COMPLICATING PREGNANCY, ANTEPARTUM, SECOND TRIMESTER: Status: RESOLVED | Noted: 2018-08-15 | Resolved: 2020-09-02

## 2020-09-02 PROCEDURE — 99213 OFFICE O/P EST LOW 20 MIN: CPT | Performed by: OBSTETRICS & GYNECOLOGY

## 2020-09-02 NOTE — ASSESSMENT & PLAN NOTE
Discussed with pt LARC methods of contraception including intrauterine device, Nexplanon, and Depo Provera in addition to surgical sterilization  Discussed the risks, benefits, and alternatives to each  Discussed that the Nexplanon has been proven to be the most effective form of contraception; pt desires permanence  Discussed that Mirena IUD has excellent bleeding profile that she would be unable to achieve with surgical sterilization alone; pt is uninterested in bleeding profile  Discussed that surgical sterilization is a PERMANENT and IRREVERSIBLE surgical procedure; pt demonstrated understanding and continued desire to proceed  Patient is okay if she does not have any more children, even if she dates a different partner or loses her children      Discussed risks of surgical procedures, including risk of bleeding, blood clots, infection, injury to soft tissue or surrounding organs, permanent disability, or even death  Discussed procedure in detail  Discussed the various methods of surgical sterilization including tubal ligation and salpingectomy, and that route of procedure will be dependent on the physician covering the procedure  Pt demonstrated understanding and expressed a desire for salpingectomy as the preferred method  Pt had opportunity to ask questions and all questions were answered to patient's satisfaction  Sterilization consent form signed today  Patient will await approval for surgery from the surgical committee and then surgery will be scheduled   She will be called to RTO 1-2 prior to scheduled surgery for H&P

## 2020-09-02 NOTE — ASSESSMENT & PLAN NOTE
- Patient briefly mentioned occasional symptoms of stress urinary incontinence     - Recommended kegel exercises and will reassess

## 2020-09-02 NOTE — PATIENT INSTRUCTIONS
Salpingectomy   AMBULATORY CARE:   A salpingectomy  is surgery to remove one or both of your fallopian tubes  The fallopian tubes carry eggs from the ovaries to the uterus  They are part of a woman's reproductive system  A salpingectomy may be done to treat an ectopic pregnancy, cancer, endometriosis, or an infection  It may also be done to prevent pregnancy or some types of cancer  How to prepare for a salpingectomy:   · Your healthcare provider will talk to you about how to prepare for surgery  He may tell you not to eat or drink anything after midnight on the day of your surgery  He will tell you what medicines to take or not take on the day of your surgery  You may need to stop taking blood thinners, aspirin, and NSAIDs several days before surgery  This may prevent bleeding before and after surgery  · You may need blood or urine tests before surgery  You may also need an x-ray, CT scan, or ultrasound before surgery  This will help your healthcare provider plan your surgery  You may be given an antibiotic through your IV to help prevent a bacterial infection  You may need to empty your bladder before surgery  This may help your healthcare provider see your reproductive organs more clearly, and prevent your bladder from being injured  What will happen during a salpingectomy:   · You will be given general anesthesia to keep you asleep and free from pain during surgery  Your healthcare provider may remove your fallopian tubes laparoscopically or through an open approach with one larger incision  If done laparoscopically, your healthcare provider will make one or more small incisions in your abdomen  Your healthcare provider will put a laparoscope and other tools into your abdomen through the incisions  A laparoscope is a long metal tube with a light and camera on the end  The abdomen will then be inflated with a gas (carbon dioxide)   This lifts your abdomen away from your organs and gives your healthcare provider more space to work  · In both types of procedures, tools will be used to cut and remove one or both of your fallopian tubes  Your healthcare provider will close the incisions with medical glue, tape, or stitches, and cover them with a bandage  A vaginal pack or sanitary pad may be used to absorb the bleeding  A vaginal pack is a special gauze that is inserted into the vagina and removed before you go home or to a hospital room  What will happen after a salpingectomy:  Healthcare providers will monitor you until you are awake  You may have bleeding and discharge from your vagina for several days  If your surgery was done laparoscopically, you may also feel pain in your shoulder or back  This is caused by the air that is put into your abdomen during laparoscopic surgery  You may be able to go home or you may need to spend the night in the hospital  Walk around as soon as possible after surgery to prevent blood clots  Risks of a salpingectomy:  You may bleed more than expected or get an infection  Your ovaries, uterus, cervix, vagina, intestines, or bladder may be damaged during surgery  You may get a blood clot in your arm or leg  This may become life-threatening  You may have a hard time getting pregnant if your remaining fallopian tube does not work correctly  If both tubes are removed, you may still be at risk for an ectopic pregnancy  Call 911 for any of the following:   · You feel lightheaded, short of breath, and have chest pain  · You cough up blood  · You have trouble breathing  Seek care immediately if:   · Your arm or leg feels warm, tender, and painful  It may look swollen and red  · Blood soaks through your bandage  · Your stitches come apart  · You soak through 1 sanitary pad in 1 hour  · You have trouble urinating or cannot urinate at all  Contact your healthcare provider if:   · You have a fever or chills      · Your wound is red, swollen, or draining pus     · You have pus or a foul-smelling odor coming from your vagina  · Your pain does not get better after you take your medicine  · You have nausea or are vomiting  · Your skin is itchy, swollen, or you have a rash  · You have questions or concerns about your condition or care  Medicines: You may need any of the following:  · Prescription pain medicine  may be given  Ask your healthcare provider how to take this medicine safely  · NSAIDs , such as ibuprofen, help decrease swelling, pain, and fever  NSAIDs can cause stomach bleeding or kidney problems in certain people  If you take blood thinner medicine, always ask your healthcare provider if NSAIDs are safe for you  Always read the medicine label and follow directions  · Take your medicine as directed  Contact your healthcare provider if you think your medicine is not helping or if you have side effects  Tell him or her if you are allergic to any medicine  Keep a list of the medicines, vitamins, and herbs you take  Include the amounts, and when and why you take them  Bring the list or the pill bottles to follow-up visits  Carry your medicine list with you in case of an emergency  Care for your wound as directed:  Ask your healthcare provider when your wound can get wet  Do not take a bath until your healthcare provider says it is okay  Take a shower only  Carefully wash around the wound with soap and water  Let the soap and water gently run over your incision  Do not  scrub your incision  Dry the area and put on new, clean bandages as directed  Change your bandages when they get wet or dirty  If you have strips of medical tape, let them fall off on their own  Activity:  Ask your healthcare provider when you can return to your normal activities  Do not douche, use tampons, or have sex until your healthcare provider says it is okay  These activities may cause infection   Do not exercise or lift anything heavy until your healthcare provider says it is okay  This may put too much stress on your incision  Follow up with your healthcare provider as directed:  Write down your questions so you remember to ask them during your visits  © 2017 [employer] Information is for End User's use only and may not be sold, redistributed or otherwise used for commercial purposes  All illustrations and images included in CareNotes® are the copyrighted property of [employer]  or [employer] Castro  The above information is an  only  It is not intended as medical advice for individual conditions or treatments  Talk to your doctor, nurse or pharmacist before following any medical regimen to see if it is safe and effective for you

## 2020-09-02 NOTE — PROGRESS NOTES
Subjective:     Jane Mathews is a 27 y o  [id number] female who presents to discuss sterilization  She has 5 healthy children  She had 4 vaginal deliveries followed by urgent primary [facility] in 5206 that was complicated by ICU admission in septic shock due to pyelonephritis caused by nephrolithiasis  She is therefore very worried about getting pregnant again  She is not currently using any form of birth control  She states that her baby is doing well  Patient Active Problem List   Diagnosis    Bacteriuria    Ureteral calculi    Iron deficiency anemia    Sterilization consult    Stress incontinence of urine     Past Medical History:   Diagnosis Date    Anemia     taking iron pills BID     Chronic kidney disease     hydronephrosis    History of transfusion     Kidney stone     [facility] [address]     [facility] [address] [facility]     Urinary tract infection     taking antibioitcs     UTI (urinary tract infection) during pregnancy     Vagina, candidiasis     Varicella     as child       Objective:    Vitals: Blood pressure 99/66, pulse 92, temperature 97 6 °F (36 4 °C), weight 60 9 kg (134 lb 3 2 oz), last menstrual period 09/02/2020, not currently breastfeeding  Body mass index is 26 21 kg/m²  Physical Exam  Vitals signs reviewed  Constitutional:       General: She is not in acute distress  Appearance: Normal appearance  She is well-developed and normal weight  She is not ill-appearing, toxic-appearing or diaphoretic  HENT:      Head: Normocephalic and atraumatic  Cardiovascular:      Rate and Rhythm: Normal rate  Pulmonary:      Effort: Pulmonary effort is normal  No respiratory distress  Genitourinary:     Comments:    Skin:     General: Skin is warm and dry  Neurological:      Mental Status: She is alert and oriented to person, place, and time  Psychiatric:         Mood and Affect: Mood normal          Behavior: Behavior normal          Thought Content:  Thought content normal  Judgment: Judgment normal          Assessment/Plan:    Problem List Items Addressed This Visit        Unprioritized    Iron deficiency anemia     - Patient has history of SHA  She just started taking "One-a-Day" women's vitamins    - She will increase her "greens" intake  - She is concerned that she is anemic    - CBC ordered preoperatively  Sterilization consult     Discussed with pt LARC methods of contraception including intrauterine device, Nexplanon, and Depo Provera in addition to surgical sterilization  Discussed the risks, benefits, and alternatives to each  Discussed that the Nexplanon has been proven to be the most effective form of contraception; pt desires permanence  Discussed that Mirena IUD has excellent bleeding profile that she would be unable to achieve with surgical sterilization alone; pt is uninterested in bleeding profile  Discussed that surgical sterilization is a PERMANENT and IRREVERSIBLE surgical procedure; pt demonstrated understanding and continued desire to proceed  Patient is okay if she does not have any more children, even if she dates a different partner or loses her children      Discussed risks of surgical procedures, including risk of bleeding, blood clots, infection, injury to soft tissue or surrounding organs, permanent disability, or even death  Discussed procedure in detail  Discussed the various methods of surgical sterilization including tubal ligation and salpingectomy, and that route of procedure will be dependent on the physician covering the procedure  Pt demonstrated understanding and expressed a desire for salpingectomy as the preferred method  Pt had opportunity to ask questions and all questions were answered to patient's satisfaction  Sterilization consent form signed today  Patient will await approval for surgery from the surgical committee and then surgery will be scheduled   She will be called to RTO 1-2 prior to scheduled surgery for H&P  Stress incontinence of urine     - Patient briefly mentioned occasional symptoms of stress urinary incontinence     - Recommended kegel exercises and will reassess           Other Visit Diagnoses     Anemia, unspecified type    -  Primary    Relevant Orders    CBC and Platelet            Karen Turner MD  9/2/2020  4:08 PM

## 2020-09-02 NOTE — ASSESSMENT & PLAN NOTE
- Patient has history of SHA  She just started taking "One-a-Day" women's vitamins    - She will increase her "greens" intake  - She is concerned that she is anemic    - CBC ordered preoperatively

## 2020-09-08 ENCOUNTER — OFFICE VISIT (OUTPATIENT)
Dept: OBGYN CLINIC | Facility: CLINIC | Age: 30
End: 2020-09-08

## 2020-09-08 VITALS
BODY MASS INDEX: 26.44 KG/M2 | SYSTOLIC BLOOD PRESSURE: 121 MMHG | DIASTOLIC BLOOD PRESSURE: 76 MMHG | HEART RATE: 88 BPM | WEIGHT: 135.4 LBS

## 2020-09-08 DIAGNOSIS — Z30.09 STERILIZATION CONSULT: Primary | ICD-10-CM

## 2020-09-08 PROCEDURE — 99213 OFFICE O/P EST LOW 20 MIN: CPT | Performed by: NURSE PRACTITIONER

## 2020-09-08 NOTE — PROGRESS NOTES
Assessment/Plan:         Diagnoses and all orders for this visit:    Sterilization consult      Plan  Surgical scheduler will call you to schedule history and physical  And give you the date of your surgical procedure  Call with needs or concerns  Pt verbalized understanding of all discussed  Subjective:      Patient ID: Cheri Mcdaniel is a 27 y o  female  HPI  Pt presented to sign MA 31 form  Pt previously signed surgical consent and was counseled on permanence, Pt states she does not want more children  MA 31 form was signed      The following portions of the patient's history were reviewed and updated as appropriate: allergies, current medications, past family history, past medical history, past social history, past surgical history and problem list     Review of Systems      Pertinent items are note in the HPI      Objective:      /76   Pulse 88   Wt 61 4 kg (135 lb 6 4 oz)   LMP 09/02/2020 (Exact Date)   BMI 26 44 kg/m²          Physical Exam  Constitutional:       Appearance: Normal appearance  Eyes:      General:         Right eye: No discharge  Left eye: No discharge  Neck:      Musculoskeletal: Normal range of motion  Pulmonary:      Effort: Pulmonary effort is normal  No respiratory distress  Neurological:      Mental Status: She is oriented to person, place, and time  Psychiatric:         Mood and Affect: Mood normal          Thought Content:  Thought content normal        Negative fever, cough or SOB

## 2020-09-08 NOTE — PATIENT INSTRUCTIONS
Surgical scheduler will call you to schedule history and physical  And give you the date of your surgical procedure  Call with needs or concerns    COVID-19 Instructions    If you are having any of the following:  Cough   Shortness of breath   Fever  If traveled within past 2 weeks internationally or to high risk [place] states  Or been in contact with someone that has     Please call either:   Your PCP office  -[phone], option [phone]    They will screen you over the phone and direct you to the nearest appropriate testing location    DO NOT go to your PCP or OB office without calling first

## 2020-09-29 ENCOUNTER — APPOINTMENT (EMERGENCY)
Dept: CT IMAGING | Facility: HOSPITAL | Age: 30
End: 2020-09-29
Payer: COMMERCIAL

## 2020-09-29 ENCOUNTER — HOSPITAL ENCOUNTER (EMERGENCY)
Facility: HOSPITAL | Age: 30
Discharge: HOME | End: 2020-09-29
Attending: EMERGENCY MEDICINE
Payer: COMMERCIAL

## 2020-09-29 VITALS
SYSTOLIC BLOOD PRESSURE: 121 MMHG | BODY MASS INDEX: 26.95 KG/M2 | TEMPERATURE: 98.1 F | RESPIRATION RATE: 16 BRPM | WEIGHT: 138.01 LBS | HEART RATE: 85 BPM | DIASTOLIC BLOOD PRESSURE: 52 MMHG | OXYGEN SATURATION: 98 %

## 2020-09-29 DIAGNOSIS — R09.89 FOREIGN BODY SENSATION IN THROAT: Primary | ICD-10-CM

## 2020-09-29 PROCEDURE — 99284 EMERGENCY DEPT VISIT MOD MDM: CPT | Performed by: EMERGENCY MEDICINE

## 2020-09-29 PROCEDURE — 99283 EMERGENCY DEPT VISIT LOW MDM: CPT

## 2020-09-29 PROCEDURE — G1004 CDSM NDSC: HCPCS

## 2020-09-29 PROCEDURE — 70490 CT SOFT TISSUE NECK W/O DYE: CPT

## 2020-09-30 ENCOUNTER — ANESTHESIA (OUTPATIENT)
Dept: GASTROENTEROLOGY | Facility: HOSPITAL | Age: 30
End: 2020-09-30

## 2020-09-30 ENCOUNTER — PREP FOR PROCEDURE (OUTPATIENT)
Dept: OBGYN CLINIC | Facility: CLINIC | Age: 30
End: 2020-09-30

## 2020-09-30 ENCOUNTER — HOSPITAL ENCOUNTER (OUTPATIENT)
Dept: GASTROENTEROLOGY | Facility: HOSPITAL | Age: 30
Setting detail: OUTPATIENT SURGERY
Discharge: HOME | End: 2020-09-30
Attending: INTERNAL MEDICINE | Admitting: INTERNAL MEDICINE
Payer: COMMERCIAL

## 2020-09-30 ENCOUNTER — DOCUMENTATION (OUTPATIENT)
Dept: GASTROENTEROLOGY | Facility: MEDICAL CENTER | Age: 30
End: 2020-09-30

## 2020-09-30 ENCOUNTER — PREP FOR PROCEDURE (OUTPATIENT)
Dept: GASTROENTEROLOGY | Facility: MEDICAL CENTER | Age: 30
End: 2020-09-30

## 2020-09-30 ENCOUNTER — TELEPHONE (OUTPATIENT)
Dept: OBGYN CLINIC | Facility: CLINIC | Age: 30
End: 2020-09-30

## 2020-09-30 ENCOUNTER — ANESTHESIA EVENT (OUTPATIENT)
Dept: GASTROENTEROLOGY | Facility: HOSPITAL | Age: 30
End: 2020-09-30

## 2020-09-30 VITALS
HEART RATE: 77 BPM | HEIGHT: 60 IN | DIASTOLIC BLOOD PRESSURE: 56 MMHG | RESPIRATION RATE: 18 BRPM | TEMPERATURE: 97.4 F | WEIGHT: 135 LBS | OXYGEN SATURATION: 100 % | SYSTOLIC BLOOD PRESSURE: 102 MMHG | BODY MASS INDEX: 26.5 KG/M2

## 2020-09-30 VITALS — HEART RATE: 101 BPM

## 2020-09-30 DIAGNOSIS — R19.8 SENSATION OF FOREIGN BODY IN ESOPHAGUS: Primary | ICD-10-CM

## 2020-09-30 DIAGNOSIS — R19.8 SENSATION OF FOREIGN BODY IN ESOPHAGUS: ICD-10-CM

## 2020-09-30 DIAGNOSIS — Z01.818 PRE-OP TESTING: ICD-10-CM

## 2020-09-30 DIAGNOSIS — Z64.1 MULTIPARITY: Primary | ICD-10-CM

## 2020-09-30 LAB
EXT PREGNANCY TEST URINE: NEGATIVE
EXT. CONTROL: NORMAL

## 2020-09-30 PROCEDURE — 88305 TISSUE EXAM BY PATHOLOGIST: CPT | Performed by: PATHOLOGY

## 2020-09-30 PROCEDURE — 43239 EGD BIOPSY SINGLE/MULTIPLE: CPT | Performed by: INTERNAL MEDICINE

## 2020-09-30 PROCEDURE — 81025 URINE PREGNANCY TEST: CPT | Performed by: ANESTHESIOLOGY

## 2020-09-30 RX ORDER — LIDOCAINE HYDROCHLORIDE 20 MG/ML
INJECTION, SOLUTION EPIDURAL; INFILTRATION; INTRACAUDAL; PERINEURAL AS NEEDED
Status: DISCONTINUED | OUTPATIENT
Start: 2020-09-30 | End: 2020-09-30

## 2020-09-30 RX ORDER — PROPOFOL 10 MG/ML
INJECTION, EMULSION INTRAVENOUS AS NEEDED
Status: DISCONTINUED | OUTPATIENT
Start: 2020-09-30 | End: 2020-09-30

## 2020-09-30 RX ORDER — SODIUM CHLORIDE 9 MG/ML
100 INJECTION, SOLUTION INTRAVENOUS CONTINUOUS
Status: DISCONTINUED | OUTPATIENT
Start: 2020-09-30 | End: 2020-10-04 | Stop reason: HOSPADM

## 2020-09-30 RX ADMIN — PROPOFOL 50 MG: 10 INJECTION, EMULSION INTRAVENOUS at 14:58

## 2020-09-30 RX ADMIN — PROPOFOL 50 MG: 10 INJECTION, EMULSION INTRAVENOUS at 14:57

## 2020-09-30 RX ADMIN — SODIUM CHLORIDE 100 ML/HR: 0.9 INJECTION, SOLUTION INTRAVENOUS at 14:26

## 2020-09-30 RX ADMIN — PROPOFOL 50 MG: 10 INJECTION, EMULSION INTRAVENOUS at 14:55

## 2020-09-30 RX ADMIN — LIDOCAINE HYDROCHLORIDE 100 MG: 20 INJECTION, SOLUTION EPIDURAL; INFILTRATION; INTRACAUDAL; PERINEURAL at 14:54

## 2020-09-30 RX ADMIN — PROPOFOL 100 MG: 10 INJECTION, EMULSION INTRAVENOUS at 14:54

## 2020-09-30 NOTE — TELEPHONE ENCOUNTER
Patient was informed of her surgery date of 10/9/20 and H&P 10/2/20  No Auth Required  Lab slip and EKG will be done after H&P appt

## 2020-09-30 NOTE — ANESTHESIA POSTPROCEDURE EVALUATION
Post-Op Assessment Note    CV Status:  Stable    Pain management: adequate     Mental Status:  Alert and awake   Hydration Status:  Euvolemic   PONV Controlled:  Controlled   Airway Patency:  Patent      Post Op Vitals Reviewed: Yes      Staff: Anesthesiologist         No complications documented      /56 (09/30/20 1523)    Temp     Pulse 77 (09/30/20 1523)   Resp 18 (09/30/20 1523)    SpO2 100 % (09/30/20 1523)

## 2020-09-30 NOTE — ANESTHESIA PREPROCEDURE EVALUATION
Review of Systems/Medical History  Patient summary reviewed  Chart reviewed  No history of anesthetic complications     Cardiovascular  Exercise tolerance (METS): >4 ,  No pacemaker/AICD,    Pulmonary  Negative pulmonary ROS        GI/Hepatic  Negative GI/hepatic ROS          Negative  ROS Kidney stones,        Endo/Other  Negative endo/other ROS      GYN  Not currently pregnant ,     Comment: Recent c section  1 month ago  Secondary to sepsis probably from ureter calculi  Had ureter stent placed at that time  Hematology  Negative hematology ROS Anemia ,     Musculoskeletal  Negative musculoskeletal ROS        Neurology  Negative neurology ROS      Psychology           Physical Exam    Airway    Mallampati score: II  TM Distance: >3 FB  Neck ROM: full     Dental   No notable dental hx     Cardiovascular  Rhythm: regular, Rate: normal, Cardiovascular exam normal    Pulmonary  Pulmonary exam normal Breath sounds clear to auscultation,     Other Findings        Anesthesia Plan  ASA Score- 2     Anesthesia Type- general and IV sedation with anesthesia with ASA Monitors  Additional Monitors:   Airway Plan:           Plan Factors-Exercise tolerance (METS): >4     Chart reviewed  Existing labs reviewed  Patient summary reviewed  Patient is not a current smoker  Patient not instructed to abstain from smoking on day of procedure  Patient did not smoke on day of surgery  Induction- intravenous  Postoperative Plan- Plan for postoperative opioid use  Informed Consent- Anesthetic plan and risks discussed with patient                Lab Results   Component Value Date    HGBA1C 5 2 08/10/2018       Lab Results   Component Value Date     (L) 07/07/2014    K 3 3 (L) 12/07/2019     12/07/2019    CO2 26 12/07/2019    ANIONGAP 17 07/27/2015    BUN 9 12/07/2019    CREATININE 0 71 12/07/2019    GLUCOSE 87 07/07/2014    CALCIUM 8 7 12/07/2019    AST 17 12/07/2019    ALT 18 12/07/2019 ALKPHOS 85 12/07/2019    PROT 7 6 07/07/2014    BILITOT 0 5 07/07/2014    EGFR 115 12/07/2019       Lab Results   Component Value Date    WBC 4 15 (L) 12/07/2019    HGB 11 2 (L) 12/07/2019    HCT 39 0 12/07/2019    MCV 78 (L) 12/07/2019     12/07/2019

## 2020-10-01 ENCOUNTER — APPOINTMENT (OUTPATIENT)
Dept: LAB | Facility: HOSPITAL | Age: 30
End: 2020-10-01
Payer: COMMERCIAL

## 2020-10-01 ENCOUNTER — APPOINTMENT (OUTPATIENT)
Dept: LAB | Facility: HOSPITAL | Age: 30
End: 2020-10-01
Attending: OBSTETRICS & GYNECOLOGY
Payer: COMMERCIAL

## 2020-10-01 DIAGNOSIS — Z01.818 PRE-OP TESTING: ICD-10-CM

## 2020-10-01 DIAGNOSIS — Z64.1 MULTIPARITY: ICD-10-CM

## 2020-10-01 DIAGNOSIS — D64.9 ANEMIA, UNSPECIFIED TYPE: ICD-10-CM

## 2020-10-01 PROBLEM — Z30.2 ENCOUNTER FOR STERILIZATION: Chronic | Status: ACTIVE | Noted: 2020-10-01

## 2020-10-01 LAB
ANION GAP SERPL CALCULATED.3IONS-SCNC: 4 MMOL/L (ref 4–13)
ATRIAL RATE: 74 BPM
BUN SERPL-MCNC: 7 MG/DL (ref 5–25)
CALCIUM SERPL-MCNC: 8.2 MG/DL (ref 8.3–10.1)
CHLORIDE SERPL-SCNC: 113 MMOL/L (ref 100–108)
CO2 SERPL-SCNC: 23 MMOL/L (ref 21–32)
CREAT SERPL-MCNC: 0.54 MG/DL (ref 0.6–1.3)
ERYTHROCYTE [DISTWIDTH] IN BLOOD BY AUTOMATED COUNT: 18.3 % (ref 11.6–15.1)
GFR SERPL CREATININE-BSD FRML MDRD: 127 ML/MIN/1.73SQ M
GLUCOSE P FAST SERPL-MCNC: 82 MG/DL (ref 65–99)
HCT VFR BLD AUTO: 26.7 % (ref 34.8–46.1)
HGB BLD-MCNC: 7 G/DL (ref 11.5–15.4)
MCH RBC QN AUTO: 18.8 PG (ref 26.8–34.3)
MCHC RBC AUTO-ENTMCNC: 26.2 G/DL (ref 31.4–37.4)
MCV RBC AUTO: 72 FL (ref 82–98)
P AXIS: 25 DEGREES
PLATELET # BLD AUTO: 254 THOUSANDS/UL (ref 149–390)
PMV BLD AUTO: 11 FL (ref 8.9–12.7)
POTASSIUM SERPL-SCNC: 3.8 MMOL/L (ref 3.5–5.3)
PR INTERVAL: 124 MS
QRS AXIS: 26 DEGREES
QRSD INTERVAL: 78 MS
QT INTERVAL: 370 MS
QTC INTERVAL: 410 MS
RBC # BLD AUTO: 3.72 MILLION/UL (ref 3.81–5.12)
SODIUM SERPL-SCNC: 140 MMOL/L (ref 136–145)
T WAVE AXIS: 27 DEGREES
VENTRICULAR RATE: 74 BPM
WBC # BLD AUTO: 3.85 THOUSAND/UL (ref 4.31–10.16)

## 2020-10-01 PROCEDURE — 85027 COMPLETE CBC AUTOMATED: CPT

## 2020-10-01 PROCEDURE — 93010 ELECTROCARDIOGRAM REPORT: CPT | Performed by: INTERNAL MEDICINE

## 2020-10-01 PROCEDURE — 93005 ELECTROCARDIOGRAM TRACING: CPT

## 2020-10-01 PROCEDURE — 80048 BASIC METABOLIC PNL TOTAL CA: CPT

## 2020-10-01 PROCEDURE — 36415 COLL VENOUS BLD VENIPUNCTURE: CPT

## 2020-10-02 ENCOUNTER — TELEPHONE (OUTPATIENT)
Dept: OBGYN CLINIC | Facility: CLINIC | Age: 30
End: 2020-10-02

## 2020-10-02 ENCOUNTER — OFFICE VISIT (OUTPATIENT)
Dept: OBGYN CLINIC | Facility: CLINIC | Age: 30
End: 2020-10-02

## 2020-10-02 VITALS
WEIGHT: 134 LBS | HEIGHT: 60 IN | DIASTOLIC BLOOD PRESSURE: 63 MMHG | BODY MASS INDEX: 26.31 KG/M2 | HEART RATE: 85 BPM | TEMPERATURE: 97.2 F | SYSTOLIC BLOOD PRESSURE: 96 MMHG

## 2020-10-02 DIAGNOSIS — Z30.09 STERILIZATION CONSULT: ICD-10-CM

## 2020-10-02 DIAGNOSIS — Z30.2 ENCOUNTER FOR STERILIZATION: Primary | Chronic | ICD-10-CM

## 2020-10-02 PROCEDURE — 99213 OFFICE O/P EST LOW 20 MIN: CPT | Performed by: OBSTETRICS & GYNECOLOGY

## 2020-10-02 RX ORDER — FERROUS SULFATE TAB EC 324 MG (65 MG FE EQUIVALENT) 324 (65 FE) MG
324 TABLET DELAYED RESPONSE ORAL
Qty: 90 TABLET | Refills: 1 | Status: SHIPPED | OUTPATIENT
Start: 2020-10-02 | End: 2021-01-06 | Stop reason: SDUPTHER

## 2020-10-02 RX ORDER — NORGESTIMATE AND ETHINYL ESTRADIOL 0.25-0.035
1 KIT ORAL DAILY
Qty: 28 TABLET | Refills: 2 | Status: SHIPPED | OUTPATIENT
Start: 2020-10-02 | End: 2021-02-10

## 2020-10-07 ENCOUNTER — TELEPHONE (OUTPATIENT)
Dept: GASTROENTEROLOGY | Facility: CLINIC | Age: 30
End: 2020-10-07

## 2020-10-07 DIAGNOSIS — A04.8 H. PYLORI INFECTION: Primary | ICD-10-CM

## 2020-10-07 RX ORDER — CLARITHROMYCIN 500 MG/1
500 TABLET, COATED ORAL EVERY 12 HOURS SCHEDULED
Qty: 28 TABLET | Refills: 0 | Status: SHIPPED | OUTPATIENT
Start: 2020-10-07 | End: 2020-10-21

## 2020-10-07 RX ORDER — OMEPRAZOLE 20 MG/1
20 CAPSULE, DELAYED RELEASE ORAL 2 TIMES DAILY
Qty: 28 CAPSULE | Refills: 0 | Status: SHIPPED | OUTPATIENT
Start: 2020-10-07 | End: 2020-12-09

## 2020-10-07 RX ORDER — AMOXICILLIN 500 MG/1
1000 CAPSULE ORAL EVERY 12 HOURS SCHEDULED
Qty: 56 CAPSULE | Refills: 0 | Status: SHIPPED | OUTPATIENT
Start: 2020-10-07 | End: 2020-10-21

## 2020-12-08 ENCOUNTER — HOSPITAL ENCOUNTER (EMERGENCY)
Facility: HOSPITAL | Age: 30
Discharge: HOME | End: 2020-12-08
Attending: EMERGENCY MEDICINE | Admitting: EMERGENCY MEDICINE
Payer: COMMERCIAL

## 2020-12-08 ENCOUNTER — APPOINTMENT (EMERGENCY)
Dept: CT IMAGING | Facility: HOSPITAL | Age: 30
End: 2020-12-08
Payer: COMMERCIAL

## 2020-12-08 VITALS
RESPIRATION RATE: 16 BRPM | HEART RATE: 75 BPM | TEMPERATURE: 98 F | SYSTOLIC BLOOD PRESSURE: 111 MMHG | WEIGHT: 149.91 LBS | BODY MASS INDEX: 29.28 KG/M2 | DIASTOLIC BLOOD PRESSURE: 57 MMHG | OXYGEN SATURATION: 99 %

## 2020-12-08 DIAGNOSIS — N39.0 UTI (URINARY TRACT INFECTION): Primary | ICD-10-CM

## 2020-12-08 DIAGNOSIS — K80.20 GALL STONES: ICD-10-CM

## 2020-12-08 LAB
ALBUMIN SERPL BCP-MCNC: 3.7 G/DL (ref 3.5–5)
ALP SERPL-CCNC: 65 U/L (ref 46–116)
ALT SERPL W P-5'-P-CCNC: 22 U/L (ref 12–78)
ANION GAP SERPL CALCULATED.3IONS-SCNC: 8 MMOL/L (ref 4–13)
AST SERPL W P-5'-P-CCNC: 18 U/L (ref 5–45)
BACTERIA UR QL AUTO: ABNORMAL /HPF
BASOPHILS # BLD AUTO: 0.04 THOUSANDS/ΜL (ref 0–0.1)
BASOPHILS NFR BLD AUTO: 1 % (ref 0–1)
BILIRUB SERPL-MCNC: 0.23 MG/DL (ref 0.2–1)
BILIRUB UR QL STRIP: NEGATIVE
BUN SERPL-MCNC: 12 MG/DL (ref 5–25)
CALCIUM SERPL-MCNC: 8.6 MG/DL (ref 8.3–10.1)
CHLORIDE SERPL-SCNC: 106 MMOL/L (ref 100–108)
CLARITY UR: CLEAR
CO2 SERPL-SCNC: 26 MMOL/L (ref 21–32)
COLOR UR: ABNORMAL
CREAT SERPL-MCNC: 0.74 MG/DL (ref 0.6–1.3)
EOSINOPHIL # BLD AUTO: 0.09 THOUSAND/ΜL (ref 0–0.61)
EOSINOPHIL NFR BLD AUTO: 2 % (ref 0–6)
ERYTHROCYTE [DISTWIDTH] IN BLOOD BY AUTOMATED COUNT: 18.6 % (ref 11.6–15.1)
EXT PREG TEST URINE: NEGATIVE
EXT. CONTROL ED NAV: NORMAL
GFR SERPL CREATININE-BSD FRML MDRD: 109 ML/MIN/1.73SQ M
GLUCOSE SERPL-MCNC: 77 MG/DL (ref 65–140)
GLUCOSE UR STRIP-MCNC: ABNORMAL MG/DL
HCT VFR BLD AUTO: 33 % (ref 34.8–46.1)
HGB BLD-MCNC: 9.1 G/DL (ref 11.5–15.4)
HGB UR QL STRIP.AUTO: NEGATIVE
IMM GRANULOCYTES # BLD AUTO: 0.02 THOUSAND/UL (ref 0–0.2)
IMM GRANULOCYTES NFR BLD AUTO: 0 % (ref 0–2)
KETONES UR STRIP-MCNC: NEGATIVE MG/DL
LEUKOCYTE ESTERASE UR QL STRIP: NEGATIVE
LYMPHOCYTES # BLD AUTO: 1.73 THOUSANDS/ΜL (ref 0.6–4.47)
LYMPHOCYTES NFR BLD AUTO: 28 % (ref 14–44)
MCH RBC QN AUTO: 20.8 PG (ref 26.8–34.3)
MCHC RBC AUTO-ENTMCNC: 27.6 G/DL (ref 31.4–37.4)
MCV RBC AUTO: 76 FL (ref 82–98)
MONOCYTES # BLD AUTO: 0.53 THOUSAND/ΜL (ref 0.17–1.22)
MONOCYTES NFR BLD AUTO: 9 % (ref 4–12)
MUCOUS THREADS UR QL AUTO: ABNORMAL
NEUTROPHILS # BLD AUTO: 3.68 THOUSANDS/ΜL (ref 1.85–7.62)
NEUTS SEG NFR BLD AUTO: 60 % (ref 43–75)
NITRITE UR QL STRIP: POSITIVE
NON-SQ EPI CELLS URNS QL MICRO: ABNORMAL /HPF
NRBC BLD AUTO-RTO: 0 /100 WBCS
PH UR STRIP.AUTO: 7.5 [PH] (ref 4.5–8)
PLATELET # BLD AUTO: 330 THOUSANDS/UL (ref 149–390)
PMV BLD AUTO: 10.1 FL (ref 8.9–12.7)
POTASSIUM SERPL-SCNC: 3.7 MMOL/L (ref 3.5–5.3)
PROT SERPL-MCNC: 7.7 G/DL (ref 6.4–8.2)
PROT UR STRIP-MCNC: ABNORMAL MG/DL
RBC # BLD AUTO: 4.37 MILLION/UL (ref 3.81–5.12)
RBC #/AREA URNS AUTO: ABNORMAL /HPF
SODIUM SERPL-SCNC: 140 MMOL/L (ref 136–145)
SP GR UR STRIP.AUTO: 1.02 (ref 1–1.03)
UROBILINOGEN UR QL STRIP.AUTO: 1 E.U./DL
WBC # BLD AUTO: 6.09 THOUSAND/UL (ref 4.31–10.16)
WBC #/AREA URNS AUTO: ABNORMAL /HPF

## 2020-12-08 PROCEDURE — 81001 URINALYSIS AUTO W/SCOPE: CPT

## 2020-12-08 PROCEDURE — 80053 COMPREHEN METABOLIC PANEL: CPT | Performed by: PHYSICIAN ASSISTANT

## 2020-12-08 PROCEDURE — 96361 HYDRATE IV INFUSION ADD-ON: CPT

## 2020-12-08 PROCEDURE — 99284 EMERGENCY DEPT VISIT MOD MDM: CPT | Performed by: EMERGENCY MEDICINE

## 2020-12-08 PROCEDURE — 96374 THER/PROPH/DIAG INJ IV PUSH: CPT

## 2020-12-08 PROCEDURE — 36415 COLL VENOUS BLD VENIPUNCTURE: CPT | Performed by: PHYSICIAN ASSISTANT

## 2020-12-08 PROCEDURE — 85025 COMPLETE CBC W/AUTO DIFF WBC: CPT | Performed by: PHYSICIAN ASSISTANT

## 2020-12-08 PROCEDURE — 81025 URINE PREGNANCY TEST: CPT | Performed by: PHYSICIAN ASSISTANT

## 2020-12-08 PROCEDURE — 74177 CT ABD & PELVIS W/CONTRAST: CPT

## 2020-12-08 PROCEDURE — 99284 EMERGENCY DEPT VISIT MOD MDM: CPT

## 2020-12-08 PROCEDURE — G1004 CDSM NDSC: HCPCS

## 2020-12-08 RX ORDER — KETOROLAC TROMETHAMINE 30 MG/ML
15 INJECTION, SOLUTION INTRAMUSCULAR; INTRAVENOUS ONCE
Status: COMPLETED | OUTPATIENT
Start: 2020-12-08 | End: 2020-12-08

## 2020-12-08 RX ORDER — CEPHALEXIN 500 MG/1
500 CAPSULE ORAL 4 TIMES DAILY
Qty: 28 CAPSULE | Refills: 0 | Status: SHIPPED | OUTPATIENT
Start: 2020-12-08 | End: 2020-12-15

## 2020-12-08 RX ORDER — CEPHALEXIN 250 MG/1
500 CAPSULE ORAL ONCE
Status: COMPLETED | OUTPATIENT
Start: 2020-12-08 | End: 2020-12-08

## 2020-12-08 RX ADMIN — SODIUM CHLORIDE 1000 ML: 0.9 INJECTION, SOLUTION INTRAVENOUS at 18:03

## 2020-12-08 RX ADMIN — KETOROLAC TROMETHAMINE 15 MG: 30 INJECTION, SOLUTION INTRAMUSCULAR at 18:03

## 2020-12-08 RX ADMIN — IOHEXOL 100 ML: 350 INJECTION, SOLUTION INTRAVENOUS at 18:56

## 2020-12-08 RX ADMIN — CEPHALEXIN 500 MG: 250 CAPSULE ORAL at 20:04

## 2020-12-09 ENCOUNTER — HOSPITAL ENCOUNTER (EMERGENCY)
Facility: HOSPITAL | Age: 30
Discharge: HOME | End: 2020-12-09
Attending: EMERGENCY MEDICINE | Admitting: EMERGENCY MEDICINE
Payer: COMMERCIAL

## 2020-12-09 ENCOUNTER — APPOINTMENT (EMERGENCY)
Dept: ULTRASOUND IMAGING | Facility: HOSPITAL | Age: 30
End: 2020-12-09
Payer: COMMERCIAL

## 2020-12-09 VITALS
TEMPERATURE: 98.9 F | WEIGHT: 151.24 LBS | SYSTOLIC BLOOD PRESSURE: 101 MMHG | BODY MASS INDEX: 29.54 KG/M2 | RESPIRATION RATE: 16 BRPM | DIASTOLIC BLOOD PRESSURE: 51 MMHG | HEART RATE: 66 BPM | OXYGEN SATURATION: 94 %

## 2020-12-09 DIAGNOSIS — R82.71 BACTERIA IN URINE: ICD-10-CM

## 2020-12-09 DIAGNOSIS — K80.20 GALL STONES: ICD-10-CM

## 2020-12-09 DIAGNOSIS — R10.10 UPPER ABDOMINAL PAIN: Primary | ICD-10-CM

## 2020-12-09 DIAGNOSIS — R11.0 NAUSEA: ICD-10-CM

## 2020-12-09 LAB
ALBUMIN SERPL BCP-MCNC: 3.5 G/DL (ref 3.5–5)
ALP SERPL-CCNC: 60 U/L (ref 46–116)
ALT SERPL W P-5'-P-CCNC: 26 U/L (ref 12–78)
ANION GAP SERPL CALCULATED.3IONS-SCNC: 8 MMOL/L (ref 4–13)
AST SERPL W P-5'-P-CCNC: 16 U/L (ref 5–45)
BACTERIA UR QL AUTO: ABNORMAL /HPF
BASOPHILS # BLD AUTO: 0.03 THOUSANDS/ΜL (ref 0–0.1)
BASOPHILS NFR BLD AUTO: 1 % (ref 0–1)
BILIRUB SERPL-MCNC: 0.23 MG/DL (ref 0.2–1)
BILIRUB UR QL STRIP: NEGATIVE
BUN SERPL-MCNC: 10 MG/DL (ref 5–25)
CALCIUM SERPL-MCNC: 8.3 MG/DL (ref 8.3–10.1)
CHLORIDE SERPL-SCNC: 105 MMOL/L (ref 100–108)
CLARITY UR: CLEAR
CLARITY, POC: CLEAR
CO2 SERPL-SCNC: 25 MMOL/L (ref 21–32)
COLOR UR: YELLOW
COLOR, POC: YELLOW
CREAT SERPL-MCNC: 0.53 MG/DL (ref 0.6–1.3)
EOSINOPHIL # BLD AUTO: 0.08 THOUSAND/ΜL (ref 0–0.61)
EOSINOPHIL NFR BLD AUTO: 1 % (ref 0–6)
ERYTHROCYTE [DISTWIDTH] IN BLOOD BY AUTOMATED COUNT: 18.6 % (ref 11.6–15.1)
EXT PREG TEST URINE: NEGATIVE
EXT. CONTROL ED NAV: NORMAL
GFR SERPL CREATININE-BSD FRML MDRD: 128 ML/MIN/1.73SQ M
GLUCOSE SERPL-MCNC: 104 MG/DL (ref 65–140)
GLUCOSE UR STRIP-MCNC: NEGATIVE MG/DL
HCT VFR BLD AUTO: 32.9 % (ref 34.8–46.1)
HGB BLD-MCNC: 9 G/DL (ref 11.5–15.4)
HGB UR QL STRIP.AUTO: NEGATIVE
IMM GRANULOCYTES # BLD AUTO: 0.01 THOUSAND/UL (ref 0–0.2)
IMM GRANULOCYTES NFR BLD AUTO: 0 % (ref 0–2)
KETONES UR STRIP-MCNC: NEGATIVE MG/DL
LEUKOCYTE ESTERASE UR QL STRIP: ABNORMAL
LIPASE SERPL-CCNC: 84 U/L (ref 73–393)
LYMPHOCYTES # BLD AUTO: 1.45 THOUSANDS/ΜL (ref 0.6–4.47)
LYMPHOCYTES NFR BLD AUTO: 23 % (ref 14–44)
MCH RBC QN AUTO: 20.8 PG (ref 26.8–34.3)
MCHC RBC AUTO-ENTMCNC: 27.4 G/DL (ref 31.4–37.4)
MCV RBC AUTO: 76 FL (ref 82–98)
MONOCYTES # BLD AUTO: 0.51 THOUSAND/ΜL (ref 0.17–1.22)
MONOCYTES NFR BLD AUTO: 8 % (ref 4–12)
NEUTROPHILS # BLD AUTO: 4.25 THOUSANDS/ΜL (ref 1.85–7.62)
NEUTS SEG NFR BLD AUTO: 67 % (ref 43–75)
NITRITE UR QL STRIP: NEGATIVE
NON-SQ EPI CELLS URNS QL MICRO: ABNORMAL /HPF
NRBC BLD AUTO-RTO: 0 /100 WBCS
PH UR STRIP.AUTO: 6 [PH] (ref 4.5–8)
PLATELET # BLD AUTO: 284 THOUSANDS/UL (ref 149–390)
PMV BLD AUTO: 10.1 FL (ref 8.9–12.7)
POTASSIUM SERPL-SCNC: 3.6 MMOL/L (ref 3.5–5.3)
PROT SERPL-MCNC: 7.2 G/DL (ref 6.4–8.2)
PROT UR STRIP-MCNC: NEGATIVE MG/DL
RBC # BLD AUTO: 4.32 MILLION/UL (ref 3.81–5.12)
RBC #/AREA URNS AUTO: ABNORMAL /HPF
SODIUM SERPL-SCNC: 138 MMOL/L (ref 136–145)
SP GR UR STRIP.AUTO: >=1.03 (ref 1–1.03)
UROBILINOGEN UR QL STRIP.AUTO: 0.2 E.U./DL
WBC # BLD AUTO: 6.33 THOUSAND/UL (ref 4.31–10.16)
WBC # BLD EST: ABNORMAL 10*3/UL
WBC #/AREA URNS AUTO: ABNORMAL /HPF

## 2020-12-09 PROCEDURE — 96375 TX/PRO/DX INJ NEW DRUG ADDON: CPT

## 2020-12-09 PROCEDURE — 96361 HYDRATE IV INFUSION ADD-ON: CPT

## 2020-12-09 PROCEDURE — 81025 URINE PREGNANCY TEST: CPT | Performed by: PHYSICIAN ASSISTANT

## 2020-12-09 PROCEDURE — 76705 ECHO EXAM OF ABDOMEN: CPT

## 2020-12-09 PROCEDURE — 99284 EMERGENCY DEPT VISIT MOD MDM: CPT

## 2020-12-09 PROCEDURE — 81001 URINALYSIS AUTO W/SCOPE: CPT

## 2020-12-09 PROCEDURE — 83690 ASSAY OF LIPASE: CPT | Performed by: PHYSICIAN ASSISTANT

## 2020-12-09 PROCEDURE — 99284 EMERGENCY DEPT VISIT MOD MDM: CPT | Performed by: PHYSICIAN ASSISTANT

## 2020-12-09 PROCEDURE — 85025 COMPLETE CBC W/AUTO DIFF WBC: CPT | Performed by: PHYSICIAN ASSISTANT

## 2020-12-09 PROCEDURE — 96374 THER/PROPH/DIAG INJ IV PUSH: CPT

## 2020-12-09 PROCEDURE — 36415 COLL VENOUS BLD VENIPUNCTURE: CPT | Performed by: PHYSICIAN ASSISTANT

## 2020-12-09 PROCEDURE — 80053 COMPREHEN METABOLIC PANEL: CPT | Performed by: PHYSICIAN ASSISTANT

## 2020-12-09 RX ORDER — FAMOTIDINE 20 MG/1
20 TABLET, FILM COATED ORAL 2 TIMES DAILY
Qty: 30 TABLET | Refills: 0 | Status: SHIPPED | OUTPATIENT
Start: 2020-12-09 | End: 2022-03-21

## 2020-12-09 RX ORDER — ONDANSETRON 2 MG/ML
4 INJECTION INTRAMUSCULAR; INTRAVENOUS ONCE
Status: COMPLETED | OUTPATIENT
Start: 2020-12-09 | End: 2020-12-09

## 2020-12-09 RX ORDER — MAGNESIUM HYDROXIDE/ALUMINUM HYDROXICE/SIMETHICONE 120; 1200; 1200 MG/30ML; MG/30ML; MG/30ML
30 SUSPENSION ORAL ONCE
Status: DISCONTINUED | OUTPATIENT
Start: 2020-12-09 | End: 2020-12-09

## 2020-12-09 RX ORDER — KETOROLAC TROMETHAMINE 30 MG/ML
15 INJECTION, SOLUTION INTRAMUSCULAR; INTRAVENOUS ONCE
Status: COMPLETED | OUTPATIENT
Start: 2020-12-09 | End: 2020-12-09

## 2020-12-09 RX ORDER — LIDOCAINE HYDROCHLORIDE 20 MG/ML
10 SOLUTION OROPHARYNGEAL ONCE
Status: DISCONTINUED | OUTPATIENT
Start: 2020-12-09 | End: 2020-12-09

## 2020-12-09 RX ADMIN — SODIUM CHLORIDE 1000 ML: 0.9 INJECTION, SOLUTION INTRAVENOUS at 14:33

## 2020-12-09 RX ADMIN — ONDANSETRON 4 MG: 2 INJECTION INTRAMUSCULAR; INTRAVENOUS at 14:33

## 2020-12-09 RX ADMIN — KETOROLAC TROMETHAMINE 15 MG: 30 INJECTION, SOLUTION INTRAMUSCULAR at 14:34

## 2020-12-10 ENCOUNTER — OFFICE VISIT (OUTPATIENT)
Dept: OBGYN CLINIC | Facility: CLINIC | Age: 30
End: 2020-12-10

## 2020-12-10 VITALS
WEIGHT: 147 LBS | BODY MASS INDEX: 28.71 KG/M2 | DIASTOLIC BLOOD PRESSURE: 65 MMHG | HEART RATE: 86 BPM | SYSTOLIC BLOOD PRESSURE: 97 MMHG

## 2020-12-10 DIAGNOSIS — Z30.09 STERILIZATION CONSULT: Primary | ICD-10-CM

## 2020-12-10 PROCEDURE — 99213 OFFICE O/P EST LOW 20 MIN: CPT | Performed by: OBSTETRICS & GYNECOLOGY

## 2020-12-11 ENCOUNTER — TELEPHONE (OUTPATIENT)
Dept: OBGYN CLINIC | Facility: CLINIC | Age: 30
End: 2020-12-11

## 2020-12-30 ENCOUNTER — TELEPHONE (OUTPATIENT)
Dept: OBGYN CLINIC | Facility: CLINIC | Age: 30
End: 2020-12-30

## 2021-01-05 ENCOUNTER — TELEPHONE (OUTPATIENT)
Dept: OBGYN CLINIC | Facility: CLINIC | Age: 31
End: 2021-01-05

## 2021-01-06 ENCOUNTER — OFFICE VISIT (OUTPATIENT)
Dept: OBGYN CLINIC | Facility: CLINIC | Age: 31
End: 2021-01-06

## 2021-01-06 VITALS
DIASTOLIC BLOOD PRESSURE: 72 MMHG | BODY MASS INDEX: 28.27 KG/M2 | HEIGHT: 60 IN | SYSTOLIC BLOOD PRESSURE: 123 MMHG | WEIGHT: 144 LBS | HEART RATE: 98 BPM

## 2021-01-06 DIAGNOSIS — D50.9 IRON DEFICIENCY ANEMIA, UNSPECIFIED IRON DEFICIENCY ANEMIA TYPE: ICD-10-CM

## 2021-01-06 DIAGNOSIS — Z30.2 ENCOUNTER FOR STERILIZATION: Primary | ICD-10-CM

## 2021-01-06 PROCEDURE — 99214 OFFICE O/P EST MOD 30 MIN: CPT | Performed by: OBSTETRICS & GYNECOLOGY

## 2021-01-06 RX ORDER — FERROUS SULFATE TAB EC 324 MG (65 MG FE EQUIVALENT) 324 (65 FE) MG
324 TABLET DELAYED RESPONSE ORAL
Qty: 60 TABLET | Refills: 1 | Status: SHIPPED | OUTPATIENT
Start: 2021-01-06 | End: 2021-12-09 | Stop reason: SDUPTHER

## 2021-01-06 NOTE — PROGRESS NOTES
Surgical Consultation Visit  Madan Torres 27 y o  female MRN: [id number]  Unit/Bed#:  Encounter: [id number]      HPI:  Madan Torres is a 27 y o  [id number] female who is to be scheduled for perrmanent sterilization on 2021  MA-31 was previously signed 2020  This surgery has been deferred since Oct 2020 due to severe anemia with Hgb as low as 7 0  She was recommended to see her PCP to further investigate her severe anemia, but she never did  She was started on PO iron supplementation and started on OCPs for contraception until her surgery could be scheduled  Her Hgb has appropriately responded since then to 9 0 ()  She feels well today  She endorses some fatigue but otherwise denies palpitations, constipation, dizziness, LOC  She does believe the iron pills are working and giving her more energy  She is still taking them twice daily  Although her partner (with whom she does not have children, she has 5 of her own) wants a child with her, the patient herself is 100% sure today that she does not want any more children  Her youngest child is only a few years old and she feels she has her hands full  She does not "want to start over " She desires to have her surgery done sooner rather than later      LMP: 2020  Contraception: Pt was put on OCPs by Dr Cele Bae in Oct 5171 once surgery had to be delayed   Last pap smear: 2017, NILM      OB History    Para Term  AB Living   5 5 5     5   SAB TAB Ectopic Multiple Live Births         0 5      # Outcome Date GA Lbr Nicho/2nd Weight Sex Delivery Anes PTL Lv   5 Term 18 38w3d  3365 g (7 lb 6 7 oz) M CS-LTranv EPI N MIO      Complications: Fetal Intolerance   4 Term 17 39w6d  2977 g (6 lb 9 oz) M Vag-Spont None N MIO   3 Term 02/16/15 38w0d  3515 g (7 lb 12 oz) F Vag-Spont   MIO   2 Term 13 40w0d  3232 g (7 lb 2 oz) F Vag-Spont   MIO   1 Term 10/06/12 40w0d  3175 g (7 lb) M Vag-Spont EPI  MIO ROS: negative     Historical Information   Past Medical History:   Diagnosis Date    Anemia     taking iron pills BID     Chronic kidney disease     hydronephrosis    History of transfusion     Kidney stone     [facility] [address]     Septic shock [facility] [address] [facility]     Urinary tract infection     taking antibioitcs     UTI (urinary tract infection) during pregnancy     Vagina, candidiasis     Varicella     as child     Past Surgical History:   Procedure Laterality Date     SECTION N/A 2018    Procedure:  SECTION (); Surgeon: Lendia Nageotte, MD;  [facility];  Service: Obstetrics    CYSTOSCOPY  2018    with right uretral stent    LA CYSTO/URETERO W/LITHOTRIPSY &INDWELL STENT INSRT Right 2018    Procedure: CYSTOSCOPY URETEROSCOPY WITH  RETROGRADE PYELOGRAM AND exchange  STENT URETERAl;  Surgeon: Juan Goldberg MD;  Location: [facility] MAIN OR;  Service: Urology    LA CYSTOURETHROSCOPY,URETER CATHETER Right 2018    Procedure: CYSTOSCOPY RETROGRADE PYELOGRAM WITH INSERTION STENT URETERAL;  Surgeon: Juan Goldberg MD;  Location: [facility];  Service: Urology    TONSILLECTOMY      TONSILLECTOMY       Social History   Social History     Substance and Sexual Activity   Alcohol Use No    Frequency: Never    Binge frequency: Never     Social History     Substance and Sexual Activity   Drug Use No     Social History     Tobacco Use   Smoking Status Former Smoker    Packs/day: 0 00    Quit date: 2019    Years since quittin 5   Smokeless Tobacco Never Used   Tobacco Comment    2-3 a day     Fam HX: non-contributory    Meds/Allergies    (Not in a hospital admission)     No Known Allergies    OBJECTIVE:    Vitals: Blood pressure 123/72, pulse 98, height 5' (1 524 m), weight 65 3 kg (144 lb), last menstrual period 2020, not currently breastfeeding  Body mass index is 28 12 kg/m²      Physical exam: deferred today     Assessment/Plan     ASSESSMENT:  Leslie Og Eliane Mercado is a 27 y o  [id number] female who is to be scheduled for permanent sterilization on 2/12/2021, presents for surgical H&P today  She desires surgery sooner rather than later  Surgery was previously deferred for severe anemia  Hgb is responding on PO iron supplementation  Discussed permanent sterilization procedure  Discussed permanency and irreversibility of the procedure  Discussed risks of surgery including bleeding, damage to surrounding organs, anesthesia risks, inability to complete procedure, and, rarely, death  Discussed pregnancy is still possible after permanent sterilization, and there is increased risk of ectopic pregnancy, which can pose a risk to maternal health  Discussed alternative methods of contraception including OCPs, depo provera, IUD, Nexplanon, condoms/barrier methods, abstinence, ring, patch, and vasectomy  Pt declines these options  Encourage continued use of condoms even after surgery as this is the only method that protects against STI's  Discussed ideal method is laparoscopic bilateral salpingectomy, but actual method performed will be determined at time of surgery by attending surgeon (considering factors such as entry into abdomen, adhesive disease, etc )  Pt accepts blood products and all cardiopulmonary resuscitative measures if they are indicated during her admission      PLAN:  Desire for permanent sterilization  - New MA-31 and procedure consent signed today   - Will task office surgery scheduler   - Pt to return for H&P visit     Anemia   - Last Hgb 9 0   - Continue PO iron supplementation until surgery   - Repeat CBC ordered     Bianca Rouse MD  1/6/2021  5:33 PM

## 2021-01-06 NOTE — PATIENT INSTRUCTIONS
Salpingectomy   WHAT YOU NEED TO KNOW:   What do I need to know about a salpingectomy? A salpingectomy is surgery to remove one or both of your fallopian tubes  The fallopian tubes carry eggs from the ovaries to the uterus  They are part of a woman's reproductive system  A salpingectomy may be done to treat an ectopic pregnancy, cancer, endometriosis, or an infection  It may also be done to prevent pregnancy or some types of cancer  How do I prepare for a salpingectomy? · Your healthcare provider will talk to you about how to prepare for surgery  He may tell you not to eat or drink anything after midnight on the day of your surgery  He will tell you what medicines to take or not take on the day of your surgery  You may need to stop taking blood thinners, aspirin, and NSAIDs several days before surgery  This may prevent bleeding before and after surgery  · You may need blood or urine tests before surgery  You may also need an x-ray, CT scan, or ultrasound before surgery  This will help your healthcare provider plan your surgery  You may be given an antibiotic through your IV to help prevent a bacterial infection  You may need to empty your bladder before surgery  This may help your healthcare provider see your reproductive organs more clearly, and prevent your bladder from being injured  What will happen during a salpingectomy? · You will be given general anesthesia to keep you asleep and free from pain during surgery  Your healthcare provider may remove your fallopian tubes laparoscopically or through an open approach with one larger incision  If done laparoscopically, your healthcare provider will make one or more small incisions in your abdomen  Your healthcare provider will put a laparoscope and other tools into your abdomen through the incisions  A laparoscope is a long metal tube with a light and camera on the end  The abdomen will then be inflated with a gas (carbon dioxide)   This lifts your abdomen away from your organs and gives your healthcare provider more space to work  · In both types of procedures, tools will be used to cut and remove one or both of your fallopian tubes  Your healthcare provider will close the incisions with medical glue, tape, or stitches, and cover them with a bandage  A vaginal pack or sanitary pad may be used to absorb the bleeding  A vaginal pack is a special gauze that is inserted into the vagina and removed before you go home or to a hospital room  What will happen after a salpingectomy? Healthcare providers will monitor you until you are awake  You may have bleeding and discharge from your vagina for several days  If your surgery was done laparoscopically, you may also feel pain in your shoulder or back  This is caused by the air that is put into your abdomen during laparoscopic surgery  You may be able to go home or you may need to spend the night in the hospital  Walk around as soon as possible after surgery to prevent blood clots  What are the risks of a salpingectomy? You may bleed more than expected or get an infection  Your ovaries, uterus, cervix, vagina, intestines, or bladder may be damaged during surgery  You may get a blood clot in your arm or leg  This may become life-threatening  You may have a hard time getting pregnant if your remaining fallopian tube does not work correctly  If both tubes are removed, you may still be at risk for an ectopic pregnancy  CARE AGREEMENT:   You have the right to help plan your care  Learn about your health condition and how it may be treated  Discuss treatment options with your healthcare providers to decide what care you want to receive  You always have the right to refuse treatment  The above information is an  only  It is not intended as medical advice for individual conditions or treatments   Talk to your doctor, nurse or pharmacist before following any medical regimen to see if it is safe and effective for you  © Copyright [address] Information is for End User's use only and may not be sold, redistributed or otherwise used for commercial purposes   All illustrations and images included in CareNotes® are the copyrighted property of A D A M , [employer]  or [address] [place], [place] [address]brittany robert

## 2021-02-03 ENCOUNTER — TELEPHONE (OUTPATIENT)
Dept: OBGYN CLINIC | Facility: CLINIC | Age: 31
End: 2021-02-03

## 2021-02-04 ENCOUNTER — OFFICE VISIT (OUTPATIENT)
Dept: OBGYN CLINIC | Facility: CLINIC | Age: 31
End: 2021-02-04

## 2021-02-04 VITALS
HEART RATE: 94 BPM | HEIGHT: 60 IN | DIASTOLIC BLOOD PRESSURE: 66 MMHG | BODY MASS INDEX: 28.47 KG/M2 | SYSTOLIC BLOOD PRESSURE: 99 MMHG | WEIGHT: 145 LBS

## 2021-02-04 DIAGNOSIS — Z01.818 PRE-OP EXAMINATION: ICD-10-CM

## 2021-02-04 DIAGNOSIS — Z87.442 HISTORY OF KIDNEY STONES: ICD-10-CM

## 2021-02-04 DIAGNOSIS — Z20.828 HERPES EXPOSURE: Primary | ICD-10-CM

## 2021-02-04 DIAGNOSIS — Z30.09 STERILIZATION CONSULT: ICD-10-CM

## 2021-02-04 PROCEDURE — 99213 OFFICE O/P EST LOW 20 MIN: CPT | Performed by: OBSTETRICS & GYNECOLOGY

## 2021-02-04 NOTE — H&P (VIEW-ONLY)
Assessment/Plan:    No problem-specific Assessment & Plan notes found for this encounter  Diagnoses and all orders for this visit:    Herpes exposure  -     Herpes I/II IgG LALITO w Reflex to HSV-2; Future    History of kidney stones  -     Ambulatory referral to Urology; Future    Sterilization consult    Pre-op examination  Pt is done with childbearing and aware of permanent and irreversible procedure  She was previously counseled on risks, benefits and alternatives at last visit and has no further questions today  She is agreeable to proceed  She is aware that given her prior c/s there may be adhesive disease that may make procedure difficult  Discussed type of sterilization will be determined intra-operatively, with either partial salpingectomy, fulguration, or full salpingectomy  Discussed will proceed with the safest route  Pt given lab slips to complete prior to date of surgery  Hibiclens and instructions also provided  Reviewed procedure with patient and post-op f/u  NPO after midnight on   All questions answered  RTO 21 for post-op visit  Right labia minors inclusion cyst: recommended warm compresses/ sitz baths  Advised if increases in size or causes discomfort or signs of infection, can be re-evaluated for I&D  Conservative management for now as patient asymptomatic    Subjective:      Patient ID: Pro Kay is a 27 y o  female  HPI  Pt is a 30yo  ( x4, C/S x1) presenting for pre-operative exam for scheduled laparoscopic bilateral salpingectomy on 21  Pt was seen in the office on 21 with Dr Mariana Hassan and MA 31 and surgical consent was signed at that time  Pt initially was scheduled for the procedure on 10/2020 however it was deferred due to severe anemia with hgb 7  Pt taking PO iron supplementation and had an appropriate response to 9 0  Pt denies any changes in her health status since her last visit   She is absolutely done with child bearing and understand this is irreversible and permanent  States her mother will be her support person  Pt requesting referral for urology to f/u for kidney stones  She was noted to have an obstructed, infected stone resulting in septic shock during her last pregnancy that required removal with stent placement  Pt denies any symptoms now, but wants to follow-up  Pt also requesting testng for herpes as her partner of the last 6months states he was told by an ex a year ago that she has herpes and patient wants to make sure she doesn't have it  Pt denies any symptoms such as pain, burning, lesions, etc  She does not an inclusion cyst on her right labia minora that is non-tender       The following portions of the patient's history were reviewed and updated as appropriate: allergies, current medications, past family history, past medical history, past social history, past surgical history and problem list     Active Ambulatory Problems     Diagnosis Date Noted    Bacteriuria 2017    Ureteral calculi 2018    Iron deficiency anemia 2020    Sterilization consult 2020    Stress incontinence of urine 2020    Chronic kidney disease     Encounter for sterilization 10/01/2020     Resolved Ambulatory Problems     Diagnosis Date Noted    Anemia during pregnancy 2017    Dysuria during pregnancy in third trimester 2017    39 weeks gestation of pregnancy 2017     (spontaneous vaginal delivery) 2017    Abdominal pain in pregnancy 2018    Urinary tract infection in mother during third trimester of pregnancy 2018    38 weeks gestation of pregnancy 2018    No prenatal care in current pregnancy in third trimester 2018    Vaginal candidiasis 2018    Short interval between pregnancies complicating pregnancy, antepartum, second trimester 08/15/2018     delivery due to maternal disorder, delivered, current hospitalization 2018    Hydronephrosis with ureteropelvic junction (UPJ) obstruction 10/30/2018    Severe sepsis with septic shock (CODE) ([facility] [address] ) 2018     Past Medical History:   Diagnosis Date    Anemia     History of transfusion     Kidney stone     Sepsis [facility] [address] [facility]     Septic shock [facility] [address] [facility]     Urinary tract infection     UTI (urinary tract infection) during pregnancy     Vagina, candidiasis     Varicella      Family History   Problem Relation Age of Onset    No Known Problems Mother     Diabetes Father     Heart defect Brother         3 open heart surgeries    Heart failure Family         Congestive    Diabetes Maternal Grandmother      Past Surgical History:   Procedure Laterality Date     SECTION N/A 2018    Procedure:  SECTION ();   Surgeon: Charlie Henley MD;  [facility];  Service: Obstetrics    CYSTOSCOPY  2018    with right uretral stent    [facility] CYSTO/URETERO W/LITHOTRIPSY &INDWELL STENT INSRT Right 2018    Procedure: CYSTOSCOPY URETEROSCOPY WITH  RETROGRADE PYELOGRAM AND exchange  STENT URETERAl;  Surgeon: Yady Estrella MD;  Location: [address] [place] [address] OR;  Service: Urology    NH CYSTOURETHROSCOPY,URETER CATHETER Right 2018    Procedure: CYSTOSCOPY RETROGRADE PYELOGRAM WITH INSERTION STENT URETERAL;  Surgeon: Yady Estrella MD;  Location: [facility];  Service: Urology    TONSILLECTOMY      TONSILLECTOMY       Social History     Socioeconomic History    Marital status: Single     Spouse name: Not on file    Number of children: Not on file    Years of education: Not on file    Highest education level: Not on file   Occupational History    Not on file   Social Needs    Financial resource strain: Not on file    Food insecurity     Worry: Not on file     Inability: Not on file    Transportation needs     Medical: Not on file     Non-medical: Not on file   Tobacco Use    Smoking status: Former Smoker     Packs/day: 0 00     Quit date: 2019     Years since quitting: 1  6    Smokeless tobacco: Never Used    Tobacco comment: 2-3 a day   Substance and Sexual Activity    Alcohol use: No     Frequency: Never     Binge frequency: Never    Drug use: No    Sexual activity: Yes     Partners: Male     Birth control/protection: None, Condom Male   Lifestyle    Physical activity     Days per week: Not on file     Minutes per session: Not on file    Stress: Not on file   Relationships    Social connections     Talks on phone: Not on file     Gets together: Not on file     Attends [employer] service: Not on file     Active member of club or organization: Not on file     Attends meetings of clubs or organizations: Not on file     Relationship status: Not on file    Intimate partner violence     Fear of current or ex partner: Not on file     Emotionally abused: Not on file     Physically abused: Not on file     Forced sexual activity: Not on file   Other Topics Concern    Not on file   Social History Narrative    Not on file       Review of Systems      Objective:      BP 99/66   Pulse 94   Ht 5' (1 524 m)   Wt 65 8 kg (145 lb)   LMP 01/11/2021   BMI 28 32 kg/m²          Physical Exam  Vitals signs reviewed  Constitutional:       General: She is not in acute distress  Appearance: She is normal weight  HENT:      Head: Normocephalic and atraumatic  Cardiovascular:      Rate and Rhythm: Normal rate and regular rhythm  Heart sounds: Normal heart sounds  No murmur  No friction rub  No gallop  Pulmonary:      Effort: Pulmonary effort is normal  No respiratory distress  Breath sounds: Normal breath sounds  No wheezing, rhonchi or rales  Abdominal:      Palpations: Abdomen is soft  Tenderness: There is no abdominal tenderness  There is no guarding or rebound  Comments: Pfannenstiel well healed   Genitourinary:     Labia:         Right: No rash, tenderness or injury  Left: No rash, tenderness, lesion or injury          Neurological:      Mental Status: She is alert and oriented to person, place, and time  Psychiatric:         Mood and Affect: Mood normal          Behavior: Behavior normal          Thought Content:  Thought content normal          Judgment: Judgment normal        Audrey Galvez MD  OBGYN, PGY-4  2/4/2021 5:13 PM

## 2021-02-04 NOTE — PROGRESS NOTES
Assessment/Plan:    No problem-specific Assessment & Plan notes found for this encounter  Diagnoses and all orders for this visit:    Herpes exposure  -     Herpes I/II IgG LALITO w Reflex to HSV-2; Future    History of kidney stones  -     Ambulatory referral to Urology; Future    Sterilization consult    Pre-op examination  Pt is done with childbearing and aware of permanent and irreversible procedure  She was previously counseled on risks, benefits and alternatives at last visit and has no further questions today  She is agreeable to proceed  She is aware that given her prior c/s there may be adhesive disease that may make procedure difficult  Discussed type of sterilization will be determined intra-operatively, with either partial salpingectomy, fulguration, or full salpingectomy  Discussed will proceed with the safest route  Pt given lab slips to complete prior to date of surgery  Hibiclens and instructions also provided  Reviewed procedure with patient and post-op f/u  NPO after midnight on   All questions answered  RTO 21 for post-op visit  Right labia minors inclusion cyst: recommended warm compresses/ sitz baths  Advised if increases in size or causes discomfort or signs of infection, can be re-evaluated for I&D  Conservative management for now as patient asymptomatic    Subjective:      Patient ID: Cathy Hoyos is a 27 y o  female  HPI  Pt is a 30yo  ( x4, C/S x1) presenting for pre-operative exam for scheduled laparoscopic bilateral salpingectomy on 21  Pt was seen in the office on 21 with Dr Lavell Payton and MA 31 and surgical consent was signed at that time  Pt initially was scheduled for the procedure on 10/2020 however it was deferred due to severe anemia with hgb 7  Pt taking PO iron supplementation and had an appropriate response to 9 0  Pt denies any changes in her health status since her last visit   She is absolutely done with child bearing and understand this is irreversible and permanent  States her mother will be her support person  Pt requesting referral for urology to f/u for kidney stones  She was noted to have an obstructed, infected stone resulting in septic shock during her last pregnancy that required removal with stent placement  Pt denies any symptoms now, but wants to follow-up  Pt also requesting testng for herpes as her partner of the last 6months states he was told by an ex a year ago that she has herpes and patient wants to make sure she doesn't have it  Pt denies any symptoms such as pain, burning, lesions, etc  She does not an inclusion cyst on her right labia minora that is non-tender       The following portions of the patient's history were reviewed and updated as appropriate: allergies, current medications, past family history, past medical history, past social history, past surgical history and problem list     Active Ambulatory Problems     Diagnosis Date Noted    Bacteriuria 2017    Ureteral calculi 2018    Iron deficiency anemia 2020    Sterilization consult 2020    Stress incontinence of urine 2020    Chronic kidney disease     Encounter for sterilization 10/01/2020     Resolved Ambulatory Problems     Diagnosis Date Noted    Anemia during pregnancy 2017    Dysuria during pregnancy in third trimester 2017    39 weeks gestation of pregnancy 2017     (spontaneous vaginal delivery) 2017    Abdominal pain in pregnancy 2018    Urinary tract infection in mother during third trimester of pregnancy 2018    38 weeks gestation of pregnancy 2018    No prenatal care in current pregnancy in third trimester 2018    Vaginal candidiasis 2018    Short interval between pregnancies complicating pregnancy, antepartum, second trimester 08/15/2018     delivery due to maternal disorder, delivered, current hospitalization 2018    Hydronephrosis with ureteropelvic junction (UPJ) obstruction 10/30/2018    Severe sepsis with septic shock (CODE) [facility] [address] ) 2018     Past Medical History:   Diagnosis Date    Anemia     History of transfusion     Kidney stone     Sepsis [facility] [address] [facility]     Septic shock ([facility] [address] [facility]     Urinary tract infection     UTI (urinary tract infection) during pregnancy     Vagina, candidiasis     Varicella      Family History   Problem Relation Age of Onset    No Known Problems Mother     Diabetes Father     Heart defect Brother         3 open heart surgeries    Heart failure Family         Congestive    Diabetes Maternal Grandmother      Past Surgical History:   Procedure Laterality Date     SECTION N/A 2018    Procedure:  SECTION ();   Surgeon: Tayla Mcdonald MD;  [facility];  Service: Obstetrics    CYSTOSCOPY  2018    with right uretral stent    UT CYSTO/URETERO W/LITHOTRIPSY &INDWELL STENT INSRT Right 2018    Procedure: CYSTOSCOPY URETEROSCOPY WITH  RETROGRADE PYELOGRAM AND exchange  STENT URETERAl;  Surgeon: Iris Boston MD;  Location: [address] [place] [address] OR;  Service: Urology    UT CYSTOURETHROSCOPY,URETER CATHETER Right 2018    Procedure: CYSTOSCOPY RETROGRADE PYELOGRAM WITH INSERTION STENT URETERAL;  Surgeon: Iris Boston MD;  Location: [facility] OR;  Service: Urology    TONSILLECTOMY      TONSILLECTOMY       Social History     Socioeconomic History    Marital status: Single     Spouse name: Not on file    Number of children: Not on file    Years of education: Not on file    Highest education level: Not on file   Occupational History    Not on file   Social Needs    Financial resource strain: Not on file    Food insecurity     Worry: Not on file     Inability: Not on file    Transportation needs     Medical: Not on file     Non-medical: Not on file   Tobacco Use    Smoking status: Former Smoker     Packs/day: 0 00     Quit date: 2019     Years since quitting: 1  6    Smokeless tobacco: Never Used    Tobacco comment: 2-3 a day   Substance and Sexual Activity    Alcohol use: No     Frequency: Never     Binge frequency: Never    Drug use: No    Sexual activity: Yes     Partners: Male     Birth control/protection: None, Condom Male   Lifestyle    Physical activity     Days per week: Not on file     Minutes per session: Not on file    Stress: Not on file   Relationships    Social connections     Talks on phone: Not on file     Gets together: Not on file     Attends [employer] service: Not on file     Active member of club or organization: Not on file     Attends meetings of clubs or organizations: Not on file     Relationship status: Not on file    Intimate partner violence     Fear of current or ex partner: Not on file     Emotionally abused: Not on file     Physically abused: Not on file     Forced sexual activity: Not on file   Other Topics Concern    Not on file   Social History Narrative    Not on file       Review of Systems      Objective:      BP 99/66   Pulse 94   Ht 5' (1 524 m)   Wt 65 8 kg (145 lb)   LMP 01/11/2021   BMI 28 32 kg/m²          Physical Exam  Vitals signs reviewed  Constitutional:       General: She is not in acute distress  Appearance: She is normal weight  HENT:      Head: Normocephalic and atraumatic  Cardiovascular:      Rate and Rhythm: Normal rate and regular rhythm  Heart sounds: Normal heart sounds  No murmur  No friction rub  No gallop  Pulmonary:      Effort: Pulmonary effort is normal  No respiratory distress  Breath sounds: Normal breath sounds  No wheezing, rhonchi or rales  Abdominal:      Palpations: Abdomen is soft  Tenderness: There is no abdominal tenderness  There is no guarding or rebound  Comments: Pfannenstiel well healed   Genitourinary:     Labia:         Right: No rash, tenderness or injury  Left: No rash, tenderness, lesion or injury          Neurological:      Mental Status: She is alert and oriented to person, place, and time  Psychiatric:         Mood and Affect: Mood normal          Behavior: Behavior normal          Thought Content:  Thought content normal          Judgment: Judgment normal        Tabby Vale MD  OBGYN, PGY-4  2/4/2021 5:13 PM

## 2021-02-10 NOTE — PRE-PROCEDURE INSTRUCTIONS
Pre-Surgery Instructions:   Medication Instructions    famotidine (PEPCID) 20 mg tablet take am DOS with small sip of water    ferrous sulfate 324 (65 Fe) mg continue as prescribed    Prenatal MV-Min-Fe Fum-FA-DHA (Prenatal 1) 30-0 975-200 MG CAPS continue as prescribed      Spoke to pt  Med list reviewed & instructed as above   NPO after midnight hs prior  Avoid NSAIDs, tylenol only  Showering instructions provided by surgeon office and reviewed @ time of call   Negative COVID screening, outpt sx   Reviewed visitation policy   All instructions verbally understood by patient  All questions answered

## 2021-02-11 ENCOUNTER — ANESTHESIA EVENT (OUTPATIENT)
Dept: PERIOP | Facility: HOSPITAL | Age: 31
End: 2021-02-11
Payer: COMMERCIAL

## 2021-02-12 ENCOUNTER — ANESTHESIA (OUTPATIENT)
Dept: PERIOP | Facility: HOSPITAL | Age: 31
End: 2021-02-12
Payer: COMMERCIAL

## 2021-02-12 ENCOUNTER — LAB (OUTPATIENT)
Dept: LAB | Facility: HOSPITAL | Age: 31
End: 2021-02-12
Payer: COMMERCIAL

## 2021-02-12 ENCOUNTER — HOSPITAL ENCOUNTER (OUTPATIENT)
Facility: HOSPITAL | Age: 31
Setting detail: OUTPATIENT SURGERY
Discharge: HOME | End: 2021-02-12
Attending: OBSTETRICS & GYNECOLOGY | Admitting: OBSTETRICS & GYNECOLOGY
Payer: COMMERCIAL

## 2021-02-12 VITALS
WEIGHT: 145 LBS | BODY MASS INDEX: 28.47 KG/M2 | HEART RATE: 63 BPM | DIASTOLIC BLOOD PRESSURE: 62 MMHG | HEIGHT: 60 IN | SYSTOLIC BLOOD PRESSURE: 112 MMHG | OXYGEN SATURATION: 99 % | RESPIRATION RATE: 20 BRPM | TEMPERATURE: 98.1 F

## 2021-02-12 VITALS — HEART RATE: 103 BPM

## 2021-02-12 DIAGNOSIS — Z30.2 ENCOUNTER FOR STERILIZATION: ICD-10-CM

## 2021-02-12 DIAGNOSIS — Z30.2 STERILIZATION: ICD-10-CM

## 2021-02-12 DIAGNOSIS — G89.18 POST-OP PAIN: Primary | ICD-10-CM

## 2021-02-12 DIAGNOSIS — D50.9 IRON DEFICIENCY ANEMIA, UNSPECIFIED IRON DEFICIENCY ANEMIA TYPE: ICD-10-CM

## 2021-02-12 DIAGNOSIS — Z20.828 HERPES EXPOSURE: ICD-10-CM

## 2021-02-12 LAB
ABO GROUP BLD: NORMAL
ANION GAP SERPL CALCULATED.3IONS-SCNC: 6 MMOL/L (ref 4–13)
BLD GP AB SCN SERPL QL: NEGATIVE
BUN SERPL-MCNC: 11 MG/DL (ref 5–25)
CALCIUM SERPL-MCNC: 9 MG/DL (ref 8.3–10.1)
CHLORIDE SERPL-SCNC: 108 MMOL/L (ref 100–108)
CO2 SERPL-SCNC: 26 MMOL/L (ref 21–32)
CREAT SERPL-MCNC: 0.62 MG/DL (ref 0.6–1.3)
ERYTHROCYTE [DISTWIDTH] IN BLOOD BY AUTOMATED COUNT: 18.7 % (ref 11.6–15.1)
GFR SERPL CREATININE-BSD FRML MDRD: 121 ML/MIN/1.73SQ M
GLUCOSE P FAST SERPL-MCNC: 81 MG/DL (ref 65–99)
HCT VFR BLD AUTO: 35.7 % (ref 34.8–46.1)
HGB BLD-MCNC: 10.3 G/DL (ref 11.5–15.4)
MCH RBC QN AUTO: 23.5 PG (ref 26.8–34.3)
MCHC RBC AUTO-ENTMCNC: 28.9 G/DL (ref 31.4–37.4)
MCV RBC AUTO: 82 FL (ref 82–98)
PLATELET # BLD AUTO: 268 THOUSANDS/UL (ref 149–390)
PMV BLD AUTO: 11 FL (ref 8.9–12.7)
POTASSIUM SERPL-SCNC: 4 MMOL/L (ref 3.5–5.3)
RBC # BLD AUTO: 4.38 MILLION/UL (ref 3.81–5.12)
RH BLD: POSITIVE
SODIUM SERPL-SCNC: 140 MMOL/L (ref 136–145)
SPECIMEN EXPIRATION DATE: NORMAL
WBC # BLD AUTO: 5.7 THOUSAND/UL (ref 4.31–10.16)

## 2021-02-12 PROCEDURE — 88302 TISSUE EXAM BY PATHOLOGIST: CPT | Performed by: PATHOLOGY

## 2021-02-12 PROCEDURE — 86696 HERPES SIMPLEX TYPE 2 TEST: CPT

## 2021-02-12 PROCEDURE — 86695 HERPES SIMPLEX TYPE 1 TEST: CPT

## 2021-02-12 PROCEDURE — 85027 COMPLETE CBC AUTOMATED: CPT

## 2021-02-12 PROCEDURE — 86901 BLOOD TYPING SEROLOGIC RH(D): CPT

## 2021-02-12 PROCEDURE — 86850 RBC ANTIBODY SCREEN: CPT

## 2021-02-12 PROCEDURE — 80048 BASIC METABOLIC PNL TOTAL CA: CPT

## 2021-02-12 PROCEDURE — 86900 BLOOD TYPING SEROLOGIC ABO: CPT

## 2021-02-12 PROCEDURE — 58661 LAPAROSCOPY REMOVE ADNEXA: CPT | Performed by: OBSTETRICS & GYNECOLOGY

## 2021-02-12 PROCEDURE — 36415 COLL VENOUS BLD VENIPUNCTURE: CPT

## 2021-02-12 RX ORDER — DEXAMETHASONE SODIUM PHOSPHATE 10 MG/ML
INJECTION, SOLUTION INTRAMUSCULAR; INTRAVENOUS AS NEEDED
Status: DISCONTINUED | OUTPATIENT
Start: 2021-02-12 | End: 2021-02-12

## 2021-02-12 RX ORDER — MIDAZOLAM HYDROCHLORIDE 2 MG/2ML
INJECTION, SOLUTION INTRAMUSCULAR; INTRAVENOUS AS NEEDED
Status: DISCONTINUED | OUTPATIENT
Start: 2021-02-12 | End: 2021-02-12

## 2021-02-12 RX ORDER — OXYCODONE HYDROCHLORIDE AND ACETAMINOPHEN 5; 325 MG/1; MG/1
1-2 TABLET ORAL EVERY 6 HOURS PRN
Qty: 8 TABLET | Refills: 0 | Status: SHIPPED | OUTPATIENT
Start: 2021-02-12 | End: 2021-02-22

## 2021-02-12 RX ORDER — HYDROMORPHONE HCL/PF 1 MG/ML
0.5 SYRINGE (ML) INJECTION
Status: DISCONTINUED | OUTPATIENT
Start: 2021-02-12 | End: 2021-02-12 | Stop reason: HOSPADM

## 2021-02-12 RX ORDER — OXYCODONE HYDROCHLORIDE 5 MG/1
10 TABLET ORAL EVERY 4 HOURS PRN
Status: DISCONTINUED | OUTPATIENT
Start: 2021-02-12 | End: 2021-02-12 | Stop reason: HOSPADM

## 2021-02-12 RX ORDER — SODIUM CHLORIDE, SODIUM LACTATE, POTASSIUM CHLORIDE, CALCIUM CHLORIDE 600; 310; 30; 20 MG/100ML; MG/100ML; MG/100ML; MG/100ML
125 INJECTION, SOLUTION INTRAVENOUS CONTINUOUS
Status: CANCELLED | OUTPATIENT
Start: 2021-02-12

## 2021-02-12 RX ORDER — MEPERIDINE HYDROCHLORIDE 25 MG/ML
25 INJECTION INTRAMUSCULAR; INTRAVENOUS; SUBCUTANEOUS ONCE AS NEEDED
Status: DISCONTINUED | OUTPATIENT
Start: 2021-02-12 | End: 2021-02-12 | Stop reason: HOSPADM

## 2021-02-12 RX ORDER — FENTANYL CITRATE 50 UG/ML
INJECTION, SOLUTION INTRAMUSCULAR; INTRAVENOUS AS NEEDED
Status: DISCONTINUED | OUTPATIENT
Start: 2021-02-12 | End: 2021-02-12

## 2021-02-12 RX ORDER — IBUPROFEN 600 MG/1
600 TABLET ORAL EVERY 6 HOURS PRN
Status: DISCONTINUED | OUTPATIENT
Start: 2021-02-12 | End: 2021-02-12 | Stop reason: HOSPADM

## 2021-02-12 RX ORDER — FENTANYL CITRATE/PF 50 MCG/ML
25 SYRINGE (ML) INJECTION
Status: DISCONTINUED | OUTPATIENT
Start: 2021-02-12 | End: 2021-02-12 | Stop reason: HOSPADM

## 2021-02-12 RX ORDER — OXYCODONE HYDROCHLORIDE 5 MG/1
5 TABLET ORAL EVERY 4 HOURS PRN
Status: DISCONTINUED | OUTPATIENT
Start: 2021-02-12 | End: 2021-02-12 | Stop reason: HOSPADM

## 2021-02-12 RX ORDER — LIDOCAINE HYDROCHLORIDE 10 MG/ML
0.5 INJECTION, SOLUTION EPIDURAL; INFILTRATION; INTRACAUDAL; PERINEURAL ONCE AS NEEDED
Status: COMPLETED | OUTPATIENT
Start: 2021-02-12 | End: 2021-02-12

## 2021-02-12 RX ORDER — ROCURONIUM BROMIDE 10 MG/ML
INJECTION, SOLUTION INTRAVENOUS AS NEEDED
Status: DISCONTINUED | OUTPATIENT
Start: 2021-02-12 | End: 2021-02-12

## 2021-02-12 RX ORDER — SODIUM CHLORIDE, SODIUM LACTATE, POTASSIUM CHLORIDE, CALCIUM CHLORIDE 600; 310; 30; 20 MG/100ML; MG/100ML; MG/100ML; MG/100ML
INJECTION, SOLUTION INTRAVENOUS CONTINUOUS PRN
Status: DISCONTINUED | OUTPATIENT
Start: 2021-02-12 | End: 2021-02-12

## 2021-02-12 RX ORDER — ONDANSETRON 2 MG/ML
4 INJECTION INTRAMUSCULAR; INTRAVENOUS EVERY 6 HOURS PRN
Status: CANCELLED | OUTPATIENT
Start: 2021-02-12

## 2021-02-12 RX ORDER — BUPIVACAINE HYDROCHLORIDE 2.5 MG/ML
INJECTION, SOLUTION EPIDURAL; INFILTRATION; INTRACAUDAL AS NEEDED
Status: DISCONTINUED | OUTPATIENT
Start: 2021-02-12 | End: 2021-02-12 | Stop reason: HOSPADM

## 2021-02-12 RX ORDER — SODIUM CHLORIDE, SODIUM LACTATE, POTASSIUM CHLORIDE, CALCIUM CHLORIDE 600; 310; 30; 20 MG/100ML; MG/100ML; MG/100ML; MG/100ML
125 INJECTION, SOLUTION INTRAVENOUS CONTINUOUS
Status: DISCONTINUED | OUTPATIENT
Start: 2021-02-12 | End: 2021-02-12 | Stop reason: HOSPADM

## 2021-02-12 RX ORDER — ONDANSETRON 2 MG/ML
INJECTION INTRAMUSCULAR; INTRAVENOUS AS NEEDED
Status: DISCONTINUED | OUTPATIENT
Start: 2021-02-12 | End: 2021-02-12

## 2021-02-12 RX ORDER — LIDOCAINE HYDROCHLORIDE 10 MG/ML
INJECTION, SOLUTION EPIDURAL; INFILTRATION; INTRACAUDAL; PERINEURAL AS NEEDED
Status: DISCONTINUED | OUTPATIENT
Start: 2021-02-12 | End: 2021-02-12

## 2021-02-12 RX ORDER — GLYCOPYRROLATE 0.2 MG/ML
INJECTION INTRAMUSCULAR; INTRAVENOUS AS NEEDED
Status: DISCONTINUED | OUTPATIENT
Start: 2021-02-12 | End: 2021-02-12

## 2021-02-12 RX ORDER — NEOSTIGMINE METHYLSULFATE 1 MG/ML
INJECTION INTRAVENOUS AS NEEDED
Status: DISCONTINUED | OUTPATIENT
Start: 2021-02-12 | End: 2021-02-12

## 2021-02-12 RX ORDER — PROPOFOL 10 MG/ML
INJECTION, EMULSION INTRAVENOUS AS NEEDED
Status: DISCONTINUED | OUTPATIENT
Start: 2021-02-12 | End: 2021-02-12

## 2021-02-12 RX ADMIN — FENTANYL CITRATE 50 MCG: 50 INJECTION INTRAMUSCULAR; INTRAVENOUS at 15:49

## 2021-02-12 RX ADMIN — GLYCOPYRROLATE 0.1 MG: 0.2 INJECTION, SOLUTION INTRAMUSCULAR; INTRAVENOUS at 15:53

## 2021-02-12 RX ADMIN — LIDOCAINE HYDROCHLORIDE 50 MG: 10 INJECTION, SOLUTION EPIDURAL; INFILTRATION; INTRACAUDAL; PERINEURAL at 15:49

## 2021-02-12 RX ADMIN — GLYCOPYRROLATE 0.6 MG: 0.2 INJECTION, SOLUTION INTRAMUSCULAR; INTRAVENOUS at 16:57

## 2021-02-12 RX ADMIN — PROPOFOL 200 MG: 10 INJECTION, EMULSION INTRAVENOUS at 15:49

## 2021-02-12 RX ADMIN — NEOSTIGMINE METHYLSULFATE 4 MG: 1 INJECTION INTRAVENOUS at 16:57

## 2021-02-12 RX ADMIN — DEXAMETHASONE SODIUM PHOSPHATE 10 MG: 10 INJECTION, SOLUTION INTRAMUSCULAR; INTRAVENOUS at 15:53

## 2021-02-12 RX ADMIN — OXYCODONE HYDROCHLORIDE 5 MG: 5 TABLET ORAL at 18:15

## 2021-02-12 RX ADMIN — FENTANYL CITRATE 100 MCG: 50 INJECTION INTRAMUSCULAR; INTRAVENOUS at 16:11

## 2021-02-12 RX ADMIN — ONDANSETRON 4 MG: 2 INJECTION INTRAMUSCULAR; INTRAVENOUS at 15:53

## 2021-02-12 RX ADMIN — ONDANSETRON 4 MG: 2 INJECTION INTRAMUSCULAR; INTRAVENOUS at 17:07

## 2021-02-12 RX ADMIN — SODIUM CHLORIDE, SODIUM LACTATE, POTASSIUM CHLORIDE, AND CALCIUM CHLORIDE 125 ML/HR: .6; .31; .03; .02 INJECTION, SOLUTION INTRAVENOUS at 17:43

## 2021-02-12 RX ADMIN — SODIUM CHLORIDE, SODIUM LACTATE, POTASSIUM CHLORIDE, AND CALCIUM CHLORIDE 125 ML/HR: .6; .31; .03; .02 INJECTION, SOLUTION INTRAVENOUS at 14:08

## 2021-02-12 RX ADMIN — MIDAZOLAM 2 MG: 1 INJECTION INTRAMUSCULAR; INTRAVENOUS at 15:49

## 2021-02-12 RX ADMIN — LIDOCAINE HYDROCHLORIDE 0.2 ML: 10 INJECTION, SOLUTION EPIDURAL; INFILTRATION; INTRACAUDAL; PERINEURAL at 14:08

## 2021-02-12 RX ADMIN — ROCURONIUM BROMIDE 35 MG: 50 INJECTION, SOLUTION INTRAVENOUS at 15:49

## 2021-02-12 RX ADMIN — Medication 25 MCG: at 17:30

## 2021-02-12 RX ADMIN — FENTANYL CITRATE 50 MCG: 50 INJECTION INTRAMUSCULAR; INTRAVENOUS at 15:53

## 2021-02-12 RX ADMIN — SODIUM CHLORIDE, SODIUM LACTATE, POTASSIUM CHLORIDE, AND CALCIUM CHLORIDE: .6; .31; .03; .02 INJECTION, SOLUTION INTRAVENOUS at 15:43

## 2021-02-12 RX ADMIN — Medication 25 MCG: at 17:19

## 2021-02-12 NOTE — ANESTHESIA POSTPROCEDURE EVALUATION
Post-Op Assessment Note    CV Status:  Stable    Pain management: adequate     Mental Status:  Alert and awake   Hydration Status:  Euvolemic   PONV Controlled:  Controlled   Airway Patency:  Patent      Post Op Vitals Reviewed: Yes      Staff: Anesthesiologist, CRNA         No complications documented      BP      Temp 98 °F (36 7 °C) (02/12/21 1710)    Pulse 84 (02/12/21 1710)   Resp 20 (02/12/21 1710)    SpO2 (P) 100 % (02/12/21 1710)

## 2021-02-12 NOTE — OP NOTE
OPERATIVE REPORT  PATIENT NAME: Barron Brewer    :  1990  MRN: [id number]  Pt Location: [facility] OR ROOM 03    SURGERY DATE: 2021    Surgeon(s) and Role:     * Alycia Campbell MD - Primary     * Debi Mahoney DO - Husam Lujan MD- Assisting     Preop Diagnosis:  Sterilization [Z30 2]    Post-Op Diagnosis Codes:     * Sterilization [Z30 2]    Procedure(s) (LRB):  LAPAROSCOPY DIAGNOSTIC (N/A)  SALPINGECTOMY, LAPAROSCOPIC (Bilateral)  EXAM UNDER ANESTHESIA (EUA) (N/A)  LYSIS ADHESIONS (N/A)    Specimen(s):  ID Type Source Tests Collected by Time Destination   1 : Bilateral Fallopian Tubes Tissue Fallopian Tubes, Bilateral TISSUE EXAM Alycia Campbell MD 2021 [id number]        Estimated Blood Loss:   20 cc    Drains:  Ureteral Drain/Stent Right ureter 6 Fr  (Active)   Number of days: 830       Anesthesia Type:   General    Operative Indications:  Sterilization [Z30 2]      Operative Findings:  Normal appearing external female genitalia with the exception of presence of aproximately 1 cm of diameter right labia minors inclusion cyst   Normal appearing vaginal mucosa  Small,  grossly normal appearing cervix  Small, anteverted , mobile uterus  No palpable adnexal masses or fullness    On laparoscopic evaluation:   Normal abdominal survey without evidence of injury or gross pathology but with evidence of significant  adhesion of anterior uterine wall to abdominal wall, adhesion of sigmoid colon to cul the sac, and adhesion of bilateral tubes and ovaries to bilateral abdominal wall  Uterus with normal contour, normal appearing bilateral ovaries     Ureteral vermiculation appreciated bilaterally  Complications:   None    Procedure and Technique:  Brief History    All risks, benefits, and alternatives to the procedure were discussed with the patient and she had the opportunity to ask questions    The risk of regret of procedure was also addressed with the patient and options for LARC was discussed  Patient expressed desire to continue with tubal sterilization  Informed consent was obtained  Description of Procedure    Patient was taken to the operating room  General endotracheal anesthesia (GET) was administered and the patient was positioned on the OR table in the dorsal lithotomy position  All pressure points were padded and a saira hugger was placed to maintain control of core body temperature  A bimanual exam was performed and the uterus was noted to be anteverted, normal in size and consistency with no palpable adnexal masses or fullness  The patient was prepped and draped in the usual sterile fashion with chloroprep on the abdomen and betadine prep on the vagina and perineum  Operative Technique    A time out was performed to confirm correct patient and correct procedure  A straight catheter was introduced into the bladder, which was drained of 100cc of clear yellow urine  A weighted speculum was inserted into the vagina and used to visualize the anterior lip of the cervix, which was then grasped with a single toothed tenaculum  A cervical dilator was inserted into the cervix and secured to the tenaculum with sterile strips  The speculum was removed from the vagina  Sterile gloves were then exchanged and attention was turned to the abdomen  A 5mm incision was made at the 5inferior edge of the umbilicus for introduction of a 5mm trocar  Trocar was introduced under direct visualization  Pneumoperitoneum was then established to a maximum of 15mmHg  The entire abdomen and pelvis was inspected and there was no evidence of injury to bowel, bladder, vasculature, or other structures  Attention was then turned to the pelvis  Patient was placed in Trendelenburg and the uterus was elevated to visualize the fallopian tubes  There was noted to be grossly normal tubes and ovaries bilaterally but significant amount of adhesion tissue to the lateral abdominal wall, bilaterally   Two additional port sites were selected in the left and right lower abdomen approximately 3 cm superior and medial to the iliac crests  A 5 mm incision was made for introduction of a 5 mm trocar under direct visualization at each site  A blunt probe was inserted through this port and used to visualize the fimbriated ends of the tubes  At this time lysis of dense adhesion from the left tube to lateral abdominal wall was performed  The left fallopian tube was grasped at its fimbriated end with a blunt grasper and elevated to visualize the mesosalpinx  The Enseal device was used to ligate along the mesosalpinx, working proximally and taking care to avoid ovarian vasculature, a fimbriectomy was performed  Subsequently approximately 2cm from the cornua, the Enseal was used to amputate partially the left fallopian tube due to significant adhesion in the middle portion to the abdominal wall,  aproximatelly 1 cm of tube remained attached to the lateral abdominal wall  The proximal left tube portion and left fimbria were removed  This was then withdrawn from the abdominal cavity and sent for pathology  Attention was then turned to the contralateral tube, which was completely amputated from 2cm from the cornua with the Enseal device  Specimen was sent to pathology evaluation  Good hemostasis was confirmed following salpingectomy  Following salpingectomy, pneumoperitoneum was allowed to escape  Adequate hemostasis was visualized  The inferior trocars were removed under direct visualization  The laparoscope was withdrawn from the abdomen, followed by its trocar sleeve at the umbilicus  Skin incisions were closed with running absorbable suture of 4-0 monocryl  Attention was turned to the vagina  A weighted speculum was reinserted into the vagina and the uterine manipulator was withdrawn  Single toothed tenaculum was removed from the anterior lip of the cervix   Good hemostasis was confirmed at the tenaculum puncture sites  Speculum was then removed from the vagina  At the conclusion of the procedure, all needle, sponge, and instrument counts were noted to be correct x2  Patient tolerated the procedure well and was transferred to PACU in stable condition prior to discharge with follow up in 1-2 weeks  Dr Eb Kerr was present and participated in all key portions of the case      Patient Disposition:  PACU     SIGNATURE: Mandi Butts MD  DATE: February 12, 2021  TIME: 5:10 PM

## 2021-02-12 NOTE — INTERVAL H&P NOTE
H&P reviewed  After examining the patient I find no changes in the patients condition since the H&P had been written  Postop instructions given  Percocet [8] for analgesia followed by Advil or Tylenol  She has a follow-up visit on February 24th      Vitals:    02/12/21 1334   BP: 102/57   Pulse: 70   Resp: 18   Temp: (!) 96 6 °F (35 9 °C)   SpO2: 99%

## 2021-02-12 NOTE — ANESTHESIA PREPROCEDURE EVALUATION
Procedure:  LAPAROSCOPY DIAGNOSTIC (N/A Abdomen)  SALPINGECTOMY, LAPAROSCOPIC (Bilateral Abdomen)  EXAM UNDER ANESTHESIA (EUA) (N/A Pelvis)    Relevant Problems   ANESTHESIA (within normal limits)      CARDIO (within normal limits)      /RENAL   (+) Chronic kidney disease      HEMATOLOGY   (+) Iron deficiency anemia      PULMONARY (within normal limits)      Other   (+) Encounter for sterilization        Physical Exam    Airway    Mallampati score: II  TM Distance: >3 FB  Neck ROM: full     Dental   No notable dental hx     Cardiovascular      Pulmonary      Other Findings        Anesthesia Plan  ASA Score- 2     Anesthesia Type- general with ASA Monitors  Additional Monitors:   Airway Plan: ETT  Plan Factors-Exercise tolerance (METS): >4 METS  Chart reviewed  EKG reviewed  Existing labs reviewed  Patient summary reviewed  Patient is not a current smoker  Induction- intravenous  Postoperative Plan-     Informed Consent- Anesthetic plan and risks discussed with patient  I personally reviewed this patient with the CRNA  Discussed and agreed on the Anesthesia Plan with the CRNA  Fina Serna

## 2021-02-12 NOTE — DISCHARGE INSTRUCTIONS
Salpingectomy   WHAT YOU NEED TO KNOW:   A salpingectomy is surgery to remove one or both of your fallopian tubes  The fallopian tubes carry eggs from the ovaries to the uterus  They are part of a woman's reproductive system  A salpingectomy may be done to treat an ectopic pregnancy, cancer, endometriosis, or an infection  It may also be done to prevent pregnancy or some types of cancer  DISCHARGE INSTRUCTIONS:   Call 911 for any of the following:   · You feel lightheaded, short of breath, and have chest pain  · You cough up blood  · You have trouble breathing  Seek care immediately if:   · Your arm or leg feels warm, tender, and painful  It may look swollen and red  · Blood soaks through your bandage  · Your stitches come apart  · You soak through 1 sanitary pad in 1 hour  · You have trouble urinating or cannot urinate at all  Contact your healthcare provider if:   · You have a fever or chills  · Your wound is red, swollen, or draining pus  · You have pus or a foul-smelling odor coming from your vagina  · Your pain does not get better after you take your medicine  · You have nausea or are vomiting  · Your skin is itchy, swollen, or you have a rash  · You have questions or concerns about your condition or care  Medicines: You may need any of the following:  · Prescription pain medicine  may be given  Ask your healthcare provider how to take this medicine safely  · NSAIDs , such as ibuprofen, help decrease swelling, pain, and fever  NSAIDs can cause stomach bleeding or kidney problems in certain people  If you take blood thinner medicine, always ask your healthcare provider if NSAIDs are safe for you  Always read the medicine label and follow directions  · Take your medicine as directed  Contact your healthcare provider if you think your medicine is not helping or if you have side effects  Tell him or her if you are allergic to any medicine   Keep a list of the medicines, vitamins, and herbs you take  Include the amounts, and when and why you take them  Bring the list or the pill bottles to follow-up visits  Carry your medicine list with you in case of an emergency  Care for your wound as directed:  Ask your healthcare provider when your wound can get wet  Do not take a bath until your healthcare provider says it is okay  Take a shower only  Carefully wash around the wound with soap and water  Let the soap and water gently run over your incision  Do not  scrub your incision  Dry the area and put on new, clean bandages as directed  Change your bandages when they get wet or dirty  If you have strips of medical tape, let them fall off on their own  Activity:  Ask your healthcare provider when you can return to your normal activities  Do not douche, use tampons, or have sex until your healthcare provider says it is okay  These activities may cause infection  Do not exercise or lift anything heavy until your healthcare provider says it is okay  This may put too much stress on your incision  Follow up with your healthcare provider as directed:  Write down your questions so you remember to ask them during your visits  © Copyright [address] [place] [address] Drive Information is for End User's use only and may not be sold, redistributed or otherwise used for commercial purposes  All illustrations and images included in CareNotes® are the copyrighted property of [employer]  or [employer]  The above information is an  only  It is not intended as medical advice for individual conditions or treatments  Talk to your doctor, nurse or pharmacist before following any medical regimen to see if it is safe and effective for you

## 2021-02-13 LAB
HSV1 IGG SER IA-ACNC: 41.2 INDEX (ref 0–0.9)
HSV2 IGG SER IA-ACNC: <0.91 INDEX (ref 0–0.9)

## 2021-02-15 ENCOUNTER — TELEPHONE (OUTPATIENT)
Dept: OBGYN CLINIC | Facility: CLINIC | Age: 31
End: 2021-02-15

## 2021-02-15 NOTE — TELEPHONE ENCOUNTER
Pt was informed  I did explain to her if she does have any outbreaks in her genital area to give us a call and we can culture it   She gave me a verbal that she understood

## 2021-02-15 NOTE — TELEPHONE ENCOUNTER
----- Message from Marda Essex, MD sent at 2/15/2021 11:46 AM EST -----  Please call the patient and let her know that one of her herpes testing came back positive, however, type 1 is usually related to oral herpes  If she has an outbreak in the vulvar area we can always culture it  She did not have any outbreaks during her visit, she only wanted testing due to concern of partner exposed

## 2021-04-05 ENCOUNTER — HOSPITAL ENCOUNTER (EMERGENCY)
Facility: HOSPITAL | Age: 31
Discharge: HOME | End: 2021-04-06
Attending: EMERGENCY MEDICINE | Admitting: EMERGENCY MEDICINE
Payer: COMMERCIAL

## 2021-04-05 DIAGNOSIS — N93.8 DYSFUNCTIONAL UTERINE BLEEDING: Primary | ICD-10-CM

## 2021-04-05 LAB
ABO GROUP BLD: NORMAL
ALBUMIN SERPL BCP-MCNC: 4.2 G/DL (ref 3–5.2)
ALP SERPL-CCNC: 65 U/L (ref 43–122)
ALT SERPL W P-5'-P-CCNC: 16 U/L
ANION GAP SERPL CALCULATED.3IONS-SCNC: 8 MMOL/L (ref 5–14)
ANISOCYTOSIS BLD QL SMEAR: PRESENT
APTT PPP: 29 SECONDS (ref 23–37)
AST SERPL W P-5'-P-CCNC: 23 U/L (ref 14–36)
BASOPHILS # BLD AUTO: 0 THOUSANDS/ΜL (ref 0–0.1)
BASOPHILS NFR BLD AUTO: 1 % (ref 0–1)
BILIRUB SERPL-MCNC: 0.2 MG/DL
BUN SERPL-MCNC: 16 MG/DL (ref 5–25)
CALCIUM SERPL-MCNC: 9.1 MG/DL (ref 8.4–10.2)
CHLORIDE SERPL-SCNC: 103 MMOL/L (ref 97–108)
CO2 SERPL-SCNC: 27 MMOL/L (ref 22–30)
CREAT SERPL-MCNC: 0.98 MG/DL (ref 0.6–1.2)
EOSINOPHIL # BLD AUTO: 0.1 THOUSAND/ΜL (ref 0–0.4)
EOSINOPHIL NFR BLD AUTO: 2 % (ref 0–6)
ERYTHROCYTE [DISTWIDTH] IN BLOOD BY AUTOMATED COUNT: 17.2 %
GFR SERPL CREATININE-BSD FRML MDRD: 78 ML/MIN/1.73SQ M
GLUCOSE SERPL-MCNC: 99 MG/DL (ref 70–99)
HCT VFR BLD AUTO: 30.8 % (ref 36–46)
HGB BLD-MCNC: 9.6 G/DL (ref 12–16)
HYPERCHROMIA BLD QL SMEAR: PRESENT
INR PPP: 0.92 (ref 0.84–1.19)
LYMPHOCYTES # BLD AUTO: 1.3 THOUSANDS/ΜL (ref 0.5–4)
LYMPHOCYTES NFR BLD AUTO: 19 % (ref 25–45)
MCH RBC QN AUTO: 24.1 PG (ref 26–34)
MCHC RBC AUTO-ENTMCNC: 31.3 G/DL (ref 31–36)
MCV RBC AUTO: 77 FL (ref 80–100)
MICROCYTES BLD QL AUTO: PRESENT
MONOCYTES # BLD AUTO: 0.6 THOUSAND/ΜL (ref 0.2–0.9)
MONOCYTES NFR BLD AUTO: 9 % (ref 1–10)
NEUTROPHILS # BLD AUTO: 4.8 THOUSANDS/ΜL (ref 1.8–7.8)
NEUTS SEG NFR BLD AUTO: 71 % (ref 45–65)
PLATELET # BLD AUTO: 254 THOUSANDS/UL (ref 150–450)
PLATELET BLD QL SMEAR: ADEQUATE
PMV BLD AUTO: 9.2 FL (ref 8.9–12.7)
POTASSIUM SERPL-SCNC: 4 MMOL/L (ref 3.6–5)
PROT SERPL-MCNC: 7.8 G/DL (ref 5.9–8.4)
PROTHROMBIN TIME: 12.5 SECONDS (ref 11.6–14.5)
RBC # BLD AUTO: 4.01 MILLION/UL (ref 4–5.2)
RBC MORPH BLD: NORMAL
RH BLD: POSITIVE
SODIUM SERPL-SCNC: 138 MMOL/L (ref 137–147)
WBC # BLD AUTO: 6.8 THOUSAND/UL (ref 4.5–11)

## 2021-04-05 PROCEDURE — 86900 BLOOD TYPING SEROLOGIC ABO: CPT | Performed by: PHYSICIAN ASSISTANT

## 2021-04-05 PROCEDURE — 85610 PROTHROMBIN TIME: CPT | Performed by: PHYSICIAN ASSISTANT

## 2021-04-05 PROCEDURE — 36415 COLL VENOUS BLD VENIPUNCTURE: CPT | Performed by: PHYSICIAN ASSISTANT

## 2021-04-05 PROCEDURE — 86901 BLOOD TYPING SEROLOGIC RH(D): CPT | Performed by: PHYSICIAN ASSISTANT

## 2021-04-05 PROCEDURE — 99284 EMERGENCY DEPT VISIT MOD MDM: CPT | Performed by: PHYSICIAN ASSISTANT

## 2021-04-05 PROCEDURE — 85730 THROMBOPLASTIN TIME PARTIAL: CPT | Performed by: PHYSICIAN ASSISTANT

## 2021-04-05 PROCEDURE — 85025 COMPLETE CBC W/AUTO DIFF WBC: CPT | Performed by: PHYSICIAN ASSISTANT

## 2021-04-05 PROCEDURE — 80053 COMPREHEN METABOLIC PANEL: CPT | Performed by: PHYSICIAN ASSISTANT

## 2021-04-05 PROCEDURE — 99284 EMERGENCY DEPT VISIT MOD MDM: CPT

## 2021-04-05 RX ORDER — NAPROXEN 500 MG/1
500 TABLET ORAL ONCE
Status: COMPLETED | OUTPATIENT
Start: 2021-04-06 | End: 2021-04-06

## 2021-04-06 VITALS
SYSTOLIC BLOOD PRESSURE: 100 MMHG | DIASTOLIC BLOOD PRESSURE: 65 MMHG | RESPIRATION RATE: 18 BRPM | OXYGEN SATURATION: 100 % | HEART RATE: 77 BPM | TEMPERATURE: 97.8 F | BODY MASS INDEX: 28.51 KG/M2 | WEIGHT: 146 LBS

## 2021-04-06 RX ORDER — NAPROXEN 500 MG/1
500 TABLET ORAL 2 TIMES DAILY WITH MEALS
Qty: 30 TABLET | Refills: 0 | Status: SHIPPED | OUTPATIENT
Start: 2021-04-06 | End: 2022-03-21

## 2021-04-06 RX ADMIN — NAPROXEN 500 MG: 500 TABLET ORAL at 00:12

## 2021-04-06 NOTE — DISCHARGE INSTRUCTIONS
Take medication as directed  Call tomorrow to make an appointment with your GYN  Return for new or worsening complaints   Follow up with primary care

## 2021-04-06 NOTE — ED PROVIDER NOTES
History  Chief Complaint   Patient presents with    Vaginal Bleeding     pt c/o heavy vaginal bleeding x 2 days  pt states that she has gone through about 60 pads since period started  pt had tubal ligation in feb, had 1 other period since then that was normal  pt states that she is passing blood clots about the size of a golf ball      79-year-old female with history of anemia presents complaining of heavy vaginal bleeding for the past 2 days  Admits to history of similar but states "it has never been this bad" had a salpingectomy on 02/27/2021 but states she has had normal periods since then  Has been going through 1 tampon an hour for 2 days  Has not taken anything prior to arrival  Denies any other complaints       History provided by:  Patient   used: No    Vaginal Bleeding  Quality:  Bright red  Severity:  Moderate  Duration:  2 days  Associated symptoms: no abdominal pain, no dizziness, no dysuria, no fatigue and no nausea        Prior to Admission Medications   Prescriptions Last Dose Informant Patient Reported? Taking?    Prenatal MV-Min-Fe Fum-FA-DHA (Prenatal 1) 30-0 975-200 MG CAPS Not Taking at Unknown time  No No   Sig: Take 1 tablet by mouth daily   Patient not taking: Reported on 4/5/2021   famotidine (PEPCID) 20 mg tablet Not Taking at Unknown time  No No   Sig: Take 1 tablet (20 mg total) by mouth 2 (two) times a day   Patient not taking: Reported on 4/5/2021   ferrous sulfate 324 (65 Fe) mg Not Taking at Unknown time  No No   Sig: Take 1 tablet (324 mg total) by mouth 2 (two) times a day before meals   Patient not taking: Reported on 4/5/2021      Facility-Administered Medications: None       Past Medical History:   Diagnosis Date    Anemia     taking iron pills BID     Chronic kidney disease     hydronephrosis    History of transfusion     Kidney stone     [facility] [address]     Septic shock ([facility] [address] [facility]     Urinary tract infection     taking antibioitcs     UTI (urinary tract infection) during pregnancy     Vagina, candidiasis     Varicella     as child       Past Surgical History:   Procedure Laterality Date    ABDOMINAL ADHESION SURGERY N/A 2021    Procedure: LYSIS ADHESIONS;  Surgeon: Ronald Soler MD;  [facility];  Service: Gynecology     SECTION N/A 2018    Procedure:  SECTION (); Surgeon: Jessee Alanis MD;  [facility];  Service: Obstetrics    CYSTOSCOPY  2018    with right uretral stent    EXAMINATION UNDER ANESTHESIA N/A 2021    Procedure: EXAM UNDER ANESTHESIA (EUA); Surgeon: Ronald Soler MD;  [facility];  Service: Gynecology    MA CYSTO/URETERO W/LITHOTRIPSY &INDWELL STENT INSRT Right 2018    Procedure: CYSTOSCOPY URETEROSCOPY WITH  RETROGRADE PYELOGRAM AND exchange  STENT URETERAl;  Surgeon: Perry Dang MD;  Location: [facility] MAIN OR;  Service: Urology    MA CYSTOURETHROSCOPY,URETER CATHETER Right 2018    Procedure: CYSTOSCOPY RETROGRADE PYELOGRAM WITH INSERTION STENT URETERAL;  Surgeon: Perry Dang MD;  [facility];  Service: Urology    MA LAP,DIAGNOSTIC ABDOMEN N/A 2021    Procedure: LAPAROSCOPY DIAGNOSTIC;  Surgeon: Ronald Soler MD;  [facility];  Service: Gynecology    MA LAP,RMV  ADNEXAL STRUCTURE Bilateral 2021    Procedure: SALPINGECTOMY, Blaise Maikel;  Surgeon: Ronald Soler MD;  [facility];  Service: Gynecology    TONSILLECTOMY      TONSILLECTOMY      TUBAL LIGATION         Family History   Problem Relation Age of Onset    No Known Problems Mother     Diabetes Father     Heart defect Brother         3 open heart surgeries    Heart failure Family         Congestive    Diabetes Maternal Grandmother      I have reviewed and agree with the history as documented      E-Cigarette/Vaping    E-Cigarette Use Never User      E-Cigarette/Vaping Substances    Nicotine No     THC No     CBD No     Flavoring No     Other No     Unknown No      Social History     Tobacco Use    Smoking status: Former Smoker     Packs/day: 0 00     Quit date: 2019     Years since quittin 8    Smokeless tobacco: Never Used    Tobacco comment: 2-3 a day   Substance Use Topics    Alcohol use: No    Drug use: No       Review of Systems   Constitutional: Negative  Negative for chills and fatigue  HENT: Negative for ear pain and sore throat  Eyes: Negative for photophobia and redness  Respiratory: Negative for apnea, cough and shortness of breath  Cardiovascular: Negative for chest pain  Gastrointestinal: Negative for abdominal pain, nausea and vomiting  Genitourinary: Positive for vaginal bleeding  Negative for dysuria  Musculoskeletal: Negative for arthralgias, neck pain and neck stiffness  Skin: Negative for rash  Neurological: Negative for dizziness, tremors, syncope and weakness  Psychiatric/Behavioral: Negative for suicidal ideas  Physical Exam  Physical Exam  Constitutional:       General: She is not in acute distress  Appearance: She is well-developed  She is not diaphoretic  Eyes:      Pupils: Pupils are equal, round, and reactive to light  Neck:      Musculoskeletal: Normal range of motion and neck supple  Cardiovascular:      Rate and Rhythm: Normal rate and regular rhythm  Pulmonary:      Effort: Pulmonary effort is normal  No respiratory distress  Breath sounds: Normal breath sounds  Abdominal:      General: Bowel sounds are normal  There is no distension  Palpations: Abdomen is soft  Musculoskeletal: Normal range of motion  Skin:     General: Skin is warm and dry  Coloration: Skin is not pale  Neurological:      Mental Status: She is alert and oriented to person, place, and time           Vital Signs  ED Triage Vitals [21 2237]   Temperature Pulse Respirations Blood Pressure SpO2   97 8 °F (36 6 °C) 94 18 110/69 100 %      Temp Source Heart Rate Source Patient Position - Orthostatic VS BP Location FiO2 (%)   Tympanic Monitor Sitting Left arm --      Pain Score       3           Vitals:    04/05/21 2237 04/05/21 2316   BP: 110/69 111/67   Pulse: 94 84   Patient Position - Orthostatic VS: Sitting Sitting         Visual Acuity      ED Medications  Medications   naproxen (NAPROSYN) tablet 500 mg (500 mg Oral Given 4/6/21 0012)       Diagnostic Studies  Results Reviewed     Procedure Component Value Units Date/Time    Smear Review(Phlebs Do Not Order) [[id number]] Collected: 04/05/21 2311    Lab Status: Final result Specimen: Blood from Arm, Right Updated: 04/05/21 2334     RBC Morphology abnormal     Anisocytosis Present     Hypochromia Present     Microcytes Present     Platelet Estimate Adequate    Comprehensive metabolic panel [[id number]]  (Normal) Collected: 04/05/21 2312    Lab Status: Final result Specimen: Blood from Arm, Right Updated: 04/05/21 2332     Sodium 138 mmol/L      Potassium 4 0 mmol/L      Chloride 103 mmol/L      CO2 27 mmol/L      ANION GAP 8 mmol/L      BUN 16 mg/dL      Creatinine 0 98 mg/dL      Glucose 99 mg/dL      Calcium 9 1 mg/dL      AST 23 U/L      ALT 16 U/L      Alkaline Phosphatase 65 U/L      Total Protein 7 8 g/dL      Albumin 4 2 g/dL      Total Bilirubin 0 20 mg/dL      eGFR 78 ml/min/1 73sq m     Narrative:      [place] guidelines for Chronic Kidney Disease (CKD):     Stage 1 with normal or high GFR (GFR > 90 mL/min/1 73 square meters)    Stage 2 Mild CKD (GFR = 60-89 mL/min/1 73 square meters)    Stage 3A Moderate CKD (GFR = 45-59 mL/min/1 73 square meters)    Stage 3B Moderate CKD (GFR = 30-44 mL/min/1 73 square meters)    Stage 4 Severe CKD (GFR = 15-29 mL/min/1 73 square meters)    Stage 5 End Stage CKD (GFR <15 mL/min/1 73 square meters)  Note: GFR calculation is accurate only with a steady state creatinine    CBC and differential [[id number]]  (Abnormal) Collected: 04/05/21 2311    Lab Status: Final result Specimen: Blood from Arm, Right Updated: 04/05/21 2332     WBC 6 80 Thousand/uL      RBC 4 01 Million/uL      Hemoglobin 9 6 g/dL      Hematocrit 30 8 %      MCV 77 fL      MCH 24 1 pg      MCHC 31 3 g/dL      RDW 17 2 %      MPV 9 2 fL      Platelets 619 Thousands/uL      Neutrophils Relative 71 %      Lymphocytes Relative 19 %      Monocytes Relative 9 %      Eosinophils Relative 2 %      Basophils Relative 1 %      Neutrophils Absolute 4 80 Thousands/µL      Lymphocytes Absolute 1 30 Thousands/µL      Monocytes Absolute 0 60 Thousand/µL      Eosinophils Absolute 0 10 Thousand/µL      Basophils Absolute 0 00 Thousands/µL     APTT [[id number]]  (Normal) Collected: 04/05/21 2312    Lab Status: Final result Specimen: Blood from Arm, Right Updated: 04/05/21 2331     PTT 29 seconds     Protime-INR [[id number]]  (Normal) Collected: 04/05/21 2312    Lab Status: Final result Specimen: Blood from Arm, Right Updated: 04/05/21 2331     Protime 12 5 seconds      INR 0 92                 No orders to display              Procedures  Procedures         ED Course                             SBIRT 20yo+      Most Recent Value   SBIRT (25 yo +)   In order to provide better care to our patients, we are screening all of our patients for alcohol and drug use  Would it be okay to ask you these screening questions? Yes Filed at: 04/05/2021 2312   Initial Alcohol Screen: US AUDIT-C    1  How often do you have a drink containing alcohol?  0 Filed at: 04/05/2021 2312   2  How many drinks containing alcohol do you have on a typical day you are drinking? 0 Filed at: 04/05/2021 2312   3a  Male UNDER 65: How often do you have five or more drinks on one occasion? 0 Filed at: 04/05/2021 2312   3b  FEMALE Any Age, or MALE 65+: How often do you have 4 or more drinks on one occassion? 0 Filed at: 04/05/2021 2312   Audit-C Score  0 Filed at: 04/05/2021 2312   DEE: How many times in the past year have you       Used an illegal drug or used a prescription medication for non-medical reasons? Never Filed at: 04/05/2021 2312                    [facility]  Number of Diagnoses or Management Options  Dysfunctional uterine bleeding: new and does not require workup  Diagnosis management comments: Benign evaluation in the ED with hemoglobin 9 6 similar when compared to prior  Pt appears well on exam  Educated on supportive care and givens trict return precautions demonstrates understanding  Stable for d c home with gyn follow up        Amount and/or Complexity of Data Reviewed  Clinical lab tests: ordered and reviewed    Risk of Complications, Morbidity, and/or Mortality  Presenting problems: moderate  Diagnostic procedures: moderate  Management options: moderate    Patient Progress  Patient progress: stable      Disposition  Final diagnoses:   Dysfunctional uterine bleeding     Time reflects when diagnosis was documented in both MDM as applicable and the Disposition within this note     Time User Action Codes Description Comment    4/6/2021 12:19 AM Pepper Mc Add [N93 8] Dysfunctional uterine bleeding       ED Disposition     ED Disposition Condition Date/Time Comment    Discharge Stable Tue Apr 6, 2021 12:19  [address] discharge to home/self care  Follow-up Information     Follow up With Specialties Details Why [address] [facility] [address], [place] Family Medicine Call  As needed [address]  [address] [facility]  [address]  [facility] [address]             Patient's Medications   Discharge Prescriptions    NAPROXEN (NAPROSYN) 500 MG TABLET    Take 1 tablet (500 mg total) by mouth 2 (two) times a day with meals       Start Date: 4/6/2021  End Date: --       Order Dose: 500 mg       Quantity: 30 tablet    Refills: 0     No discharge procedures on file      PDMP Review       Value Time User    PDMP Reviewed  Yes 2/12/2021  2:14 PM Kevyn Kyle MD          ED Provider  Electronically Signed by           Carmel Lockwood PA-C  04/06/21 [id number] [facility] César Moser PA-C  04/06/21 0028

## 2021-04-06 NOTE — ED NOTES
Patient resting quietly  Radha Boss in to speak with patient concerning results of blood tests that were done as well as discharge instructions and follow up plan of care       Eddie Raza RN  04/06/21 7931

## 2021-04-08 ENCOUNTER — OFFICE VISIT (OUTPATIENT)
Dept: OBGYN CLINIC | Facility: CLINIC | Age: 31
End: 2021-04-08

## 2021-04-08 VITALS
WEIGHT: 141 LBS | HEART RATE: 101 BPM | HEIGHT: 60 IN | BODY MASS INDEX: 27.68 KG/M2 | SYSTOLIC BLOOD PRESSURE: 138 MMHG | DIASTOLIC BLOOD PRESSURE: 85 MMHG

## 2021-04-08 DIAGNOSIS — D64.9 ANEMIA, UNSPECIFIED TYPE: ICD-10-CM

## 2021-04-08 DIAGNOSIS — N93.9 ABNORMAL UTERINE BLEEDING (AUB): Primary | ICD-10-CM

## 2021-04-08 PROCEDURE — 99213 OFFICE O/P EST LOW 20 MIN: CPT | Performed by: OBSTETRICS & GYNECOLOGY

## 2021-04-08 RX ORDER — FERROUS SULFATE TAB EC 324 MG (65 MG FE EQUIVALENT) 324 (65 FE) MG
324 TABLET DELAYED RESPONSE ORAL
Qty: 30 TABLET | Refills: 3 | Status: SHIPPED | OUTPATIENT
Start: 2021-04-08 | End: 2022-03-25

## 2021-04-08 RX ORDER — MEDROXYPROGESTERONE ACETATE 10 MG/1
10 TABLET ORAL DAILY
Qty: 10 TABLET | Refills: 0 | Status: SHIPPED | OUTPATIENT
Start: 2021-04-08 | End: 2021-04-18

## 2021-04-08 NOTE — PROGRESS NOTES
Subjective     Sanya Hopper is a 27 y o  female here for abnormal uterine bleeding  The patient underwent a tubal ligation on  without complication  Since that time, the patient states that she had two periods in March with normal flow  However, over the last few days, the patient has experienced heavy menstrual bleeding  She went to the ED for the bleeding and was found to have a hemoglobin of 9 6  She was discharged with the recommendation of outpatient follow up  The patient states that since Monday, she has been having menstrual bleeding requiring up to 15 tampons per day  The patient denies any recent history of heavy bleeding  She did have very heavy periods from ages [age] years old when she had her first child  Prior to her tubal ligation, she was using condoms for contraception  She does not have experience with hormonal contraception  The patient has used iron in the past for anemia with good results  Patient Active Problem List   Diagnosis    Bacteriuria    Ureteral calculi    Iron deficiency anemia    Sterilization consult    Stress incontinence of urine    Chronic kidney disease    Encounter for sterilization    Sterilization       Gynecologic History  Contraception: tubal ligation  Last Pap:   Results were: normal; HPV not done    Obstetric History  OB History    Para Term  AB Living   5 5 5     5   SAB TAB Ectopic Multiple Live Births         0 5      # Outcome Date GA Lbr Nicho/2nd Weight Sex Delivery Anes PTL Lv   5 Term 18 38w3d  3365 g (7 lb 6 7 oz) M CS-LTranv EPI N MIO      Complications: Fetal Intolerance   4 Term 17 39w6d  2977 g (6 lb 9 oz) M Vag-Spont None N MIO   3 Term 02/16/15 38w0d  3515 g (7 lb 12 oz) F Vag-Spont   MIO   2 Term 13 40w0d  3232 g (7 lb 2 oz) F Vag-Spont   MIO   1 Term 10/06/12 40w0d  3175 g (7 lb) M Vag-Spont EPI  MIO       Allergies  Patient has no known allergies      Medications    Current Outpatient Medications:     famotidine (PEPCID) 20 mg tablet, Take 1 tablet (20 mg total) by mouth 2 (two) times a day (Patient not taking: Reported on 4/5/2021), Disp: 30 tablet, Rfl: 0    ferrous sulfate 324 (65 Fe) mg, Take 1 tablet (324 mg total) by mouth 2 (two) times a day before meals (Patient not taking: Reported on 4/5/2021), Disp: 60 tablet, Rfl: 1    naproxen (NAPROSYN) 500 mg tablet, Take 1 tablet (500 mg total) by mouth 2 (two) times a day with meals, Disp: 30 tablet, Rfl: 0    Prenatal MV-Min-Fe Fum-FA-DHA (Prenatal 1) 30-0 975-200 MG CAPS, Take 1 tablet by mouth daily (Patient not taking: Reported on 4/5/2021), Disp: 90 capsule, Rfl: 1      Review of Systems  Constitutional: no fever, feels well, tiredness, no recent weight gain or loss  Breasts:no complaints of breast pain, breast lump, or nipple discharge  Gastrointestinal: no complaints of abdominal pain, constipation, nausea, vomiting, or diarrhea or bloody stools  Genitourinary : no complaints of dysuria, incontinence, pelvic pain, dysmenorrhea,vaginal discharge        Objective        Vitals:    04/08/21 1429   BP: 138/85   Pulse: 101       Assessment      Kori Martin is a 27 y o  female here for abnormal uterine bleeding  Plan    Patient prescribed a 10 day course of Provera to improve symptomatic bleeding  TVUS ordered to assess for structural abnormalities  Patient instructed to resume taking daily po iron with citrus  She will RTC in one month to reassess  Patient should have a pap smear at that time

## 2021-04-17 ENCOUNTER — HOSPITAL ENCOUNTER (EMERGENCY)
Facility: HOSPITAL | Age: 31
Discharge: HOME | End: 2021-04-17
Attending: EMERGENCY MEDICINE | Admitting: EMERGENCY MEDICINE
Payer: COMMERCIAL

## 2021-04-17 VITALS
HEART RATE: 108 BPM | DIASTOLIC BLOOD PRESSURE: 70 MMHG | BODY MASS INDEX: 28.03 KG/M2 | SYSTOLIC BLOOD PRESSURE: 121 MMHG | TEMPERATURE: 98.8 F | WEIGHT: 143.5 LBS | RESPIRATION RATE: 18 BRPM | OXYGEN SATURATION: 96 %

## 2021-04-17 DIAGNOSIS — N93.8 DYSFUNCTIONAL UTERINE BLEEDING: Primary | ICD-10-CM

## 2021-04-17 LAB
ALBUMIN SERPL BCP-MCNC: 4.3 G/DL (ref 3–5.2)
ALP SERPL-CCNC: 72 U/L (ref 43–122)
ALT SERPL W P-5'-P-CCNC: 17 U/L
ANION GAP SERPL CALCULATED.3IONS-SCNC: 9 MMOL/L (ref 5–14)
ANISOCYTOSIS BLD QL SMEAR: PRESENT
AST SERPL W P-5'-P-CCNC: 22 U/L (ref 14–36)
BACTERIA UR QL AUTO: ABNORMAL /HPF
BASOPHILS # BLD AUTO: 0 THOUSANDS/ΜL (ref 0–0.1)
BASOPHILS NFR BLD AUTO: 0 % (ref 0–1)
BILIRUB SERPL-MCNC: 0.39 MG/DL
BILIRUB UR QL STRIP: NEGATIVE
BUN SERPL-MCNC: 13 MG/DL (ref 5–25)
CALCIUM SERPL-MCNC: 9.6 MG/DL (ref 8.4–10.2)
CHLORIDE SERPL-SCNC: 105 MMOL/L (ref 97–108)
CLARITY UR: ABNORMAL
CO2 SERPL-SCNC: 30 MMOL/L (ref 22–30)
COLOR UR: ABNORMAL
CREAT SERPL-MCNC: 0.66 MG/DL (ref 0.6–1.2)
EOSINOPHIL # BLD AUTO: 0.1 THOUSAND/ΜL (ref 0–0.4)
EOSINOPHIL NFR BLD AUTO: 2 % (ref 0–6)
ERYTHROCYTE [DISTWIDTH] IN BLOOD BY AUTOMATED COUNT: 17 %
EXT PREG TEST URINE: NEGATIVE
EXT. CONTROL ED NAV: NORMAL
GFR SERPL CREATININE-BSD FRML MDRD: 119 ML/MIN/1.73SQ M
GLUCOSE SERPL-MCNC: 72 MG/DL (ref 70–99)
GLUCOSE UR STRIP-MCNC: NEGATIVE MG/DL
HCT VFR BLD AUTO: 33.3 % (ref 36–46)
HGB BLD-MCNC: 10.3 G/DL (ref 12–16)
HGB UR QL STRIP.AUTO: 150
HYPERCHROMIA BLD QL SMEAR: PRESENT
KETONES UR STRIP-MCNC: NEGATIVE MG/DL
LEUKOCYTE ESTERASE UR QL STRIP: NEGATIVE
LYMPHOCYTES # BLD AUTO: 1.5 THOUSANDS/ΜL (ref 0.5–4)
LYMPHOCYTES NFR BLD AUTO: 19 % (ref 25–45)
MCH RBC QN AUTO: 23.4 PG (ref 26–34)
MCHC RBC AUTO-ENTMCNC: 30.9 G/DL (ref 31–36)
MCV RBC AUTO: 76 FL (ref 80–100)
MICROCYTES BLD QL AUTO: PRESENT
MONOCYTES # BLD AUTO: 0.6 THOUSAND/ΜL (ref 0.2–0.9)
MONOCYTES NFR BLD AUTO: 8 % (ref 1–10)
NEUTROPHILS # BLD AUTO: 5.5 THOUSANDS/ΜL (ref 1.8–7.8)
NEUTS SEG NFR BLD AUTO: 71 % (ref 45–65)
NITRITE UR QL STRIP: NEGATIVE
NON-SQ EPI CELLS URNS QL MICRO: ABNORMAL /HPF
PH UR STRIP.AUTO: 8 [PH]
PLATELET # BLD AUTO: 347 THOUSANDS/UL (ref 150–450)
PLATELET BLD QL SMEAR: ADEQUATE
PMV BLD AUTO: 8.4 FL (ref 8.9–12.7)
POTASSIUM SERPL-SCNC: 3.7 MMOL/L (ref 3.6–5)
PROT SERPL-MCNC: 8 G/DL (ref 5.9–8.4)
PROT UR STRIP-MCNC: ABNORMAL MG/DL
RBC # BLD AUTO: 4.4 MILLION/UL (ref 4–5.2)
RBC #/AREA URNS AUTO: ABNORMAL /HPF
RBC MORPH BLD: NORMAL
SODIUM SERPL-SCNC: 144 MMOL/L (ref 137–147)
SP GR UR STRIP.AUTO: 1.01 (ref 1–1.04)
UROBILINOGEN UA: NEGATIVE MG/DL
WBC # BLD AUTO: 7.8 THOUSAND/UL (ref 4.5–11)
WBC #/AREA URNS AUTO: ABNORMAL /HPF

## 2021-04-17 PROCEDURE — 99284 EMERGENCY DEPT VISIT MOD MDM: CPT

## 2021-04-17 PROCEDURE — 81001 URINALYSIS AUTO W/SCOPE: CPT | Performed by: PHYSICIAN ASSISTANT

## 2021-04-17 PROCEDURE — 36415 COLL VENOUS BLD VENIPUNCTURE: CPT | Performed by: PHYSICIAN ASSISTANT

## 2021-04-17 PROCEDURE — 81025 URINE PREGNANCY TEST: CPT | Performed by: PHYSICIAN ASSISTANT

## 2021-04-17 PROCEDURE — 96374 THER/PROPH/DIAG INJ IV PUSH: CPT

## 2021-04-17 PROCEDURE — 85025 COMPLETE CBC W/AUTO DIFF WBC: CPT | Performed by: PHYSICIAN ASSISTANT

## 2021-04-17 PROCEDURE — 96361 HYDRATE IV INFUSION ADD-ON: CPT

## 2021-04-17 PROCEDURE — 99284 EMERGENCY DEPT VISIT MOD MDM: CPT | Performed by: PHYSICIAN ASSISTANT

## 2021-04-17 PROCEDURE — 80053 COMPREHEN METABOLIC PANEL: CPT | Performed by: PHYSICIAN ASSISTANT

## 2021-04-17 RX ORDER — NAPROXEN 500 MG/1
500 TABLET ORAL 2 TIMES DAILY WITH MEALS
Qty: 30 TABLET | Refills: 0 | Status: SHIPPED | OUTPATIENT
Start: 2021-04-17 | End: 2022-03-21

## 2021-04-17 RX ORDER — KETOROLAC TROMETHAMINE 30 MG/ML
15 INJECTION, SOLUTION INTRAMUSCULAR; INTRAVENOUS ONCE
Status: COMPLETED | OUTPATIENT
Start: 2021-04-17 | End: 2021-04-17

## 2021-04-17 RX ADMIN — KETOROLAC TROMETHAMINE 15 MG: 30 INJECTION, SOLUTION INTRAMUSCULAR; INTRAVENOUS at 21:24

## 2021-04-17 RX ADMIN — SODIUM CHLORIDE 1000 ML: 0.9 INJECTION, SOLUTION INTRAVENOUS at 21:23

## 2021-04-18 NOTE — ED PROVIDER NOTES
History  Chief Complaint   Patient presents with    Vaginal Bleeding     Pt reports vaginal bleeding last week that stopped 4 days ago and is now back x 2 days  Pt reports having clots the size of a quarter and going through 15 pads a day the last two days  80-year-old female with history of dysfunctional uterine bleeding and anemia with prior transfusions presents complaining of continued vaginal bleeding for approximately 10 days now tells me that she is going to approximately 15 pads a day and is now feeling weak  Was seen by me in this emergency department on April 5, 2021 with a stable hemoglobin of 9 6 advised follow-up with OBGYN  Patient followed up in the scheduled for outpatient ultrasound however returns today due to fatigue and generalized weakness  Tells me that the bleeding stopped for a day or 2 but has now returned  Denies any urinary complaints  Denies any other complaints or new symptoms  Has been taking naproxen as direcred      History provided by:  Patient   used: No    Vaginal Bleeding  Quality:  Dark red and clots  Severity:  Moderate  Onset quality:  Gradual  Duration:  10 days  Timing:  Constant  Associated symptoms: abdominal pain (Mild, pelvic cramping)    Associated symptoms: no dizziness, no dysuria, no fatigue and no nausea        Prior to Admission Medications   Prescriptions Last Dose Informant Patient Reported? Taking?    Prenatal MV-Min-Fe Fum-FA-DHA (Prenatal 1) 30-0 975-200 MG CAPS   No No   Sig: Take 1 tablet by mouth daily   Patient not taking: Reported on 4/5/2021   famotidine (PEPCID) 20 mg tablet   No No   Sig: Take 1 tablet (20 mg total) by mouth 2 (two) times a day   Patient not taking: Reported on 4/5/2021   ferrous sulfate 324 (65 Fe) mg   No No   Sig: Take 1 tablet (324 mg total) by mouth 2 (two) times a day before meals   Patient not taking: Reported on 4/5/2021   ferrous sulfate 324 (65 Fe) mg   No No   Sig: Take 1 tablet (324 mg total) by mouth 2 (two) times a day before meals   medroxyPROGESTERone (PROVERA) 10 mg tablet   No No   Sig: Take 1 tablet (10 mg total) by mouth daily for 10 days   naproxen (NAPROSYN) 500 mg tablet   No No   Sig: Take 1 tablet (500 mg total) by mouth 2 (two) times a day with meals   Patient not taking: Reported on 2021      Facility-Administered Medications: None       Past Medical History:   Diagnosis Date    Anemia     taking iron pills BID     Chronic kidney disease     hydronephrosis    History of transfusion     Kidney stone     [facility] [address]     Septic shock ([facility] [address]     Urinary tract infection     taking antibioitcs     UTI (urinary tract infection) during pregnancy     Vagina, candidiasis     Varicella     as child       Past Surgical History:   Procedure Laterality Date    ABDOMINAL ADHESION SURGERY N/A 2021    Procedure: LYSIS ADHESIONS;  Surgeon: Sri Huang MD;  [facility];  Service: Gynecology     SECTION N/A 2018    Procedure:  SECTION (); Surgeon: Carmen Odom MD;  [facility];  Service: Obstetrics    CYSTOSCOPY  2018    with right uretral stent    EXAMINATION UNDER ANESTHESIA N/A 2021    Procedure: EXAM UNDER ANESTHESIA (EUA);   Surgeon: Sri Huang MD;  [facility];  Service: Gynecology    VT CYSTO/URETERO W/LITHOTRIPSY &INDWELL STENT INSRT Right 2018    Procedure: CYSTOSCOPY URETEROSCOPY WITH  RETROGRADE PYELOGRAM AND exchange  STENT URETERAl;  Surgeon: Migdalia Garcia MD;  Location: [address] [place] [address] MAIN OR;  Service: Urology    VT CYSTOURETHROSCOPY,URETER CATHETER Right 2018    Procedure: CYSTOSCOPY RETROGRADE PYELOGRAM WITH INSERTION STENT URETERAL;  Surgeon: Migdalia Garcia MD;  [facility];  Service: Urology    VT LAP,DIAGNOSTIC ABDOMEN N/A 2021    Procedure: LAPAROSCOPY DIAGNOSTIC;  Surgeon: Sri Huang MD;  [facility]: [facility] MA[facility];  Service: Gynecology    VT LAP,RMV  ADNEXAL STRUCTURE Bilateral 2021    Procedure: SALPINGECTOMY, LAPAROSCOPIC;  Surgeon: Denise Herrera MD;  [facility];  Service: Gynecology    TONSILLECTOMY      TONSILLECTOMY      TUBAL LIGATION         Family History   Problem Relation Age of Onset    No Known Problems Mother     Diabetes Father     Heart defect Brother         3 open heart surgeries    Heart failure Family         Congestive    Diabetes Maternal Grandmother      I have reviewed and agree with the history as documented  E-Cigarette/Vaping    E-Cigarette Use Never User      E-Cigarette/Vaping Substances    Nicotine No     THC No     CBD No     Flavoring No     Other No     Unknown No      Social History     Tobacco Use    Smoking status: Current Every Day Smoker     Packs/day: 0 25     Last attempt to quit: 2019     Years since quittin 8    Smokeless tobacco: Never Used    Tobacco comment: 2-3 a day   Substance Use Topics    Alcohol use: No    Drug use: No       Review of Systems   Constitutional: Negative  Negative for chills and fatigue  HENT: Negative for ear pain and sore throat  Eyes: Negative for photophobia and redness  Respiratory: Negative for apnea, cough and shortness of breath  Cardiovascular: Negative for chest pain  Gastrointestinal: Positive for abdominal pain (Mild, pelvic cramping)  Negative for nausea and vomiting  Genitourinary: Positive for vaginal bleeding  Negative for dysuria  Musculoskeletal: Negative for arthralgias, neck pain and neck stiffness  Skin: Negative for rash  Neurological: Negative for dizziness, tremors, syncope and weakness  Psychiatric/Behavioral: Negative for suicidal ideas  Physical Exam  Physical Exam  Constitutional:       General: She is not in acute distress  Appearance: She is well-developed  She is not diaphoretic  Eyes:      Pupils: Pupils are equal, round, and reactive to light  Neck:      Musculoskeletal: Normal range of motion and neck supple  Cardiovascular:      Rate and Rhythm: Normal rate and regular rhythm  Pulmonary:      Effort: Pulmonary effort is normal  No respiratory distress  Breath sounds: Normal breath sounds  Abdominal:      General: Bowel sounds are normal  There is no distension  Palpations: Abdomen is soft  Comments: Soft nondistended mild tenderness in the suprapubic area remaining areas nontender no guarding rebound or rigidity  Musculoskeletal: Normal range of motion  Skin:     General: Skin is warm and dry  Coloration: Skin is not pale  Neurological:      Mental Status: She is alert and oriented to person, place, and time           Vital Signs  ED Triage Vitals [04/17/21 2102]   Temperature Pulse Respirations Blood Pressure SpO2   98 8 °F (37 1 °C) (!) 108 18 121/70 96 %      Temp Source Heart Rate Source Patient Position - Orthostatic VS BP Location FiO2 (%)   Tympanic Monitor Sitting Left arm --      Pain Score       1           Vitals:    04/17/21 2102   BP: 121/70   Pulse: (!) 108   Patient Position - Orthostatic VS: Sitting         Visual Acuity      ED Medications  Medications   sodium chloride 0 9 % bolus 1,000 mL (1,000 mL Intravenous New Bag 4/17/21 2123)   ketorolac (TORADOL) injection 15 mg (15 mg Intravenous Given 4/17/21 2124)       Diagnostic Studies  Results Reviewed     Procedure Component Value Units Date/Time    Urine Microscopic [[id number]]  (Abnormal) Collected: 04/17/21 2121    Lab Status: Final result Specimen: Urine, Other Updated: 04/17/21 2141     RBC, UA Innumerable /hpf      WBC, UA       Field obscured, unable to enumerate     /hpf     Epithelial Cells       Field obscured, unable to enumerate     /hpf     Bacteria, UA       Field obscured, unable to enumerate     /hpf    Comprehensive metabolic panel [[id number]]  (Normal) Collected: 04/17/21 2121    Lab Status: Final result Specimen: Blood from Arm, Right Updated: 04/17/21 2138     Sodium 144 mmol/L      Potassium 3 7 mmol/L Chloride 105 mmol/L      CO2 30 mmol/L      ANION GAP 9 mmol/L      BUN 13 mg/dL      Creatinine 0 66 mg/dL      Glucose 72 mg/dL      Calcium 9 6 mg/dL      AST 22 U/L      ALT 17 U/L      Alkaline Phosphatase 72 U/L      Total Protein 8 0 g/dL      Albumin 4 3 g/dL      Total Bilirubin 0 39 mg/dL      eGFR 119 ml/min/1 73sq m     Narrative:      National Kidney Disease Foundation guidelines for Chronic Kidney Disease (CKD):     Stage 1 with normal or high GFR (GFR > 90 mL/min/1 73 square meters)    Stage 2 Mild CKD (GFR = 60-89 mL/min/1 73 square meters)    Stage 3A Moderate CKD (GFR = 45-59 mL/min/1 73 square meters)    Stage 3B Moderate CKD (GFR = 30-44 mL/min/1 73 square meters)    Stage 4 Severe CKD (GFR = 15-29 mL/min/1 73 square meters)    Stage 5 End Stage CKD (GFR <15 mL/min/1 73 square meters)  Note: GFR calculation is accurate only with a steady state creatinine    CBC and differential [[id number]]  (Abnormal) Collected: 04/17/21 2121    Lab Status: Final result Specimen: Blood from Arm, Right Updated: 04/17/21 2134     WBC 7 80 Thousand/uL      RBC 4 40 Million/uL      Hemoglobin 10 3 g/dL      Hematocrit 33 3 %      MCV 76 fL      MCH 23 4 pg      MCHC 30 9 g/dL      RDW 17 0 %      MPV 8 4 fL      Platelets 104 Thousands/uL      Neutrophils Relative 71 %      Lymphocytes Relative 19 %      Monocytes Relative 8 %      Eosinophils Relative 2 %      Basophils Relative 0 %      Neutrophils Absolute 5 50 Thousands/µL      Lymphocytes Absolute 1 50 Thousands/µL      Monocytes Absolute 0 60 Thousand/µL      Eosinophils Absolute 0 10 Thousand/µL      Basophils Absolute 0 00 Thousands/µL     UA (URINE) with reflex to Scope [[id number]]  (Abnormal) Collected: 04/17/21 2121    Lab Status: Final result Specimen: Urine, Other Updated: 04/17/21 2131     Color, UA Red     Clarity, UA Bloody     Specific Gravity, UA 1 015     pH, UA 8 0     Leukocytes, UA Negative     Nitrite, UA Negative     Protein,  (2+) mg/dl      Glucose, UA Negative mg/dl      Ketones, UA Negative mg/dl      Bilirubin, UA Negative     Blood,  0     UROBILINOGEN UA Negative mg/dL     Smear Review(Phlebs Do Not Order) [[id number]] Collected: 04/17/21 2121    Lab Status: In process Specimen: Blood from Arm, Right Updated: 04/17/21 2129    POCT pregnancy, urine [[id number]]  (Normal) Resulted: 04/17/21 2124    Lab Status: Final result Updated: 04/17/21 2124     EXT PREG TEST UR (Ref: Negative) Negative     Control Valid                 No orders to display              Procedures  Procedures         ED Course  ED Course as of Apr 17 2153   Sat Apr 17, 2021 2137 Improved since prior   Hemoglobin(!): 10 3                             SBIRT 20yo+      Most Recent Value   SBIRT (23 yo +)   In order to provide better care to our patients, we are screening all of our patients for alcohol and drug use  Would it be okay to ask you these screening questions? Yes Filed at: 04/17/2021 2123   Initial Alcohol Screen: US AUDIT-C    1  How often do you have a drink containing alcohol?  0 Filed at: 04/17/2021 2123   2  How many drinks containing alcohol do you have on a typical day you are drinking? 0 Filed at: 04/17/2021 2123   3b  FEMALE Any Age, or MALE 65+: How often do you have 4 or more drinks on one occassion? 0 Filed at: 04/17/2021 2123   Audit-C Score  0 Filed at: 04/17/2021 2123   DEE: How many times in the past year have you    Used an illegal drug or used a prescription medication for non-medical reasons? Never Filed at: 04/17/2021 2123                    MDM  Number of Diagnoses or Management Options  Dysfunctional uterine bleeding: new and does not require workup  Diagnosis management comments: Case discussed with attending  Patient had stable hemoglobin with improvement since prior  Has scheduled outpatient ultrasound already  No clear role for emergent ultrasound at this time  Patient compensating appropriately    Pain controlled with outpatient medications  Stable for outpatient follow-up  Symptoms thought to be related to dysfunctional uterine bleeding       Amount and/or Complexity of Data Reviewed  Clinical lab tests: ordered and reviewed    Risk of Complications, Morbidity, and/or Mortality  Presenting problems: moderate  Diagnostic procedures: moderate  Management options: moderate    Patient Progress  Patient progress: stable      Disposition  Final diagnoses:   Dysfunctional uterine bleeding     Time reflects when diagnosis was documented in both MDM as applicable and the Disposition within this note     Time User Action Codes Description Comment    4/17/2021  9:51 PM Marysene Stefanosusy Add [N93 8] Dysfunctional uterine bleeding       ED Disposition     ED Disposition Condition Date/Time Comment    Discharge Stable Sat Apr 17, 2021  9:51  [address] discharge to home/self care  Follow-up Information     Follow up With Specialties Details Why Contact Arlet Garcia MD Family Medicine  As needed [address]  [facility]  [place]   [address]  [facility] [address]             Patient's Medications   Discharge Prescriptions    NAPROXEN (NAPROSYN) 500 MG TABLET    Take 1 tablet (500 mg total) by mouth 2 (two) times a day with meals       Start Date: 4/17/2021 End Date: --       Order Dose: 500 mg       Quantity: 30 tablet    Refills: 0     No discharge procedures on file      PDMP Review       Value Time User    PDMP Reviewed  Yes 2/12/2021  2:14 PM Denise Herrera MD          ED Provider  Electronically Signed by           Jeaneth North PA-C  04/17/21 3363

## 2021-04-18 NOTE — DISCHARGE INSTRUCTIONS
Follow-up with your ultrasound on Tuesday  As well as OBGYN at next visit  Return for worsening complaints  Continue all medications

## 2021-04-22 ENCOUNTER — HOSPITAL ENCOUNTER (OUTPATIENT)
Dept: ULTRASOUND IMAGING | Facility: HOSPITAL | Age: 31
Discharge: HOME | End: 2021-04-22
Payer: COMMERCIAL

## 2021-04-22 DIAGNOSIS — N93.9 ABNORMAL UTERINE BLEEDING (AUB): ICD-10-CM

## 2021-04-22 PROCEDURE — 76856 US EXAM PELVIC COMPLETE: CPT

## 2021-04-22 PROCEDURE — 76830 TRANSVAGINAL US NON-OB: CPT

## 2021-04-29 ENCOUNTER — HOSPITAL ENCOUNTER (EMERGENCY)
Facility: HOSPITAL | Age: 31
Discharge: HOME | End: 2021-04-29
Attending: EMERGENCY MEDICINE
Payer: COMMERCIAL

## 2021-04-29 ENCOUNTER — TELEPHONE (OUTPATIENT)
Dept: OBGYN CLINIC | Facility: CLINIC | Age: 31
End: 2021-04-29

## 2021-04-29 VITALS
SYSTOLIC BLOOD PRESSURE: 105 MMHG | TEMPERATURE: 98.2 F | HEART RATE: 90 BPM | DIASTOLIC BLOOD PRESSURE: 54 MMHG | BODY MASS INDEX: 27.21 KG/M2 | WEIGHT: 139.33 LBS | RESPIRATION RATE: 14 BRPM | OXYGEN SATURATION: 99 %

## 2021-04-29 DIAGNOSIS — N39.0 UTI (URINARY TRACT INFECTION): ICD-10-CM

## 2021-04-29 DIAGNOSIS — N93.8 DYSFUNCTIONAL UTERINE BLEEDING: ICD-10-CM

## 2021-04-29 DIAGNOSIS — D50.9 IRON DEFICIENCY ANEMIA, UNSPECIFIED IRON DEFICIENCY ANEMIA TYPE: Primary | ICD-10-CM

## 2021-04-29 LAB
ALBUMIN SERPL BCP-MCNC: 4.1 G/DL (ref 3.5–5)
ALP SERPL-CCNC: 78 U/L (ref 46–116)
ALT SERPL W P-5'-P-CCNC: 61 U/L (ref 12–78)
ANION GAP SERPL CALCULATED.3IONS-SCNC: 7 MMOL/L (ref 4–13)
AST SERPL W P-5'-P-CCNC: 60 U/L (ref 5–45)
BACTERIA UR QL AUTO: ABNORMAL /HPF
BASOPHILS # BLD AUTO: 0.03 THOUSANDS/ΜL (ref 0–0.1)
BASOPHILS NFR BLD AUTO: 0 % (ref 0–1)
BILIRUB SERPL-MCNC: 0.56 MG/DL (ref 0.2–1)
BILIRUB UR QL STRIP: ABNORMAL
BUN SERPL-MCNC: 10 MG/DL (ref 5–25)
CALCIUM SERPL-MCNC: 8.8 MG/DL (ref 8.3–10.1)
CHLORIDE SERPL-SCNC: 105 MMOL/L (ref 100–108)
CLARITY UR: ABNORMAL
CO2 SERPL-SCNC: 28 MMOL/L (ref 21–32)
COLOR UR: ABNORMAL
CREAT SERPL-MCNC: 0.65 MG/DL (ref 0.6–1.3)
EOSINOPHIL # BLD AUTO: 0.13 THOUSAND/ΜL (ref 0–0.61)
EOSINOPHIL NFR BLD AUTO: 2 % (ref 0–6)
ERYTHROCYTE [DISTWIDTH] IN BLOOD BY AUTOMATED COUNT: 15.7 % (ref 11.6–15.1)
EXT PREG TEST URINE: NEGATIVE
EXT. CONTROL ED NAV: NORMAL
GFR SERPL CREATININE-BSD FRML MDRD: 120 ML/MIN/1.73SQ M
GLUCOSE SERPL-MCNC: 99 MG/DL (ref 65–140)
GLUCOSE UR STRIP-MCNC: ABNORMAL MG/DL
HCT VFR BLD AUTO: 35.2 % (ref 34.8–46.1)
HGB BLD-MCNC: 10.3 G/DL (ref 11.5–15.4)
HGB UR QL STRIP.AUTO: ABNORMAL
IMM GRANULOCYTES # BLD AUTO: 0.03 THOUSAND/UL (ref 0–0.2)
IMM GRANULOCYTES NFR BLD AUTO: 0 % (ref 0–2)
KETONES UR STRIP-MCNC: ABNORMAL MG/DL
LEUKOCYTE ESTERASE UR QL STRIP: ABNORMAL
LYMPHOCYTES # BLD AUTO: 1.07 THOUSANDS/ΜL (ref 0.6–4.47)
LYMPHOCYTES NFR BLD AUTO: 14 % (ref 14–44)
MCH RBC QN AUTO: 23.1 PG (ref 26.8–34.3)
MCHC RBC AUTO-ENTMCNC: 29.3 G/DL (ref 31.4–37.4)
MCV RBC AUTO: 79 FL (ref 82–98)
MONOCYTES # BLD AUTO: 0.56 THOUSAND/ΜL (ref 0.17–1.22)
MONOCYTES NFR BLD AUTO: 7 % (ref 4–12)
NEUTROPHILS # BLD AUTO: 5.71 THOUSANDS/ΜL (ref 1.85–7.62)
NEUTS SEG NFR BLD AUTO: 77 % (ref 43–75)
NITRITE UR QL STRIP: POSITIVE
NON-SQ EPI CELLS URNS QL MICRO: ABNORMAL /HPF
NRBC BLD AUTO-RTO: 0 /100 WBCS
PH UR STRIP.AUTO: 6.5 [PH]
PLATELET # BLD AUTO: 220 THOUSANDS/UL (ref 149–390)
PMV BLD AUTO: 11.3 FL (ref 8.9–12.7)
POTASSIUM SERPL-SCNC: 3.7 MMOL/L (ref 3.5–5.3)
PROLACTIN SERPL-MCNC: 8.5 NG/ML
PROT SERPL-MCNC: 8 G/DL (ref 6.4–8.2)
PROT UR STRIP-MCNC: >=300 MG/DL
RBC # BLD AUTO: 4.45 MILLION/UL (ref 3.81–5.12)
RBC #/AREA URNS AUTO: ABNORMAL /HPF
SODIUM SERPL-SCNC: 140 MMOL/L (ref 136–145)
SP GR UR STRIP.AUTO: 1.02 (ref 1–1.03)
TSH SERPL DL<=0.05 MIU/L-ACNC: 0.93 UIU/ML (ref 0.36–3.74)
UROBILINOGEN UR QL STRIP.AUTO: >=8 E.U./DL
WBC # BLD AUTO: 7.53 THOUSAND/UL (ref 4.31–10.16)
WBC #/AREA URNS AUTO: ABNORMAL /HPF

## 2021-04-29 PROCEDURE — 81001 URINALYSIS AUTO W/SCOPE: CPT | Performed by: PHYSICIAN ASSISTANT

## 2021-04-29 PROCEDURE — 87086 URINE CULTURE/COLONY COUNT: CPT | Performed by: PHYSICIAN ASSISTANT

## 2021-04-29 PROCEDURE — 87186 SC STD MICRODIL/AGAR DIL: CPT | Performed by: PHYSICIAN ASSISTANT

## 2021-04-29 PROCEDURE — 99284 EMERGENCY DEPT VISIT MOD MDM: CPT

## 2021-04-29 PROCEDURE — 87077 CULTURE AEROBIC IDENTIFY: CPT | Performed by: PHYSICIAN ASSISTANT

## 2021-04-29 PROCEDURE — 81025 URINE PREGNANCY TEST: CPT | Performed by: EMERGENCY MEDICINE

## 2021-04-29 PROCEDURE — 36415 COLL VENOUS BLD VENIPUNCTURE: CPT

## 2021-04-29 PROCEDURE — 99284 EMERGENCY DEPT VISIT MOD MDM: CPT | Performed by: PHYSICIAN ASSISTANT

## 2021-04-29 PROCEDURE — 99242 OFF/OP CONSLTJ NEW/EST SF 20: CPT | Performed by: OBSTETRICS & GYNECOLOGY

## 2021-04-29 PROCEDURE — 84146 ASSAY OF PROLACTIN: CPT | Performed by: OBSTETRICS & GYNECOLOGY

## 2021-04-29 PROCEDURE — 87147 CULTURE TYPE IMMUNOLOGIC: CPT | Performed by: PHYSICIAN ASSISTANT

## 2021-04-29 PROCEDURE — 80053 COMPREHEN METABOLIC PANEL: CPT | Performed by: EMERGENCY MEDICINE

## 2021-04-29 PROCEDURE — 85025 COMPLETE CBC W/AUTO DIFF WBC: CPT | Performed by: EMERGENCY MEDICINE

## 2021-04-29 PROCEDURE — 84443 ASSAY THYROID STIM HORMONE: CPT | Performed by: OBSTETRICS & GYNECOLOGY

## 2021-04-29 RX ORDER — CEPHALEXIN 500 MG/1
500 CAPSULE ORAL 2 TIMES DAILY
Qty: 14 CAPSULE | Refills: 0 | Status: SHIPPED | OUTPATIENT
Start: 2021-04-29 | End: 2021-05-06

## 2021-04-29 NOTE — CONSULTS
Consult - [address] 27 y o  female MRN: [id number]  Unit/Bed#: ED 01 Encounter: [id number]    27 y o   sexually active reproductive aged female who we were consulted for abnormal uterine bleeding  She is a patient of [facility], [facility]  Chief complaint: Abnormal uterine bleeding     HPI:  Bleeding started 4/10 and she notes she was initially seen in ER because flow was very heavy  Reports saturating 50 tampons in 2 days  Cramping very severe, located over suprapubic area  Using Naproxen for this, which she does not like using due to kidney issues  Was prescribed provera 10mg x10d  Does not think Provera stopped menses, but shortly after taking the flow did stop on its own  Did not bleed for 4-5d, but then started again just the same last night  Started suddenly and passed large blood clots  Endorses dizziness, shortness of breath, and tingling sensation throughout her body  Notes menses have always been heavy  Regular (q28-30d) and lasts approximately 5 days  On an average menses, uses 15 tampons per day on a heavy day  Menarche age 6  Hx 5 babies; first baby age 25, last 29  She has never used contraception to regulate menses, but has been using iron to improve Hgb  Medical hx notable for migraine without aura  +tobacco use   She requests to be admitted to the hospital    Active Problems:      PMH:  Past Medical History:   Diagnosis Date    Anemia     taking iron pills BID     Chronic kidney disease     hydronephrosis    History of transfusion     Kidney stone     Sepsis ([address]     Septic shock ([address]     Urinary tract infection     taking antibioitcs     UTI (urinary tract infection) during pregnancy     Vagina, candidiasis     Varicella     as child       PSH:  Past Surgical History:   Procedure Laterality Date    ABDOMINAL ADHESION SURGERY N/A 2021    Procedure: LYSIS ADHESIONS;  Surgeon: Ronald Soler MD;  [facility];  Service: Gynecology     SECTION N/A 2018    Procedure:  SECTION (); Surgeon: Jesusa Kayser, MD;  [facility];  Service: Obstetrics    CYSTOSCOPY  2018    with right uretral stent    EXAMINATION UNDER ANESTHESIA N/A 2021    Procedure: EXAM UNDER ANESTHESIA (EUA); Surgeon: Rukhsana Ziegler MD;  [facility];  Service: Gynecology    ND CYSTO/URETERO W/LITHOTRIPSY &INDWELL STENT INSRT Right 2018    Procedure: CYSTOSCOPY URETEROSCOPY WITH  RETROGRADE PYELOGRAM AND exchange  STENT URETERAl;  Surgeon: Pam Osborne MD;  Location: [facility] MAIN OR;  Service: Urology    ND CYSTOURETHROSCOPY,URETER CATHETER Right 2018    Procedure: CYSTOSCOPY RETROGRADE PYELOGRAM WITH INSERTION STENT URETERAL;  Surgeon: Pam Osborne MD;  [facility];  Service: Urology    ND LAP,DIAGNOSTIC ABDOMEN N/A 2021    Procedure: LAPAROSCOPY DIAGNOSTIC;  Surgeon: Rukhsana Ziegler MD;  [facility];  Service: Gynecology    ND LAP,RMV  ADNEXAL STRUCTURE Bilateral 2021    Procedure: SALPINGECTOMY, Joseph Organ;  Surgeon: Rukhsana Ziegler MD;  [facility];  Service: Gynecology    TONSILLECTOMY      TONSILLECTOMY      TUBAL LIGATION         Meds:  No current facility-administered medications on file prior to encounter        Current Outpatient Medications on File Prior to Encounter   Medication Sig Dispense Refill    famotidine (PEPCID) 20 mg tablet Take 1 tablet (20 mg total) by mouth 2 (two) times a day (Patient not taking: Reported on 2021) 30 tablet 0    ferrous sulfate 324 (65 Fe) mg Take 1 tablet (324 mg total) by mouth 2 (two) times a day before meals (Patient not taking: Reported on 2021) 60 tablet 1    ferrous sulfate 324 (65 Fe) mg Take 1 tablet (324 mg total) by mouth 2 (two) times a day before meals (Patient not taking: Reported on 2021) 30 tablet 3    medroxyPROGESTERone (PROVERA) 10 mg tablet Take 1 tablet (10 mg total) by mouth daily for 10 days 10 tablet 0    naproxen (NAPROSYN) 500 mg tablet Take 1 tablet (500 mg total) by mouth 2 (two) times a day with meals (Patient not taking: Reported on 2021) 30 tablet 0    naproxen (NAPROSYN) 500 mg tablet Take 1 tablet (500 mg total) by mouth 2 (two) times a day with meals (Patient not taking: Reported on 2021) 30 tablet 0    Prenatal MV-Min-Fe Fum-FA-DHA (Prenatal 1) 30-0 975-200 MG CAPS Take 1 tablet by mouth daily (Patient not taking: Reported on 2021) 90 capsule 1       Allergies:  No Known Allergies    Physical Exam:  /57 (BP Location: Right arm)   Pulse 93   Temp 98 2 °F (36 8 °C) (Oral)   Resp 16   Wt 63 2 kg (139 lb 5 3 oz)   LMP 2021   SpO2 100%   BMI 27 21 kg/m²     Physical Exam  Vitals signs reviewed  Constitutional:       Appearance: She is normal weight  HENT:      Head: Normocephalic and atraumatic  Neck:      Musculoskeletal: Normal range of motion  Cardiovascular:      Pulses: Normal pulses  Pulmonary:      Effort: Pulmonary effort is normal  No respiratory distress  Abdominal:      General: Abdomen is flat  There is no distension  Palpations: Abdomen is soft  There is no mass  Tenderness: There is no abdominal tenderness  There is no rebound  Genitourinary:     General: Normal vulva  Pubic Area: No rash  Labia:         Right: No rash  Left: No rash  Rectum: Normal       Comments: Small vaginal introitus, no blood noted on vulva, vaginal vault with some blood and small clot in it  Neurological:      Mental Status: She is alert  Assessment/Plan:   27 y o   sexually active reproductive aged female with abnormal uterine bleeding       1) Abnormal Uterine Bleeding  - Treatment options were discussed, including expectant management, hormonal (both combination and progesterone-only), Mirena, Lysteda,   - Discussed she could take NSAIDs since her creatinine is normal, which she declined   - Discussed trial of TXA which she declined as she does not desire to take pills until she has an answer as to what is going on  - Will get labs outpatient- CBC, FSH/LH, prolactin, estradiol, TSH, iron studies  - She will see us in our office on 5/6/21 and if her bleeding has stopped, plan for EMB  - Not interested in starting hormonal contraceptives at this time, but would consider in the future    Discussed with Dr Catherine Mast & Dr Julia Benjamin

## 2021-04-29 NOTE — DISCHARGE INSTRUCTIONS
DISCHARGE INSTRUCTIONS:    FOLLOW UP WITH YOUR PRIMARY CARE PROVIDER OR THE [address] [place] [address]  MAKE AN APPOINTMENT TO BE SEEN  FOLLOW UP WITH THE RECOMMENDED OBGYN  REST AND DRINK PLENTY OF FLUIDS  IF SYMPTOMS WORSEN OR NEW SYMPTOMS ARISE, RETURN TO THE ER TO BE SEEN

## 2021-04-29 NOTE — ED PROVIDER NOTES
History  Chief Complaint   Patient presents with    Vaginal Bleeding     Pt reports abnormal viginal bleeding  Pt reports quarter size clots and reports saturating 1 pad per hour  Pt states she has been following up with OB, had U/S last friday and is awaiting results  30y  o female with PMH of anemia, CKD, kidney stone and sepsis presents to the ER for vaginal bleeding  Patient had a tubal ligation in February  Since having her ligation, patient was having normal periods with normal flow but then in April began with heavy bleeding  She was seen in our ER on 4/17 for symptoms  Her hemoglobin at that time was 9 6  She was discharged home with recommendations to follow up as an outpatient  She did follow up with her OBGYN and was started on Provera for symptoms without relief  She also had an ultrasound performed, which just showed a vaginal cyst  Patient presents today for ongoing bleeding  She denies taking any other medication for symptoms other then iron  She was given Naproxen to take for symptoms but has not been taking it because she already has kidney disease  She does have pelvic pain at times  Symptoms are constant  She denies fever, chills, URI symptoms, chest pain, dyspnea, N/V/D, weakness or paresthesias  History provided by:  Patient   used: No        Prior to Admission Medications   Prescriptions Last Dose Informant Patient Reported? Taking?    Prenatal MV-Min-Fe Fum-FA-DHA (Prenatal 1) 30-0 975-200 MG CAPS Not Taking at Unknown time  No No   Sig: Take 1 tablet by mouth daily   Patient not taking: Reported on 4/5/2021   famotidine (PEPCID) 20 mg tablet Not Taking at Unknown time  No No   Sig: Take 1 tablet (20 mg total) by mouth 2 (two) times a day   Patient not taking: Reported on 4/5/2021   ferrous sulfate 324 (65 Fe) mg Not Taking at Unknown time  No No   Sig: Take 1 tablet (324 mg total) by mouth 2 (two) times a day before meals   Patient not taking: Reported on 4/5/2021 ferrous sulfate 324 (65 Fe) mg Not Taking at Unknown time  No No   Sig: Take 1 tablet (324 mg total) by mouth 2 (two) times a day before meals   Patient not taking: Reported on 2021   medroxyPROGESTERone (PROVERA) 10 mg tablet   No No   Sig: Take 1 tablet (10 mg total) by mouth daily for 10 days   naproxen (NAPROSYN) 500 mg tablet Not Taking at Unknown time  No No   Sig: Take 1 tablet (500 mg total) by mouth 2 (two) times a day with meals   Patient not taking: Reported on 2021   naproxen (NAPROSYN) 500 mg tablet Not Taking at Unknown time  No No   Sig: Take 1 tablet (500 mg total) by mouth 2 (two) times a day with meals   Patient not taking: Reported on 2021      Facility-Administered Medications: None       Past Medical History:   Diagnosis Date    Anemia     taking iron pills BID     Chronic kidney disease     hydronephrosis    History of transfusion     Kidney stone     [facility] [address] [facility]     Septic shock ([facility] [address] [facility]     Urinary tract infection     taking antibioitcs     UTI (urinary tract infection) during pregnancy     Vagina, candidiasis     Varicella     as child       Past Surgical History:   Procedure Laterality Date    ABDOMINAL ADHESION SURGERY N/A 2021    Procedure: LYSIS ADHESIONS;  Surgeon: Henry Scott MD;  Location:  MAIN OR;  Service: Gynecology     SECTION N/A 2018    Procedure:  SECTION (); Surgeon: Anne Real MD;  [facility];  Service: Obstetrics    CYSTOSCOPY  2018    with right uretral stent    EXAMINATION UNDER ANESTHESIA N/A 2021    Procedure: EXAM UNDER ANESTHESIA (EUA);   Surgeon: Henry Scott MD;  Location:  MAIN OR;  Service: Gynecology    [place] CYSTO/URETERO W/LITHOTRIPSY &INDWELL STENT INSRT Right 2018    Procedure: CYSTOSCOPY URETEROSCOPY WITH  RETROGRADE PYELOGRAM AND exchange  STENT URETERAl;  Surgeon: Cameron Osborn MD;  Location: [address] [place] [address] MAIN OR;  Service: Urology    [place] [address] Right 2018    Procedure: CYSTOSCOPY RETROGRADE PYELOGRAM WITH INSERTION STENT URETERAL;  Surgeon: Candy Ahmadi MD;  Location: [facility] Main OR;  Service: Urology    MN LAP,DIAGNOSTIC ABDOMEN N/A 2021    Procedure: LAPAROSCOPY DIAGNOSTIC;  Surgeon: Sumi Lucas MD;  Location: [facility] MAIN [facility];  Service: Gynecology    MN LAP,RMV  ADNEXAL STRUCTURE Bilateral 2021    Procedure: SALPINGECTOMY, Navid Mini;  Surgeon: Sumi Lucas MD;  Location: [facility] MA[facility];  Service: Gynecology    TONSILLECTOMY      TONSILLECTOMY      TUBAL LIGATION         Family History   Problem Relation Age of Onset    No Known Problems Mother     Diabetes Father     Heart defect Brother         3 open heart surgeries    Heart failure Family         Congestive    Diabetes Maternal Grandmother      I have reviewed and agree with the history as documented  E-Cigarette/Vaping    E-Cigarette Use Never User      E-Cigarette/Vaping Substances    Nicotine No     THC No     CBD No     Flavoring No     Other No     Unknown No      Social History     Tobacco Use    Smoking status: Current Every Day Smoker     Packs/day: 0 25     Last attempt to quit: 2019     Years since quittin 8    Smokeless tobacco: Never Used    Tobacco comment: 2-3 a day   Substance Use Topics    Alcohol use: No    Drug use: No       Review of Systems   Constitutional: Negative for activity change, appetite change, chills and fever  HENT: Negative for congestion, drooling, ear discharge, ear pain, facial swelling, rhinorrhea and sore throat  Eyes: Negative for redness  Respiratory: Negative for cough and shortness of breath  Cardiovascular: Negative for chest pain  Gastrointestinal: Negative for abdominal pain, diarrhea, nausea and vomiting  Genitourinary: Positive for pelvic pain and vaginal bleeding  Musculoskeletal: Negative for neck stiffness  Skin: Negative for rash  Allergic/Immunologic: Negative for food allergies  Neurological: Negative for weakness and numbness  Physical Exam  Physical Exam  Vitals signs and nursing note reviewed  Constitutional:       General: She is not in acute distress  Appearance: She is not toxic-appearing  HENT:      Head: Normocephalic and atraumatic  Eyes:      Conjunctiva/sclera: Conjunctivae normal    Neck:      Musculoskeletal: Normal range of motion and neck supple  Trachea: No tracheal deviation  Cardiovascular:      Rate and Rhythm: Normal rate  Pulmonary:      Effort: Pulmonary effort is normal  No respiratory distress  Abdominal:      General: Bowel sounds are normal  There is no distension  Palpations: Abdomen is soft  Tenderness: There is no abdominal tenderness  There is no guarding or rebound  Skin:     General: Skin is warm and dry  Findings: No rash  Neurological:      Mental Status: She is alert  GCS: GCS eye subscore is 4  GCS verbal subscore is 5  GCS motor subscore is 6           Vital Signs  ED Triage Vitals   Temperature Pulse Respirations Blood Pressure SpO2   04/29/21 1211 04/29/21 1209 04/29/21 1209 04/29/21 1209 04/29/21 1209   98 2 °F (36 8 °C) (!) 107 16 115/57 99 %      Temp Source Heart Rate Source Patient Position - Orthostatic VS BP Location FiO2 (%)   04/29/21 1211 04/29/21 1209 04/29/21 1209 04/29/21 1209 --   Oral Monitor Sitting Right arm       Pain Score       --                  Vitals:    04/29/21 1209 04/29/21 1351 04/29/21 1555   BP: 115/57 107/57 105/54   Pulse: (!) 107 93 90   Patient Position - Orthostatic VS: Sitting Sitting          Visual Acuity      ED Medications  Medications - No data to display    Diagnostic Studies  Results Reviewed     Procedure Component Value Units Date/Time    Urine Microscopic [[id number]]  (Abnormal) Collected: 04/29/21 1603    Lab Status: Final result Specimen: Urine Updated: 04/29/21 1620     RBC, UA Innumerable /hpf      WBC, UA       Field obscured, unable to enumerate /hpf     Epithelial Cells       Field obscured, unable to enumerate     /hpf     Bacteria, UA       Field obscured, unable to enumerate     /hpf    Urine culture [[id number]] Collected: 04/29/21 1603    Lab Status: In process Specimen: Urine Updated: 04/29/21 1620    UA w Reflex to Microscopic w Reflex to Culture [[id number]]  (Abnormal) Collected: 04/29/21 1603    Lab Status: Final result Specimen: Urine Updated: 04/29/21 1610     Color, UA Red     Clarity, UA Turbid     Specific [place], UA 1 020     pH, UA 6 5     Leukocytes, UA Moderate     Nitrite, UA Positive     Protein, UA >=300 mg/dl      Glucose,  (1/10%) mg/dl      Ketones, UA 15 (1+) mg/dl      Urobilinogen, UA >=8 0 E U /dl      Bilirubin, UA Interference- unable to analyze     Blood, UA Large    TSH, 3rd generation with Free T4 reflex [[id number]]  (Normal) Collected: 04/29/21 1224    Lab Status: Final result Specimen: Blood from Arm, Right Updated: 04/29/21 1559     TSH 3RD GENERATON 0 931 uIU/mL     Narrative:      Patients undergoing fluorescein dye angiography may retain small amounts of fluorescein in the body for 48-72 hours post procedure  Samples containing fluorescein can produce falsely depressed TSH values  If the patient had this procedure,a specimen should be resubmitted post fluorescein clearance  Prolactin [[id number]] Collected: 04/29/21 1224    Lab Status:  In process Specimen: Blood from Arm, Right Updated: 04/29/21 1532    POCT pregnancy, urine [[id number]]  (Normal) Resulted: 04/29/21 1424    Lab Status: Final result Specimen: Urine Updated: 04/29/21 1424     EXT PREG TEST UR (Ref: Negative) negative     Control valid    Comprehensive metabolic panel [[id number]]  (Abnormal) Collected: 04/29/21 1224    Lab Status: Final result Specimen: Blood from Arm, Right Updated: 04/29/21 1248     Sodium 140 mmol/L      Potassium 3 7 mmol/L      Chloride 105 mmol/L      CO2 28 mmol/L      ANION GAP 7 mmol/L      BUN 10 mg/dL      Creatinine 0 65 mg/dL      Glucose 99 mg/dL      Calcium 8 8 mg/dL      AST 60 U/L      ALT 61 U/L      Alkaline Phosphatase 78 U/L      Total Protein 8 0 g/dL      Albumin 4 1 g/dL      Total Bilirubin 0 56 mg/dL      eGFR 120 ml/min/1 73sq m     Narrative:      National Kidney Disease Foundation guidelines for Chronic Kidney Disease (CKD):     Stage 1 with normal or high GFR (GFR > 90 mL/min/1 73 square meters)    Stage 2 Mild CKD (GFR = 60-89 mL/min/1 73 square meters)    Stage 3A Moderate CKD (GFR = 45-59 mL/min/1 73 square meters)    Stage 3B Moderate CKD (GFR = 30-44 mL/min/1 73 square meters)    Stage 4 Severe CKD (GFR = 15-29 mL/min/1 73 square meters)    Stage 5 End Stage CKD (GFR <15 mL/min/1 73 square meters)  Note: GFR calculation is accurate only with a steady state creatinine    CBC and differential [[id number]]  (Abnormal) Collected: 04/29/21 1224    Lab Status: Final result Specimen: Blood from Arm, Right Updated: 04/29/21 1231     WBC 7 53 Thousand/uL      RBC 4 45 Million/uL      Hemoglobin 10 3 g/dL      Hematocrit 35 2 %      MCV 79 fL      MCH 23 1 pg      MCHC 29 3 g/dL      RDW 15 7 %      MPV 11 3 fL      Platelets 040 Thousands/uL      nRBC 0 /100 WBCs      Neutrophils Relative 77 %      Immat GRANS % 0 %      Lymphocytes Relative 14 %      Monocytes Relative 7 %      Eosinophils Relative 2 %      Basophils Relative 0 %      Neutrophils Absolute 5 71 Thousands/µL      Immature Grans Absolute 0 03 Thousand/uL      Lymphocytes Absolute 1 07 Thousands/µL      Monocytes Absolute 0 56 Thousand/µL      Eosinophils Absolute 0 13 Thousand/µL      Basophils Absolute 0 03 Thousands/µL                  No orders to display              Procedures  Procedures         ED Course                             SBIRT 22yo+      Most Recent Value   SBIRT (24 yo +)   In order to provide better care to our patients, we are screening all of our patients for alcohol and drug use   Would it be okay to ask you these screening questions? Unable to answer at this time Filed at: 04/29/2021 1352                    MDM  Number of Diagnoses or Management Options  Dysfunctional uterine bleeding: new and requires workup  UTI (urinary tract infection): new and requires workup  Diagnosis management comments: DDX consists of but not limited to: abnormal bleeding, anemia, fibroids    Will check labs  Will consult OB  [address] with eLidy Nevarez from [facility]  She will come see patient  [address] with Leidy Nevarez from [facility]  Patient can be discharged with outpatient follow up  Informed patient of lab findings and plan per OB  Awaiting urine  Patient requesting discharge prior to urine resulting  Will call patient with results  Patient agreeable  At discharge, I instructed the patient to:  -follow up with pcp  -follow up with OBGYN  -rest and drink plenty of fluids  -return to the ER if symptoms worsened or new symptoms arose  Patient agreed to this plan and was stable at time of discharge  [address] and informed patient of +UTI  [facility] [address] [facility] [facility]  Patient will          Amount and/or Complexity of Data Reviewed  Clinical lab tests: ordered and reviewed  Review and summarize past medical records: yes  Discuss the patient with other providers: yes    Patient Progress  Patient progress: stable      Disposition  Final diagnoses:   Dysfunctional uterine bleeding   UTI (urinary tract infection)     Time reflects when diagnosis was documented in both MDM as applicable and the Disposition within this note     Time User Action Codes Description Comment    4/29/2021  3:16 PM Marolyn Balloon Add [D50 9] Iron deficiency anemia, unspecified iron deficiency anemia type     4/29/2021  3:30 PM Elvia Ng A Add [N93 8] Dysfunctional uterine bleeding     4/29/2021  4:43 PM Elvia Ng A Add [N39 0] UTI (urinary tract infection)       ED Disposition     ED Disposition Condition Date/Time Comment    Discharge Stable Thu Apr 29, 2021  3:30  [address] discharge to home/self care  Follow-up Information     Follow up With Specialties Details Why Contact Info Additional [address] [facility] Obstetrics and Gynecology Follow up Please come to your next scheduled appointment to see resident physician (MD/) to discuss options for bleeding [address], [address] [facility] [address]  [facility], [address], [address] [place], [place], [address] [phone]          Discharge Medication List as of 4/29/2021  3:33 PM      CONTINUE these medications which have NOT CHANGED    Details   famotidine (PEPCID) 20 mg tablet Take 1 tablet (20 mg total) by mouth 2 (two) times a day, Starting Wed 12/9/2020, Normal      !! ferrous sulfate 324 (65 Fe) mg Take 1 tablet (324 mg total) by mouth 2 (two) times a day before meals, Starting Wed 1/6/2021, Normal      !! ferrous sulfate 324 (65 Fe) mg Take 1 tablet (324 mg total) by mouth 2 (two) times a day before meals, Starting Thu 4/8/2021, Normal      medroxyPROGESTERone (PROVERA) 10 mg tablet Take 1 tablet (10 mg total) by mouth daily for 10 days, Starting Thu 4/8/2021, Until Sun 4/18/2021, Normal      !! naproxen (NAPROSYN) 500 mg tablet Take 1 tablet (500 mg total) by mouth 2 (two) times a day with meals, Starting Tue 4/6/2021, Normal      !! naproxen (NAPROSYN) 500 mg tablet Take 1 tablet (500 mg total) by mouth 2 (two) times a day with meals, Starting Sat 4/17/2021, Normal      Prenatal MV-Min-Fe Fum-FA-DHA (Prenatal 1) 30-0 975-200 MG CAPS Take 1 tablet by mouth daily, Starting Fri 10/2/2020, Normal       !! - Potential duplicate medications found  Please discuss with provider  Outpatient Discharge Orders   [facility] and    Standing Status: Future Standing Exp   Date: 04/29/22     Prolactin   Standing Status: Future Number of Occurrences: 1 Standing Exp  Date: 04/29/22     TSH, 3rd generation with Free T4 reflex   Standing Status: Future Number of Occurrences: 1 Standing Exp  Date: 04/29/22     Estradiol   Standing Status: Future Standing Exp  Date: 04/29/22     Iron Panel (Includes Ferritin, Iron Sat%, Iron, and TIBC)   Standing Status: Future Standing Exp   Date: 04/29/22       PDMP Review       Value Time User    PDMP Reviewed  Yes 2/12/2021  2:14 PM Denise Herrera MD          ED Provider  Electronically Signed by           Kelli Hammans, PA-C  04/29/21 5686

## 2021-05-02 LAB
BACTERIA UR CULT: ABNORMAL
BACTERIA UR CULT: ABNORMAL

## 2021-05-06 ENCOUNTER — OFFICE VISIT (OUTPATIENT)
Dept: OBGYN CLINIC | Facility: CLINIC | Age: 31
End: 2021-05-06

## 2021-05-06 VITALS
WEIGHT: 146 LBS | HEIGHT: 60 IN | BODY MASS INDEX: 28.66 KG/M2 | SYSTOLIC BLOOD PRESSURE: 111 MMHG | DIASTOLIC BLOOD PRESSURE: 79 MMHG | HEART RATE: 99 BPM

## 2021-05-06 DIAGNOSIS — N93.9 ABNORMAL UTERINE BLEEDING: Primary | ICD-10-CM

## 2021-05-06 LAB — SL AMB POCT URINE HCG: NORMAL

## 2021-05-06 PROCEDURE — G0145 SCR C/V CYTO,THINLAYER,RESCR: HCPCS | Performed by: OBSTETRICS & GYNECOLOGY

## 2021-05-06 PROCEDURE — 81025 URINE PREGNANCY TEST: CPT | Performed by: OBSTETRICS & GYNECOLOGY

## 2021-05-06 PROCEDURE — 88305 TISSUE EXAM BY PATHOLOGIST: CPT | Performed by: PATHOLOGY

## 2021-05-06 PROCEDURE — 99214 OFFICE O/P EST MOD 30 MIN: CPT | Performed by: OBSTETRICS & GYNECOLOGY

## 2021-05-06 PROCEDURE — 87624 HPV HI-RISK TYP POOLED RSLT: CPT | Performed by: OBSTETRICS & GYNECOLOGY

## 2021-05-06 NOTE — PROGRESS NOTES
GYN Problem Visit  Monse Denny is a 27y/o [address] [place] who presents today for abnormal uterine bleeding  She reports that she had a laparoscopic salpingectomy 2021  Patient reports some spotting following the procedure, however starting on 4/10/2021, patient reports irregular bleeding, with only a few days in between without bleeding  She states that she was passing clots and was evaluated in the ED multiple times due to the amount of bleeding and feeling weak  She was started on Provera x 10 days with some improvement of symptoms  At the last ED visit, patient was evaluated by OB/GYN and recommended to start other medical management, however patient declined  She was also recommended to have Pap and EMB done at that time  Patient reports that menarche was at age 6  She reports normal periods prior to April, lasting 5 days  Menses are usually heavy on the first few days and then slows down  She has a history of 4 prior vaginal deliveries and one  section  Vitals:    21 1423   BP: 111/79   Pulse: 99     General: no acute distress  SSE: normal appearing cervix, no vaginal cysts noted, no bleeding noted; no cervical or vaginal lesions noted    Endometrial biopsy    Date/Time: 2021 9:23 PM  Performed by: Mahamed Elias MD  Authorized by: Mahamed Elias MD   Universal Protocol:  Consent: Verbal consent obtained  Written consent obtained  Consent given by: patient  Patient understanding: patient states understanding of the procedure being performed  Patient consent: the patient's understanding of the procedure matches consent given  Procedure consent: procedure consent matches procedure scheduled  Patient identity confirmed: verbally with patient      Indication:     Indications:  Other disorder of menstruation and other abnormal bleeding from female genital tract    Procedure:     Procedure: endometrial biopsy with Pipelle      A bivalve speculum was placed in the vagina: yes Cervix cleaned and prepped: yes      The cervix was dilated: no      Uterus sounded: yes      Uterus sound depth (cm):  9    Specimen collected: specimen collected and sent to pathology      Patient tolerated procedure well with no complications: yes    Findings:     Cervix: normal    Comments:     Procedure comments:  [place] speculum was inserted into the vagina  Cervix was visualized and cleansed with betadine solution  EMB pipelle was easily inserted into uterus, sounded to 9cm  One pass was used to obtain adequate sample  Patient tolerated well    Pelvic Ultrasound 4/2021:      UTERUS:  The uterus is anteverted in position, measuring 9 2 x 3 1 x 5 5 cm  Contour and echotexture appear normal   The cervix shows no suspicious abnormality      ENDOMETRIUM:    Normal caliber of 9 mm  Homogeneous and normal in appearance      OVARIES/ADNEXA:  Right ovary:  3 9 x 2 4 x 2 7 cm  No suspicious right ovarian abnormality  Doppler flow within normal limits      Left ovary:  Transabdominally 3 4 x 1 3 x 2 0 cm  No suspicious left ovarian abnormality  Doppler flow within normal limits      No suspicious adnexal mass or loculated collections  There is no free fluid      Incidentally noted is a 1 2 cm vaginal cyst    A/P: 30y/o with one month history of AUB  Pelvic ultrasound normal  EMB and pap collected today  Medical management options reviewed including OCPs, POPs, depo provera, Nexplanon and IUD  No contraindications to estrogen containing contraception   Patient would like to proceed with IUD with no bridge     - RTC in 2 weeks for results review  - IUD to be inserted at next visit    Dr Nakia Giordano at bedside for evaluation      Karen Garay MD, PGY-3  5/6/2021  9:28 PM

## 2021-05-12 LAB
HPV HR 12 DNA CVX QL NAA+PROBE: NEGATIVE
HPV16 DNA CVX QL NAA+PROBE: NEGATIVE
HPV18 DNA CVX QL NAA+PROBE: NEGATIVE

## 2021-05-12 NOTE — RESULT ENCOUNTER NOTE
Marvin Esteban has an appointment on 5/20  However, please let her know that her EMB is negative  She should RTO for IUD placement  Thanks!

## 2021-05-13 ENCOUNTER — TELEPHONE (OUTPATIENT)
Dept: OBGYN CLINIC | Facility: CLINIC | Age: 31
End: 2021-05-13

## 2021-05-13 LAB
LAB AP GYN PRIMARY INTERPRETATION: NORMAL
LAB AP LMP: NORMAL
Lab: NORMAL

## 2021-05-13 NOTE — TELEPHONE ENCOUNTER
----- Message from Edel Carpenter MD sent at 5/12/2021  6:25 PM EDT -----  Mariely Herrera has an appointment on 5/20  However, please let her know that her EMB is negative  She should RTO for IUD placement  Thanks!

## 2021-05-14 NOTE — RESULT ENCOUNTER NOTE
Please let Marvin Orellana know that her Pap is negative  She will need a repeat pap in 3 years  Thanks!

## 2021-06-20 NOTE — PROGRESS NOTES
Gyn Oncology Progress note   Helena Velazquez 29 y o  female MRN: [id number]  Unit/Bed#: L&D 313-01 Encounter: [id number]    A/P: 29 y o   s/p primary  section POD4; Sepsis status likely secondary to pyelonephritis / obstructing nephrolithiasis at right UVJ s/p stent placement       1) Sepsis, source likely from pyelonephritis, obstructing nephrolithiasis              - s/p ureteral stent placement on  by urology              - Olivas catheter discontinued per Uro recommendations, voiding spontaneously              - Blood culture showing E Coli, ID following              - Ancef IV transitioned to PO Keflex yesterday   - Afebrile > 96hrs     2) Acute Kidney Injury, resolved              - Likely multifactorial, including shock and ureteral obstruction              - Creatinine improved and stable - creatinine appropriate yesterday 0 64mg/dL              - Avoid nephrotoxic medications, NSAIDs     3) Acute on chronic anemia, resolved              - Likely chronic iron deficiency anemia compounded by gravid state and poor adherence to prenatal care              - Preoperative Hb was 7 1g/dL consistent with initial prenatal levels              - No significant bleeding noted intraop, with appropriate EBL              - Immediate postop Hb was 4 6g/dL likely reflective of postoperative state with hemodilutional component              - Received 3 units pRBCs, 1u FFP; Hb stable, yesterday 5 5P/XD     4) Metabolic Acidosis likely secondary to 1, resolved              - Improving, bicarb completed              - IV fluids discontinued     5) Status post- section, POD 4              - Appropriate for postoperative state              - Declining breastfeeding, bottle exclusive              -  is doing well, alert and active              - Desires permanent sterilization, declines depo bridge; signed state papers today     6) Thrombocytopenia, resolved              - Platelets on admission 164K, trended down to 85, now stabilized to 105              - Likely hemodilutional with postpartum fluid redistribution    DVT PPx: SCDs, arixtra 2 5mg SQ while inpatient  Encouraged ambulation as tolerated  Dispo: well appearing and stable, anticipate discharge today; cleared by CM    No acute events overnight  Pain is well controlled  Pt is tolerating PO  Pt is passing flatus  Pt is ambulating  Denies fevers/chills/N/V/CP/SOB/fever/chills      /85 (BP Location: Left arm)   Pulse 82   Temp 97 9 °F (36 6 °C) (Oral)   Resp 18   Ht 5' (1 524 m)   Wt 60 8 kg (134 lb)   LMP 03/30/2018   SpO2 96%   Breastfeeding? No   BMI 26 17 kg/m²      I/O last 3 completed shifts: In: 48 [IV Piggyback:50]  Out: 2500 [Urine:2500]  No intake/output data recorded  Lab Results   Component Value Date    WBC 23 74 (H) 11/08/2018    HGB 9 0 (L) 11/08/2018    HCT 29 5 (L) 11/08/2018    MCV 74 (L) 11/08/2018     (L) 11/08/2018       Lab Results   Component Value Date    GLUCOSE 87 07/07/2014    CALCIUM 7 9 (L) 11/08/2018     (L) 07/07/2014    K 3 6 11/08/2018    CO2 24 11/08/2018     11/08/2018    BUN 11 11/08/2018    CREATININE 0 64 11/08/2018       Lab Results   Component Value Date/Time    POCGLU 90 07/27/2015 07:29 PM       Physical Exam  Physical Examination: General appearance - alert, well appearing, and in no distress and oriented to person, place, and time  Chest - clear to auscultation, no wheezes, rales or rhonchi, symmetric air entry  Heart - normal rate and regular rhythm, no murmurs noted, no gallops noted  Abdomen - soft, nondistended, no masses or organomegaly, minimal tenderness - appropriate for postoperative state   Fundus at umbilicus, incision c/d/i with histoacryl  Skin - normal coloration and turgor, no rashes, no suspicious skin lesions noted      Ifeanyi Melendez MD  OB/GYN PGY-3  11/9/2018 6:44 AM Male

## 2021-06-29 ENCOUNTER — APPOINTMENT (EMERGENCY)
Dept: RADIOLOGY | Facility: HOSPITAL | Age: 31
End: 2021-06-29
Payer: COMMERCIAL

## 2021-06-29 ENCOUNTER — HOSPITAL ENCOUNTER (EMERGENCY)
Facility: HOSPITAL | Age: 31
Discharge: HOME | End: 2021-06-29
Attending: EMERGENCY MEDICINE | Admitting: EMERGENCY MEDICINE
Payer: COMMERCIAL

## 2021-06-29 VITALS
SYSTOLIC BLOOD PRESSURE: 114 MMHG | OXYGEN SATURATION: 100 % | RESPIRATION RATE: 16 BRPM | BODY MASS INDEX: 29.32 KG/M2 | DIASTOLIC BLOOD PRESSURE: 62 MMHG | HEART RATE: 71 BPM | WEIGHT: 150.13 LBS | TEMPERATURE: 98 F

## 2021-06-29 DIAGNOSIS — R07.81 RIB PAIN ON LEFT SIDE: Primary | ICD-10-CM

## 2021-06-29 LAB
ANION GAP SERPL CALCULATED.3IONS-SCNC: 5 MMOL/L (ref 4–13)
ATRIAL RATE: 80 BPM
BASOPHILS # BLD AUTO: 0.03 THOUSANDS/ΜL (ref 0–0.1)
BASOPHILS NFR BLD AUTO: 1 % (ref 0–1)
BUN SERPL-MCNC: 14 MG/DL (ref 5–25)
CALCIUM SERPL-MCNC: 8.7 MG/DL (ref 8.3–10.1)
CHLORIDE SERPL-SCNC: 105 MMOL/L (ref 100–108)
CO2 SERPL-SCNC: 26 MMOL/L (ref 21–32)
CREAT SERPL-MCNC: 0.5 MG/DL (ref 0.6–1.3)
EOSINOPHIL # BLD AUTO: 0.18 THOUSAND/ΜL (ref 0–0.61)
EOSINOPHIL NFR BLD AUTO: 3 % (ref 0–6)
ERYTHROCYTE [DISTWIDTH] IN BLOOD BY AUTOMATED COUNT: 17.3 % (ref 11.6–15.1)
GFR SERPL CREATININE-BSD FRML MDRD: 129 ML/MIN/1.73SQ M
GLUCOSE SERPL-MCNC: 89 MG/DL (ref 65–140)
HCT VFR BLD AUTO: 31.3 % (ref 34.8–46.1)
HGB BLD-MCNC: 8.6 G/DL (ref 11.5–15.4)
IMM GRANULOCYTES # BLD AUTO: 0.03 THOUSAND/UL (ref 0–0.2)
IMM GRANULOCYTES NFR BLD AUTO: 1 % (ref 0–2)
LYMPHOCYTES # BLD AUTO: 1.91 THOUSANDS/ΜL (ref 0.6–4.47)
LYMPHOCYTES NFR BLD AUTO: 31 % (ref 14–44)
MCH RBC QN AUTO: 20 PG (ref 26.8–34.3)
MCHC RBC AUTO-ENTMCNC: 27.5 G/DL (ref 31.4–37.4)
MCV RBC AUTO: 73 FL (ref 82–98)
MONOCYTES # BLD AUTO: 0.6 THOUSAND/ΜL (ref 0.17–1.22)
MONOCYTES NFR BLD AUTO: 10 % (ref 4–12)
NEUTROPHILS # BLD AUTO: 3.36 THOUSANDS/ΜL (ref 1.85–7.62)
NEUTS SEG NFR BLD AUTO: 54 % (ref 43–75)
NRBC BLD AUTO-RTO: 0 /100 WBCS
P AXIS: 64 DEGREES
PLATELET # BLD AUTO: 353 THOUSANDS/UL (ref 149–390)
PMV BLD AUTO: 9.9 FL (ref 8.9–12.7)
POTASSIUM SERPL-SCNC: 3.9 MMOL/L (ref 3.5–5.3)
PR INTERVAL: 126 MS
QRS AXIS: 61 DEGREES
QRSD INTERVAL: 82 MS
QT INTERVAL: 360 MS
QTC INTERVAL: 415 MS
RBC # BLD AUTO: 4.31 MILLION/UL (ref 3.81–5.12)
SODIUM SERPL-SCNC: 136 MMOL/L (ref 136–145)
T WAVE AXIS: 51 DEGREES
TROPONIN I SERPL-MCNC: <0.02 NG/ML
VENTRICULAR RATE: 80 BPM
WBC # BLD AUTO: 6.11 THOUSAND/UL (ref 4.31–10.16)

## 2021-06-29 PROCEDURE — 80048 BASIC METABOLIC PNL TOTAL CA: CPT | Performed by: EMERGENCY MEDICINE

## 2021-06-29 PROCEDURE — 36415 COLL VENOUS BLD VENIPUNCTURE: CPT | Performed by: EMERGENCY MEDICINE

## 2021-06-29 PROCEDURE — 85025 COMPLETE CBC W/AUTO DIFF WBC: CPT | Performed by: EMERGENCY MEDICINE

## 2021-06-29 PROCEDURE — 99284 EMERGENCY DEPT VISIT MOD MDM: CPT

## 2021-06-29 PROCEDURE — 84484 ASSAY OF TROPONIN QUANT: CPT | Performed by: EMERGENCY MEDICINE

## 2021-06-29 PROCEDURE — 96374 THER/PROPH/DIAG INJ IV PUSH: CPT

## 2021-06-29 PROCEDURE — 99284 EMERGENCY DEPT VISIT MOD MDM: CPT | Performed by: EMERGENCY MEDICINE

## 2021-06-29 PROCEDURE — 93010 ELECTROCARDIOGRAM REPORT: CPT | Performed by: INTERNAL MEDICINE

## 2021-06-29 PROCEDURE — 71101 X-RAY EXAM UNILAT RIBS/CHEST: CPT

## 2021-06-29 PROCEDURE — 93005 ELECTROCARDIOGRAM TRACING: CPT

## 2021-06-29 RX ORDER — KETOROLAC TROMETHAMINE 30 MG/ML
15 INJECTION, SOLUTION INTRAMUSCULAR; INTRAVENOUS ONCE
Status: COMPLETED | OUTPATIENT
Start: 2021-06-29 | End: 2021-06-29

## 2021-06-29 RX ORDER — NAPROXEN 500 MG/1
500 TABLET ORAL 2 TIMES DAILY WITH MEALS
Qty: 15 TABLET | Refills: 0 | Status: SHIPPED | OUTPATIENT
Start: 2021-06-29 | End: 2022-03-21

## 2021-06-29 RX ADMIN — KETOROLAC TROMETHAMINE 15 MG: 30 INJECTION, SOLUTION INTRAMUSCULAR; INTRAVENOUS at 16:35

## 2021-06-29 NOTE — Clinical Note
Beau Salmeron was seen and treated in our emergency department on 6/29/2021  Diagnosis:     Deanna Corona  may return to work on return date  She may return on this date: 07/01/2021         If you have any questions or concerns, please don't hesitate to call        Emilee    ______________________________           _______________          _______________  Hospital Representative                              Date                                Time

## 2021-06-29 NOTE — ED PROVIDER NOTES
History  Chief Complaint   Patient presents with    Rib Pain     pt reports she has left sided rib pain for the last week  reports poking pain and radiates into her chest   states hx of same when she gave birth to her son 3 years ago and states  "I  after they gave me toradol "  also c/o left arm pain  A 28-year-old female with past medical history of anemia; presents with left-sided rib pain for the past week  Pain is worse with movement and deep inspiration  Pain does radiate along the mid sternum  Patient does feel short of breath secondary to the pain  Patient states she works as a , and symptoms began at work when she reached for something  Patient has not taken anything for her symptoms  Patient otherwise denies fever, chills, cough, abdominal pain, nausea, vomiting, diarrhea, peripheral edema and rashes  Of note, patient had initially reported a reaction to Toradol several years ago however states she has since tolerated it without difficulty  Assessment & plan:  Left-sided rib pain, reproducible tenderness along the left anterior lateral lower ribs and left thoracic paraspinal muscles  Suspect symptoms are muscular in nature  Will check basic lab work, imaging and treat symptomatically  History provided by:  Patient and medical records      Prior to Admission Medications   Prescriptions Last Dose Informant Patient Reported? Taking?    Prenatal MV-Min-Fe Fum-FA-DHA (Prenatal 1) 30-0 975-200 MG CAPS Not Taking at Unknown time  No No   Sig: Take 1 tablet by mouth daily   Patient not taking: Reported on 2021   famotidine (PEPCID) 20 mg tablet Not Taking at Unknown time  No No   Sig: Take 1 tablet (20 mg total) by mouth 2 (two) times a day   Patient not taking: Reported on 2021   ferrous sulfate 324 (65 Fe) mg   No No   Sig: Take 1 tablet (324 mg total) by mouth 2 (two) times a day before meals   Patient not taking: Reported on 2021   ferrous sulfate 324 (65 Fe) mg Not Taking at Unknown time  No No   Sig: Take 1 tablet (324 mg total) by mouth 2 (two) times a day before meals   Patient not taking: Reported on 2021   medroxyPROGESTERone (PROVERA) 10 mg tablet   No No   Sig: Take 1 tablet (10 mg total) by mouth daily for 10 days   naproxen (NAPROSYN) 500 mg tablet Not Taking at Unknown time  No No   Sig: Take 1 tablet (500 mg total) by mouth 2 (two) times a day with meals   Patient not taking: Reported on 2021   naproxen (NAPROSYN) 500 mg tablet Not Taking at Unknown time  No No   Sig: Take 1 tablet (500 mg total) by mouth 2 (two) times a day with meals   Patient not taking: Reported on 2021      Facility-Administered Medications: None       Past Medical History:   Diagnosis Date    Anemia     taking iron pills BID     Chronic kidney disease     hydronephrosis    History of transfusion     Kidney stone     Sepsis ([address]     Septic shock ([address]     Urinary tract infection     taking antibioitcs     UTI (urinary tract infection) during pregnancy     Vagina, candidiasis     Varicella     as child       Past Surgical History:   Procedure Laterality Date    ABDOMINAL ADHESION SURGERY N/A 2021    Procedure: LYSIS ADHESIONS;  Surgeon: Ricki Garcia MD;  Location:  MAIN OR;  Service: Gynecology     SECTION N/A 2018    Procedure:  SECTION (); Surgeon: Trenton Quinones MD;  Location: [facility];  Service: Obstetrics    CYSTOSCOPY  2018    with right uretral stent    EXAMINATION UNDER ANESTHESIA N/A 2021    Procedure: EXAM UNDER ANESTHESIA (EUA);   Surgeon: Ricki Garcia MD;  Location: [facility] OR;  Service: Gynecology    MN CYSTO/URETERO W/LITHOTRIPSY &INDWELL STENT INSRT Right 2018    Procedure: CYSTOSCOPY URETEROSCOPY WITH  RETROGRADE PYELOGRAM AND exchange  STENT URETERAl;  Surgeon: Iva Dominique MD;  Location: [address] [place] [address] MAIN OR;  Service: Urology    [address] [place] [address] Right 2018    Procedure: CYSTOSCOPY RETROGRADE PYELOGRAM WITH INSERTION STENT URETERAL;  Surgeon: Master Dillon MD;  [facility];  Service: Urology    MT LAP,DIAGNOSTIC ABDOMEN N/A 2021    Procedure: LAPAROSCOPY DIAGNOSTIC;  Surgeon: Quincy Barraza MD;  [facility] MA[facility];  Service: Gynecology    MT LAP,RMV  ADNEXAL STRUCTURE Bilateral 2021    Procedure: SALPINGECTOMY, [place];  Surgeon: Quincy Barraza MD;  [facility] MA[facility];  Service: Gynecology    TONSILLECTOMY      TONSILLECTOMY      TUBAL LIGATION         Family History   Problem Relation Age of Onset    No Known Problems Mother     Diabetes Father     Heart defect Brother         3 open heart surgeries    Heart failure Family         Congestive    Diabetes Maternal Grandmother      I have reviewed and agree with the history as documented  E-Cigarette/Vaping    E-Cigarette Use Never User      E-Cigarette/Vaping Substances    Nicotine No     THC No     CBD No     Flavoring No     Other No     Unknown No      Social History     Tobacco Use    Smoking status: Current Every Day Smoker     Packs/day: 0 25     Last attempt to quit: 2019     Years since quittin 0    Smokeless tobacco: Never Used    Tobacco comment: 2-3 a day   Vaping Use    Vaping Use: Never used   Substance Use Topics    Alcohol use: No    Drug use: No       Review of Systems   Respiratory: Positive for shortness of breath  Cardiovascular: Positive for chest pain (L rib pain)  All other systems reviewed and are negative  Physical Exam  Physical Exam  General Appearance: alert and oriented, nad, non toxic appearing  Skin:  Warm, dry, intact  HEENT: atraumatic, normocephalic  Neck: Supple, trachea midline  Cardiac: RRR; no murmurs, rub, gallops  Pulmonary: lungs CTAB; no wheezes, rales, rhonchi  Left lower anterior-lateral ribs tender to palpation    Gastrointestinal: abdomen soft, nontender, nondistended; no guarding or rebound tenderness; good bowel sounds, no mass or bruits  Extremities:  No midline spinal tenderness, L thoracic paraspinal muscle tenderness    No pedal edema, 2+ pulses; no calf tenderness, no clubbing, no cyanosis  Neuro:  no focal motor or sensory deficits, CN 2-12 grossly intact  Psych:  Normal mood and affect, normal judgement and insight      Vital Signs  ED Triage Vitals   Temperature Pulse Respirations Blood Pressure SpO2   06/29/21 1425 06/29/21 1425 06/29/21 1425 06/29/21 1425 06/29/21 1425   98 °F (36 7 °C) 80 16 104/58 99 %      Temp Source Heart Rate Source Patient Position - Orthostatic VS BP Location FiO2 (%)   06/29/21 1425 06/29/21 1755 06/29/21 1755 06/29/21 1755 --   Oral Monitor Lying Left arm       Pain Score       06/29/21 1425       2           Vitals:    06/29/21 1425 06/29/21 1633 06/29/21 1755   BP: 104/58 111/58 114/62   Pulse: 80 73 71   Patient Position - Orthostatic VS:   Lying         Visual Acuity      ED Medications  Medications   ketorolac (TORADOL) injection 15 mg (15 mg Intravenous Given 6/29/21 1635)       Diagnostic Studies  Results Reviewed     Procedure Component Value Units Date/Time    CBC and differential [[id number]]  (Abnormal) Collected: 06/29/21 1634    Lab Status: Final result Specimen: Blood from Arm, Left Updated: 06/29/21 1708     WBC 6 11 Thousand/uL      RBC 4 31 Million/uL      Hemoglobin 8 6 g/dL      Hematocrit 31 3 %      MCV 73 fL      MCH 20 0 pg      MCHC 27 5 g/dL      RDW 17 3 %      MPV 9 9 fL      Platelets 883 Thousands/uL      nRBC 0 /100 WBCs      Neutrophils Relative 54 %      Immat GRANS % 1 %      Lymphocytes Relative 31 %      Monocytes Relative 10 %      Eosinophils Relative 3 %      Basophils Relative 1 %      Neutrophils Absolute 3 36 Thousands/µL      Immature Grans Absolute 0 03 Thousand/uL      Lymphocytes Absolute 1 91 Thousands/µL      Monocytes Absolute 0 60 Thousand/µL      Eosinophils Absolute 0 18 Thousand/µL      Basophils Absolute 0 03 Thousands/µL     Troponin I [[id number]]  (Normal) Collected: 06/29/21 1634    Lab Status: Final result Specimen: Blood from Arm, Left Updated: 06/29/21 1706     Troponin I <0 02 ng/mL     Basic metabolic panel [[id number]]  (Abnormal) Collected: 06/29/21 1634    Lab Status: Final result Specimen: Blood from Arm, Left Updated: 06/29/21 1657     Sodium 136 mmol/L      Potassium 3 9 mmol/L      Chloride 105 mmol/L      CO2 26 mmol/L      ANION GAP 5 mmol/L      BUN 14 mg/dL      Creatinine 0 50 mg/dL      Glucose 89 mg/dL      Calcium 8 7 mg/dL      eGFR 129 ml/min/1 73sq m     Narrative:      [place] guidelines for Chronic Kidney Disease (CKD):     Stage 1 with normal or high GFR (GFR > 90 mL/min/1 73 square meters)    Stage 2 Mild CKD (GFR = 60-89 mL/min/1 73 square meters)    Stage 3A Moderate CKD (GFR = 45-59 mL/min/1 73 square meters)    Stage 3B Moderate CKD (GFR = 30-44 mL/min/1 73 square meters)    Stage 4 Severe CKD (GFR = 15-29 mL/min/1 73 square meters)    Stage 5 End Stage CKD (GFR <15 mL/min/1 73 square meters)  Note: GFR calculation is accurate only with a steady state creatinine                 XR ribs with pa chest min 3 views LEFT   Final Result by Javier Ly DO (06/29 1723)      No active cardiopulmonary disease  No evidence of rib fractures           Workstation performed: [id number]                    Procedures  Procedures   ECG 12 Lead Documentation  Date/Time: today/date: 6/29/2021  Performed by: Edilson Felix    ECG reviewed by me, the ED Provider: yes    Patient location:  ED   Previous ECG:  Compared to current, no change   Rate:  80  ECG rate assessment: normal    Rhythm: sinus rhythm    Ectopy:  none    QRS axis:  Normal  Intervals: normal   Q waves: None   ST segments:  Normal  T waves: normal      Impression: Normal EKG        ED Course  ED Course as of Jun 29 2218   Tue Jun 29, 2021   1731 Baseline has ranged 8-10   Hemoglobin(!): 8 6 1731 1 ) No active cardiopulmonary disease  2 ) No evidence of rib fractures  XR ribs with pa chest min 3 views LEFT   1740 Labs and imaging WNL  Suspect pain is muscular in nature, will proceed with discharge home recommending symptomatic treatment  SBIRT 22yo+      Most Recent Value   SBIRT (22 yo +)   In order to provide better care to our patients, we are screening all of our patients for alcohol and drug use  Would it be okay to ask you these screening questions? Unable to answer at this time Filed at: 06/29/2021 1650                    MDM    Disposition  Final diagnoses:   Rib pain on left side     Time reflects when diagnosis was documented in both MDM as applicable and the Disposition within this note     Time User Action Codes Description Comment    6/29/2021  5:40 PM Jesse Reina Add [R07 81] Rib pain on right side     6/29/2021  5:40 PM Shawna Reina Remove [R07 81] Rib pain on right side     6/29/2021  5:40 PM Candelario Sánchez Add [R07 81] Rib pain on left side       ED Disposition     ED Disposition Condition Date/Time Comment    Discharge Stable Tue Jun 29, 2021  5:40  [address] discharge to home/self care  Follow-up Information     Follow up With Specialties Details Why Contact Info    Gamaliel Pressley MD Family Medicine Schedule an appointment as soon as possible for a visit in 2 days For re-evaluation [address] [place] [address]  [phone]            Discharge Medication List as of 6/29/2021  5:41 PM      START taking these medications    Details   !! naproxen (NAPROSYN) 500 mg tablet Take 1 tablet (500 mg total) by mouth 2 (two) times a day with meals, Starting Tue 6/29/2021, Print       !! - Potential duplicate medications found  Please discuss with provider        CONTINUE these medications which have NOT CHANGED    Details   famotidine (PEPCID) 20 mg tablet Take 1 tablet (20 mg total) by mouth 2 (two) times a day, Starting Wed 12/9/2020, Normal      !! ferrous sulfate 324 (65 Fe) mg Take 1 tablet (324 mg total) by mouth 2 (two) times a day before meals, Starting Wed 1/6/2021, Normal      !! ferrous sulfate 324 (65 Fe) mg Take 1 tablet (324 mg total) by mouth 2 (two) times a day before meals, Starting Thu 4/8/2021, Normal      medroxyPROGESTERone (PROVERA) 10 mg tablet Take 1 tablet (10 mg total) by mouth daily for 10 days, Starting Thu 4/8/2021, Until Sun 4/18/2021, Normal      !! naproxen (NAPROSYN) 500 mg tablet Take 1 tablet (500 mg total) by mouth 2 (two) times a day with meals, Starting Tue 4/6/2021, Normal      !! naproxen (NAPROSYN) 500 mg tablet Take 1 tablet (500 mg total) by mouth 2 (two) times a day with meals, Starting Sat 4/17/2021, Normal      Prenatal MV-Min-Fe Fum-FA-DHA (Prenatal 1) 30-0 975-200 MG CAPS Take 1 tablet by mouth daily, Starting Fri 10/2/2020, Normal       !! - Potential duplicate medications found  Please discuss with provider  No discharge procedures on file      PDMP Review       Value Time User    PDMP Reviewed  Yes 2/12/2021  2:14 PM Nia Chaney MD          ED Provider  Electronically Signed by           Delphine Wright,   06/29/21 8973

## 2021-07-07 ENCOUNTER — TELEPHONE (OUTPATIENT)
Dept: OBGYN CLINIC | Facility: CLINIC | Age: 31
End: 2021-07-07

## 2021-08-06 ENCOUNTER — HOSPITAL ENCOUNTER (EMERGENCY)
Facility: HOSPITAL | Age: 31
Discharge: HOME | End: 2021-08-06
Attending: EMERGENCY MEDICINE
Payer: COMMERCIAL

## 2021-08-06 VITALS
TEMPERATURE: 98.3 F | WEIGHT: 142.86 LBS | SYSTOLIC BLOOD PRESSURE: 94 MMHG | DIASTOLIC BLOOD PRESSURE: 53 MMHG | HEART RATE: 80 BPM | BODY MASS INDEX: 27.9 KG/M2 | RESPIRATION RATE: 18 BRPM | OXYGEN SATURATION: 99 %

## 2021-08-06 DIAGNOSIS — Z20.822 ENCOUNTER FOR LABORATORY TESTING FOR COVID-19 VIRUS: Primary | ICD-10-CM

## 2021-08-06 LAB
EXT PREG TEST URINE: NEGATIVE
EXT. CONTROL ED NAV: NORMAL
SARS-COV-2 RNA RESP QL NAA+PROBE: NEGATIVE

## 2021-08-06 PROCEDURE — U0005 INFEC AGEN DETEC AMPLI PROBE: HCPCS | Performed by: PHYSICIAN ASSISTANT

## 2021-08-06 PROCEDURE — U0003 INFECTIOUS AGENT DETECTION BY NUCLEIC ACID (DNA OR RNA); SEVERE ACUTE RESPIRATORY SYNDROME CORONAVIRUS 2 (SARS-COV-2) (CORONAVIRUS DISEASE [COVID-19]), AMPLIFIED PROBE TECHNIQUE, MAKING USE OF HIGH THROUGHPUT TECHNOLOGIES AS DESCRIBED BY CMS-2020-01-R: HCPCS | Performed by: PHYSICIAN ASSISTANT

## 2021-08-06 PROCEDURE — 81025 URINE PREGNANCY TEST: CPT | Performed by: PHYSICIAN ASSISTANT

## 2021-08-06 PROCEDURE — 99282 EMERGENCY DEPT VISIT SF MDM: CPT

## 2021-08-06 PROCEDURE — 99281 EMR DPT VST MAYX REQ PHY/QHP: CPT | Performed by: PHYSICIAN ASSISTANT

## 2021-08-06 NOTE — Clinical Note
Song Peters was seen and treated in our emergency department on 8/6/2021  Diagnosis:     Sedonia Stalling    She may return on this date:     Patient tested for COVID-19  Pt will need to quarantine until results come back in approximately 1-2 days  If COVID positive, patient requires 10 day self-quarantine  If COVID negative and patient will need to be symptom free before returning to work  If you have any questions or concerns, please don't hesitate to call        Freya Mcgarry PA-C    ______________________________           _______________          _______________  Hospital Representative                              Date                                Time

## 2021-08-06 NOTE — ED PROVIDER NOTES
HPI: Patient is a 32 y o  female who presents with 1 days of anosmia and nausea which the patient describes at mild The patient has had contact with people with similar symptoms  The patient has not taken any medication  No Known Allergies    Past Medical History:   Diagnosis Date    Anemia     taking iron pills BID     Chronic kidney disease     hydronephrosis    History of transfusion     Kidney stone     [address] [address]     Septic shock (HCC)     Urinary tract infection     taking antibioitcs     UTI (urinary tract infection) during pregnancy     Vagina, candidiasis     Varicella     as child      Past Surgical History:   Procedure Laterality Date    ABDOMINAL ADHESION SURGERY N/A 2021    Procedure: LYSIS ADHESIONS;  Surgeon: Cele Ryder MD;  [facility];  Service: Gynecology     SECTION N/A 2018    Procedure:  SECTION (); Surgeon: Brant Blackman MD;  [facility];  Service: Obstetrics    CYSTOSCOPY  2018    with right uretral stent    EXAMINATION UNDER ANESTHESIA N/A 2021    Procedure: EXAM UNDER ANESTHESIA (EUA);   Surgeon: Cele Ryder MD;  [facility];  Service: Gynecology    MD CYSTO/URETERO W/LITHOTRIPSY &INDWELL STENT INSRT Right 2018    Procedure: CYSTOSCOPY URETEROSCOPY WITH  RETROGRADE PYELOGRAM AND exchange  STENT URETERAl;  Surgeon: Hitesh Brewer MD;  Location: [address] [place] [address] MAIN OR;  Service: Urology    MD CYSTOURETHROSCOPY,URETER CATHETER Right 2018    Procedure: CYSTOSCOPY RETROGRADE PYELOGRAM WITH INSERTION STENT URETERAL;  Surgeon: Hitesh Brewer MD;  [facility];  Service: Urology    MD LAP,DIAGNOSTIC ABDOMEN N/A 2021    Procedure: LAPAROSCOPY DIAGNOSTIC;  Surgeon: Cele Ryder MD;  [facility];  Service: Gynecology    MD LAP,RMV  ADNEXAL STRUCTURE Bilateral 2021    Procedure: SALPINGECTOMY, LAPAROSCOPIC;  Surgeon: Cele Ryder MD;  [facility];  Service: Gynecology    TONSILLECTOMY      TONSILLECTOMY      TUBAL LIGATION       Social History     Tobacco Use    Smoking status: Current Every Day Smoker     Packs/day: 0 25     Last attempt to quit: 2019     Years since quittin 1    Smokeless tobacco: Never Used    Tobacco comment: 2-3 a day   Vaping Use    Vaping Use: Never used   Substance Use Topics    Alcohol use: No    Drug use: No       Nursing notes reviewed  Physical Exam:  ED Triage Vitals [21 1334]   Temperature Pulse Respirations Blood Pressure SpO2   98 3 °F (36 8 °C) 80 18 94/53 99 %      Temp Source Heart Rate Source Patient Position - Orthostatic VS BP Location FiO2 (%)   Oral -- Sitting Right arm --      Pain Score       No Pain           ROS: Positive for anosmia, congestion, nausea, the remainder of a 10 organ system ROS was otherwise unremarkable  General: awake, alert, no acute distress    Head: normocephalic, atraumatic    Eyes: no scleral icterus  Ears: external ears normal, hearing grossly intact  Nose: external exam grossly normal, negative nasal discharge  Neck: symmetric, No JVD noted, trachea midline  Pulmonary: no respiratory distress, no tachypnea noted  Cardiovascular: appears well perfused  Abdomen: no distention noted  Musculoskeletal: no deformities noted, tone normal  Neuro: grossly non-focal  Psych: mood and affect appropriate    The patient is stable and has a history and physical exam consistent with a viral illness  COVID19 testing has been performed  I considered the patient's other medical conditions as applicable/noted above in my medical decision making  The patient is stable upon discharge  The plan is for supportive care at home  The patient (and any family present) verbalized understanding of the discharge instructions and warnings that would necessitate return to the Emergency Department  All questions were answered prior to discharge      Medications - No data to display  Final diagnoses: Encounter for laboratory testing for COVID-19 virus     Time reflects when diagnosis was documented in both MDM as applicable and the Disposition within this note     Time User Action Codes Description Comment    8/6/2021  2:52 PM Osmany Mae Add [F36 096] Encounter for laboratory testing for COVID-19 virus       ED Disposition     ED Disposition Condition Date/Time Comment    Discharge Stable Fri Aug 6, 2021  2:52  [address] discharge to home/self care  Follow-up Information     Follow up With Specialties Details Why [address]  [phone]          Patient's Medications   Discharge Prescriptions    No medications on file     No discharge procedures on file      Electronically Signed by       Patel Kowalski PA-C  08/06/21 9910

## 2021-10-23 ENCOUNTER — HOSPITAL ENCOUNTER (EMERGENCY)
Facility: HOSPITAL | Age: 31
Discharge: HOME | End: 2021-10-23
Attending: EMERGENCY MEDICINE | Admitting: EMERGENCY MEDICINE
Payer: COMMERCIAL

## 2021-10-23 ENCOUNTER — APPOINTMENT (EMERGENCY)
Dept: CT IMAGING | Facility: HOSPITAL | Age: 31
End: 2021-10-23
Payer: COMMERCIAL

## 2021-10-23 VITALS
SYSTOLIC BLOOD PRESSURE: 125 MMHG | HEIGHT: 60 IN | HEART RATE: 86 BPM | OXYGEN SATURATION: 100 % | BODY MASS INDEX: 28.35 KG/M2 | RESPIRATION RATE: 19 BRPM | TEMPERATURE: 97.2 F | DIASTOLIC BLOOD PRESSURE: 68 MMHG | WEIGHT: 144.4 LBS

## 2021-10-23 DIAGNOSIS — N20.0 RENAL CALCULI: ICD-10-CM

## 2021-10-23 DIAGNOSIS — K80.20 CHOLELITHIASIS: ICD-10-CM

## 2021-10-23 DIAGNOSIS — R10.9 LEFT FLANK PAIN: Primary | ICD-10-CM

## 2021-10-23 LAB
ALBUMIN SERPL BCP-MCNC: 4.9 G/DL (ref 3–5.2)
ALP SERPL-CCNC: 98 U/L (ref 43–122)
ALT SERPL W P-5'-P-CCNC: 38 U/L
AMORPH URATE CRY URNS QL MICRO: ABNORMAL /HPF
ANION GAP SERPL CALCULATED.3IONS-SCNC: 9 MMOL/L (ref 5–14)
AST SERPL W P-5'-P-CCNC: 38 U/L (ref 14–36)
BACTERIA UR QL AUTO: ABNORMAL /HPF
BASOPHILS # BLD AUTO: 0 THOUSANDS/ΜL (ref 0–0.1)
BASOPHILS NFR BLD AUTO: 1 % (ref 0–1)
BILIRUB SERPL-MCNC: 0.58 MG/DL
BILIRUB UR QL STRIP: NEGATIVE
BUN SERPL-MCNC: 12 MG/DL (ref 5–25)
CALCIUM SERPL-MCNC: 9.5 MG/DL (ref 8.4–10.2)
CHLORIDE SERPL-SCNC: 106 MMOL/L (ref 97–108)
CLARITY UR: ABNORMAL
CO2 SERPL-SCNC: 26 MMOL/L (ref 22–30)
COLOR UR: YELLOW
CREAT SERPL-MCNC: 0.56 MG/DL (ref 0.6–1.2)
EOSINOPHIL # BLD AUTO: 0.1 THOUSAND/ΜL (ref 0–0.4)
EOSINOPHIL NFR BLD AUTO: 1 % (ref 0–6)
ERYTHROCYTE [DISTWIDTH] IN BLOOD BY AUTOMATED COUNT: 19.4 %
EXT PREG TEST URINE: NEGATIVE
EXT. CONTROL ED NAV: NORMAL
GFR SERPL CREATININE-BSD FRML MDRD: 125 ML/MIN/1.73SQ M
GLUCOSE SERPL-MCNC: 98 MG/DL (ref 70–99)
GLUCOSE UR STRIP-MCNC: NEGATIVE MG/DL
HCT VFR BLD AUTO: 33.4 % (ref 36–46)
HGB BLD-MCNC: 9.8 G/DL (ref 12–16)
HGB UR QL STRIP.AUTO: 250
KETONES UR STRIP-MCNC: NEGATIVE MG/DL
LEUKOCYTE ESTERASE UR QL STRIP: 25
LIPASE SERPL-CCNC: 27 U/L (ref 23–300)
LYMPHOCYTES # BLD AUTO: 1.6 THOUSANDS/ΜL (ref 0.5–4)
LYMPHOCYTES NFR BLD AUTO: 23 % (ref 25–45)
MCH RBC QN AUTO: 19.1 PG (ref 26–34)
MCHC RBC AUTO-ENTMCNC: 29.4 G/DL (ref 31–36)
MCV RBC AUTO: 65 FL (ref 80–100)
MONOCYTES # BLD AUTO: 0.5 THOUSAND/ΜL (ref 0.2–0.9)
MONOCYTES NFR BLD AUTO: 7 % (ref 1–10)
NEUTROPHILS # BLD AUTO: 4.8 THOUSANDS/ΜL (ref 1.8–7.8)
NEUTS SEG NFR BLD AUTO: 68 % (ref 45–65)
NITRITE UR QL STRIP: NEGATIVE
NON-SQ EPI CELLS URNS QL MICRO: ABNORMAL /HPF
PH UR STRIP.AUTO: 5 [PH]
PLATELET # BLD AUTO: 333 THOUSANDS/UL (ref 150–450)
PMV BLD AUTO: 8.7 FL (ref 8.9–12.7)
POTASSIUM SERPL-SCNC: 3.9 MMOL/L (ref 3.6–5)
PROT SERPL-MCNC: 8.8 G/DL (ref 5.9–8.4)
PROT UR STRIP-MCNC: ABNORMAL MG/DL
RBC # BLD AUTO: 5.15 MILLION/UL (ref 4–5.2)
RBC #/AREA URNS AUTO: ABNORMAL /HPF
SODIUM SERPL-SCNC: 141 MMOL/L (ref 137–147)
SP GR UR STRIP.AUTO: 1.02 (ref 1–1.04)
UROBILINOGEN UA: 1 MG/DL
WBC # BLD AUTO: 7 THOUSAND/UL (ref 4.5–11)
WBC #/AREA URNS AUTO: ABNORMAL /HPF

## 2021-10-23 PROCEDURE — 81025 URINE PREGNANCY TEST: CPT | Performed by: PHYSICIAN ASSISTANT

## 2021-10-23 PROCEDURE — G1004 CDSM NDSC: HCPCS

## 2021-10-23 PROCEDURE — 36415 COLL VENOUS BLD VENIPUNCTURE: CPT | Performed by: PHYSICIAN ASSISTANT

## 2021-10-23 PROCEDURE — 80053 COMPREHEN METABOLIC PANEL: CPT | Performed by: PHYSICIAN ASSISTANT

## 2021-10-23 PROCEDURE — 96375 TX/PRO/DX INJ NEW DRUG ADDON: CPT

## 2021-10-23 PROCEDURE — 96374 THER/PROPH/DIAG INJ IV PUSH: CPT

## 2021-10-23 PROCEDURE — 99284 EMERGENCY DEPT VISIT MOD MDM: CPT

## 2021-10-23 PROCEDURE — 96361 HYDRATE IV INFUSION ADD-ON: CPT

## 2021-10-23 PROCEDURE — 74176 CT ABD & PELVIS W/O CONTRAST: CPT

## 2021-10-23 PROCEDURE — 85025 COMPLETE CBC W/AUTO DIFF WBC: CPT | Performed by: PHYSICIAN ASSISTANT

## 2021-10-23 PROCEDURE — 99284 EMERGENCY DEPT VISIT MOD MDM: CPT | Performed by: PHYSICIAN ASSISTANT

## 2021-10-23 PROCEDURE — 83690 ASSAY OF LIPASE: CPT | Performed by: PHYSICIAN ASSISTANT

## 2021-10-23 PROCEDURE — 81001 URINALYSIS AUTO W/SCOPE: CPT | Performed by: PHYSICIAN ASSISTANT

## 2021-10-23 PROCEDURE — 81003 URINALYSIS AUTO W/O SCOPE: CPT | Performed by: PHYSICIAN ASSISTANT

## 2021-10-23 RX ORDER — KETOROLAC TROMETHAMINE 10 MG/1
10 TABLET, FILM COATED ORAL EVERY 6 HOURS PRN
Qty: 20 TABLET | Refills: 0 | Status: SHIPPED | OUTPATIENT
Start: 2021-10-23 | End: 2022-03-21

## 2021-10-23 RX ORDER — KETOROLAC TROMETHAMINE 30 MG/ML
15 INJECTION, SOLUTION INTRAMUSCULAR; INTRAVENOUS ONCE
Status: COMPLETED | OUTPATIENT
Start: 2021-10-23 | End: 2021-10-23

## 2021-10-23 RX ORDER — ONDANSETRON 4 MG/1
4 TABLET, FILM COATED ORAL EVERY 6 HOURS
Qty: 12 TABLET | Refills: 0 | Status: SHIPPED | OUTPATIENT
Start: 2021-10-23

## 2021-10-23 RX ORDER — ONDANSETRON 2 MG/ML
4 INJECTION INTRAMUSCULAR; INTRAVENOUS ONCE
Status: COMPLETED | OUTPATIENT
Start: 2021-10-23 | End: 2021-10-23

## 2021-10-23 RX ADMIN — KETOROLAC TROMETHAMINE 15 MG: 30 INJECTION, SOLUTION INTRAMUSCULAR; INTRAVENOUS at 21:12

## 2021-10-23 RX ADMIN — SODIUM CHLORIDE 1000 ML: 0.9 INJECTION, SOLUTION INTRAVENOUS at 21:12

## 2021-10-23 RX ADMIN — ONDANSETRON 4 MG: 2 INJECTION INTRAMUSCULAR; INTRAVENOUS at 21:12

## 2021-12-08 ENCOUNTER — HOSPITAL ENCOUNTER (EMERGENCY)
Facility: HOSPITAL | Age: 31
Discharge: HOME | End: 2021-12-09
Attending: EMERGENCY MEDICINE
Payer: COMMERCIAL

## 2021-12-08 DIAGNOSIS — D64.9 ANEMIA: Primary | ICD-10-CM

## 2021-12-08 DIAGNOSIS — R53.83 FATIGUE: ICD-10-CM

## 2021-12-08 DIAGNOSIS — Z30.2 ENCOUNTER FOR STERILIZATION: ICD-10-CM

## 2021-12-08 DIAGNOSIS — N39.0 UTI (URINARY TRACT INFECTION): ICD-10-CM

## 2021-12-08 PROCEDURE — 81025 URINE PREGNANCY TEST: CPT | Performed by: EMERGENCY MEDICINE

## 2021-12-08 PROCEDURE — 99284 EMERGENCY DEPT VISIT MOD MDM: CPT | Performed by: EMERGENCY MEDICINE

## 2021-12-08 PROCEDURE — 99285 EMERGENCY DEPT VISIT HI MDM: CPT

## 2021-12-09 VITALS
HEIGHT: 60 IN | TEMPERATURE: 98.1 F | DIASTOLIC BLOOD PRESSURE: 61 MMHG | BODY MASS INDEX: 29 KG/M2 | WEIGHT: 147.71 LBS | SYSTOLIC BLOOD PRESSURE: 103 MMHG | OXYGEN SATURATION: 98 % | RESPIRATION RATE: 18 BRPM | HEART RATE: 68 BPM

## 2021-12-09 LAB
ALBUMIN SERPL BCP-MCNC: 3.8 G/DL (ref 3.5–5)
ALP SERPL-CCNC: 84 U/L (ref 46–116)
ALT SERPL W P-5'-P-CCNC: 41 U/L (ref 12–78)
ANION GAP SERPL CALCULATED.3IONS-SCNC: 8 MMOL/L (ref 4–13)
AST SERPL W P-5'-P-CCNC: 23 U/L (ref 5–45)
ATRIAL RATE: 87 BPM
BACTERIA UR QL AUTO: ABNORMAL /HPF
BASOPHILS # BLD AUTO: 0.03 THOUSANDS/ΜL (ref 0–0.1)
BASOPHILS NFR BLD AUTO: 0 % (ref 0–1)
BILIRUB SERPL-MCNC: 0.14 MG/DL (ref 0.2–1)
BILIRUB UR QL STRIP: NEGATIVE
BUN SERPL-MCNC: 14 MG/DL (ref 5–25)
CALCIUM SERPL-MCNC: 8.9 MG/DL (ref 8.3–10.1)
CHLORIDE SERPL-SCNC: 102 MMOL/L (ref 100–108)
CLARITY UR: ABNORMAL
CO2 SERPL-SCNC: 25 MMOL/L (ref 21–32)
COLOR UR: YELLOW
CREAT SERPL-MCNC: 0.57 MG/DL (ref 0.6–1.3)
EOSINOPHIL # BLD AUTO: 0.09 THOUSAND/ΜL (ref 0–0.61)
EOSINOPHIL NFR BLD AUTO: 1 % (ref 0–6)
ERYTHROCYTE [DISTWIDTH] IN BLOOD BY AUTOMATED COUNT: 17.1 % (ref 11.6–15.1)
EXT PREG TEST URINE: NORMAL
EXT. CONTROL ED NAV: NORMAL
GFR SERPL CREATININE-BSD FRML MDRD: 124 ML/MIN/1.73SQ M
GLUCOSE SERPL-MCNC: 94 MG/DL (ref 65–140)
GLUCOSE UR STRIP-MCNC: NEGATIVE MG/DL
HCT VFR BLD AUTO: 31.2 % (ref 34.8–46.1)
HGB BLD-MCNC: 8.6 G/DL (ref 11.5–15.4)
HGB UR QL STRIP.AUTO: NEGATIVE
IMM GRANULOCYTES # BLD AUTO: 0.01 THOUSAND/UL (ref 0–0.2)
IMM GRANULOCYTES NFR BLD AUTO: 0 % (ref 0–2)
KETONES UR STRIP-MCNC: NEGATIVE MG/DL
LEUKOCYTE ESTERASE UR QL STRIP: ABNORMAL
LYMPHOCYTES # BLD AUTO: 1.91 THOUSANDS/ΜL (ref 0.6–4.47)
LYMPHOCYTES NFR BLD AUTO: 28 % (ref 14–44)
MCH RBC QN AUTO: 19.4 PG (ref 26.8–34.3)
MCHC RBC AUTO-ENTMCNC: 27.6 G/DL (ref 31.4–37.4)
MCV RBC AUTO: 70 FL (ref 82–98)
MONOCYTES # BLD AUTO: 0.59 THOUSAND/ΜL (ref 0.17–1.22)
MONOCYTES NFR BLD AUTO: 9 % (ref 4–12)
NEUTROPHILS # BLD AUTO: 4.13 THOUSANDS/ΜL (ref 1.85–7.62)
NEUTS SEG NFR BLD AUTO: 62 % (ref 43–75)
NITRITE UR QL STRIP: POSITIVE
NON-SQ EPI CELLS URNS QL MICRO: ABNORMAL /HPF
NRBC BLD AUTO-RTO: 0 /100 WBCS
P AXIS: 48 DEGREES
PH UR STRIP.AUTO: 7 [PH] (ref 4.5–8)
PLATELET # BLD AUTO: 256 THOUSANDS/UL (ref 149–390)
PMV BLD AUTO: 10.1 FL (ref 8.9–12.7)
POTASSIUM SERPL-SCNC: 3.8 MMOL/L (ref 3.5–5.3)
PR INTERVAL: 126 MS
PROT SERPL-MCNC: 7.6 G/DL (ref 6.4–8.2)
PROT UR STRIP-MCNC: NEGATIVE MG/DL
QRS AXIS: 54 DEGREES
QRSD INTERVAL: 86 MS
QT INTERVAL: 332 MS
QTC INTERVAL: 399 MS
RBC # BLD AUTO: 4.43 MILLION/UL (ref 3.81–5.12)
RBC #/AREA URNS AUTO: ABNORMAL /HPF
SODIUM SERPL-SCNC: 135 MMOL/L (ref 136–145)
SP GR UR STRIP.AUTO: >=1.03 (ref 1–1.03)
T WAVE AXIS: 38 DEGREES
TSH SERPL DL<=0.05 MIU/L-ACNC: 1.16 UIU/ML (ref 0.36–3.74)
UROBILINOGEN UR QL STRIP.AUTO: 0.2 E.U./DL
VENTRICULAR RATE: 87 BPM
WBC # BLD AUTO: 6.76 THOUSAND/UL (ref 4.31–10.16)
WBC #/AREA URNS AUTO: ABNORMAL /HPF

## 2021-12-09 PROCEDURE — 81001 URINALYSIS AUTO W/SCOPE: CPT

## 2021-12-09 PROCEDURE — 85025 COMPLETE CBC W/AUTO DIFF WBC: CPT | Performed by: EMERGENCY MEDICINE

## 2021-12-09 PROCEDURE — 80053 COMPREHEN METABOLIC PANEL: CPT | Performed by: EMERGENCY MEDICINE

## 2021-12-09 PROCEDURE — 93010 ELECTROCARDIOGRAM REPORT: CPT | Performed by: INTERNAL MEDICINE

## 2021-12-09 PROCEDURE — 36415 COLL VENOUS BLD VENIPUNCTURE: CPT | Performed by: EMERGENCY MEDICINE

## 2021-12-09 PROCEDURE — 93005 ELECTROCARDIOGRAM TRACING: CPT

## 2021-12-09 PROCEDURE — 84443 ASSAY THYROID STIM HORMONE: CPT | Performed by: EMERGENCY MEDICINE

## 2021-12-09 RX ORDER — CEPHALEXIN 500 MG/1
500 CAPSULE ORAL EVERY 12 HOURS SCHEDULED
Qty: 10 CAPSULE | Refills: 0 | Status: SHIPPED | OUTPATIENT
Start: 2021-12-09 | End: 2021-12-14

## 2021-12-09 RX ORDER — FERROUS SULFATE TAB EC 324 MG (65 MG FE EQUIVALENT) 324 (65 FE) MG
324 TABLET DELAYED RESPONSE ORAL
Qty: 60 TABLET | Refills: 1 | Status: SHIPPED | OUTPATIENT
Start: 2021-12-09 | End: 2022-03-25 | Stop reason: SDUPTHER

## 2021-12-15 ENCOUNTER — HOSPITAL ENCOUNTER (EMERGENCY)
Facility: HOSPITAL | Age: 31
Discharge: HOME | End: 2021-12-15
Attending: EMERGENCY MEDICINE
Payer: COMMERCIAL

## 2021-12-15 VITALS
DIASTOLIC BLOOD PRESSURE: 61 MMHG | WEIGHT: 145.06 LBS | RESPIRATION RATE: 18 BRPM | SYSTOLIC BLOOD PRESSURE: 107 MMHG | TEMPERATURE: 98.7 F | HEART RATE: 104 BPM | OXYGEN SATURATION: 100 % | BODY MASS INDEX: 28.33 KG/M2

## 2021-12-15 DIAGNOSIS — M79.10 MYALGIA: ICD-10-CM

## 2021-12-15 DIAGNOSIS — Z20.822 PERSON UNDER INVESTIGATION FOR COVID-19: Primary | ICD-10-CM

## 2021-12-15 DIAGNOSIS — R05.9 COUGH: ICD-10-CM

## 2021-12-15 DIAGNOSIS — R50.9 FEVER: ICD-10-CM

## 2021-12-15 DIAGNOSIS — R51.9 HEADACHE: ICD-10-CM

## 2021-12-15 PROCEDURE — 87636 SARSCOV2 & INF A&B AMP PRB: CPT | Performed by: EMERGENCY MEDICINE

## 2021-12-15 PROCEDURE — 99283 EMERGENCY DEPT VISIT LOW MDM: CPT

## 2021-12-15 PROCEDURE — 99284 EMERGENCY DEPT VISIT MOD MDM: CPT | Performed by: EMERGENCY MEDICINE

## 2021-12-17 ENCOUNTER — TELEPHONE (OUTPATIENT)
Dept: FAMILY MEDICINE CLINIC | Facility: CLINIC | Age: 31
End: 2021-12-17

## 2021-12-17 LAB
FLUAV RNA RESP QL NAA+PROBE: NEGATIVE
FLUBV RNA RESP QL NAA+PROBE: NEGATIVE
SARS-COV-2 RNA RESP QL NAA+PROBE: POSITIVE

## 2021-12-21 ENCOUNTER — APPOINTMENT (EMERGENCY)
Dept: RADIOLOGY | Facility: HOSPITAL | Age: 31
End: 2021-12-21
Payer: COMMERCIAL

## 2021-12-21 ENCOUNTER — HOSPITAL ENCOUNTER (EMERGENCY)
Facility: HOSPITAL | Age: 31
Discharge: HOME | End: 2021-12-21
Attending: EMERGENCY MEDICINE
Payer: COMMERCIAL

## 2021-12-21 VITALS
TEMPERATURE: 98.1 F | HEART RATE: 89 BPM | OXYGEN SATURATION: 100 % | SYSTOLIC BLOOD PRESSURE: 99 MMHG | DIASTOLIC BLOOD PRESSURE: 56 MMHG | RESPIRATION RATE: 20 BRPM

## 2021-12-21 DIAGNOSIS — U07.1 COVID-19: ICD-10-CM

## 2021-12-21 DIAGNOSIS — R52 BODY ACHES: ICD-10-CM

## 2021-12-21 DIAGNOSIS — R05.9 COUGH: Primary | ICD-10-CM

## 2021-12-21 PROCEDURE — 99283 EMERGENCY DEPT VISIT LOW MDM: CPT

## 2021-12-21 PROCEDURE — 99284 EMERGENCY DEPT VISIT MOD MDM: CPT | Performed by: EMERGENCY MEDICINE

## 2021-12-21 PROCEDURE — 71045 X-RAY EXAM CHEST 1 VIEW: CPT

## 2021-12-21 RX ORDER — BENZONATATE 100 MG/1
100 CAPSULE ORAL EVERY 8 HOURS PRN
Qty: 15 CAPSULE | Refills: 0 | Status: SHIPPED | OUTPATIENT
Start: 2021-12-21 | End: 2021-12-26

## 2022-03-21 ENCOUNTER — OFFICE VISIT (OUTPATIENT)
Dept: FAMILY MEDICINE CLINIC | Facility: CLINIC | Age: 32
End: 2022-03-21

## 2022-03-21 VITALS
HEIGHT: 60 IN | RESPIRATION RATE: 16 BRPM | WEIGHT: 145.9 LBS | SYSTOLIC BLOOD PRESSURE: 116 MMHG | HEART RATE: 101 BPM | OXYGEN SATURATION: 99 % | BODY MASS INDEX: 28.64 KG/M2 | TEMPERATURE: 97.2 F | DIASTOLIC BLOOD PRESSURE: 68 MMHG

## 2022-03-21 DIAGNOSIS — N18.9 CHRONIC KIDNEY DISEASE, UNSPECIFIED CKD STAGE: ICD-10-CM

## 2022-03-21 DIAGNOSIS — Z00.00 ANNUAL PHYSICAL EXAM: Primary | ICD-10-CM

## 2022-03-21 DIAGNOSIS — R07.81 RIB PAIN ON LEFT SIDE: ICD-10-CM

## 2022-03-21 DIAGNOSIS — Z11.4 ENCOUNTER FOR SCREENING FOR HIV: ICD-10-CM

## 2022-03-21 DIAGNOSIS — N20.1 URETERAL CALCULI: ICD-10-CM

## 2022-03-21 DIAGNOSIS — Q62.11 HYDRONEPHROSIS WITH URETEROPELVIC JUNCTION (UPJ) OBSTRUCTION: ICD-10-CM

## 2022-03-21 DIAGNOSIS — K80.20 CALCULUS OF GALLBLADDER WITHOUT CHOLECYSTITIS WITHOUT OBSTRUCTION: ICD-10-CM

## 2022-03-21 DIAGNOSIS — Z11.3 ROUTINE SCREENING FOR STI (SEXUALLY TRANSMITTED INFECTION): ICD-10-CM

## 2022-03-21 DIAGNOSIS — R10.10 UPPER ABDOMINAL PAIN: ICD-10-CM

## 2022-03-21 DIAGNOSIS — D50.9 MICROCYTIC ANEMIA: ICD-10-CM

## 2022-03-21 DIAGNOSIS — E66.3 OVERWEIGHT: ICD-10-CM

## 2022-03-21 DIAGNOSIS — N20.0 RENAL CALCULI: ICD-10-CM

## 2022-03-21 DIAGNOSIS — K02.9 DENTAL CARIES: ICD-10-CM

## 2022-03-21 DIAGNOSIS — N93.8 DYSFUNCTIONAL UTERINE BLEEDING: ICD-10-CM

## 2022-03-21 DIAGNOSIS — D50.0 IRON DEFICIENCY ANEMIA DUE TO CHRONIC BLOOD LOSS: ICD-10-CM

## 2022-03-21 PROBLEM — Z30.2 STERILIZATION: Status: RESOLVED | Noted: 2021-02-12 | Resolved: 2022-03-21

## 2022-03-21 PROBLEM — R82.71 BACTERIURIA: Status: RESOLVED | Noted: 2017-09-22 | Resolved: 2022-03-21

## 2022-03-21 PROBLEM — Z30.09 STERILIZATION CONSULT: Status: RESOLVED | Noted: 2020-09-02 | Resolved: 2022-03-21

## 2022-03-21 LAB
SL AMB LEUKOCYTE ESTERASE,UA: ABNORMAL
SL AMB POCT CLARITY,UA: ABNORMAL
SL AMB POCT COLOR,UA: ABNORMAL
SL AMB POCT PH,UA: 5
SL AMB POCT URINE PROTEIN: ABNORMAL

## 2022-03-21 PROCEDURE — 99385 PREV VISIT NEW AGE 18-39: CPT | Performed by: NURSE PRACTITIONER

## 2022-03-21 PROCEDURE — 87186 SC STD MICRODIL/AGAR DIL: CPT | Performed by: NURSE PRACTITIONER

## 2022-03-21 PROCEDURE — 87086 URINE CULTURE/COLONY COUNT: CPT | Performed by: NURSE PRACTITIONER

## 2022-03-21 PROCEDURE — 81002 URINALYSIS NONAUTO W/O SCOPE: CPT | Performed by: NURSE PRACTITIONER

## 2022-03-21 PROCEDURE — 87077 CULTURE AEROBIC IDENTIFY: CPT | Performed by: NURSE PRACTITIONER

## 2022-03-21 RX ORDER — NAPROXEN 500 MG/1
500 TABLET ORAL 2 TIMES DAILY WITH MEALS
Qty: 30 TABLET | Refills: 0 | Status: SHIPPED | OUTPATIENT
Start: 2022-03-21

## 2022-03-21 RX ORDER — FAMOTIDINE 20 MG/1
20 TABLET, FILM COATED ORAL 2 TIMES DAILY
Qty: 30 TABLET | Refills: 0 | Status: SHIPPED | OUTPATIENT
Start: 2022-03-21

## 2022-03-21 NOTE — PROGRESS NOTES
[address] [facility] PRACTICE [facility]    NAME: Brigida Mcdaniel  AGE: 32 y o   SEX: female  : 1990     DATE: 3/21/2022     Assessment and Plan:     Problem List Items Addressed This Visit        Digestive    Calculus of gallbladder without cholecystitis without obstruction     Thinks that she is having gall bladder "attacks" after meals   Referral to surgery          Relevant Orders    Ambulatory Referral to General Surgery       Genitourinary    Ureteral calculi     Noted on most recent imaging to have multiple calculi bilaterally, also has hx of frequent UTI's   Referral to urology          Chronic kidney disease    Relevant Orders    Comprehensive metabolic panel    Urine culture    RESOLVED: Hydronephrosis with ureteropelvic junction (UPJ) obstruction    Relevant Orders    POCT urine dip (Completed)    Ambulatory Referral to Urology       Other    Iron deficiency anemia     Lab Results   Component Value Date    WBC 6 76 2021    HGB 8 6 (L) 2021    HCT 31 2 (L) 2021    MCV 70 (L) 2021     2021     Repeat CBC, iron level  Restart iron supplement            Other Visit Diagnoses     Annual physical exam    -  Primary    Dental caries        Relevant Orders    Ambulatory Referral to Dentistry    Upper abdominal pain        Relevant Orders    CT abdomen pelvis w contrast    Overweight        Relevant Orders    Hemoglobin A1C    Lipid panel    TSH, 3rd generation with Free T4 reflex    Microcytic anemia        Relevant Orders    CBC and differential    Iron Panel (Includes Ferritin, Iron Sat%, Iron, and TIBC)    Encounter for screening for HIV        Relevant Orders    HIV 1/2 Antigen/Antibody (4th Generation) w Reflex [facility]    Routine screening for STI (sexually transmitted infection)        Relevant Orders    RPR    Hepatitis C antibody    Chlamydia/GC amplified DNA by PCR    Renal calculi Relevant Orders    Ambulatory Referral to Urology    Dysfunctional uterine bleeding        Relevant Medications    naproxen (NAPROSYN) 500 mg tablet    Rib pain on left side              Immunizations and preventive care screenings were discussed with patient today  Appropriate education was printed on patient's after visit summary  Counseling:  Alcohol/drug use: discussed moderation in alcohol intake, the recommendations for healthy alcohol use, and avoidance of illicit drug use  Dental Health: discussed importance of regular tooth brushing, flossing, and dental visits  Injury prevention: discussed safety/seat belts, safety helmets, smoke detectors, carbon dioxide detectors, and smoking near bedding or upholstery  Sexual health: discussed sexually transmitted diseases, partner selection, use of condoms, avoidance of unintended pregnancy, and contraceptive alternatives  · Exercise: the importance of regular exercise/physical activity was discussed  Recommend exercise 3-5 times per week for at least 30 minutes  Return in about 6 weeks (around 2022) for abdominal pain   Chief Complaint:     Chief Complaint   Patient presents with   [facility] Patient Visit     31 y/o     Abdominal Pain     upper abdomen "per pt possible mass pt states it move from side to side"     GI Problem     acid reflux, per pt depends on what she eats she will experienced bloating "pt concerned of possible gallbladder issues"       History of Present Illness:     Adult Annual Physical   Patient here for a comprehensive physical exam and to establish care  The patient reports hx of frequent UTI's and kidney stones  Also notes that she often has pain after eating in the upper abdomen  Used to just have after greasy, fatty foods but now feels like it happens with everything even water  She is  s/p BTL  She reports menses is regular  Reports occasional ETOH and tobacco use  Denies illicit drugs       Diet and Physical Activity  · Diet/Nutrition: limited fruits/vegetables  · Exercise: no formal exercise  Depression Screening  PHQ-2/9 Depression Screening    Little interest or pleasure in doing things: 0 - not at all  Feeling down, depressed, or hopeless: 1 - several days  PHQ-2 Score: 1  PHQ-2 Interpretation: Negative depression screen       General Health  · Sleep: sleeps well  · Hearing: normal - bilateral   · Vision: no vision problems  · Dental: regular dental visits  /GYN Health  · Last menstrual period: 3/18/2022  · History of STDs?: no      Review of Systems:     Review of Systems   Constitutional: Negative for chills and fever  HENT: Negative for ear pain and sore throat  Eyes: Negative for pain and visual disturbance  Respiratory: Negative for cough and shortness of breath  Cardiovascular: Negative for chest pain and palpitations  Gastrointestinal: Positive for abdominal pain  Negative for blood in stool, constipation, diarrhea and vomiting  Genitourinary: Negative for dysuria and hematuria  Musculoskeletal: Negative for arthralgias and back pain  Skin: Negative for color change and rash  Neurological: Negative for seizures and syncope  All other systems reviewed and are negative       Past Medical History:     Past Medical History:   Diagnosis Date    Anemia     taking iron pills BID     Chronic kidney disease     hydronephrosis    History of transfusion     Kidney stone     Sepsis ([address] )     Septic shock (HCC)     Urinary tract infection     taking antibioitcs     UTI (urinary tract infection) during pregnancy     Vagina, candidiasis     Varicella     as child      Past Surgical History:     Past Surgical History:   Procedure Laterality Date    ABDOMINAL ADHESION SURGERY N/A 2021    Procedure: LYSIS ADHESIONS;  Surgeon: Yajaira Nash MD;  [facility];  Service: Gynecology     SECTION N/A 2018    Procedure:  SECTION (); Surgeon: Belinda Davidson MD;  [facility]: [facility];  Service: Obstetrics    CYSTOSCOPY  2018    with right uretral stent    EXAMINATION UNDER ANESTHESIA N/A 2021    Procedure: EXAM UNDER ANESTHESIA (EUA);   Surgeon: Cyndi Amos MD;  [facility];  Service: Gynecology    UT CYSTO/URETERO W/LITHOTRIPSY &INDWELL STENT INSRT Right 2018    Procedure: CYSTOSCOPY URETEROSCOPY WITH  RETROGRADE PYELOGRAM AND exchange  STENT URETERAl;  Surgeon: Greg Gordon MD;  Location: [address] [place] [address] MAIN OR;  Service: Urology    UT CYSTOURETHROSCOPY,URETER CATHETER Right 2018    Procedure: CYSTOSCOPY RETROGRADE PYELOGRAM WITH INSERTION STENT URETERAL;  Surgeon: Greg Gordon MD;  [facility];  Service: Urology    UT LAP,DIAGNOSTIC ABDOMEN N/A 2021    Procedure: LAPAROSCOPY DIAGNOSTIC;  Surgeon: Cyndi Amos MD;  [facility]: [facility] MA[facility];  Service: Gynecology    UT LAP,RMV  ADNEXAL STRUCTURE Bilateral 2021    Procedure: SALPINGECTOMY, Elbridge Dima;  Surgeon: Cyndi Amos MD;  [facility];  Service: Gynecology    TONSILLECTOMY      TONSILLECTOMY      TUBAL LIGATION        Social History:     Social History     Socioeconomic History    Marital status: Single     Spouse name: None    Number of children: None    Years of education: None    Highest education level: None   Occupational History    None   Tobacco Use    Smoking status: Current Every Day Smoker     Packs/day: 0 25     Last attempt to quit: 2019     Years since quittin 7    Smokeless tobacco: Never Used    Tobacco comment: 2-3 a day   Vaping Use    Vaping Use: Never used   Substance and Sexual Activity    Alcohol use: No    Drug use: No    Sexual activity: Yes     Partners: Male     Birth control/protection: None, Condom Male   Other Topics Concern    None   Social History Narrative    None     Social Determinants of Health     Financial Resource Strain: Low Risk     Difficulty of Paying Living Expenses: Not very hard   Food Insecurity: No Food Insecurity    Worried About Running Out of Food in the Last Year: Never true    Ran Out of Food in the Last Year: Never true   Transportation Needs: No Transportation Needs    Lack of Transportation (Medical): No    Lack of Transportation (Non-Medical): No   Physical Activity: Not on file   Stress: Not on file   Social Connections: Not on file   Intimate Partner Violence: Not on file   Housing Stability: Not on file      Family History:     Family History   Problem Relation Age of Onset    No Known Problems Mother     Diabetes Father     Heart defect Brother         3 open heart surgeries    Heart failure Family         Congestive    Diabetes Maternal Grandmother       Current Medications:     Current Outpatient Medications   Medication Sig Dispense Refill    ferrous sulfate 324 (65 Fe) mg Take 1 tablet (324 mg total) by mouth 2 (two) times a day before meals 60 tablet 1    ferrous sulfate 324 (65 Fe) mg Take 1 tablet (324 mg total) by mouth 2 (two) times a day before meals (Patient not taking: Reported on 4/29/2021) 30 tablet 3    medroxyPROGESTERone (PROVERA) 10 mg tablet Take 1 tablet (10 mg total) by mouth daily for 10 days 10 tablet 0    naproxen (NAPROSYN) 500 mg tablet Take 1 tablet (500 mg total) by mouth 2 (two) times a day with meals 30 tablet 0    ondansetron (ZOFRAN) 4 mg tablet Take 1 tablet (4 mg total) by mouth every 6 (six) hours 12 tablet 0    Prenatal MV-Min-Fe Fum-FA-DHA (Prenatal 1) 30-0 975-200 MG CAPS Take 1 tablet by mouth daily (Patient not taking: Reported on 4/5/2021) 90 capsule 1     No current facility-administered medications for this visit        Allergies:     No Known Allergies   Physical Exam:     /68 (BP Location: Left arm, Patient Position: Sitting, Cuff Size: Standard)   Pulse 101   Temp (!) 97 2 °F (36 2 °C) (Temporal)   Resp 16   Ht 5' (1 524 m)   Wt 66 2 kg (145 lb 14 4 oz)   LMP 03/18/2022 (Within Days)   SpO2 99%   BMI 28 49 kg/m²     Physical Exam  Vitals and nursing note reviewed  Constitutional:       General: She is not in acute distress  Appearance: She is well-developed  HENT:      Head: Normocephalic and atraumatic  Right Ear: External ear normal       Left Ear: External ear normal    Eyes:      Conjunctiva/sclera: Conjunctivae normal    Cardiovascular:      Rate and Rhythm: Normal rate and regular rhythm  Heart sounds: No murmur heard  Pulmonary:      Effort: Pulmonary effort is normal  No respiratory distress  Breath sounds: Normal breath sounds  Abdominal:      Palpations: Abdomen is soft  Tenderness: There is abdominal tenderness in the periumbilical area  Musculoskeletal:      Cervical back: Neck supple  Skin:     General: Skin is warm and dry  Neurological:      General: No focal deficit present  Mental Status: She is alert and oriented to person, place, and time            Ta Melchor 34

## 2022-03-21 NOTE — ASSESSMENT & PLAN NOTE
Noted on most recent imaging to have multiple calculi bilaterally, also has hx of frequent UTI's   Referral to urology

## 2022-03-21 NOTE — PATIENT INSTRUCTIONS

## 2022-03-24 ENCOUNTER — APPOINTMENT (OUTPATIENT)
Dept: LAB | Facility: HOSPITAL | Age: 32
End: 2022-03-24
Payer: COMMERCIAL

## 2022-03-24 DIAGNOSIS — E66.3 OVERWEIGHT: ICD-10-CM

## 2022-03-24 DIAGNOSIS — D50.9 MICROCYTIC ANEMIA: ICD-10-CM

## 2022-03-24 DIAGNOSIS — Z11.3 ROUTINE SCREENING FOR STI (SEXUALLY TRANSMITTED INFECTION): ICD-10-CM

## 2022-03-24 DIAGNOSIS — N18.9 CHRONIC KIDNEY DISEASE, UNSPECIFIED CKD STAGE: ICD-10-CM

## 2022-03-24 DIAGNOSIS — Z11.4 ENCOUNTER FOR SCREENING FOR HIV: ICD-10-CM

## 2022-03-24 LAB
ALBUMIN SERPL BCP-MCNC: 4.6 G/DL (ref 3–5.2)
ALP SERPL-CCNC: 83 U/L (ref 43–122)
ALT SERPL W P-5'-P-CCNC: 13 U/L
ANION GAP SERPL CALCULATED.3IONS-SCNC: 8 MMOL/L (ref 5–14)
AST SERPL W P-5'-P-CCNC: 23 U/L (ref 14–36)
BACTERIA UR CULT: ABNORMAL
BASOPHILS # BLD AUTO: 0 THOUSANDS/ΜL (ref 0–0.1)
BASOPHILS NFR BLD AUTO: 0 % (ref 0–1)
BILIRUB SERPL-MCNC: 0.48 MG/DL
BUN SERPL-MCNC: 11 MG/DL (ref 5–25)
CALCIUM SERPL-MCNC: 9 MG/DL (ref 8.4–10.2)
CHLORIDE SERPL-SCNC: 105 MMOL/L (ref 97–108)
CHOLEST SERPL-MCNC: 173 MG/DL
CO2 SERPL-SCNC: 26 MMOL/L (ref 22–30)
CREAT SERPL-MCNC: 0.61 MG/DL (ref 0.6–1.2)
EOSINOPHIL # BLD AUTO: 0.1 THOUSAND/ΜL (ref 0–0.4)
EOSINOPHIL NFR BLD AUTO: 1 % (ref 0–6)
ERYTHROCYTE [DISTWIDTH] IN BLOOD BY AUTOMATED COUNT: 18.7 %
EST. AVERAGE GLUCOSE BLD GHB EST-MCNC: 103 MG/DL
FERRITIN SERPL-MCNC: 3 NG/ML (ref 8–388)
GFR SERPL CREATININE-BSD FRML MDRD: 121 ML/MIN/1.73SQ M
GLUCOSE P FAST SERPL-MCNC: 99 MG/DL (ref 70–99)
HBA1C MFR BLD: 5.2 %
HCT VFR BLD AUTO: 28.3 % (ref 36–46)
HDLC SERPL-MCNC: 64 MG/DL
HGB BLD-MCNC: 8.5 G/DL (ref 12–16)
IRON SATN MFR SERPL: 2 % (ref 15–50)
IRON SERPL-MCNC: 11 UG/DL (ref 50–170)
LDLC SERPL CALC-MCNC: 96 MG/DL
LYMPHOCYTES # BLD AUTO: 2.2 THOUSANDS/ΜL (ref 0.5–4)
LYMPHOCYTES NFR BLD AUTO: 38 % (ref 25–45)
MCH RBC QN AUTO: 18.9 PG (ref 26–34)
MCHC RBC AUTO-ENTMCNC: 29.9 G/DL (ref 31–36)
MCV RBC AUTO: 63 FL (ref 80–100)
MICROCYTES BLD QL AUTO: PRESENT
MONOCYTES # BLD AUTO: 0.3 THOUSAND/ΜL (ref 0.2–0.9)
MONOCYTES NFR BLD AUTO: 5 % (ref 1–10)
NEUTROPHILS # BLD AUTO: 3.2 THOUSANDS/ΜL (ref 1.8–7.8)
NEUTS SEG NFR BLD AUTO: 56 % (ref 45–65)
NONHDLC SERPL-MCNC: 109 MG/DL
PLATELET # BLD AUTO: 365 THOUSANDS/UL (ref 150–450)
PLATELET BLD QL SMEAR: ADEQUATE
PMV BLD AUTO: 8.9 FL (ref 8.9–12.7)
POTASSIUM SERPL-SCNC: 3.7 MMOL/L (ref 3.6–5)
PROT SERPL-MCNC: 8.3 G/DL (ref 5.9–8.4)
RBC # BLD AUTO: 4.48 MILLION/UL (ref 4–5.2)
RBC MORPH BLD: NORMAL
SODIUM SERPL-SCNC: 139 MMOL/L (ref 137–147)
TIBC SERPL-MCNC: 514 UG/DL (ref 250–450)
TRIGL SERPL-MCNC: 63 MG/DL
TSH SERPL DL<=0.05 MIU/L-ACNC: 1.09 UIU/ML (ref 0.47–4.68)
WBC # BLD AUTO: 5.7 THOUSAND/UL (ref 4.5–11)

## 2022-03-24 PROCEDURE — 83550 IRON BINDING TEST: CPT

## 2022-03-24 PROCEDURE — 86803 HEPATITIS C AB TEST: CPT

## 2022-03-24 PROCEDURE — 87389 HIV-1 AG W/HIV-1&-2 AB AG IA: CPT

## 2022-03-24 PROCEDURE — 84443 ASSAY THYROID STIM HORMONE: CPT

## 2022-03-24 PROCEDURE — 80061 LIPID PANEL: CPT

## 2022-03-24 PROCEDURE — 85025 COMPLETE CBC W/AUTO DIFF WBC: CPT

## 2022-03-24 PROCEDURE — 80053 COMPREHEN METABOLIC PANEL: CPT

## 2022-03-24 PROCEDURE — 86592 SYPHILIS TEST NON-TREP QUAL: CPT

## 2022-03-24 PROCEDURE — 83540 ASSAY OF IRON: CPT

## 2022-03-24 PROCEDURE — 36415 COLL VENOUS BLD VENIPUNCTURE: CPT

## 2022-03-24 PROCEDURE — 82728 ASSAY OF FERRITIN: CPT

## 2022-03-24 PROCEDURE — 83036 HEMOGLOBIN GLYCOSYLATED A1C: CPT

## 2022-03-25 LAB
HCV AB SER QL: NORMAL
HIV 1+2 AB+HIV1 P24 AG SERPL QL IA: NORMAL
RPR SER QL: NORMAL

## 2022-03-30 ENCOUNTER — CONSULT (OUTPATIENT)
Dept: SURGERY | Facility: CLINIC | Age: 32
End: 2022-03-30
Payer: COMMERCIAL

## 2022-03-30 ENCOUNTER — PREP FOR PROCEDURE (OUTPATIENT)
Dept: SURGERY | Facility: CLINIC | Age: 32
End: 2022-03-30

## 2022-03-30 VITALS
OXYGEN SATURATION: 84 % | TEMPERATURE: 98.4 F | HEART RATE: 69 BPM | WEIGHT: 145 LBS | BODY MASS INDEX: 28.47 KG/M2 | DIASTOLIC BLOOD PRESSURE: 70 MMHG | HEIGHT: 60 IN | SYSTOLIC BLOOD PRESSURE: 118 MMHG

## 2022-03-30 DIAGNOSIS — K80.20 CHOLELITHIASIS: Primary | ICD-10-CM

## 2022-03-30 DIAGNOSIS — K80.20 CALCULUS OF GALLBLADDER WITHOUT CHOLECYSTITIS WITHOUT OBSTRUCTION: ICD-10-CM

## 2022-03-30 PROCEDURE — 99244 OFF/OP CNSLTJ NEW/EST MOD 40: CPT | Performed by: SURGERY

## 2022-03-30 RX ORDER — CEFAZOLIN SODIUM 1 G/50ML
1000 SOLUTION INTRAVENOUS ONCE
Status: CANCELLED | OUTPATIENT
Start: 2022-04-08 | End: 2022-03-30

## 2022-03-31 NOTE — PROGRESS NOTES
Assessment/Plan:  Symptomatic cholelithiasis  She has tried food avoidance without success and would like to undergo laparoscopic cholecystectomy  Discussed risks of procedure including bleeding, infection, damage to surrounding structures, risk of open procedure  Discussed post op diet and activity limitations as well as expected post op course  Informed consent obtained and will schedule  No problem-specific Assessment & Plan notes found for this encounter  Diagnoses and all orders for this visit:    Calculus of gallbladder without cholecystitis without obstruction  -     Ambulatory Referral to General Surgery          Subjective:      Patient ID: Steven Dia is a 32 y o  female  She presents with a several month history of abdominal pain and nausea, mainly after eating greasy, fatty and spicy foods  Pain is epigastric and will radiate across bilateral upper quadrants  She had a RUQ ultrasound in December 2020 that showed cholelithiasis without cholecystitis with a 4 mm CBD  CT renal study in October 2021 documented cholelithiasis as well as bilateral nonobstructing nephrolithiasis  She also has recurrent UTIs though upper abdominal symptoms are distinctly different that her symptoms from kidney stones  She denies any abdominal pain with drinking liquids or eating bland foods  Pain will last for several hours to days when she gets the pain  She has had previous C section, cystoscopies and diagnostic laparoscopy  A chart review was performed and previous primary care visit notes were reviewed  All applicable imaging studies were reviewed and images were reviewed personally  All applicable laboratory studies were reviewed personally  Care everywhere review was performed if  available and all pertinent notes were reviewed    The following portions of the patient's history were reviewed and updated as appropriate:   She  has a past medical history of Anemia, Chronic kidney disease, History of transfusion, Kidney stone, [address]is ([facility] [address] ), Septic shock ([facility] [address] ), Urinary tract infection, UTI (urinary tract infection) during pregnancy, Vagina, candidiasis, and Varicella  She   Patient Active Problem List    Diagnosis Date Noted    Calculus of gallbladder without cholecystitis without obstruction 2022    Encounter for sterilization 10/01/2020    Chronic kidney disease     Iron deficiency anemia 2020    Stress incontinence of urine 2020    Ureteral calculi 2018     She  has a past surgical history that includes Tonsillectomy; Tonsillectomy; pr cystourethroscopy,ureter catheter (Right, 2018);  section (N/A, 2018); Cystoscopy (2018); pr cysto/uretero w/lithotripsy &indwell stent insrt (Right, 2018); pr lap,diagnostic abdomen (N/A, 2021); pr lap,rmv  adnexal structure (Bilateral, 2021); Examination under anesthesia (N/A, 2021); Abdominal adhesion surgery (N/A, 2021); and Tubal ligation  Her family history includes Diabetes in her father and maternal grandmother; Heart defect in her brother; Heart failure in her family; No Known Problems in her mother  She  reports that she has been smoking  She has been smoking about 0 25 packs per day  She has never used smokeless tobacco  She reports that she does not drink alcohol and does not use drugs    Current Outpatient Medications   Medication Sig Dispense Refill    famotidine (PEPCID) 20 mg tablet Take 1 tablet (20 mg total) by mouth 2 (two) times a day 30 tablet 0    ferrous sulfate 324 (65 Fe) mg Take 1 tablet (324 mg total) by mouth daily before breakfast 60 tablet 1    naproxen (NAPROSYN) 500 mg tablet Take 1 tablet (500 mg total) by mouth 2 (two) times a day with meals 30 tablet 0    ondansetron (ZOFRAN) 4 mg tablet Take 1 tablet (4 mg total) by mouth every 6 (six) hours 12 tablet 0    medroxyPROGESTERone (PROVERA) 10 mg tablet Take 1 tablet (10 mg total) by mouth daily for 10 days 10 tablet 0     No current facility-administered medications for this visit  She has No Known Allergies       Review of Systems   Gastrointestinal: Positive for abdominal pain and nausea  Negative for vomiting  All other systems reviewed and are negative  Objective:      /70 (BP Location: Left arm, Patient Position: Sitting, Cuff Size: Standard)   Pulse 69   Temp 98 4 °F (36 9 °C) (Tympanic)   Ht 5' (1 524 m)   Wt 65 8 kg (145 lb)   LMP 03/18/2022 (Within Days)   SpO2 (!) 84%   BMI 28 32 kg/m²          Physical Exam  Vitals reviewed  Constitutional:       General: She is not in acute distress  Appearance: Normal appearance  She is normal weight  HENT:      Head: Normocephalic and atraumatic  Nose: Nose normal       Mouth/Throat:      Mouth: Mucous membranes are moist       Pharynx: Oropharynx is clear  Eyes:      Extraocular Movements: Extraocular movements intact  Conjunctiva/sclera: Conjunctivae normal       Pupils: Pupils are equal, round, and reactive to light  Cardiovascular:      Rate and Rhythm: Normal rate and regular rhythm  Heart sounds: Normal heart sounds  Pulmonary:      Effort: Pulmonary effort is normal  No respiratory distress  Breath sounds: Normal breath sounds  Abdominal:      General: Abdomen is flat  There is no distension  Palpations: Abdomen is soft  Tenderness: There is no abdominal tenderness  There is no guarding or rebound  Musculoskeletal:         General: No swelling or tenderness  Normal range of motion  Cervical back: Normal range of motion and neck supple  Skin:     General: Skin is warm and dry  Neurological:      General: No focal deficit present  Mental Status: She is alert and oriented to person, place, and time

## 2022-03-31 NOTE — H&P (VIEW-ONLY)
Assessment/Plan:  Symptomatic cholelithiasis  She has tried food avoidance without success and would like to undergo laparoscopic cholecystectomy  Discussed risks of procedure including bleeding, infection, damage to surrounding structures, risk of open procedure  Discussed post op diet and activity limitations as well as expected post op course  Informed consent obtained and will schedule  No problem-specific Assessment & Plan notes found for this encounter  Diagnoses and all orders for this visit:    Calculus of gallbladder without cholecystitis without obstruction  -     Ambulatory Referral to General Surgery          Subjective:      Patient ID: Ronald Enriquez is a 32 y o  female  She presents with a several month history of abdominal pain and nausea, mainly after eating greasy, fatty and spicy foods  Pain is epigastric and will radiate across bilateral upper quadrants  She had a RUQ ultrasound in December 2020 that showed cholelithiasis without cholecystitis with a 4 mm CBD  CT renal study in October 2021 documented cholelithiasis as well as bilateral nonobstructing nephrolithiasis  She also has recurrent UTIs though upper abdominal symptoms are distinctly different that her symptoms from kidney stones  She denies any abdominal pain with drinking liquids or eating bland foods  Pain will last for several hours to days when she gets the pain  She has had previous C section, cystoscopies and diagnostic laparoscopy  A chart review was performed and previous primary care visit notes were reviewed  All applicable imaging studies were reviewed and images were reviewed personally  All applicable laboratory studies were reviewed personally  Care everywhere review was performed if  available and all pertinent notes were reviewed    The following portions of the patient's history were reviewed and updated as appropriate:   She  has a past medical history of Anemia, Chronic kidney disease, History of transfusion, Kidney stone, Sepsis ([facility] [address] ), Septic shock ([facility] [address] ), Urinary tract infection, UTI (urinary tract infection) during pregnancy, Vagina, candidiasis, and Varicella  She   Patient Active Problem List    Diagnosis Date Noted    Calculus of gallbladder without cholecystitis without obstruction 2022    Encounter for sterilization 10/01/2020    Chronic kidney disease     Iron deficiency anemia 2020    Stress incontinence of urine 2020    Ureteral calculi 2018     She  has a past surgical history that includes Tonsillectomy; Tonsillectomy; pr cystourethroscopy,ureter catheter (Right, 2018);  section (N/A, 2018); Cystoscopy (2018); pr cysto/uretero w/lithotripsy &indwell stent insrt (Right, 2018); pr lap,diagnostic abdomen (N/A, 2021); pr lap,rmv  adnexal structure (Bilateral, 2021); Examination under anesthesia (N/A, 2021); Abdominal adhesion surgery (N/A, 2021); and Tubal ligation  Her family history includes Diabetes in her father and maternal grandmother; Heart defect in her brother; Heart failure in her family; No Known Problems in her mother  She  reports that she has been smoking  She has been smoking about 0 25 packs per day  She has never used smokeless tobacco  She reports that she does not drink alcohol and does not use drugs    Current Outpatient Medications   Medication Sig Dispense Refill    famotidine (PEPCID) 20 mg tablet Take 1 tablet (20 mg total) by mouth 2 (two) times a day 30 tablet 0    ferrous sulfate 324 (65 Fe) mg Take 1 tablet (324 mg total) by mouth daily before breakfast 60 tablet 1    naproxen (NAPROSYN) 500 mg tablet Take 1 tablet (500 mg total) by mouth 2 (two) times a day with meals 30 tablet 0    ondansetron (ZOFRAN) 4 mg tablet Take 1 tablet (4 mg total) by mouth every 6 (six) hours 12 tablet 0    medroxyPROGESTERone (PROVERA) 10 mg tablet Take 1 tablet (10 mg total) by mouth daily for 10 days 10 tablet 0     No current facility-administered medications for this visit  She has No Known Allergies       Review of Systems   Gastrointestinal: Positive for abdominal pain and nausea  Negative for vomiting  All other systems reviewed and are negative  Objective:      /70 (BP Location: Left arm, Patient Position: Sitting, Cuff Size: Standard)   Pulse 69   Temp 98 4 °F (36 9 °C) (Tympanic)   Ht 5' (1 524 m)   Wt 65 8 kg (145 lb)   LMP 03/18/2022 (Within Days)   SpO2 (!) 84%   BMI 28 32 kg/m²          Physical Exam  Vitals reviewed  Constitutional:       General: She is not in acute distress  Appearance: Normal appearance  She is normal weight  HENT:      Head: Normocephalic and atraumatic  Nose: Nose normal       Mouth/Throat:      Mouth: Mucous membranes are moist       Pharynx: Oropharynx is clear  Eyes:      Extraocular Movements: Extraocular movements intact  Conjunctiva/sclera: Conjunctivae normal       Pupils: Pupils are equal, round, and reactive to light  Cardiovascular:      Rate and Rhythm: Normal rate and regular rhythm  Heart sounds: Normal heart sounds  Pulmonary:      Effort: Pulmonary effort is normal  No respiratory distress  Breath sounds: Normal breath sounds  Abdominal:      General: Abdomen is flat  There is no distension  Palpations: Abdomen is soft  Tenderness: There is no abdominal tenderness  There is no guarding or rebound  Musculoskeletal:         General: No swelling or tenderness  Normal range of motion  Cervical back: Normal range of motion and neck supple  Skin:     General: Skin is warm and dry  Neurological:      General: No focal deficit present  Mental Status: She is alert and oriented to person, place, and time

## 2022-04-05 ENCOUNTER — ANESTHESIA EVENT (OUTPATIENT)
Dept: PERIOP | Facility: HOSPITAL | Age: 32
End: 2022-04-05
Payer: COMMERCIAL

## 2022-04-08 ENCOUNTER — ANESTHESIA (OUTPATIENT)
Dept: PERIOP | Facility: HOSPITAL | Age: 32
End: 2022-04-08
Payer: COMMERCIAL

## 2022-04-08 ENCOUNTER — HOSPITAL ENCOUNTER (OUTPATIENT)
Facility: HOSPITAL | Age: 32
Setting detail: OUTPATIENT SURGERY
Discharge: HOME | End: 2022-04-08
Attending: SURGERY | Admitting: SURGERY
Payer: COMMERCIAL

## 2022-04-08 VITALS
DIASTOLIC BLOOD PRESSURE: 70 MMHG | HEART RATE: 64 BPM | HEIGHT: 60 IN | BODY MASS INDEX: 29.45 KG/M2 | TEMPERATURE: 97 F | WEIGHT: 150 LBS | OXYGEN SATURATION: 100 % | SYSTOLIC BLOOD PRESSURE: 112 MMHG | RESPIRATION RATE: 18 BRPM

## 2022-04-08 DIAGNOSIS — K80.20 CHOLELITHIASIS: Primary | ICD-10-CM

## 2022-04-08 LAB
EXT PREGNANCY TEST URINE: NEGATIVE
EXT. CONTROL: NORMAL

## 2022-04-08 PROCEDURE — 81025 URINE PREGNANCY TEST: CPT | Performed by: SURGERY

## 2022-04-08 PROCEDURE — 88304 TISSUE EXAM BY PATHOLOGIST: CPT | Performed by: PATHOLOGY

## 2022-04-08 PROCEDURE — 47562 LAPAROSCOPIC CHOLECYSTECTOMY: CPT | Performed by: SURGERY

## 2022-04-08 RX ORDER — LIDOCAINE HYDROCHLORIDE 10 MG/ML
0.5 INJECTION, SOLUTION EPIDURAL; INFILTRATION; INTRACAUDAL; PERINEURAL ONCE AS NEEDED
Status: DISCONTINUED | OUTPATIENT
Start: 2022-04-08 | End: 2022-04-08 | Stop reason: HOSPADM

## 2022-04-08 RX ORDER — PROPOFOL 10 MG/ML
INJECTION, EMULSION INTRAVENOUS AS NEEDED
Status: DISCONTINUED | OUTPATIENT
Start: 2022-04-08 | End: 2022-04-08

## 2022-04-08 RX ORDER — ESMOLOL HYDROCHLORIDE 10 MG/ML
INJECTION INTRAVENOUS AS NEEDED
Status: DISCONTINUED | OUTPATIENT
Start: 2022-04-08 | End: 2022-04-08

## 2022-04-08 RX ORDER — HYDROMORPHONE HCL/PF 1 MG/ML
0.2 SYRINGE (ML) INJECTION
Status: DISCONTINUED | OUTPATIENT
Start: 2022-04-08 | End: 2022-04-08 | Stop reason: HOSPADM

## 2022-04-08 RX ORDER — MIDAZOLAM HYDROCHLORIDE 2 MG/2ML
INJECTION, SOLUTION INTRAMUSCULAR; INTRAVENOUS AS NEEDED
Status: DISCONTINUED | OUTPATIENT
Start: 2022-04-08 | End: 2022-04-08

## 2022-04-08 RX ORDER — OXYCODONE HYDROCHLORIDE AND ACETAMINOPHEN 5; 325 MG/1; MG/1
1 TABLET ORAL EVERY 4 HOURS PRN
Qty: 20 TABLET | Refills: 0 | Status: SHIPPED | OUTPATIENT
Start: 2022-04-08 | End: 2022-04-18

## 2022-04-08 RX ORDER — DEXAMETHASONE SODIUM PHOSPHATE 10 MG/ML
INJECTION, SOLUTION INTRAMUSCULAR; INTRAVENOUS AS NEEDED
Status: DISCONTINUED | OUTPATIENT
Start: 2022-04-08 | End: 2022-04-08

## 2022-04-08 RX ORDER — ONDANSETRON 2 MG/ML
INJECTION INTRAMUSCULAR; INTRAVENOUS AS NEEDED
Status: DISCONTINUED | OUTPATIENT
Start: 2022-04-08 | End: 2022-04-08

## 2022-04-08 RX ORDER — CEFAZOLIN SODIUM 1 G/50ML
1000 SOLUTION INTRAVENOUS ONCE
Status: COMPLETED | OUTPATIENT
Start: 2022-04-08 | End: 2022-04-08

## 2022-04-08 RX ORDER — OXYCODONE HYDROCHLORIDE AND ACETAMINOPHEN 5; 325 MG/1; MG/1
1 TABLET ORAL EVERY 4 HOURS PRN
Status: DISCONTINUED | OUTPATIENT
Start: 2022-04-08 | End: 2022-04-08 | Stop reason: HOSPADM

## 2022-04-08 RX ORDER — OXYCODONE HYDROCHLORIDE AND ACETAMINOPHEN 5; 325 MG/1; MG/1
2 TABLET ORAL EVERY 4 HOURS PRN
Status: DISCONTINUED | OUTPATIENT
Start: 2022-04-08 | End: 2022-04-08 | Stop reason: HOSPADM

## 2022-04-08 RX ORDER — FENTANYL CITRATE 50 UG/ML
INJECTION, SOLUTION INTRAMUSCULAR; INTRAVENOUS AS NEEDED
Status: DISCONTINUED | OUTPATIENT
Start: 2022-04-08 | End: 2022-04-08

## 2022-04-08 RX ORDER — ROCURONIUM BROMIDE 10 MG/ML
INJECTION, SOLUTION INTRAVENOUS AS NEEDED
Status: DISCONTINUED | OUTPATIENT
Start: 2022-04-08 | End: 2022-04-08

## 2022-04-08 RX ORDER — NEOSTIGMINE METHYLSULFATE 1 MG/ML
INJECTION INTRAVENOUS AS NEEDED
Status: DISCONTINUED | OUTPATIENT
Start: 2022-04-08 | End: 2022-04-08

## 2022-04-08 RX ORDER — ONDANSETRON 2 MG/ML
4 INJECTION INTRAMUSCULAR; INTRAVENOUS EVERY 4 HOURS PRN
Status: DISCONTINUED | OUTPATIENT
Start: 2022-04-08 | End: 2022-04-08 | Stop reason: HOSPADM

## 2022-04-08 RX ORDER — SODIUM CHLORIDE, SODIUM LACTATE, POTASSIUM CHLORIDE, CALCIUM CHLORIDE 600; 310; 30; 20 MG/100ML; MG/100ML; MG/100ML; MG/100ML
125 INJECTION, SOLUTION INTRAVENOUS CONTINUOUS
Status: DISCONTINUED | OUTPATIENT
Start: 2022-04-08 | End: 2022-04-08 | Stop reason: HOSPADM

## 2022-04-08 RX ORDER — GLYCOPYRROLATE 0.2 MG/ML
INJECTION INTRAMUSCULAR; INTRAVENOUS AS NEEDED
Status: DISCONTINUED | OUTPATIENT
Start: 2022-04-08 | End: 2022-04-08

## 2022-04-08 RX ORDER — ONDANSETRON 2 MG/ML
4 INJECTION INTRAMUSCULAR; INTRAVENOUS ONCE AS NEEDED
Status: DISCONTINUED | OUTPATIENT
Start: 2022-04-08 | End: 2022-04-08 | Stop reason: HOSPADM

## 2022-04-08 RX ORDER — ACETAMINOPHEN 325 MG/1
650 TABLET ORAL EVERY 6 HOURS PRN
Status: DISCONTINUED | OUTPATIENT
Start: 2022-04-08 | End: 2022-04-08 | Stop reason: HOSPADM

## 2022-04-08 RX ORDER — LIDOCAINE HYDROCHLORIDE 10 MG/ML
INJECTION, SOLUTION EPIDURAL; INFILTRATION; INTRACAUDAL; PERINEURAL AS NEEDED
Status: DISCONTINUED | OUTPATIENT
Start: 2022-04-08 | End: 2022-04-08

## 2022-04-08 RX ORDER — FENTANYL CITRATE/PF 50 MCG/ML
25 SYRINGE (ML) INJECTION
Status: COMPLETED | OUTPATIENT
Start: 2022-04-08 | End: 2022-04-08

## 2022-04-08 RX ADMIN — OXYCODONE HYDROCHLORIDE AND ACETAMINOPHEN 1 TABLET: 5; 325 TABLET ORAL at 15:02

## 2022-04-08 RX ADMIN — SODIUM CHLORIDE, SODIUM LACTATE, POTASSIUM CHLORIDE, AND CALCIUM CHLORIDE: .6; .31; .03; .02 INJECTION, SOLUTION INTRAVENOUS at 11:26

## 2022-04-08 RX ADMIN — SODIUM CHLORIDE, SODIUM LACTATE, POTASSIUM CHLORIDE, AND CALCIUM CHLORIDE 125 ML/HR: .6; .31; .03; .02 INJECTION, SOLUTION INTRAVENOUS at 14:07

## 2022-04-08 RX ADMIN — ESMOLOL HYDROCHLORIDE 30 MG: 10 INJECTION, SOLUTION INTRAVENOUS at 12:40

## 2022-04-08 RX ADMIN — CEFAZOLIN SODIUM 1000 MG: 1 SOLUTION INTRAVENOUS at 12:02

## 2022-04-08 RX ADMIN — FENTANYL CITRATE 25 MCG: 50 INJECTION INTRAMUSCULAR; INTRAVENOUS at 13:15

## 2022-04-08 RX ADMIN — LIDOCAINE HYDROCHLORIDE 50 MG: 10 INJECTION, SOLUTION EPIDURAL; INFILTRATION; INTRACAUDAL; PERINEURAL at 12:05

## 2022-04-08 RX ADMIN — FENTANYL CITRATE 50 MCG: 50 INJECTION, SOLUTION INTRAMUSCULAR; INTRAVENOUS at 12:13

## 2022-04-08 RX ADMIN — ONDANSETRON 4 MG: 2 INJECTION INTRAMUSCULAR; INTRAVENOUS at 12:19

## 2022-04-08 RX ADMIN — FENTANYL CITRATE 25 MCG: 50 INJECTION INTRAMUSCULAR; INTRAVENOUS at 13:45

## 2022-04-08 RX ADMIN — FENTANYL CITRATE 50 MCG: 50 INJECTION, SOLUTION INTRAMUSCULAR; INTRAVENOUS at 12:24

## 2022-04-08 RX ADMIN — FENTANYL CITRATE 25 MCG: 50 INJECTION INTRAMUSCULAR; INTRAVENOUS at 13:29

## 2022-04-08 RX ADMIN — SODIUM CHLORIDE, SODIUM LACTATE, POTASSIUM CHLORIDE, AND CALCIUM CHLORIDE: .6; .31; .03; .02 INJECTION, SOLUTION INTRAVENOUS at 12:40

## 2022-04-08 RX ADMIN — PROPOFOL 200 MG: 10 INJECTION, EMULSION INTRAVENOUS at 12:05

## 2022-04-08 RX ADMIN — OXYCODONE HYDROCHLORIDE AND ACETAMINOPHEN 1 TABLET: 5; 325 TABLET ORAL at 14:07

## 2022-04-08 RX ADMIN — NEOSTIGMINE METHYLSULFATE 3 MG: 1 INJECTION INTRAVENOUS at 12:45

## 2022-04-08 RX ADMIN — FENTANYL CITRATE 50 MCG: 50 INJECTION, SOLUTION INTRAMUSCULAR; INTRAVENOUS at 12:09

## 2022-04-08 RX ADMIN — FENTANYL CITRATE 25 MCG: 50 INJECTION INTRAMUSCULAR; INTRAVENOUS at 13:20

## 2022-04-08 RX ADMIN — MIDAZOLAM 2 MG: 1 INJECTION INTRAMUSCULAR; INTRAVENOUS at 12:02

## 2022-04-08 RX ADMIN — DEXAMETHASONE SODIUM PHOSPHATE 10 MG: 10 INJECTION, SOLUTION INTRAMUSCULAR; INTRAVENOUS at 12:05

## 2022-04-08 RX ADMIN — GLYCOPYRROLATE 0.4 MG: 0.2 INJECTION, SOLUTION INTRAMUSCULAR; INTRAVENOUS at 12:45

## 2022-04-08 RX ADMIN — ROCURONIUM BROMIDE 30 MG: 10 INJECTION, SOLUTION INTRAVENOUS at 12:06

## 2022-04-08 RX ADMIN — ESMOLOL HYDROCHLORIDE 30 MG: 10 INJECTION, SOLUTION INTRAVENOUS at 12:27

## 2022-04-08 RX ADMIN — FENTANYL CITRATE 50 MCG: 50 INJECTION, SOLUTION INTRAMUSCULAR; INTRAVENOUS at 12:05

## 2022-04-08 NOTE — ANESTHESIA PREPROCEDURE EVALUATION
Procedure:  CHOLECYSTECTOMY LAPAROSCOPIC (N/A Abdomen)    Relevant Problems   ANESTHESIA (within normal limits)   (-) History of anesthesia complications      CARDIO   (-) Chest pain   (-) KING (dyspnea on exertion)      /RENAL   (+) Chronic kidney disease      HEMATOLOGY   (+) Iron deficiency anemia      NEURO/PSYCH   (-) CVA (cerebral vascular accident) ([facility] [address] )   (-) Seizures (HCC)      PULMONARY   (-) Asthma   (-) Shortness of breath   (-) Sleep apnea   (-) URI (upper respiratory infection)      Other   (+) Calculus of gallbladder without cholecystitis without obstruction        Physical Exam    Airway    Mallampati score: I  TM Distance: >3 FB  Neck ROM: full     Dental   No notable dental hx     Cardiovascular      Pulmonary      Other Findings        Anesthesia Plan  ASA Score- 2     Anesthesia Type- general with ASA Monitors  Additional Monitors:   Airway Plan: ETT  Plan Factors-Exercise tolerance (METS): >4 METS  Chart reviewed  EKG reviewed  Existing labs reviewed  Induction- intravenous  Postoperative Plan-     Informed Consent- Anesthetic plan and risks discussed with patient  I personally reviewed this patient with the CRNA  Discussed and agreed on the Anesthesia Plan with the CRNA  Johnathan Ocasio

## 2022-04-08 NOTE — OP NOTE
OPERATIVE REPORT  PATIENT NAME: Osmani Barahona    :  1990  MRN: [id number]  Pt Location:  OR ROOM 07    SURGERY DATE: 2022    Surgeon(s) and Role:     * Silvino Schafer MD - Primary     * Jet Andres MD - Assisting    Preop Diagnosis:  Cholelithiasis [K80 20]    Post-Op Diagnosis Codes:     * Cholelithiasis [K80 20]    Procedure(s) (LRB):  CHOLECYSTECTOMY LAPAROSCOPIC (N/A)    Specimen(s):  ID Type Source Tests Collected by Time Destination   1 :  Tissue Gallbladder TISSUE EXAM Silvino Schafer MD 2022 1229        Estimated Blood Loss:   Minimal    Drains:  * No LDAs found *    Anesthesia Type:   General/LMA    Operative Indications:  Cholelithiasis [K80 20]      Operative Findings:  Cholelithiasis    Complications:   None    Procedure and Technique:  Patient was identified in the preoperative holding area and taken back to the operating room  The patient was positioned supine on the operating room table  A preoperative time-out was held confirming the correct patient, correct procedure and that all necessary equipment and personnel were present within the operating room  General anesthesia was induced and patient was prepped and draped in the standard sterile surgical fashion  Preoperative antibiotics were administered prior to incision  An incision was made at the umbilicus and a Veress needle was then inserted into the abdomen  The abdomen was then insufflated to 15 mmHg  The Veress needle was then removed and a 5 mm port placed at the umbilicus  The laparoscope was inserted into the port  Intra-abdominal contents were inspected and there was no trauma from port or needle placement  An 11 mm port was then placed in the epigastric region and two 5 mm ports along the right costal margin under direct visualization, in similar fashion  The gallbladder was grasped with an atraumatic grasper and elevated cephalad above the liver    Omental adhesions were then taken down bluntly and with judicious electrocautery  The peritoneum of the neck of the gallbladder was then opened with electrocautery and blunt dissection  This was done circumferentially down to the liver bed laterally and medially  The cystic duct was identified as the anterior most tubular structure at the gallbladder neck that was clearly directly entering the gallbladder  The [place] dissector was to circumferentially dissect the cystic duct free from surrounding tissue  Attention was then turned to the cystic artery which was medial to the cystic duct and this was circumferentially dissected with blunt dissection  The posterior aspect of the gallbladder was then cleared of fatty and peritoneal attachments so that the liver bed was visible, thus obtaining a critical view  2 hemoclips were placed proximally and 1 clip distally on the cystic artery and this was divided  with Endo Wu  3 clips were placed proximally and 1 clip placed distally on the cystic duct and this was divided with Endo Wu  Dissection of the gallbladder was continued with electrocautery until the gallbladder was freed from the liver bed  Once the gallbladder was freed it was placed in Endo-Catch bag and removed from the abdomen  Examination the liver bed was performed and any bleeding points were controlled with electrocautery  Focused irrigation and suction of the gallbladder fossa was performed   The clips were re-examined and confirmed to be secure without any bilious drainage or bleeding  The 11 mm port was then removed and an 0 Vicryl suture on a closure device was used to close the fascia of the port site  The remaining ports were then removed and the abdomen was allowed to desufflate  Local anesthetic was injected around each of the port sites and the skin then closed with 4 0 Monocryl suture in subcuticular fashion  Skin glue was then applied    The patient was then awoken from general anesthesia and taken to the postoperative care unit in good condition  All counts were correct at the end of the case  I was present for the entire procedure, A qualified resident physician was not available and A co-surgeon was required for assistance with visualization and dissection       Patient Disposition:  PACU  and hemodynamically stable      SIGNATURE: Gerda Ross MD  DATE: April 8, 2022  TIME: 12:54 PM

## 2022-04-08 NOTE — DISCHARGE INSTRUCTIONS
[address] [address]  [facility] Surgery Discharge Instructions      1  General: You will feel pulling sensations around the wound or funny aches and pains around the incisions  You may have some bruising or mild redness  This is normal  Even minor surgery is a change in your body and this is your bodys reaction to it  If you have had abdominal surgery, it may help to support the incision with a small pillow or blanket for comfort when moving or coughing  There may be some hardness surrounding your incision which is your body's normal reaction to surgery  This typically softens up over time  A heating pad or ice pack can also be beneficial in the post-op period  2  Wound care: Make sure to remove the overlying dressing/bandage in about 24 hours, unless instructed otherwise  You usually don't have to redress the wound after 24-48 hours, unless for comfort  Keep the incision clean and dry  Let air get to it  If this Steri-Strips fall off, just keep the wound clean  If there is surgical glue in place this will usually start to soften in around 2 weeks and will eventually dissolve or fall off with gentle scrubbing in the shower  3  Water: You may shower over the wound, unless there are drain tubes left in place  Do not bathe or use a pool or hot tub until cleared by the physician  Do not swim in a lake or ocean until cleared by your physician  You may shower right over the staples or Steri-Strips and packing dry when you are done  4  Activity: You may go up and down stairs, walk as much as you are comfortable, but walk at least 3 times each day  If you have had abdominal surgery, do not lift anything heavier than 15 pounds for at least 2-4 weeks, unless cleared by the doctor  Notes and paperwork for your job are typically filled out at your post-op appointment but if there is a time constraint please call the office for assistance if this is needed prior to your post-op check      5  Diet: You should follow a low fat diet  If you had a same-day surgery or overnight stay surgery, you may wish to eat lightly for a few days: soups, crackers, and sandwiches        6  Medications: Resume all of your previous medications, unless told otherwise by the doctor  Ibuprofen (Advil, Motrin, etc ) is usually ok unless specifically discouraged by your surgeon  400-600 mg of ibuprofen every 6 hours for post-surgical pain is an acceptable regimen with a maximum dosage of 2400 mg per day  Avoid ibuprofen if you are taking aspirin  If you have a bleeding disorder or have stomach issues, talk to your surgeon prior to use  Tylenol is ok, unless you are taking any narcotic pain medication containing Tylenol (such as Percocet, Darvocet, Vicodin, or anything containing acetaminophen)  Be cautious taking additional Tylenol if you're taking these medications as there is a maximum dosage of 4,000 mg of Tylenol per day  You do not need to take the narcotic pain medications unless you are having significant pain and discomfort  7  Driving: You will need someone to drive you home on the day of surgery  Do not drive or make any important decisions while on narcotic pain medication or 24 hours and after anesthesia or sedation for surgery  Generally, you may drive when you are off all narcotic pain medications  8  Upset Stomach: You may take Maalox, Tums, or similar items for an upset stomach  If your narcotic pain medication causes an upset stomach, do not take it on an empty stomach  Try taking it with at least some crackers or toast      9  Constipation: Patients often experience constipation after surgery due to anesthesia and pain medication  This is especially common after abdominal surgery  You may take over-the-counter medication for this, such as Metamucil, Senokot, Dulcolax, milk of magnesia, etc  You may take a suppository unless you have had anorectal surgery such as a procedure on your hemorrhoids   If you experience significant nausea or vomiting after abdominal surgery, call the office before trying any of these medications  10  Pain: You may be given a prescription for pain  This will be prescribed the day of surgery and sent to your pharmacy of choice  Take the pain medication as prescribed, only if you need it  Narcotic pain medications (such as anything containing hydrocodone or oxycodone) have many side effects such as nausea/vomiting, constipation, dizziness and respiratory depression  Do not drive when taking the pain medication  Do not take more than prescribed in the 4-6 hour time period  11  Sexual Activity: You may resume sexual activity when you feel ready and comfortable and your incision is sealed and healed without apparent infection risk  12  Urination: If you haven't urinated in 6 hours and are unable to urinate please call the office  If you are having pain and can not urinate you need to be evaluated by a physician and should go to the emergency room  Call the office: If you are experiencing any of the following, fevers above 101 5°, significant nausea or vomiting, if the wound develops drainage and/or has excessive redness around the wound, or if you have significant diarrhea or other worsening symptoms

## 2022-04-20 ENCOUNTER — OFFICE VISIT (OUTPATIENT)
Dept: SURGERY | Facility: CLINIC | Age: 32
End: 2022-04-20

## 2022-04-20 VITALS
BODY MASS INDEX: 29.45 KG/M2 | TEMPERATURE: 98.7 F | OXYGEN SATURATION: 99 % | WEIGHT: 150 LBS | HEIGHT: 60 IN | HEART RATE: 68 BPM

## 2022-04-20 DIAGNOSIS — Z98.890 POST-OPERATIVE STATE: Primary | ICD-10-CM

## 2022-04-20 PROCEDURE — 99024 POSTOP FOLLOW-UP VISIT: CPT | Performed by: SURGERY

## 2022-04-22 NOTE — PROGRESS NOTES
Assessment/Plan:  Post op s/p lap dyan  Pathology was reviewed  She can start to increase diet and activity  Follow up as needed      No problem-specific Assessment & Plan notes found for this encounter  Diagnoses and all orders for this visit:    Post-operative state          Subjective:      Patient ID: Kelsie Christianson is a 32 y o  female  She presents for post op s/p lap dyan  She is feeling well, no issues with nausea/vomiting or loose stools  No significant pain  The following portions of the patient's history were reviewed and updated as appropriate: allergies, current medications, past family history, past medical history, past social history, past surgical history and problem list     Review of Systems   All other systems reviewed and are negative          Objective:      Pulse 68   Temp 98 7 °F (37 1 °C) (Tympanic)   Ht 5' (1 524 m)   Wt 68 kg (150 lb)   SpO2 99%   BMI 29 29 kg/m²          Physical Exam  Abdominal:

## 2022-08-27 ENCOUNTER — OFFICE VISIT (OUTPATIENT)
Dept: FAMILY MEDICINE CLINIC | Facility: CLINIC | Age: 32
End: 2022-08-27

## 2022-08-27 VITALS
DIASTOLIC BLOOD PRESSURE: 58 MMHG | TEMPERATURE: 97.9 F | HEART RATE: 87 BPM | RESPIRATION RATE: 18 BRPM | OXYGEN SATURATION: 99 % | WEIGHT: 146 LBS | BODY MASS INDEX: 28.66 KG/M2 | SYSTOLIC BLOOD PRESSURE: 98 MMHG | HEIGHT: 60 IN

## 2022-08-27 DIAGNOSIS — Z30.2 ENCOUNTER FOR STERILIZATION: ICD-10-CM

## 2022-08-27 DIAGNOSIS — D50.9 MICROCYTIC ANEMIA: ICD-10-CM

## 2022-08-27 DIAGNOSIS — N93.8 DYSFUNCTIONAL UTERINE BLEEDING: ICD-10-CM

## 2022-08-27 DIAGNOSIS — B34.9 VIRAL ILLNESS: Primary | ICD-10-CM

## 2022-08-27 LAB
SARS-COV-2 AG UPPER RESP QL IA: NEGATIVE
VALID CONTROL: NORMAL

## 2022-08-27 PROCEDURE — 99214 OFFICE O/P EST MOD 30 MIN: CPT | Performed by: NURSE PRACTITIONER

## 2022-08-27 PROCEDURE — 87811 SARS-COV-2 COVID19 W/OPTIC: CPT | Performed by: NURSE PRACTITIONER

## 2022-08-27 RX ORDER — FERROUS SULFATE TAB EC 324 MG (65 MG FE EQUIVALENT) 324 (65 FE) MG
324 TABLET DELAYED RESPONSE ORAL
Qty: 60 TABLET | Refills: 1 | Status: SHIPPED | OUTPATIENT
Start: 2022-08-27 | End: 2022-09-08 | Stop reason: SDUPTHER

## 2022-08-27 NOTE — PROGRESS NOTES
COVID-19 Outpatient Progress Note    Assessment/Plan:    Problem List Items Addressed This Visit    None     Visit Diagnoses     Viral illness    -  Primary    Relevant Orders    POCT Rapid Covid Ag (Completed)    Microcytic anemia        Relevant Orders    CBC and differential    Comprehensive metabolic panel    Iron Panel (Includes Ferritin, Iron Sat%, Iron, and TIBC)    Vitamin B12    Ambulatory Referral to Obstetrics / Gynecology    Dysfunctional uterine bleeding        Relevant Orders    CBC and differential    Comprehensive metabolic panel    Iron Panel (Includes Ferritin, Iron Sat%, Iron, and TIBC)    Ambulatory Referral to Obstetrics / Gynecology         Disposition:     Rapid antigen testing was performed and the result is negative for COVID-19  Return to work tomorrow     I have spent 20 minutes directly with the patient  Greater than 50% of this time was spent in counseling/coordination of care regarding: diagnostic results, risks and benefits of treatment options, instructions for management, patient and family education, importance of treatment compliance and impressions  Encounter provider: Santiago Shi 151     Provider located at: [address] [address] [phone]     Recent Visits  No visits were found meeting these conditions  Showing recent visits within past 7 days and meeting all other requirements  Today's Visits  Date Type Provider Dept   08/27/22 Office Visit [facility]   Showing today's visits and meeting all other requirements  Future Appointments  No visits were found meeting these conditions  Showing future appointments within next 150 days and meeting all other requirements     Subjective:   Margurette Siemens is a 28 y o  female who is concerned about COVID-19  Patient's symptoms include fever, chills, myalgias and headache  COVID-19 vaccination status: Not vaccinated    Exposure:   Contact with a person who is under investigation (PUI) for or who is positive for COVID-19 within the last 14 days?: No    Hospitalized recently for fever and/or lower respiratory symptoms?: No      Currently a healthcare worker that is involved in direct patient care?: No      Works in a special setting where the risk of COVID-19 transmission may be high? (this may include long-term care, correctional and senior living facilities; homeless shelters; assisted-living facilities and group homes ): No      Resident in a special setting where the risk of COVID-19 transmission may be high? (this may include long-term care, correctional and senior living facilities; homeless shelters; assisted-living facilities and group homes ): No      Lab Results   Component Value Date    SARSCOV2 Positive (A) 12/15/2021    [address] Negative 2022     Past Medical History:   Diagnosis Date    Anemia     taking iron pills BID     Chronic kidney disease     hydronephrosis    History of transfusion     Kidney stone     Sepsis ([address]     Septic shock ([address] )     Urinary tract infection     taking antibioitcs     UTI (urinary tract infection) during pregnancy     Vagina, candidiasis     Varicella     as child     Past Surgical History:   Procedure Laterality Date    ABDOMINAL ADHESION SURGERY N/A 2021    Procedure: LYSIS ADHESIONS;  Surgeon: Steffen Morrell MD;  [facility]: [facility];  Service: Gynecology     SECTION N/A 2018    Procedure:  SECTION (); Surgeon: Blu Ramos MD;  Location: [facility];  Service: Obstetrics    CYSTOSCOPY  2018    with right uretral stent    EXAMINATION UNDER ANESTHESIA N/A 2021    Procedure: EXAM UNDER ANESTHESIA (EUA);   Surgeon: Steffen Morrell MD;  [facility];  Service: Gynecology    CT CYSTO/URETERO W/LITHOTRIPSY &INDWELL STENT INSRT Right 2018    Procedure: Joslyn Shaw URETEROSCOPY WITH  RETROGRADE PYELOGRAM AND exchange  STENT URETERAl;  Surgeon: Efrain Sexton MD;  Location: [address] MAIN OR;  Service: Urology    NM CYSTOURETHROSCOPY,URETER CATHETER Right 11/5/2018    Procedure: CYSTOSCOPY RETROGRADE PYELOGRAM WITH INSERTION STENT URETERAL;  Surgeon: Efrain Sexton MD;  Location: [facility];  Service: Urology    NM LAP,CHOLECYSTECTOMY N/A 4/8/2022    Procedure: Lisseth Shouts;  Surgeon: Kristi Dmoinguez MD;  Location: [address] MAIN OR;  Service: General    NM LAP,DIAGNOSTIC ABDOMEN N/A 2/12/2021    Procedure: LAPAROSCOPY DIAGNOSTIC;  Surgeon: Mark Jacobsen MD;  [facility];  Service: Gynecology    NM LAP,RMV  ADNEXAL STRUCTURE Bilateral 2/12/2021    Procedure: SALPINGECTOMY, LAPAROSCOPIC;  Surgeon: Mark Jacobsen MD;  [facility];  Service: Gynecology    TONSILLECTOMY      TONSILLECTOMY      TUBAL LIGATION       Current Outpatient Medications   Medication Sig Dispense Refill    famotidine (PEPCID) 20 mg tablet Take 1 tablet (20 mg total) by mouth 2 (two) times a day 30 tablet 0    ferrous sulfate 324 (65 Fe) mg Take 1 tablet (324 mg total) by mouth daily before breakfast 60 tablet 1    medroxyPROGESTERone (PROVERA) 10 mg tablet Take 1 tablet (10 mg total) by mouth daily for 10 days 10 tablet 0    naproxen (NAPROSYN) 500 mg tablet Take 1 tablet (500 mg total) by mouth 2 (two) times a day with meals (Patient not taking: Reported on 4/20/2022 ) 30 tablet 0    ondansetron (ZOFRAN) 4 mg tablet Take 1 tablet (4 mg total) by mouth every 6 (six) hours (Patient not taking: Reported on 4/20/2022 ) 12 tablet 0     No current facility-administered medications for this visit  No Known Allergies    Review of Systems   Constitutional: Positive for chills and fever  Musculoskeletal: Positive for myalgias  Neurological: Positive for headaches       Objective:    Vitals:    08/27/22 0925   BP: 98/58   BP Location: Left arm   Patient Position: Sitting Cuff Size: Adult   Pulse: 87   Resp: 18   Temp: 97 9 °F (36 6 °C)   TempSrc: Temporal   SpO2: 99%   Weight: 66 2 kg (146 lb)   Height: 5' (1 524 m)       Physical Exam  Vitals and nursing note reviewed  Constitutional:       General: She is not in acute distress  Appearance: She is well-developed  HENT:      Head: Normocephalic and atraumatic  Eyes:      Conjunctiva/sclera: Conjunctivae normal    Cardiovascular:      Rate and Rhythm: Normal rate and regular rhythm  Heart sounds: No murmur heard  Pulmonary:      Effort: Pulmonary effort is normal  No respiratory distress  Breath sounds: Normal breath sounds  Abdominal:      Palpations: Abdomen is soft  Tenderness: There is no abdominal tenderness  Musculoskeletal:      Cervical back: Neck supple  Skin:     General: Skin is warm and dry  Neurological:      Mental Status: She is alert

## 2022-08-27 NOTE — LETTER
August 27, 2022     Patient: Emani Jackson   YOB: 1990   Date of Visit: 8/27/2022       To Whom it May Concern:    Jenni Quick was seen in my clinic on 8/27/2022  She is excused 8/23/2022 - 8/28/2022  If you have any questions or concerns, please don't hesitate to call           Sincerely,        Bianca SALCEDO    [facility] [place], [place]        CC: No Recipients

## 2022-08-29 ENCOUNTER — APPOINTMENT (OUTPATIENT)
Dept: LAB | Facility: HOSPITAL | Age: 32
End: 2022-08-29
Payer: COMMERCIAL

## 2022-08-29 DIAGNOSIS — D50.9 MICROCYTIC ANEMIA: ICD-10-CM

## 2022-08-29 DIAGNOSIS — Z11.3 ROUTINE SCREENING FOR STI (SEXUALLY TRANSMITTED INFECTION): ICD-10-CM

## 2022-08-29 DIAGNOSIS — N93.8 DYSFUNCTIONAL UTERINE BLEEDING: ICD-10-CM

## 2022-08-29 LAB
ALBUMIN SERPL BCP-MCNC: 3.7 G/DL (ref 3.5–5)
ALP SERPL-CCNC: 73 U/L (ref 46–116)
ALT SERPL W P-5'-P-CCNC: 22 U/L (ref 12–78)
ANION GAP SERPL CALCULATED.3IONS-SCNC: 9 MMOL/L (ref 4–13)
AST SERPL W P-5'-P-CCNC: 16 U/L (ref 5–45)
BASOPHILS # BLD AUTO: 0.03 THOUSANDS/ΜL (ref 0–0.1)
BASOPHILS NFR BLD AUTO: 1 % (ref 0–1)
BILIRUB SERPL-MCNC: 0.48 MG/DL (ref 0.2–1)
BUN SERPL-MCNC: 11 MG/DL (ref 5–25)
C TRACH DNA SPEC QL NAA+PROBE: NEGATIVE
CALCIUM SERPL-MCNC: 8.9 MG/DL (ref 8.3–10.1)
CHLORIDE SERPL-SCNC: 105 MMOL/L (ref 96–108)
CO2 SERPL-SCNC: 28 MMOL/L (ref 21–32)
CREAT SERPL-MCNC: 0.87 MG/DL (ref 0.6–1.3)
EOSINOPHIL # BLD AUTO: 0.07 THOUSAND/ΜL (ref 0–0.61)
EOSINOPHIL NFR BLD AUTO: 2 % (ref 0–6)
ERYTHROCYTE [DISTWIDTH] IN BLOOD BY AUTOMATED COUNT: 17.5 % (ref 11.6–15.1)
FERRITIN SERPL-MCNC: 4 NG/ML (ref 8–388)
GFR SERPL CREATININE-BSD FRML MDRD: 88 ML/MIN/1.73SQ M
GLUCOSE SERPL-MCNC: 117 MG/DL (ref 65–140)
HCT VFR BLD AUTO: 32.6 % (ref 34.8–46.1)
HGB BLD-MCNC: 8.6 G/DL (ref 11.5–15.4)
IMM GRANULOCYTES # BLD AUTO: 0.01 THOUSAND/UL (ref 0–0.2)
IMM GRANULOCYTES NFR BLD AUTO: 0 % (ref 0–2)
IRON SATN MFR SERPL: 4 % (ref 15–50)
IRON SERPL-MCNC: 17 UG/DL (ref 50–170)
LYMPHOCYTES # BLD AUTO: 1.1 THOUSANDS/ΜL (ref 0.6–4.47)
LYMPHOCYTES NFR BLD AUTO: 24 % (ref 14–44)
MCH RBC QN AUTO: 19.3 PG (ref 26.8–34.3)
MCHC RBC AUTO-ENTMCNC: 26.4 G/DL (ref 31.4–37.4)
MCV RBC AUTO: 73 FL (ref 82–98)
MONOCYTES # BLD AUTO: 0.39 THOUSAND/ΜL (ref 0.17–1.22)
MONOCYTES NFR BLD AUTO: 8 % (ref 4–12)
N GONORRHOEA DNA SPEC QL NAA+PROBE: NEGATIVE
NEUTROPHILS # BLD AUTO: 3.07 THOUSANDS/ΜL (ref 1.85–7.62)
NEUTS SEG NFR BLD AUTO: 65 % (ref 43–75)
NRBC BLD AUTO-RTO: 0 /100 WBCS
PLATELET # BLD AUTO: 357 THOUSANDS/UL (ref 149–390)
PMV BLD AUTO: 10.4 FL (ref 8.9–12.7)
POTASSIUM SERPL-SCNC: 3.7 MMOL/L (ref 3.5–5.3)
PROT SERPL-MCNC: 7.9 G/DL (ref 6.4–8.4)
RBC # BLD AUTO: 4.45 MILLION/UL (ref 3.81–5.12)
SODIUM SERPL-SCNC: 142 MMOL/L (ref 135–147)
TIBC SERPL-MCNC: 447 UG/DL (ref 250–450)
VIT B12 SERPL-MCNC: 382 PG/ML (ref 100–900)
WBC # BLD AUTO: 4.67 THOUSAND/UL (ref 4.31–10.16)

## 2022-08-29 PROCEDURE — 87591 N.GONORRHOEAE DNA AMP PROB: CPT

## 2022-08-29 PROCEDURE — 36415 COLL VENOUS BLD VENIPUNCTURE: CPT

## 2022-08-29 PROCEDURE — 83550 IRON BINDING TEST: CPT

## 2022-08-29 PROCEDURE — 82728 ASSAY OF FERRITIN: CPT

## 2022-08-29 PROCEDURE — 83540 ASSAY OF IRON: CPT

## 2022-08-29 PROCEDURE — 80053 COMPREHEN METABOLIC PANEL: CPT

## 2022-08-29 PROCEDURE — 87491 CHLMYD TRACH DNA AMP PROBE: CPT

## 2022-08-29 PROCEDURE — 85025 COMPLETE CBC W/AUTO DIFF WBC: CPT

## 2022-08-29 PROCEDURE — 82607 VITAMIN B-12: CPT

## 2022-08-31 ENCOUNTER — TELEPHONE (OUTPATIENT)
Dept: FAMILY MEDICINE CLINIC | Facility: CLINIC | Age: 32
End: 2022-08-31

## 2022-08-31 NOTE — TELEPHONE ENCOUNTER
Patient came in because she viewed her lab results on my chart and is concerned  She stated she wants to be on top of her anemia

## 2022-09-08 DIAGNOSIS — E55.9 VITAMIN D DEFICIENCY: ICD-10-CM

## 2022-09-08 DIAGNOSIS — Z30.2 ENCOUNTER FOR STERILIZATION: ICD-10-CM

## 2022-09-08 DIAGNOSIS — E61.1 IRON DEFICIENCY: Primary | ICD-10-CM

## 2022-09-08 DIAGNOSIS — E53.8 B12 DEFICIENCY: ICD-10-CM

## 2022-09-08 RX ORDER — FERROUS SULFATE TAB EC 324 MG (65 MG FE EQUIVALENT) 324 (65 FE) MG
324 TABLET DELAYED RESPONSE ORAL
Qty: 60 TABLET | Refills: 1 | Status: SHIPPED | OUTPATIENT
Start: 2022-09-08 | End: 2023-04-03 | Stop reason: SDUPTHER

## 2022-09-14 ENCOUNTER — HOSPITAL ENCOUNTER (EMERGENCY)
Facility: HOSPITAL | Age: 32
Discharge: HOME | End: 2022-09-14
Attending: EMERGENCY MEDICINE
Payer: COMMERCIAL

## 2022-09-14 ENCOUNTER — APPOINTMENT (OUTPATIENT)
Dept: ULTRASOUND IMAGING | Facility: HOSPITAL | Age: 32
End: 2022-09-14
Payer: COMMERCIAL

## 2022-09-14 VITALS
DIASTOLIC BLOOD PRESSURE: 62 MMHG | TEMPERATURE: 98.8 F | OXYGEN SATURATION: 99 % | RESPIRATION RATE: 18 BRPM | BODY MASS INDEX: 28.57 KG/M2 | HEIGHT: 60 IN | HEART RATE: 72 BPM | WEIGHT: 145.5 LBS | SYSTOLIC BLOOD PRESSURE: 96 MMHG

## 2022-09-14 DIAGNOSIS — N93.9 ABNORMAL UTERINE BLEEDING: Primary | ICD-10-CM

## 2022-09-14 DIAGNOSIS — D64.9 ANEMIA: ICD-10-CM

## 2022-09-14 DIAGNOSIS — N93.8 DUB (DYSFUNCTIONAL UTERINE BLEEDING): ICD-10-CM

## 2022-09-14 LAB
ALBUMIN SERPL BCP-MCNC: 3.9 G/DL (ref 3.5–5)
ALP SERPL-CCNC: 82 U/L (ref 46–116)
ALT SERPL W P-5'-P-CCNC: 40 U/L (ref 12–78)
ANION GAP SERPL CALCULATED.3IONS-SCNC: 7 MMOL/L (ref 4–13)
APTT PPP: 26 SECONDS (ref 23–37)
AST SERPL W P-5'-P-CCNC: 25 U/L (ref 5–45)
BACTERIA UR QL AUTO: ABNORMAL /HPF
BASOPHILS # BLD AUTO: 0.02 THOUSANDS/ΜL (ref 0–0.1)
BASOPHILS NFR BLD AUTO: 0 % (ref 0–1)
BILIRUB SERPL-MCNC: 0.3 MG/DL (ref 0.2–1)
BILIRUB UR QL STRIP: NEGATIVE
BUN SERPL-MCNC: 7 MG/DL (ref 5–25)
CALCIUM SERPL-MCNC: 8.7 MG/DL (ref 8.3–10.1)
CHLORIDE SERPL-SCNC: 106 MMOL/L (ref 96–108)
CLARITY UR: CLEAR
CO2 SERPL-SCNC: 28 MMOL/L (ref 21–32)
COLOR UR: YELLOW
CREAT SERPL-MCNC: 0.65 MG/DL (ref 0.6–1.3)
EOSINOPHIL # BLD AUTO: 0.04 THOUSAND/ΜL (ref 0–0.61)
EOSINOPHIL NFR BLD AUTO: 1 % (ref 0–6)
ERYTHROCYTE [DISTWIDTH] IN BLOOD BY AUTOMATED COUNT: 17.4 % (ref 11.6–15.1)
EXT PREG TEST URINE: NEGATIVE
EXT. CONTROL ED NAV: NORMAL
GFR SERPL CREATININE-BSD FRML MDRD: 117 ML/MIN/1.73SQ M
GLUCOSE SERPL-MCNC: 95 MG/DL (ref 65–140)
GLUCOSE UR STRIP-MCNC: NEGATIVE MG/DL
HCT VFR BLD AUTO: 34.1 % (ref 34.8–46.1)
HGB BLD-MCNC: 9.2 G/DL (ref 11.5–15.4)
HGB UR QL STRIP.AUTO: ABNORMAL
IMM GRANULOCYTES # BLD AUTO: 0.01 THOUSAND/UL (ref 0–0.2)
IMM GRANULOCYTES NFR BLD AUTO: 0 % (ref 0–2)
INR PPP: 0.94 (ref 0.84–1.19)
KETONES UR STRIP-MCNC: NEGATIVE MG/DL
LEUKOCYTE ESTERASE UR QL STRIP: ABNORMAL
LYMPHOCYTES # BLD AUTO: 1.09 THOUSANDS/ΜL (ref 0.6–4.47)
LYMPHOCYTES NFR BLD AUTO: 20 % (ref 14–44)
MCH RBC QN AUTO: 19.1 PG (ref 26.8–34.3)
MCHC RBC AUTO-ENTMCNC: 27 G/DL (ref 31.4–37.4)
MCV RBC AUTO: 71 FL (ref 82–98)
MONOCYTES # BLD AUTO: 0.39 THOUSAND/ΜL (ref 0.17–1.22)
MONOCYTES NFR BLD AUTO: 7 % (ref 4–12)
NEUTROPHILS # BLD AUTO: 3.97 THOUSANDS/ΜL (ref 1.85–7.62)
NEUTS SEG NFR BLD AUTO: 72 % (ref 43–75)
NITRITE UR QL STRIP: NEGATIVE
NON-SQ EPI CELLS URNS QL MICRO: ABNORMAL /HPF
NRBC BLD AUTO-RTO: 0 /100 WBCS
PH UR STRIP.AUTO: 6.5 [PH] (ref 4.5–8)
PLATELET # BLD AUTO: 305 THOUSANDS/UL (ref 149–390)
PMV BLD AUTO: 10.4 FL (ref 8.9–12.7)
POTASSIUM SERPL-SCNC: 3.6 MMOL/L (ref 3.5–5.3)
PROT SERPL-MCNC: 8.4 G/DL (ref 6.4–8.4)
PROT UR STRIP-MCNC: NEGATIVE MG/DL
PROTHROMBIN TIME: 12.6 SECONDS (ref 11.6–14.5)
RBC # BLD AUTO: 4.82 MILLION/UL (ref 3.81–5.12)
RBC #/AREA URNS AUTO: ABNORMAL /HPF
SODIUM SERPL-SCNC: 141 MMOL/L (ref 135–147)
SP GR UR STRIP.AUTO: 1.02 (ref 1–1.03)
UROBILINOGEN UR QL STRIP.AUTO: 1 E.U./DL
WBC # BLD AUTO: 5.52 THOUSAND/UL (ref 4.31–10.16)
WBC #/AREA URNS AUTO: ABNORMAL /HPF

## 2022-09-14 PROCEDURE — 81001 URINALYSIS AUTO W/SCOPE: CPT

## 2022-09-14 PROCEDURE — 81025 URINE PREGNANCY TEST: CPT | Performed by: EMERGENCY MEDICINE

## 2022-09-14 PROCEDURE — 93005 ELECTROCARDIOGRAM TRACING: CPT

## 2022-09-14 PROCEDURE — 85025 COMPLETE CBC W/AUTO DIFF WBC: CPT | Performed by: EMERGENCY MEDICINE

## 2022-09-14 PROCEDURE — 80053 COMPREHEN METABOLIC PANEL: CPT | Performed by: EMERGENCY MEDICINE

## 2022-09-14 PROCEDURE — 99285 EMERGENCY DEPT VISIT HI MDM: CPT | Performed by: EMERGENCY MEDICINE

## 2022-09-14 PROCEDURE — 36415 COLL VENOUS BLD VENIPUNCTURE: CPT | Performed by: EMERGENCY MEDICINE

## 2022-09-14 PROCEDURE — 76830 TRANSVAGINAL US NON-OB: CPT

## 2022-09-14 PROCEDURE — 76856 US EXAM PELVIC COMPLETE: CPT

## 2022-09-14 PROCEDURE — 99284 EMERGENCY DEPT VISIT MOD MDM: CPT

## 2022-09-14 PROCEDURE — 85610 PROTHROMBIN TIME: CPT | Performed by: EMERGENCY MEDICINE

## 2022-09-14 PROCEDURE — 85730 THROMBOPLASTIN TIME PARTIAL: CPT | Performed by: EMERGENCY MEDICINE

## 2022-09-14 NOTE — Clinical Note
Tanika Haley was seen and treated in our emergency department on 9/14/2022  Diagnosis:     Cassy Gonsalez    She may return on this date: 09/16/2022         If you have any questions or concerns, please don't hesitate to call        Elmo Loyola DO    ______________________________           _______________          _______________  Hospital Representative                              Date                                Time

## 2022-09-14 NOTE — Clinical Note
Jude Walterstulio was seen and treated in our emergency department on 9/14/2022  Diagnosis:     Varun Foyten    She may return on this date: 09/16/2022         If you have any questions or concerns, please don't hesitate to call        Campbell Bhardwaj DO    ______________________________           _______________          _______________  Hospital Representative                              Date                                Time

## 2022-09-14 NOTE — ED PROVIDER NOTES
History  Chief Complaint   Patient presents with    Vaginal Bleeding     Patient reports heavy vaginal bleeding since 2300 last night  Changing 2 tampons hourly since  History of same  Patient is a 27-year-old female coming in today complaining of heavy vaginal bleeding  Patient initially states that for the past 3 months she has been having her menses that are significantly heavy  She reports that she is going through several tampons an hour  Her periods were last approximately 3 days  She reports that prior to this that she was getting her period for 5 days and was not this heavy  She has not had this problem since she was a teenager  She has no recent birth control or IUD use  She denies any chance of pregnancy  She does complain of intermittent dizziness and feeling lightheaded but no syncopal events, palpitations chest pain or shortness of breath  No extremity edema  She does report she has an appointment with Gynecology on the 20th  However today when she woke up she was going through about 1-2 tampons an hour  He was subsequently slow down since then  She has no history of gynecological surgeries  No family history of uterine and or ovarian cancer        History provided by:  Patient   used: No    Vaginal Bleeding  Quality:  Clots, dark red and heavier than menses  Severity:  Moderate  Onset quality:  Gradual  Timing:  Intermittent  Progression:  Waxing and waning  Chronicity:  Recurrent  Possible pregnancy: no    Context: at rest    Relieved by:  None tried  Worsened by:  Nothing  Ineffective treatments:  None tried  Associated symptoms: dizziness    Associated symptoms: no abdominal pain, no back pain, no dysuria and no fever    Risk factors: no bleeding disorder, no hx of ectopic pregnancy, no hx of endometriosis, no gynecological surgery, does not have multiple partners, no new sexual partner, no ovarian cysts, no ovarian torsion, no PID, no prior miscarriage, no STD, no STD exposure, no terminated pregnancies and does not have unprotected sex        Prior to Admission Medications   Prescriptions Last Dose Informant Patient Reported? Taking? cyanocobalamin (VITAMIN B-12) 1000 MCG tablet   No No   Sig: Take 1 tablet (1,000 mcg total) by mouth daily   famotidine (PEPCID) 20 mg tablet   No No   Sig: Take 1 tablet (20 mg total) by mouth 2 (two) times a day   ferrous sulfate 324 (65 Fe) mg   No No   Sig: Take 1 tablet (324 mg total) by mouth daily before breakfast   medroxyPROGESTERone (PROVERA) 10 mg tablet   No No   Sig: Take 1 tablet (10 mg total) by mouth daily for 10 days   naproxen (NAPROSYN) 500 mg tablet   No No   Sig: Take 1 tablet (500 mg total) by mouth 2 (two) times a day with meals   Patient not taking: Reported on 2022    ondansetron (ZOFRAN) 4 mg tablet   No No   Sig: Take 1 tablet (4 mg total) by mouth every 6 (six) hours   Patient not taking: Reported on 2022       Facility-Administered Medications: None       Past Medical History:   Diagnosis Date    Anemia     taking iron pills BID     Chronic kidney disease     hydronephrosis    History of transfusion     Kidney stone     [facility] [address] [facility]     Septic shock [facility] [address] [facility]     Urinary tract infection     taking antibioitcs     UTI (urinary tract infection) during pregnancy     Vagina, candidiasis     Varicella     as child       Past Surgical History:   Procedure Laterality Date    ABDOMINAL ADHESION SURGERY N/A 2021    Procedure: LYSIS ADHESIONS;  Surgeon: Rhonda Gardner MD;  [facility];  Service: Gynecology     SECTION N/A 2018    Procedure:  SECTION (); Surgeon: Mey Langston MD;  [facility];  Service: Obstetrics    CYSTOSCOPY  2018    with right uretral stent    EXAMINATION UNDER ANESTHESIA N/A 2021    Procedure: EXAM UNDER ANESTHESIA (EUA);   Surgeon: Rhonda Gardner MD;  [facility];  Service: Gynecology    VT CYSTO/URETERO W/LITHOTRIPSY &INDWELL STENT INSRT Right 12/24/2018    Procedure: CYSTOSCOPY URETEROSCOPY WITH  RETROGRADE PYELOGRAM AND exchange  STENT URETERAl;  Surgeon: Dilshad Chen MD;  Location: [address] MAIN OR;  Service: Urology    NC CYSTOURETHROSCOPY,URETER CATHETER Right 11/5/2018    Procedure: CYSTOSCOPY RETROGRADE PYELOGRAM WITH INSERTION STENT URETERAL;  Surgeon: Dilshad Chen MD;  [facility];  Service: Urology    NC LAP,CHOLECYSTECTOMY N/A 4/8/2022    Procedure: Jas Konig;  Surgeon: Andres Gonsales MD;  Location: [address] [facility] MAIN OR;  Service: General    NC LAP,DIAGNOSTIC ABDOMEN N/A 2/12/2021    Procedure: LAPAROSCOPY DIAGNOSTIC;  Surgeon: Lugenia Gilford, MD;  [facility];  Service: Gynecology    NC LAP,RMV  ADNEXAL STRUCTURE Bilateral 2/12/2021    Procedure: SALPINGECTOMY, Robles Do;  Surgeon: Lugenia Gilford, MD;  [facility];  Service: Gynecology    TONSILLECTOMY      TONSILLECTOMY      TUBAL LIGATION         Family History   Problem Relation Age of Onset    No Known Problems Mother     Diabetes Father     Heart defect Brother         3 open heart surgeries    Heart failure Family         Congestive    Diabetes Maternal Grandmother      I have reviewed and agree with the history as documented  E-Cigarette/Vaping    E-Cigarette Use Never User      E-Cigarette/Vaping Substances    Nicotine No     THC No     CBD No     Flavoring No     Other No     Unknown No      Social History     Tobacco Use    Smoking status: Current Some Day Smoker     Packs/day: 0 25     Last attempt to quit: 6/6/2019     Years since quitting: 3 2    Smokeless tobacco: Never Used    Tobacco comment: 2-3 a day   Vaping Use    Vaping Use: Never used   Substance Use Topics    Alcohol use: No    Drug use: No       Review of Systems   Constitutional: Negative  Negative for chills and fever  HENT: Negative  Negative for ear pain and sore throat  Eyes: Negative    Negative for pain and visual disturbance  Respiratory: Negative  Negative for cough and shortness of breath  Cardiovascular: Negative  Negative for chest pain and palpitations  Gastrointestinal: Negative  Negative for abdominal pain and vomiting  Genitourinary: Positive for vaginal bleeding  Negative for dysuria and hematuria  Musculoskeletal: Negative  Negative for arthralgias and back pain  Skin: Negative  Negative for color change and rash  Neurological: Positive for dizziness  Negative for seizures and syncope  Hematological: Negative  Psychiatric/Behavioral: Negative  All other systems reviewed and are negative  Physical Exam  Physical Exam  Vitals and nursing note reviewed  Constitutional:       General: She is not in acute distress  Appearance: She is well-developed  HENT:      Head: Normocephalic and atraumatic  Comments: Patient maintaining airway and secretions  No stridor   No brawniness under tongue  Eyes:      Extraocular Movements: Extraocular movements intact  Conjunctiva/sclera: Conjunctivae normal       Pupils: Pupils are equal, round, and reactive to light  Comments: Pale conjunctiva bilaterally   Cardiovascular:      Rate and Rhythm: Normal rate and regular rhythm  Heart sounds: No murmur heard  Pulmonary:      Effort: Pulmonary effort is normal  No respiratory distress  Breath sounds: Normal breath sounds  Abdominal:      General: Abdomen is flat  Bowel sounds are normal       Palpations: Abdomen is soft  Tenderness: There is generalized abdominal tenderness  There is no right CVA tenderness, left CVA tenderness, guarding or rebound  Negative signs include Cain's sign, Rovsing's sign and McBurney's sign  Musculoskeletal:      Cervical back: Neck supple  Skin:     General: Skin is warm and dry  Capillary Refill: Capillary refill takes less than 2 seconds  Neurological:      General: No focal deficit present        Mental Status: She is alert and oriented to person, place, and time  Cranial Nerves: Cranial nerves are intact  Sensory: Sensation is intact  Motor: Motor function is intact  Coordination: Coordination is intact  Gait: Gait is intact  Psychiatric:         Mood and Affect: Mood normal          Behavior: Behavior normal          Thought Content:  Thought content normal          Judgment: Judgment normal          Vital Signs  ED Triage Vitals [09/14/22 1656]   Temperature Pulse Respirations Blood Pressure SpO2   98 8 °F (37 1 °C) (!) 119 20 121/56 98 %      Temp Source Heart Rate Source Patient Position - Orthostatic VS BP Location FiO2 (%)   Oral Monitor Sitting Right arm --      Pain Score       2           Vitals:    09/14/22 1656 09/14/22 1845 09/14/22 1915   BP: 121/56 103/60 96/62   Pulse: (!) 119 76 72   Patient Position - Orthostatic VS: Sitting Lying Lying         Visual Acuity      ED Medications  Medications - No data to display    Diagnostic Studies  Results Reviewed     Procedure Component Value Units Date/Time    Urine Microscopic [[id number]]  (Abnormal) Collected: 09/14/22 1821    Lab Status: Final result Specimen: Urine, Clean Catch Updated: 09/14/22 1910     RBC, UA 2-4 /hpf      WBC, UA 2-4 /hpf      Epithelial Cells Occasional /hpf      Bacteria, UA Moderate /hpf     Comprehensive metabolic panel [[id number]] Collected: 09/14/22 1811    Lab Status: Final result Specimen: Blood from Arm, Left Updated: 09/14/22 1834     Sodium 141 mmol/L      Potassium 3 6 mmol/L      Chloride 106 mmol/L      CO2 28 mmol/L      ANION GAP 7 mmol/L      BUN 7 mg/dL      Creatinine 0 65 mg/dL      Glucose 95 mg/dL      Calcium 8 7 mg/dL      AST 25 U/L      ALT 40 U/L      Alkaline Phosphatase 82 U/L      Total Protein 8 4 g/dL      Albumin 3 9 g/dL      Total Bilirubin 0 30 mg/dL      eGFR 117 ml/min/1 73sq m     Narrative:      [place] guidelines for Chronic Kidney Disease (CKD):      Stage 1 with normal or high GFR (GFR > 90 mL/min/1 73 square meters)    Stage 2 Mild CKD (GFR = 60-89 mL/min/1 73 square meters)    Stage 3A Moderate CKD (GFR = 45-59 mL/min/1 73 square meters)    Stage 3B Moderate CKD (GFR = 30-44 mL/min/1 73 square meters)    Stage 4 Severe CKD (GFR = 15-29 mL/min/1 73 square meters)    Stage 5 End Stage CKD (GFR <15 mL/min/1 73 square meters)  Note: GFR calculation is accurate only with a steady state creatinine    Protime-INR [[id number]]  (Normal) Collected: 09/14/22 1811    Lab Status: Final result Specimen: Blood from Arm, Left Updated: 09/14/22 1833     Protime 12 6 seconds      INR 0 94    APTT [[id number]]  (Normal) Collected: 09/14/22 1811    Lab Status: Final result Specimen: Blood from Arm, Left Updated: 09/14/22 1833     PTT 26 seconds     POCT pregnancy, urine [[id number]]  (Normal) Resulted: 09/14/22 1823    Lab Status: Final result Updated: 09/14/22 1823     EXT PREG TEST UR (Ref: Negative) negative     Control valid    Urine Macroscopic, POC [[id number]]  (Abnormal) Collected: 09/14/22 1821    Lab Status: Final result Specimen: Urine Updated: 09/14/22 1823     Color, UA Yellow     Clarity, UA Clear     pH, UA 6 5     Leukocytes, UA Trace     Nitrite, UA Negative     Protein, UA Negative mg/dl      Glucose, UA Negative mg/dl      Ketones, UA Negative mg/dl      Urobilinogen, UA 1 0 E U /dl      Bilirubin, UA Negative     Occult Blood, UA Moderate     Specific Gravity, UA 1 025    Narrative:      CLINITEK RESULT    CBC and differential [[id number]]  (Abnormal) Collected: 09/14/22 1811    Lab Status: Final result Specimen: Blood from Arm, Left Updated: 09/14/22 1818     WBC 5 52 Thousand/uL      RBC 4 82 Million/uL      Hemoglobin 9 2 g/dL      Hematocrit 34 1 %      MCV 71 fL      MCH 19 1 pg      MCHC 27 0 g/dL      RDW 17 4 %      MPV 10 4 fL      Platelets 951 Thousands/uL      nRBC 0 /100 WBCs      Neutrophils Relative 72 %      Immat GRANS % 0 % Lymphocytes Relative 20 %      Monocytes Relative 7 %      Eosinophils Relative 1 %      Basophils Relative 0 %      Neutrophils Absolute 3 97 Thousands/µL      Immature Grans Absolute 0 01 Thousand/uL      Lymphocytes Absolute 1 09 Thousands/µL      Monocytes Absolute 0 39 Thousand/µL      Eosinophils Absolute 0 04 Thousand/µL      Basophils Absolute 0 02 Thousands/µL                  US pelvis complete w transvaginal   Final Result by Robert Schmid MD (09/14 1954)       No evidence of ovarian torsion  Again seen is a 1 6 cm complex vaginal cyst                Workstation performed: [id number]                    Procedures  Procedures         ED Course  ED Course as of 09/14/22 2014   Wed Sep 14, 2022   [address] Patient is a 79-year-old female coming in today with heavy vaginal bleeding  On exam she is well-appearing in no acute distress  Will review chart as patient has a history of anemia  Patient is mildly tachycardic with mild diffuse abdominal tenderness and non peritoneal   Will check urine, pregnancy test, CBC CMP as well as pelvic ultrasound    Disclosure: Voice to text software was used in the preparation of this document and could have resulted in translational errors       Occasional wrong word or "sound a like" substitutions may have occurred due to the inherent limitations of voice recognition software   Read the chart carefully and recognize, using context, where substitutions have occurred         1840 Patient's labs are stable no evidence of end-organ damage  Compared old hemoglobin is at baseline  Noted MCV is low  Pending beta quant  Pregnancy negative   1915 Patient's vital signs improved without any intervention  She does have leukocytes with bacteria occasional epithelial cyst   Asymptomatic for urine infection  Pending ultrasound of   1949 Patient updated on labs as well as pending ultrasound  Patient resting in bed in no distress  No complaints     2000 Patient's ultrasound without acute pathology  Stable 1 6 cm vaginal cyst   Will refer to Gynecology  Hemodynamically stable no acute distress  Return to ER instructions will be given a plan for DC    Counseling: I had a detailed discussion with the patient and/or guardian regarding: the historical points, exam findings, and any diagnostic results supporting the discharge diagnosis, lab results, radiology results, discharge instructions reviewed with patient and/or family/caregiver and understanding was verbalized  Instructions given to return to the emergency department if symptoms worsen or persist, or if there are any questions or concerns that arise at home  MDM  Number of Diagnoses or Management Options  Diagnosis management comments:     DDx including but not limited to: ectopic pregnancy, threatened , missed , incomplete , anemia, coagulopathy, DUB, tumor, retained products of conception, polycystic disease  EKG INTERPRETATION @ 1836  RHYTHM:  Normal sinus rhythm at 70 beats per minute  AXIS:  Normal axis  INTERVALS:  ID interval measured at 116 milliseconds  QRS COMPLEX:  QRS measured at 86 milliseconds  ST SEGMENT:  Nonspecific ST segment changes    Diffuse artifact  QT INTERVAL:  QTC measured at 382 milliseconds  COMPARED WITH PRIOR compared to old EKG on 2021 no acute change  Interpretation by Austen Hernandez, DO         Amount and/or Complexity of Data Reviewed  Clinical lab tests: ordered and reviewed  Tests in the radiology section of CPT®: reviewed and ordered  Tests in the medicine section of CPT®: ordered and reviewed  Review and summarize past medical records: yes        Disposition  Final diagnoses:   Abnormal uterine bleeding   DUB (dysfunctional uterine bleeding)   Anemia     Time reflects when diagnosis was documented in both MDM as applicable and the Disposition within this note     Time User Action Codes Description Comment    9/14/2022  7:16 PM Bendock, Claudell Loyal L Add [N93 9] Abnormal uterine bleeding     9/14/2022  7:16 PM Bendock, Claudell Loyal L Add [N93 8] DUB (dysfunctional uterine bleeding)     9/14/2022  7:16 PM Guccigrzegorzmark Peacock Add [D64 9] Anemia       ED Disposition     ED Disposition   Discharge    Condition   Stable    Date/Time   Wed Sep 14, 2022  8:01 PM    [facility] discharge to home/self care  Follow-up Information     Follow up With Specialties Details Why Contact Info Additional [address] [facility] Schedule an appointment as soon as possible for a visit in 1 week  [address], [address] [facility] [address]  [address] [place] [address] Street, [address], [address] [place], [place], [address]    [address], Nurse Practitioner In 1 week  [address]  [address] [facility]  [place] [address] [facility]  [phone]             Patient's Medications   Discharge Prescriptions    No medications on file       No discharge procedures on file      PDMP Review       Value Time User    PDMP Reviewed  Yes 2/12/2021  2:14 PM Calvin Boyer MD          ED Provider  Electronically Signed by           Elodia Bullock DO  09/14/22 2014

## 2022-09-15 LAB
ATRIAL RATE: 68 BPM
P AXIS: 6 DEGREES
PR INTERVAL: 116 MS
QRS AXIS: 57 DEGREES
QRSD INTERVAL: 86 MS
QT INTERVAL: 360 MS
QTC INTERVAL: 382 MS
T WAVE AXIS: 28 DEGREES
VENTRICULAR RATE: 68 BPM

## 2022-09-15 PROCEDURE — 93010 ELECTROCARDIOGRAM REPORT: CPT | Performed by: INTERNAL MEDICINE

## 2022-09-20 ENCOUNTER — OFFICE VISIT (OUTPATIENT)
Dept: OBGYN CLINIC | Facility: CLINIC | Age: 32
End: 2022-09-20

## 2022-09-20 VITALS
WEIGHT: 145 LBS | HEIGHT: 60 IN | BODY MASS INDEX: 28.47 KG/M2 | DIASTOLIC BLOOD PRESSURE: 68 MMHG | HEART RATE: 58 BPM | SYSTOLIC BLOOD PRESSURE: 99 MMHG

## 2022-09-20 DIAGNOSIS — D50.9 MICROCYTIC ANEMIA: ICD-10-CM

## 2022-09-20 DIAGNOSIS — N92.0 MENORRHAGIA WITH REGULAR CYCLE: Primary | ICD-10-CM

## 2022-09-20 PROCEDURE — 99213 OFFICE O/P EST LOW 20 MIN: CPT | Performed by: OBSTETRICS & GYNECOLOGY

## 2022-09-20 NOTE — PATIENT INSTRUCTIONS
Thank you for your confidence in our team    We appreciate you and welcome your feedback  If you receive a survey from us, please take a few moments to let us know how we are doing     Sincerely,  [facility], DO

## 2022-09-20 NOTE — PROGRESS NOTES
PROBLEM GYNECOLOGICAL VISIT    Indigo Garcia is a 28 y o  female who presents today for follow up s/p ER visit  Her general medical history has been reviewed and she reports it as follows:    Past Medical History:   Diagnosis Date    Anemia     taking iron pills BID     Chronic kidney disease     hydronephrosis    History of transfusion     Kidney stone     Sepsis ([address]     Septic shock ([facility] [address]     Urinary tract infection     taking antibioitcs     UTI (urinary tract infection) during pregnancy     Vagina, candidiasis     Varicella     as child     Past Surgical History:   Procedure Laterality Date    ABDOMINAL ADHESION SURGERY N/A 2021    Procedure: LYSIS ADHESIONS;  Surgeon: Vivi Wood MD;  [facility];  Service: Gynecology     SECTION N/A 2018    Procedure:  SECTION (); Surgeon: Norman Okeefe MD;  [facility];  Service: Obstetrics    CYSTOSCOPY  2018    with right uretral stent    EXAMINATION UNDER ANESTHESIA N/A 2021    Procedure: EXAM UNDER ANESTHESIA (EUA);   Surgeon: Vivi Wood MD;  [facility];  Service: Gynecology    HI CYSTO/URETERO W/LITHOTRIPSY &INDWELL STENT INSRT Right 2018    Procedure: CYSTOSCOPY URETEROSCOPY WITH  RETROGRADE PYELOGRAM AND exchange  STENT URETERAl;  Surgeon: Maria C Rojas MD;  [facility];  Service: Urology    HI CYSTOURETHROSCOPY,URETER CATHETER Right 2018    Procedure: CYSTOSCOPY RETROGRADE PYELOGRAM WITH INSERTION STENT URETERAL;  Surgeon: Maria C Rojas MD;  [facility];  Service: Urology    HI LAP,CHOLECYSTECTOMY N/A 2022    Procedure: Adriana Light;  Surgeon: Rupesh Larsen MD;  [facility];  Service: General    HI LAP,DIAGNOSTIC ABDOMEN N/A 2021    Procedure: LAPAROSCOPY DIAGNOSTIC;  Surgeon: Vivi Wood MD;  [facility];  Service: Gynecology    HI LAP,RMV  ADNEXAL STRUCTURE Bilateral 2021    Procedure: SALPINGECTOMY, LAPAROSCOPIC;  Surgeon: Sri Huang MD;  [facility];  Service: Gynecology    TONSILLECTOMY      TONSILLECTOMY      TUBAL LIGATION       OB History        5    Para   5    Term   5            AB        Living   5       SAB        IAB        Ectopic        Multiple   0    Live Births   5               Social History     Tobacco Use    Smoking status: Current Some Day Smoker     Packs/day: 0 25     Last attempt to quit: 2019     Years since quitting: 3 2    Smokeless tobacco: Never Used    Tobacco comment: 2-3 a day   Vaping Use    Vaping Use: Never used   Substance Use Topics    Alcohol use: No    Drug use: No     Social History     Substance and Sexual Activity   Sexual Activity Yes    Partners: Male    Birth control/protection: None, Condom Male       Current Outpatient Medications   Medication Instructions    cyanocobalamin (VITAMIN B-12) 1,000 mcg, Oral, Daily    famotidine (PEPCID) 20 mg, Oral, 2 times daily    ferrous sulfate 324 mg, Oral, Daily before breakfast    medroxyPROGESTERone (PROVERA) 10 mg, Oral, Daily    naproxen (NAPROSYN) 500 mg, Oral, 2 times daily with meals    ondansetron (ZOFRAN) 4 mg, Oral, Every 6 hours       History of Present Illness:   Patient presents s/p ER visit for heavy menses which she started experiencing 2mos ago  Reviewed  ER chart  Review of Systems:  Review of Systems   Genitourinary: Positive for menstrual problem  Heavy menses   All other systems reviewed and are negative  Physical Exam:  BP 99/68   Pulse 58   Ht 5' (1 524 m)   Wt 65 8 kg (145 lb)   LMP 2022 (Exact Date)   BMI 28 32 kg/m²   Physical Exam  Constitutional:       Appearance: Normal appearance  Genitourinary:      Bladder and urethral meatus normal       No lesions in the vagina  Right Labia: No rash, tenderness, lesions or skin changes       Left Labia: No tenderness, lesions, skin changes or rash      No vaginal discharge, erythema, bleeding or ulceration  Vaginal exam comments: Right vaginal cyst noted nontender  Right Adnexa: not tender, not full and no mass present  Left Adnexa: not tender, not full and no mass present  No cervical motion tenderness, discharge or lesion  Uterus is not enlarged or tender  No uterine mass detected  No urethral tenderness or mass present  Neurological:      Mental Status: She is alert  Vitals reviewed  Discussion:  Reviewed pelvic sonogram with patient with normal findings  Recommend either patient start OCP or mirena iud to help with the heavy cycles  Patient is unsure at this time will call the office  Assessment:   1  Menorrhagia    Plan:   1  Recommend either OCP or mirena iud   2  Return to office prn  Reviewed with patient that [facility] are [facility] immediately, but that they will not necessarily be reviewed by me immediately  Explained that I will review results at my earliest opportunity and contact patient appropriately

## 2022-11-25 ENCOUNTER — TELEPHONE (OUTPATIENT)
Dept: FAMILY MEDICINE CLINIC | Facility: CLINIC | Age: 32
End: 2022-11-25

## 2023-03-22 ENCOUNTER — TELEPHONE (OUTPATIENT)
Dept: OBGYN CLINIC | Facility: CLINIC | Age: 33
End: 2023-03-22

## 2023-03-29 ENCOUNTER — OFFICE VISIT (OUTPATIENT)
Dept: FAMILY MEDICINE CLINIC | Facility: CLINIC | Age: 33
End: 2023-03-29

## 2023-03-29 VITALS
TEMPERATURE: 97.3 F | OXYGEN SATURATION: 99 % | HEIGHT: 60 IN | RESPIRATION RATE: 14 BRPM | BODY MASS INDEX: 26.97 KG/M2 | SYSTOLIC BLOOD PRESSURE: 112 MMHG | DIASTOLIC BLOOD PRESSURE: 68 MMHG | WEIGHT: 137.4 LBS | HEART RATE: 110 BPM

## 2023-03-29 DIAGNOSIS — F32.A ANXIETY AND DEPRESSION: ICD-10-CM

## 2023-03-29 DIAGNOSIS — N93.8 DYSFUNCTIONAL UTERINE BLEEDING: ICD-10-CM

## 2023-03-29 DIAGNOSIS — E61.1 IRON DEFICIENCY: Primary | ICD-10-CM

## 2023-03-29 DIAGNOSIS — E55.9 VITAMIN D DEFICIENCY: ICD-10-CM

## 2023-03-29 DIAGNOSIS — F41.9 ANXIETY AND DEPRESSION: ICD-10-CM

## 2023-03-29 DIAGNOSIS — E66.3 OVERWEIGHT: ICD-10-CM

## 2023-03-29 DIAGNOSIS — Z00.00 ANNUAL PHYSICAL EXAM: ICD-10-CM

## 2023-03-29 DIAGNOSIS — D50.0 IRON DEFICIENCY ANEMIA DUE TO CHRONIC BLOOD LOSS: ICD-10-CM

## 2023-03-29 DIAGNOSIS — N18.9 CHRONIC KIDNEY DISEASE, UNSPECIFIED CKD STAGE: ICD-10-CM

## 2023-03-29 DIAGNOSIS — E53.8 B12 DEFICIENCY: ICD-10-CM

## 2023-03-29 RX ORDER — ESCITALOPRAM OXALATE 10 MG/1
10 TABLET ORAL DAILY
Qty: 30 TABLET | Refills: 0 | Status: SHIPPED | OUTPATIENT
Start: 2023-03-29 | End: 2023-09-25

## 2023-03-29 NOTE — PATIENT INSTRUCTIONS
Anxiety   WHAT YOU NEED TO KNOW:   What do I need to know about anxiety? Anxiety is a condition that causes you to feel extremely worried or nervous  The feelings are so strong that they can cause problems with your daily activities or sleep  Anxiety may be triggered by something you fear, or it may happen without a cause  Family or work stress, smoking, caffeine, and alcohol can increase your risk for anxiety  Certain medicines or health conditions can also increase your risk  Anxiety can become a long-term condition if it is not managed or treated  What other common signs and symptoms may occur with anxiety? Fatigue or muscle tightness    Shaking, restlessness, or irritability    Problems focusing    Trouble sleeping    Feeling jumpy, easily startled, or dizzy    Rapid heartbeat or shortness of breath    What do I need to tell my healthcare provider about my anxiety? Tell your healthcare provider when your symptoms began and what triggers them  Tell your provider if anxiety affects your daily activities  Your provider will also ask about your medical history and if you have family members with a similar condition  Tell your provider about your past and present alcohol, nicotine, or drug use  What can I do to manage anxiety? You may get medicines to help you feel calm and relaxed, and to decrease your symptoms  Medicines are usually given together with therapy or other treatments  The following can help you manage anxiety:  Talk to someone about your anxiety  Your healthcare provider may suggest counseling  Cognitive behavioral therapy can help you understand and change how you react to events that trigger your symptoms  You might feel more comfortable talking with a friend or family member about your anxiety  Choose someone you know will be supportive and encouraging  Find ways to relax  Activities such as exercise, meditation, or listening to music can help you relax   Spend time with friends, or do things you enjoy  Practice deep breathing  Deep breathing can help you relax when you feel anxious  Focus on taking slow, deep breaths several times a day, or during an anxiety attack  Breathe in through your nose and out through your mouth  Create a regular sleep routine  Regular sleep can help you feel calmer during the day  Go to sleep and wake up at the same times every day  Do not watch television or use the computer right before bed  Your room should be comfortable, dark, and quiet  Eat a variety of healthy foods  Healthy foods include fruits, vegetables, low-fat dairy products, lean meats, fish, whole-grain breads, and cooked beans  Healthy foods can help you feel less anxious and have more energy  Exercise regularly  Exercise can increase your energy level  Exercise may also lift your mood and help you sleep better  Your healthcare provider can help you create an exercise plan  Do not smoke  Nicotine and other chemicals in cigarettes and cigars can increase anxiety  Ask your healthcare provider for information if you currently smoke and need help to quit  E-cigarettes or smokeless tobacco still contain nicotine  Talk to your healthcare provider before you use these products  Do not have caffeine  Caffeine can make your symptoms worse  Do not have foods or drinks that are meant to increase your energy level  Limit or do not drink alcohol  Ask your healthcare provider if alcohol is safe for you  You may not be able to drink alcohol if you take certain anxiety or depression medicines  Limit alcohol to 1 drink per day if you are a woman  Limit alcohol to 2 drinks per day if you are a man  A drink of alcohol is 12 ounces of beer, 5 ounces of wine, or 1½ ounces of liquor  Do not use drugs  Drugs can make your anxiety worse  It can also make anxiety hard to manage  Talk to your healthcare provider if you use drugs and want help to quit         Call your local emergency number (911 in the [place]) if:   You have chest pain, tightness, or heaviness that may spread to your shoulders, arms, jaw, neck, or back  You feel like hurting yourself or someone else  When should I call my doctor? Your symptoms get worse or do not get better with treatment  Your anxiety keeps you from doing your regular daily activities  You have new symptoms since your last visit  You have questions or concerns about your condition or care  CARE AGREEMENT:   You have the right to help plan your care  Learn about your health condition and how it may be treated  Discuss treatment options with your healthcare providers to decide what care you want to receive  You always have the right to refuse treatment  The above information is an  only  It is not intended as medical advice for individual conditions or treatments  Talk to your doctor, nurse or pharmacist before following any medical regimen to see if it is safe and effective for you  © Copyright [employer] 2022 Information is for End User's use only and may not be sold, redistributed or otherwise used for commercial purposes

## 2023-03-29 NOTE — PROGRESS NOTES
[address] [facility]    NAME: Daniel Mcdaniel  AGE: 28 y o   SEX: female  : 1990     DATE: 3/30/2023     Assessment and Plan:     Problem List Items Addressed This Visit        Genitourinary    Chronic kidney disease     Lab Results   Component Value Date    EGFR 117 2022    EGFR 88 2022    EGFR 121 2022    CREATININE 0 65 2022    CREATININE 0 87 2022    CREATININE 0 61 2022     Last renal panel WNL, will recheck labs          Relevant Orders    Comprehensive metabolic panel       Other    Iron deficiency anemia     Lab Results   Component Value Date    WBC 5 52 2022    HGB 9 2 (L) 2022    HCT 34 1 (L) 2022    MCV 71 (L) 2022     2022     Lab Results   Component Value Date    IRON 17 (L) 2022    TIBC 447 2022    FERRITIN 4 (L) 2022     Repeat labs, she has been consistent with iron supplement, referral to hematology for possible iron infusions   Also recommend follow up with gynecology for evaluation of menorrhagia          Anxiety and depression     Chronic hx of anxiety and depression feels like has been worsening due to financial and family stressors, denies suicidal ideations or self injury  interested in starting medication, will trial low dose Lexapro daily and follow up in 4 weeks, discussed expected outcomes and reviewed possible side effects of medication  We also discussed establishing with [facility] provider, she was given recommendation by her cousin and so will pursue that          Relevant Medications    escitalopram (LEXAPRO) 10 mg tablet    Overweight     -Encouraged diet and lifestyle changes: decrease processed foods (cakes, cookies, chips, soda), decrease total carbohydrate intake, decrease fried/fatty foods, increase fruits and vegetables, increase lean proteins (chicken, turkey), increase healthy fats (avocado, fish, nuts), drink plenty of water (at least four 16 oz bottles per day)           Relevant Orders    Hemoglobin A1C    Lipid panel   Other Visit Diagnoses     Iron deficiency    -  Primary    Relevant Orders    CBC and differential    Iron Panel (Includes Ferritin, Iron Sat%, Iron, and TIBC)    Ambulatory Referral to Hematology / Oncology    B12 deficiency        Relevant Orders    CBC and differential    Vitamin B12    Dysfunctional uterine bleeding        Relevant Orders    CBC and differential    Iron Panel (Includes Ferritin, Iron Sat%, Iron, and TIBC)    Vitamin D deficiency              Immunizations and preventive care screenings were discussed with patient today  Appropriate education was printed on patient's after visit summary  Counseling:  Alcohol/drug use: discussed moderation in alcohol intake, the recommendations for healthy alcohol use, and avoidance of illicit drug use  Dental Health: discussed importance of regular tooth brushing, flossing, and dental visits  Injury prevention: discussed safety/seat belts, safety helmets, smoke detectors, carbon dioxide detectors, and smoking near bedding or upholstery  Sexual health: discussed sexually transmitted diseases, partner selection, use of condoms, avoidance of unintended pregnancy, and contraceptive alternatives  · Exercise: the importance of regular exercise/physical activity was discussed  Recommend exercise 3-5 times per week for at least 30 minutes  BMI Counseling: Body mass index is 26 83 kg/m²  The BMI is above normal  Nutrition recommendations include decreasing portion sizes, encouraging healthy choices of fruits and vegetables, decreasing fast food intake, consuming healthier snacks and limiting drinks that contain sugar  Exercise recommendations include exercising 3-5 times per week  Rationale for BMI follow-up plan is due to patient being overweight or obese       Tobacco Cessation Counseling: Tobacco cessation counseling was provided  The patient is sincerely urged to quit consumption of tobacco  She is not ready to quit tobacco          Return in about 4 weeks (around 4/26/2023)  Chief Complaint:     Chief Complaint   Patient presents with   • Anxiety   • Anemia   • Medication Refill      History of Present Illness:     Adult Annual Physical   Patient here for a comprehensive physical exam  The patient reports that she has been experiencing worsening anxiety and depression  No specific trigger or events, she is not currently on medication or in treatment  She is interested in starting medication  She denies any SI/ self injury  Also experiencing daily fatigue, hx of iron deficiency anemia and menorrhagia, has not previously discussed with gynecology  Diet and Physical Activity  · Diet/Nutrition: limited fruits/vegetables  · Exercise: no formal exercise  General Health  · Sleep: gets 4-6 hours of sleep on average  · Hearing: normal - bilateral   · Vision: goes for regular eye exams  · Dental: regular dental visits  /GYN Health  · Last menstrual period: 3/27/2023  · Contraceptive method: injectable contraception  · History of STDs?: no      Review of Systems:     Review of Systems   Constitutional: Positive for fatigue  Negative for chills and fever  HENT: Negative for ear pain and sore throat  Eyes: Negative for pain and visual disturbance  Respiratory: Negative for cough and shortness of breath  Cardiovascular: Negative for chest pain and palpitations  Gastrointestinal: Negative for abdominal pain and vomiting  Genitourinary: Negative for dysuria and hematuria  Musculoskeletal: Negative for arthralgias and back pain  Skin: Negative for color change and rash  Neurological: Negative for seizures and syncope  Psychiatric/Behavioral: Positive for dysphoric mood  Negative for self-injury and suicidal ideas  The patient is nervous/anxious      All other systems reviewed and are negative  Past Medical History:     Past Medical History:   Diagnosis Date   • Anemia     taking iron pills BID    • Chronic kidney disease     hydronephrosis   • History of transfusion    • Kidney stone    • Sepsis ([address] )    • Septic shock (HCC)    • Urinary tract infection     taking antibioitcs    • UTI (urinary tract infection) during pregnancy    • Vagina, candidiasis    • Varicella     as child      Past Surgical History:     Past Surgical History:   Procedure Laterality Date   • ABDOMINAL ADHESION SURGERY N/A 2021    Procedure: LYSIS ADHESIONS;  Surgeon: Jackie Perry MD;  [facility];  Service: Gynecology   •  SECTION N/A 2018    Procedure:  SECTION (); Surgeon: Reene Mclean MD;  [facility]: [facility];  Service: Obstetrics   • CYSTOSCOPY  2018    with right uretral stent   • EXAMINATION UNDER ANESTHESIA N/A 2021    Procedure: EXAM UNDER ANESTHESIA (EUA);   Surgeon: Jackie Perry MD;  [facility];  Service: Gynecology   • FL CYSTO BLADDER W/URETERAL CATHETERIZATION Right 2018    Procedure: CYSTOSCOPY RETROGRADE PYELOGRAM WITH INSERTION STENT URETERAL;  Surgeon: Greg Nava MD;  [facility];  Service: Urology   • FL CYSTO/URETERO W/LITHOTRIPSY &INDWELL STENT INSRT Right 2018    Procedure: CYSTOSCOPY URETEROSCOPY WITH  RETROGRADE PYELOGRAM AND exchange  STENT URETERAl;  Surgeon: Greg Nava MD;  Location: [facility] MAIN OR;  Service: Urology   • FL LAPAROSCOPY SURG CHOLECYSTECTOMY N/A 2022    Procedure: [facility];  Surgeon: Vinita Ash MD;  Location: [facility] MAIN OR;  Service: General   • FL LAPAROSCOPY W/RMVL ADNEXAL STRUCTURES Bilateral 2021    Procedure: SALPINGECTOMY, Jennifer Blew;  Surgeon: Jackie Perry MD;  [facility];  Service: Gynecology   • FL LAPS ABD PRTM&OMENTUM DX W/WO SPEC BR/ [facility] N/A 2021    Procedure: LAPAROSCOPY DIAGNOSTIC;  Surgeon: Jackie Perry MD;  [facility];  Service: Gynecology   • TONSILLECTOMY     • TONSILLECTOMY     • TUBAL LIGATION        Social History:     Social History     Socioeconomic History   • Marital status: Single     Spouse name: None   • Number of children: None   • Years of education: None   • Highest education level: None   Occupational History   • None   Tobacco Use   • Smoking status: Some Days     Packs/day: 0 25     Types: Cigarettes     Last attempt to quit: 6/6/2019     Years since quitting: 3 8   • Smokeless tobacco: Never   • Tobacco comments:     2-3 a day   Vaping Use   • Vaping Use: Never used   Substance and Sexual Activity   • Alcohol use: No   • Drug use: No   • Sexual activity: Yes     Partners: Male     Birth control/protection: None, Condom Male   Other Topics Concern   • None   Social History Narrative   • None     Social Determinants of Health     Financial Resource Strain: Low Risk    • Difficulty of Paying Living Expenses: Not hard at all   Food Insecurity: No Food Insecurity   • Worried About Running Out of Food in the Last Year: Never true   • Ran Out of Food in the Last Year: Never true   Transportation Needs: No Transportation Needs   • Lack of Transportation (Medical): No   • Lack of Transportation (Non-Medical):  No   Physical Activity: Not on file   Stress: Not on file   Social Connections: Not on file   Intimate Partner Violence: Not on file   Housing Stability: Not on file      Family History:     Family History   Problem Relation Age of Onset   • No Known Problems Mother    • Diabetes Father    • Heart defect Brother         3 open heart surgeries   • Heart failure Family         Congestive   • Diabetes Maternal Grandmother       Current Medications:     Current Outpatient Medications   Medication Sig Dispense Refill   • escitalopram (LEXAPRO) 10 mg tablet Take 1 tablet (10 mg total) by mouth daily 30 tablet 0   • cyanocobalamin (VITAMIN B-12) 1000 MCG tablet Take 1 tablet (1,000 mcg total) by mouth daily 90 tablet 3   • famotidine (PEPCID) 20 mg tablet Take 1 tablet (20 mg total) by mouth 2 (two) times a day (Patient not taking: Reported on 9/20/2022) 30 tablet 0   • ferrous sulfate 324 (65 Fe) mg Take 1 tablet (324 mg total) by mouth daily before breakfast 60 tablet 1   • medroxyPROGESTERone (PROVERA) 10 mg tablet Take 1 tablet (10 mg total) by mouth daily for 10 days 10 tablet 0   • ondansetron (ZOFRAN) 4 mg tablet Take 1 tablet (4 mg total) by mouth every 6 (six) hours (Patient not taking: Reported on 4/20/2022 ) 12 tablet 0     No current facility-administered medications for this visit  Allergies:     No Known Allergies   Physical Exam:     /68 (BP Location: Left arm, Patient Position: Sitting, Cuff Size: Standard)   Pulse (!) 110   Temp (!) 97 3 °F (36 3 °C) (Temporal)   Resp 14   Ht 5' (1 524 m)   Wt 62 3 kg (137 lb 6 4 oz)   LMP 03/27/2023 (Within Days)   SpO2 99%   BMI 26 83 kg/m²     Physical Exam  Vitals and nursing note reviewed  Constitutional:       General: She is not in acute distress  Appearance: She is well-developed  She is not ill-appearing or diaphoretic  HENT:      Head: Normocephalic and atraumatic  Right Ear: Tympanic membrane and external ear normal       Left Ear: Tympanic membrane and external ear normal    Eyes:      Conjunctiva/sclera: Conjunctivae normal       Pupils: Pupils are equal, round, and reactive to light  Cardiovascular:      Rate and Rhythm: Normal rate and regular rhythm  Heart sounds: No murmur heard  Pulmonary:      Effort: Pulmonary effort is normal  No respiratory distress  Breath sounds: Normal breath sounds  Abdominal:      General: Bowel sounds are normal       Palpations: Abdomen is soft  Tenderness: There is no abdominal tenderness  Musculoskeletal:         General: No swelling  Normal range of motion  Cervical back: Normal range of motion and neck supple  Skin:     General: Skin is warm and dry  Capillary Refill: Capillary refill takes less than 2 seconds  Neurological:      General: No focal deficit present  Mental Status: She is alert and oriented to person, place, and time     Psychiatric:         Mood and Affect: Mood normal          Behavior: Behavior normal           [address]

## 2023-03-30 ENCOUNTER — TELEPHONE (OUTPATIENT)
Dept: HEMATOLOGY ONCOLOGY | Facility: CLINIC | Age: 33
End: 2023-03-30

## 2023-03-30 ENCOUNTER — APPOINTMENT (OUTPATIENT)
Dept: LAB | Facility: HOSPITAL | Age: 33
End: 2023-03-30

## 2023-03-30 DIAGNOSIS — E61.1 IRON DEFICIENCY: ICD-10-CM

## 2023-03-30 DIAGNOSIS — N18.9 CHRONIC KIDNEY DISEASE, UNSPECIFIED CKD STAGE: ICD-10-CM

## 2023-03-30 DIAGNOSIS — N93.8 DYSFUNCTIONAL UTERINE BLEEDING: ICD-10-CM

## 2023-03-30 DIAGNOSIS — E66.3 OVERWEIGHT: ICD-10-CM

## 2023-03-30 DIAGNOSIS — E53.8 B12 DEFICIENCY: ICD-10-CM

## 2023-03-30 PROBLEM — F32.A ANXIETY AND DEPRESSION: Status: ACTIVE | Noted: 2023-03-30

## 2023-03-30 PROBLEM — F41.9 ANXIETY AND DEPRESSION: Status: ACTIVE | Noted: 2023-03-30

## 2023-03-30 LAB
ALBUMIN SERPL BCP-MCNC: 4.4 G/DL (ref 3.5–5)
ALP SERPL-CCNC: 65 U/L (ref 34–104)
ALT SERPL W P-5'-P-CCNC: 11 U/L (ref 7–52)
ANION GAP SERPL CALCULATED.3IONS-SCNC: 6 MMOL/L (ref 4–13)
AST SERPL W P-5'-P-CCNC: 16 U/L (ref 13–39)
BASOPHILS # BLD AUTO: 0.03 THOUSANDS/ÂΜL (ref 0–0.1)
BASOPHILS NFR BLD AUTO: 0 % (ref 0–1)
BILIRUB SERPL-MCNC: 0.34 MG/DL (ref 0.2–1)
BUN SERPL-MCNC: 9 MG/DL (ref 5–25)
CALCIUM SERPL-MCNC: 9.1 MG/DL (ref 8.4–10.2)
CHLORIDE SERPL-SCNC: 106 MMOL/L (ref 96–108)
CHOLEST SERPL-MCNC: 154 MG/DL
CO2 SERPL-SCNC: 25 MMOL/L (ref 21–32)
CREAT SERPL-MCNC: 0.59 MG/DL (ref 0.6–1.3)
EOSINOPHIL # BLD AUTO: 0.13 THOUSAND/ÂΜL (ref 0–0.61)
EOSINOPHIL NFR BLD AUTO: 2 % (ref 0–6)
ERYTHROCYTE [DISTWIDTH] IN BLOOD BY AUTOMATED COUNT: 17.8 % (ref 11.6–15.1)
FERRITIN SERPL-MCNC: 2 NG/ML (ref 8–388)
GFR SERPL CREATININE-BSD FRML MDRD: 121 ML/MIN/1.73SQ M
GLUCOSE P FAST SERPL-MCNC: 87 MG/DL (ref 65–99)
HCT VFR BLD AUTO: 30.4 % (ref 34.8–46.1)
HDLC SERPL-MCNC: 54 MG/DL
HGB BLD-MCNC: 7.9 G/DL (ref 11.5–15.4)
IMM GRANULOCYTES # BLD AUTO: 0.02 THOUSAND/UL (ref 0–0.2)
IMM GRANULOCYTES NFR BLD AUTO: 0 % (ref 0–2)
IRON SATN MFR SERPL: 2 % (ref 15–50)
IRON SERPL-MCNC: 10 UG/DL (ref 50–170)
LDLC SERPL CALC-MCNC: 88 MG/DL (ref 0–100)
LYMPHOCYTES # BLD AUTO: 1.33 THOUSANDS/ÂΜL (ref 0.6–4.47)
LYMPHOCYTES NFR BLD AUTO: 19 % (ref 14–44)
MCH RBC QN AUTO: 18.3 PG (ref 26.8–34.3)
MCHC RBC AUTO-ENTMCNC: 26 G/DL (ref 31.4–37.4)
MCV RBC AUTO: 70 FL (ref 82–98)
MONOCYTES # BLD AUTO: 0.48 THOUSAND/ÂΜL (ref 0.17–1.22)
MONOCYTES NFR BLD AUTO: 7 % (ref 4–12)
NEUTROPHILS # BLD AUTO: 5.17 THOUSANDS/ÂΜL (ref 1.85–7.62)
NEUTS SEG NFR BLD AUTO: 72 % (ref 43–75)
NONHDLC SERPL-MCNC: 100 MG/DL
NRBC BLD AUTO-RTO: 0 /100 WBCS
PLATELET # BLD AUTO: 356 THOUSANDS/UL (ref 149–390)
PMV BLD AUTO: 10.2 FL (ref 8.9–12.7)
POTASSIUM SERPL-SCNC: 4.1 MMOL/L (ref 3.5–5.3)
PROT SERPL-MCNC: 7 G/DL (ref 6.4–8.4)
RBC # BLD AUTO: 4.32 MILLION/UL (ref 3.81–5.12)
SODIUM SERPL-SCNC: 137 MMOL/L (ref 135–147)
TIBC SERPL-MCNC: 451 UG/DL (ref 250–450)
TRIGL SERPL-MCNC: 60 MG/DL
VIT B12 SERPL-MCNC: 365 PG/ML (ref 100–900)
WBC # BLD AUTO: 7.16 THOUSAND/UL (ref 4.31–10.16)

## 2023-03-30 NOTE — ASSESSMENT & PLAN NOTE
Lab Results   Component Value Date    WBC 5 52 09/14/2022    HGB 9 2 (L) 09/14/2022    HCT 34 1 (L) 09/14/2022    MCV 71 (L) 09/14/2022     09/14/2022     Lab Results   Component Value Date    IRON 17 (L) 08/29/2022    TIBC 447 08/29/2022    FERRITIN 4 (L) 08/29/2022     Repeat labs, she has been consistent with iron supplement, referral to hematology for possible iron infusions   Also recommend follow up with gynecology for evaluation of menorrhagia

## 2023-03-30 NOTE — ASSESSMENT & PLAN NOTE
Lab Results   Component Value Date    EGFR 117 09/14/2022    EGFR 88 08/29/2022    EGFR 121 03/24/2022    CREATININE 0 65 09/14/2022    CREATININE 0 87 08/29/2022    CREATININE 0 61 03/24/2022     Last renal panel WNL, will recheck labs

## 2023-03-30 NOTE — TELEPHONE ENCOUNTER
Patient has a referral on file - Made an attempt to schedule a new patient consultation  A voicemail was left making patient aware of their referral and instructing them to call back at the [facility] phone number in order to schedule their consultation

## 2023-03-30 NOTE — ASSESSMENT & PLAN NOTE
Chronic hx of anxiety and depression feels like has been worsening due to financial and family stressors, denies suicidal ideations or self injury  interested in starting medication, will trial low dose Lexapro daily and follow up in 4 weeks, discussed expected outcomes and reviewed possible side effects of medication  We also discussed establishing with [facility] provider, she was given recommendation by her cousin and so will pursue that

## 2023-03-31 ENCOUNTER — TELEPHONE (OUTPATIENT)
Dept: HEMATOLOGY ONCOLOGY | Facility: CLINIC | Age: 33
End: 2023-03-31

## 2023-03-31 NOTE — TELEPHONE ENCOUNTER
Patient has a referral on file - Made an attempt to schedule a new patient consultation  A voicemail was left making patient aware of their referral and instructing them to call back at the [facility] phone number in order to schedule their consultation

## 2023-04-01 LAB
EST. AVERAGE GLUCOSE BLD GHB EST-MCNC: 100 MG/DL
HBA1C MFR BLD: 5.1 %

## 2023-04-03 ENCOUNTER — TELEPHONE (OUTPATIENT)
Dept: HEMATOLOGY ONCOLOGY | Facility: CLINIC | Age: 33
End: 2023-04-03

## 2023-04-03 DIAGNOSIS — E61.1 IRON DEFICIENCY: ICD-10-CM

## 2023-04-03 RX ORDER — FERROUS SULFATE TAB EC 324 MG (65 MG FE EQUIVALENT) 324 (65 FE) MG
324 TABLET DELAYED RESPONSE ORAL
Qty: 60 TABLET | Refills: 1 | Status: SHIPPED | OUTPATIENT
Start: 2023-04-03 | End: 2023-05-03

## 2023-04-03 NOTE — TELEPHONE ENCOUNTER
Patient has a referral on file - Made an attempt to schedule a new patient consultation  A voicemail was left making patient aware of their referral and instructing them to call back at the [facility] phone number in order to schedule their consultation  This is the third attempt to contact patient unsuccessfully  The referral has been closed and a department letter has been mailed to patients address on file

## 2023-04-25 DIAGNOSIS — F41.9 ANXIETY AND DEPRESSION: ICD-10-CM

## 2023-04-25 DIAGNOSIS — F32.A ANXIETY AND DEPRESSION: ICD-10-CM

## 2023-04-26 RX ORDER — ESCITALOPRAM OXALATE 10 MG/1
TABLET ORAL
Qty: 30 TABLET | Refills: 0 | Status: SHIPPED | OUTPATIENT
Start: 2023-04-26

## 2023-05-16 ENCOUNTER — CONSULT (OUTPATIENT)
Dept: HEMATOLOGY ONCOLOGY | Facility: CLINIC | Age: 33
End: 2023-05-16

## 2023-05-16 ENCOUNTER — TELEPHONE (OUTPATIENT)
Dept: SURGICAL ONCOLOGY | Facility: CLINIC | Age: 33
End: 2023-05-16

## 2023-05-16 VITALS
RESPIRATION RATE: 18 BRPM | TEMPERATURE: 96.9 F | DIASTOLIC BLOOD PRESSURE: 72 MMHG | OXYGEN SATURATION: 99 % | BODY MASS INDEX: 27.09 KG/M2 | HEIGHT: 60 IN | WEIGHT: 138 LBS | SYSTOLIC BLOOD PRESSURE: 118 MMHG | HEART RATE: 94 BPM

## 2023-05-16 DIAGNOSIS — N92.0 MENORRHAGIA WITH REGULAR CYCLE: ICD-10-CM

## 2023-05-16 DIAGNOSIS — E61.1 IRON DEFICIENCY: Primary | ICD-10-CM

## 2023-05-16 RX ORDER — SODIUM CHLORIDE 9 MG/ML
20 INJECTION, SOLUTION INTRAVENOUS ONCE
Status: CANCELLED | OUTPATIENT
Start: 2023-05-24

## 2023-05-16 NOTE — TELEPHONE ENCOUNTER
While we try to accommodate patient requests, our priority is to schedule treatment according to Doctor's orders and site availability  Does the Provider use the intake sheet or checkout note? What would be a preferred day of the week that would work best for your infusion appointment? Wednesday  Do you prefer mornings or afternoons for your appointments? 12pm  Are there any days or dates that do not work for your schedule, including any upcoming vacations? N/a  We are going to try our best to schedule you at the infusion center closest to your home  In the event that we are unable to what would be your next preferred infusion site or sites? [facility]    1  [facility]  2  [facility]    Do you have transportation to take you to all of your appointments?  yes  Would you like the infusion center to draw labs from your port? (disregard if patient doesn't have a port or need labs for infusion appointment) n/a

## 2023-05-16 NOTE — PROGRESS NOTES
Hematology/Oncology [address] 35 y o  female 1990 [id number]    Date:  5/16/2023    Assessment and Plan:  1  Iron deficiency  80-year-old female presents for consultation regarding iron deficiency anemia  She had labs completed on 3/30/2023 which showed a ferritin of 2  She has had chronic anemia for years  She has been taking oral iron  This has not improved her iron stores  Reviewed due to no response to oral iron she should proceed with IV iron therapy  She is agreeable  She will receive Venofer 200 mg weekly x6  We reviewed possibility of allergic reaction to Venofer  She will let nursing staff with any symptoms  Likely this is secondary to her menorrhagia  Follow-up with GYN as recommended  For completeness recommend evaluating stool for blood and H  pylori  Also recommended consultation with GI however she deferred at this time  She is aware to complete stool testing when she has not menstruating  Follow-up in approximately 5 to 6 months with repeat labs  - APTT; Future  - Protime-INR; Future  - VON [employer] SCREEN; Future  - H  pylori antigen, stool; Future  - Occult Blood, Fecal Immunochemical; Future  - CBC and differential; Future  - Iron Panel (Includes Ferritin, Iron Sat%, Iron, and TIBC); Future    2  Menorrhagia with regular cycle  R/o bleeding disorder  Follow up with GYN advised     - APTT; Future  - Protime-INR; Future  - V[employer] SCREEN; Future      HPI:  80-year-old female presents for consultation regarding iron deficiency anemia  This has been ongoing for years  She has never seen a hematologist before  She has been taking oral iron 1 tablet daily for at least the last 7 years she states  She has menorrhagia  She has had this for at least the last 4 years  She notes that she is presently on her  Yesterday went through a box of super tampons +5 pads  She has not followed up with GYN regarding this      ROS: Review of Systems   Constitutional: Positive for fatigue  Respiratory: Positive for cough and shortness of breath  Gastrointestinal: Positive for abdominal pain and nausea  Negative for constipation and diarrhea  Genitourinary: Negative for difficulty urinating, dysuria and hematuria  Musculoskeletal: Positive for arthralgias and myalgias  Skin: Negative  Neurological: Positive for dizziness, weakness, light-headedness and headaches  Hematological: Negative  Past Medical History:   Diagnosis Date   • Anemia     taking iron pills BID    • Chronic kidney disease     hydronephrosis   • History of transfusion    • Kidney stone    • Sepsis ([address] )    • Septic shock (HCC)    • Urinary tract infection     taking antibioitcs    • UTI (urinary tract infection) during pregnancy    • Vagina, candidiasis    • Varicella     as child       Past Surgical History:   Procedure Laterality Date   • ABDOMINAL ADHESION SURGERY N/A 2021    Procedure: LYSIS ADHESIONS;  Surgeon: Manny Jaffe MD;  [facility];  Service: Gynecology   •  SECTION N/A 2018    Procedure:  SECTION (); Surgeon: Jessica Rogers MD;  [facility] ;  Service: Obstetrics   • CYSTOSCOPY  2018    with right uretral stent   • EXAMINATION UNDER ANESTHESIA N/A 2021    Procedure: EXAM UNDER ANESTHESIA (EUA);   Surgeon: Manny Jaffe MD;  [facility];  Service: Gynecology   • LA CYSTO BLADDER W/URETERAL CATHETERIZATION Right 2018    Procedure: CYSTOSCOPY RETROGRADE PYELOGRAM WITH INSERTION STENT URETERAL;  Surgeon: Andra Fernandez MD;  [facility];  Service: Urology   • LA CYSTO/URETERO W/LITHOTRIPSY &INDWELL STENT INSRT Right 2018    Procedure: CYSTOSCOPY URETEROSCOPY WITH  RETROGRADE PYELOGRAM AND exchange  STENT URETERAl;  Surgeon: Andra Fernandez MD;  Location: [facility] MAIN OR;  Service: Urology   • LA LAPAROSCOPY SURG CHOLECYSTECTOMY N/A 2022    Procedure: Britton Kebede LAPAROSCOPIC;  Surgeon: Marivel Roberto MD;  Location: [address] [place] [address] MAIN OR;  Service: General   • HI LAPAROSCOPY W/RMVL ADNEXAL STRUCTURES Bilateral 2/12/2021    Procedure: SALPINGECTOMY, LAPAROSCOPIC;  Surgeon: Melo Velazquez MD;  [facility];  Service: Gynecology   • HI LAPS ABD PRTM&OMENTUM DX W/WO SPEC BR/ [facility] N/A 2/12/2021    Procedure: LAPAROSCOPY DIAGNOSTIC;  Surgeon: Melo Velazquez MD;  [facility];  Service: Gynecology   • TONSILLECTOMY     • TONSILLECTOMY     • TUBAL LIGATION         Social History     Socioeconomic History   • Marital status: Single     Spouse name: Not on file   • Number of children: Not on file   • Years of education: Not on file   • Highest education level: Not on file   Occupational History   • Not on file   Tobacco Use   • Smoking status: Some Days     Packs/day: 0 25     Types: Cigarettes     Last attempt to quit: 6/6/2019     Years since quitting: 3 9   • Smokeless tobacco: Never   • Tobacco comments:     2-3 a day   Vaping Use   • Vaping Use: Never used   Substance and Sexual Activity   • Alcohol use: No   • Drug use: No   • Sexual activity: Yes     Partners: Male     Birth control/protection: None, Condom Male   Other Topics Concern   • Not on file   Social History Narrative   • Not on file     Social Determinants of Health     Financial Resource Strain: Low Risk    • Difficulty of Paying Living Expenses: Not hard at all   Food Insecurity: No Food Insecurity   • Worried About Running Out of Food in the Last Year: Never true   • Ran Out of Food in the Last Year: Never true   Transportation Needs: No Transportation Needs   • Lack of Transportation (Medical): No   • Lack of Transportation (Non-Medical):  No   Physical Activity: Not on file   Stress: Not on file   Social Connections: Not on file   Intimate Partner Violence: Not on file   Housing Stability: Not on file       Family History   Problem Relation Age of Onset   • No Known Problems Mother    • Diabetes Father • Heart defect Brother         3 open heart surgeries   • Heart failure Family         Congestive   • Diabetes Maternal Grandmother        No Known Allergies      Current Outpatient Medications:   •  cyanocobalamin (VITAMIN B-12) 1000 MCG tablet, Take 1 tablet (1,000 mcg total) by mouth daily, Disp: 90 tablet, Rfl: 3  •  escitalopram (LEXAPRO) 10 mg tablet, TAKE 1 TABLET BY MOUTH EVERY DAY, Disp: 30 tablet, Rfl: 0  •  famotidine (PEPCID) 20 mg tablet, Take 1 tablet (20 mg total) by mouth 2 (two) times a day (Patient not taking: Reported on 9/20/2022), Disp: 30 tablet, Rfl: 0  •  ferrous sulfate 324 (65 Fe) mg, Take 1 tablet (324 mg total) by mouth daily before breakfast, Disp: 60 tablet, Rfl: 1  •  medroxyPROGESTERone (PROVERA) 10 mg tablet, Take 1 tablet (10 mg total) by mouth daily for 10 days, Disp: 10 tablet, Rfl: 0  •  ondansetron (ZOFRAN) 4 mg tablet, Take 1 tablet (4 mg total) by mouth every 6 (six) hours (Patient not taking: Reported on 4/20/2022), Disp: 12 tablet, Rfl: 0    Current Facility-Administered Medications:   •  cyanocobalamin injection 1,000 mcg, 1,000 mcg, Intramuscular, Q30 Days, DENISSE Walden, 1,000 mcg at 04/14/23 1204      Physical Exam:  /72 (BP Location: Left arm)   Pulse 94   Temp (!) 96 9 °F (36 1 °C) (Tympanic)   Resp 18   Ht 5' (1 524 m)   Wt 62 6 kg (138 lb)   SpO2 99%   BMI 26 95 kg/m²     Physical Exam  Vitals reviewed  Constitutional:       General: She is not in acute distress  Appearance: She is well-developed  She is not ill-appearing  HENT:      Head: Normocephalic and atraumatic  Eyes:      General: No scleral icterus  Conjunctiva/sclera: Conjunctivae normal    Cardiovascular:      Rate and Rhythm: Normal rate and regular rhythm  Heart sounds: Normal heart sounds  No murmur heard  Pulmonary:      Effort: Pulmonary effort is normal  No respiratory distress  Breath sounds: Normal breath sounds     Abdominal:      Palpations: Abdomen is soft  Tenderness: There is no abdominal tenderness  Musculoskeletal:         General: No tenderness  Normal range of motion  Cervical back: Normal range of motion and neck supple  Right lower leg: No edema  Left lower leg: No edema  Lymphadenopathy:      Cervical: No cervical adenopathy  Skin:     General: Skin is warm and dry  Neurological:      Mental Status: She is alert and oriented to person, place, and time  Cranial Nerves: No cranial nerve deficit  Psychiatric:         Mood and Affect: Mood normal          Behavior: Behavior normal        Labs:  Lab Results   Component Value Date    WBC 7 16 03/30/2023    HGB 7 9 (L) 03/30/2023    HCT 30 4 (L) 03/30/2023    MCV 70 (L) 03/30/2023     03/30/2023     Patient voiced understanding and agreement in the above discussion  Aware to contact our office with questions/symptoms in the interim  This note has been generated by voice recognition software system  Therefore, there may be spelling, grammar, and or syntax errors  Please contact if questions arise

## 2023-05-24 ENCOUNTER — HOSPITAL ENCOUNTER (OUTPATIENT)
Dept: INFUSION CENTER | Facility: HOSPITAL | Age: 33
Discharge: HOME | End: 2023-05-24

## 2023-05-24 VITALS
RESPIRATION RATE: 18 BRPM | DIASTOLIC BLOOD PRESSURE: 70 MMHG | HEART RATE: 93 BPM | TEMPERATURE: 97.7 F | SYSTOLIC BLOOD PRESSURE: 101 MMHG

## 2023-05-24 DIAGNOSIS — E61.1 IRON DEFICIENCY: Primary | ICD-10-CM

## 2023-05-24 RX ORDER — SODIUM CHLORIDE 9 MG/ML
20 INJECTION, SOLUTION INTRAVENOUS ONCE
Status: CANCELLED | OUTPATIENT
Start: 2023-05-31

## 2023-05-24 RX ORDER — SODIUM CHLORIDE 9 MG/ML
20 INJECTION, SOLUTION INTRAVENOUS ONCE
Status: COMPLETED | OUTPATIENT
Start: 2023-05-24 | End: 2023-05-24

## 2023-05-24 RX ADMIN — IRON SUCROSE 200 MG: 20 INJECTION, SOLUTION INTRAVENOUS at 13:01

## 2023-05-24 RX ADMIN — SODIUM CHLORIDE 20 ML/HR: 0.9 INJECTION, SOLUTION INTRAVENOUS at 13:00

## 2023-05-30 ENCOUNTER — TELEPHONE (OUTPATIENT)
Dept: GYNECOLOGY | Facility: CLINIC | Age: 33
End: 2023-05-30

## 2023-05-30 ENCOUNTER — DOCUMENTATION (OUTPATIENT)
Dept: HEMATOLOGY ONCOLOGY | Facility: CLINIC | Age: 33
End: 2023-05-30

## 2023-05-30 NOTE — PROGRESS NOTES
Oncology Finance Advocacy Intake and Intervention  Oncology Finance Counselor/Advocate placed call to patient. This writer informed patient that this writer is here to assist patient with billing questions, financial assistance, payment/payment plans, quotes, copayment assistance, insurance optimization, and insurance navigation. This writer conducted a thorough benefit review of copayment, deductible, and out of pocket cost. This information is documented below and has been reviewed with patient. Copayment: N/A  Deductible: N/A  Out of Pocket Cost: N/A  Insurance optimization (Limited benefit vs self-pay):  Patient assistance status:  Free Drug Applications:  Interventions:  Pt has active [employer]  Called pt to go over her benefits got her voicemail left a message for pt to call me back  Added to carete        Information above was review thoroughly with patient and patient was advise of possible assistance programs/interventions. If any question arise patient can contact this writer at below information. This information was given to patient at time of contact. Kris Mcknight  Phone:[phone]  Email: [email or web address]

## 2023-05-30 NOTE — TELEPHONE ENCOUNTER
Called and lm with patient regarding todays missed appointment  Told her to call office to reschedule

## 2023-05-31 ENCOUNTER — HOSPITAL ENCOUNTER (OUTPATIENT)
Dept: INFUSION CENTER | Facility: HOSPITAL | Age: 33
Discharge: HOME | End: 2023-05-31
Payer: COMMERCIAL

## 2023-05-31 VITALS
RESPIRATION RATE: 18 BRPM | TEMPERATURE: 97.7 F | DIASTOLIC BLOOD PRESSURE: 67 MMHG | HEART RATE: 108 BPM | SYSTOLIC BLOOD PRESSURE: 112 MMHG

## 2023-05-31 DIAGNOSIS — E61.1 IRON DEFICIENCY: Primary | ICD-10-CM

## 2023-05-31 PROCEDURE — 96365 THER/PROPH/DIAG IV INF INIT: CPT

## 2023-05-31 RX ORDER — SODIUM CHLORIDE 9 MG/ML
20 INJECTION, SOLUTION INTRAVENOUS ONCE
Status: CANCELLED | OUTPATIENT
Start: 2023-06-07

## 2023-05-31 RX ORDER — SODIUM CHLORIDE 9 MG/ML
20 INJECTION, SOLUTION INTRAVENOUS ONCE
Status: COMPLETED | OUTPATIENT
Start: 2023-05-31 | End: 2023-05-31

## 2023-05-31 RX ADMIN — SODIUM CHLORIDE 200 MG: 900 INJECTION, SOLUTION INTRAVENOUS at 13:12

## 2023-05-31 RX ADMIN — SODIUM CHLORIDE 20 ML/HR: 0.9 INJECTION, SOLUTION INTRAVENOUS at 13:08

## 2023-05-31 NOTE — PROGRESS NOTES
Pt tolerated venofer infusion without difficulty  No s/s reaction noted  Next appt confirmed and AVS provided  Left ambulatory in stable condition

## 2023-06-07 ENCOUNTER — HOSPITAL ENCOUNTER (OUTPATIENT)
Dept: INFUSION CENTER | Facility: HOSPITAL | Age: 33
Discharge: HOME | End: 2023-06-07
Payer: COMMERCIAL

## 2023-06-07 VITALS
HEART RATE: 92 BPM | RESPIRATION RATE: 18 BRPM | SYSTOLIC BLOOD PRESSURE: 99 MMHG | DIASTOLIC BLOOD PRESSURE: 68 MMHG | TEMPERATURE: 97.7 F

## 2023-06-07 DIAGNOSIS — E61.1 IRON DEFICIENCY: Primary | ICD-10-CM

## 2023-06-07 PROCEDURE — 96365 THER/PROPH/DIAG IV INF INIT: CPT

## 2023-06-07 RX ORDER — SODIUM CHLORIDE 9 MG/ML
20 INJECTION, SOLUTION INTRAVENOUS ONCE
Status: COMPLETED | OUTPATIENT
Start: 2023-06-07 | End: 2023-06-07

## 2023-06-07 RX ORDER — SODIUM CHLORIDE 9 MG/ML
20 INJECTION, SOLUTION INTRAVENOUS ONCE
Status: CANCELLED | OUTPATIENT
Start: 2023-06-14

## 2023-06-07 RX ADMIN — IRON SUCROSE 200 MG: 20 INJECTION, SOLUTION INTRAVENOUS at 13:18

## 2023-06-07 RX ADMIN — SODIUM CHLORIDE 20 ML/HR: 0.9 INJECTION, SOLUTION INTRAVENOUS at 13:15

## 2023-06-14 ENCOUNTER — HOSPITAL ENCOUNTER (OUTPATIENT)
Dept: INFUSION CENTER | Facility: HOSPITAL | Age: 33
Discharge: HOME | End: 2023-06-14
Payer: COMMERCIAL

## 2023-06-14 VITALS
RESPIRATION RATE: 18 BRPM | SYSTOLIC BLOOD PRESSURE: 92 MMHG | HEART RATE: 83 BPM | TEMPERATURE: 97.6 F | DIASTOLIC BLOOD PRESSURE: 54 MMHG

## 2023-06-14 DIAGNOSIS — E61.1 IRON DEFICIENCY: Primary | ICD-10-CM

## 2023-06-14 PROCEDURE — 96365 THER/PROPH/DIAG IV INF INIT: CPT

## 2023-06-14 RX ORDER — SODIUM CHLORIDE 9 MG/ML
20 INJECTION, SOLUTION INTRAVENOUS ONCE
Status: CANCELLED | OUTPATIENT
Start: 2023-06-21

## 2023-06-14 RX ORDER — SODIUM CHLORIDE 9 MG/ML
20 INJECTION, SOLUTION INTRAVENOUS ONCE
Status: COMPLETED | OUTPATIENT
Start: 2023-06-14 | End: 2023-06-14

## 2023-06-14 RX ADMIN — SODIUM CHLORIDE 200 MG: 9 INJECTION, SOLUTION INTRAVENOUS at 12:21

## 2023-06-14 RX ADMIN — SODIUM CHLORIDE 20 ML/HR: 0.9 INJECTION, SOLUTION INTRAVENOUS at 12:19

## 2023-06-21 ENCOUNTER — HOSPITAL ENCOUNTER (OUTPATIENT)
Dept: INFUSION CENTER | Facility: HOSPITAL | Age: 33
Discharge: HOME | End: 2023-06-21
Payer: COMMERCIAL

## 2023-06-21 VITALS
TEMPERATURE: 97.8 F | SYSTOLIC BLOOD PRESSURE: 100 MMHG | HEART RATE: 58 BPM | DIASTOLIC BLOOD PRESSURE: 64 MMHG | RESPIRATION RATE: 18 BRPM

## 2023-06-21 DIAGNOSIS — E61.1 IRON DEFICIENCY: Primary | ICD-10-CM

## 2023-06-21 PROCEDURE — 96365 THER/PROPH/DIAG IV INF INIT: CPT

## 2023-06-21 RX ORDER — SODIUM CHLORIDE 9 MG/ML
20 INJECTION, SOLUTION INTRAVENOUS ONCE
Status: COMPLETED | OUTPATIENT
Start: 2023-06-21 | End: 2023-06-21

## 2023-06-21 RX ORDER — SODIUM CHLORIDE 9 MG/ML
20 INJECTION, SOLUTION INTRAVENOUS ONCE
Status: CANCELLED | OUTPATIENT
Start: 2023-06-28

## 2023-06-21 RX ADMIN — SODIUM CHLORIDE 20 ML/HR: 9 INJECTION, SOLUTION INTRAVENOUS at 11:52

## 2023-06-21 RX ADMIN — IRON SUCROSE 200 MG: 20 INJECTION, SOLUTION INTRAVENOUS at 11:52

## 2023-06-28 ENCOUNTER — HOSPITAL ENCOUNTER (OUTPATIENT)
Dept: INFUSION CENTER | Facility: HOSPITAL | Age: 33
Discharge: HOME | End: 2023-06-28
Payer: COMMERCIAL

## 2023-06-28 VITALS
TEMPERATURE: 97.6 F | SYSTOLIC BLOOD PRESSURE: 103 MMHG | DIASTOLIC BLOOD PRESSURE: 70 MMHG | HEART RATE: 74 BPM | RESPIRATION RATE: 18 BRPM

## 2023-06-28 DIAGNOSIS — E61.1 IRON DEFICIENCY: Primary | ICD-10-CM

## 2023-06-28 PROCEDURE — 96365 THER/PROPH/DIAG IV INF INIT: CPT

## 2023-06-28 RX ORDER — SODIUM CHLORIDE 9 MG/ML
20 INJECTION, SOLUTION INTRAVENOUS ONCE
Status: COMPLETED | OUTPATIENT
Start: 2023-06-28 | End: 2023-06-28

## 2023-06-28 RX ORDER — SODIUM CHLORIDE 9 MG/ML
20 INJECTION, SOLUTION INTRAVENOUS ONCE
Status: CANCELLED | OUTPATIENT
Start: 2023-06-28

## 2023-06-28 RX ADMIN — IRON SUCROSE 200 MG: 20 INJECTION, SOLUTION INTRAVENOUS at 12:04

## 2023-06-28 RX ADMIN — SODIUM CHLORIDE 20 ML/HR: 9 INJECTION, SOLUTION INTRAVENOUS at 12:02

## 2023-06-28 NOTE — PROGRESS NOTES
Pt tolerated #6/6 venofer without complications.  confirmed appt back with office in Sept, printed lab script with appt written on top

## 2023-07-17 ENCOUNTER — CLINICAL SUPPORT (OUTPATIENT)
Dept: FAMILY MEDICINE CLINIC | Facility: CLINIC | Age: 33
End: 2023-07-17

## 2023-07-17 DIAGNOSIS — Z23 ENCOUNTER FOR IMMUNIZATION: Primary | ICD-10-CM

## 2023-07-17 PROCEDURE — 86580 TB INTRADERMAL TEST: CPT

## 2023-07-19 ENCOUNTER — CLINICAL SUPPORT (OUTPATIENT)
Dept: FAMILY MEDICINE CLINIC | Facility: CLINIC | Age: 33
End: 2023-07-19

## 2023-07-19 DIAGNOSIS — Z11.1 ENCOUNTER FOR PPD SKIN TEST READING: Primary | ICD-10-CM

## 2023-07-19 LAB
INDURATION: 0 MM
TB SKIN TEST: NEGATIVE

## 2023-08-11 ENCOUNTER — TELEPHONE (OUTPATIENT)
Dept: HEMATOLOGY ONCOLOGY | Facility: CLINIC | Age: 33
End: 2023-08-11

## 2023-08-11 NOTE — TELEPHONE ENCOUNTER
I called [facility] regarding an appointment that they have scheduled with Shona aGy PA-C scheduled on 9/19/23     Patient's voicemail is full and not accepting messages at this time    A [employer] message (if applicable) has been sent to patient relaying the above information and advising patient to call [facility] and reschedule their appointment.

## 2023-08-21 ENCOUNTER — CLINICAL SUPPORT (OUTPATIENT)
Dept: FAMILY MEDICINE CLINIC | Facility: CLINIC | Age: 33
End: 2023-08-21

## 2023-08-21 DIAGNOSIS — Z11.1 SCREENING FOR TUBERCULOSIS: Primary | ICD-10-CM

## 2023-08-21 PROCEDURE — 86580 TB INTRADERMAL TEST: CPT

## 2023-08-22 ENCOUNTER — TELEPHONE (OUTPATIENT)
Dept: HEMATOLOGY ONCOLOGY | Facility: CLINIC | Age: 33
End: 2023-08-22

## 2023-08-22 NOTE — TELEPHONE ENCOUNTER
Appointment Change  Cancel, Reschedule, Change to Virtual      Who are you speaking with? Patient   If it is not the patient, are they listed on an active communication consent form? N/A   Which provider is the appointment scheduled with? Petrona Bergman PA-C   When is the appointment scheduled? Please list date and time 9/20/23 10:00AM   At which location is the appointment scheduled to take place? [facility]   Was the appointment rescheduled or changed from an in person visit to a virtual visit? If so, please list the details of the change. 9/26/23 1:30PM   What is the reason for the appointment change? Provider   Was [facility] transport scheduled for this visit? No   Does [facility] transport need to be scheduled for the new visit (if applicable) No   Does the patient need an infusion appointment rescheduled? No   Does the patient have an infusion appointment scheduled? If so, when? No   Is the patient undergoing chemotherapy? No   Was the no-show policy reviewed for appointments being changed with less then 24 hours of notice?  No

## 2023-08-23 ENCOUNTER — CLINICAL SUPPORT (OUTPATIENT)
Dept: FAMILY MEDICINE CLINIC | Facility: CLINIC | Age: 33
End: 2023-08-23

## 2023-08-23 DIAGNOSIS — Z11.1 ENCOUNTER FOR PPD SKIN TEST READING: Primary | ICD-10-CM

## 2023-08-23 LAB
INDURATION: 0 MM
TB SKIN TEST: NEGATIVE

## 2023-08-29 ENCOUNTER — APPOINTMENT (OUTPATIENT)
Dept: LAB | Facility: HOSPITAL | Age: 33
End: 2023-08-29
Payer: COMMERCIAL

## 2023-08-29 ENCOUNTER — OFFICE VISIT (OUTPATIENT)
Dept: FAMILY MEDICINE CLINIC | Facility: CLINIC | Age: 33
End: 2023-08-29

## 2023-08-29 ENCOUNTER — HOSPITAL ENCOUNTER (OUTPATIENT)
Dept: RADIOLOGY | Facility: HOSPITAL | Age: 33
Discharge: HOME | End: 2023-08-29
Payer: COMMERCIAL

## 2023-08-29 VITALS
SYSTOLIC BLOOD PRESSURE: 122 MMHG | OXYGEN SATURATION: 98 % | DIASTOLIC BLOOD PRESSURE: 84 MMHG | HEIGHT: 60 IN | RESPIRATION RATE: 18 BRPM | BODY MASS INDEX: 29.43 KG/M2 | WEIGHT: 149.9 LBS | TEMPERATURE: 98.2 F | HEART RATE: 82 BPM

## 2023-08-29 DIAGNOSIS — R14.0 BLOATING: ICD-10-CM

## 2023-08-29 DIAGNOSIS — K59.00 CONSTIPATION, UNSPECIFIED CONSTIPATION TYPE: ICD-10-CM

## 2023-08-29 DIAGNOSIS — F41.9 ANXIETY AND DEPRESSION: ICD-10-CM

## 2023-08-29 DIAGNOSIS — E61.1 IRON DEFICIENCY: ICD-10-CM

## 2023-08-29 DIAGNOSIS — F32.A ANXIETY AND DEPRESSION: ICD-10-CM

## 2023-08-29 DIAGNOSIS — D50.0 IRON DEFICIENCY ANEMIA DUE TO CHRONIC BLOOD LOSS: ICD-10-CM

## 2023-08-29 DIAGNOSIS — N92.0 MENORRHAGIA WITH REGULAR CYCLE: ICD-10-CM

## 2023-08-29 DIAGNOSIS — K59.00 CONSTIPATION, UNSPECIFIED CONSTIPATION TYPE: Primary | ICD-10-CM

## 2023-08-29 LAB
APTT PPP: 32 SECONDS (ref 23–37)
BASOPHILS # BLD AUTO: 0.03 THOUSANDS/ÂΜL (ref 0–0.1)
BASOPHILS NFR BLD AUTO: 1 % (ref 0–1)
EOSINOPHIL # BLD AUTO: 0.11 THOUSAND/ÂΜL (ref 0–0.61)
EOSINOPHIL NFR BLD AUTO: 2 % (ref 0–6)
ERYTHROCYTE [DISTWIDTH] IN BLOOD BY AUTOMATED COUNT: 13.6 % (ref 11.6–15.1)
FERRITIN SERPL-MCNC: 16 NG/ML (ref 11–307)
HCT VFR BLD AUTO: 37.7 % (ref 34.8–46.1)
HGB BLD-MCNC: 12 G/DL (ref 11.5–15.4)
IMM GRANULOCYTES # BLD AUTO: 0.01 THOUSAND/UL (ref 0–0.2)
IMM GRANULOCYTES NFR BLD AUTO: 0 % (ref 0–2)
INR PPP: 1.07 (ref 0.84–1.19)
IRON SATN MFR SERPL: 25 % (ref 15–50)
IRON SERPL-MCNC: 99 UG/DL (ref 50–212)
LYMPHOCYTES # BLD AUTO: 1.42 THOUSANDS/ÂΜL (ref 0.6–4.47)
LYMPHOCYTES NFR BLD AUTO: 26 % (ref 14–44)
MCH RBC QN AUTO: 28.4 PG (ref 26.8–34.3)
MCHC RBC AUTO-ENTMCNC: 31.8 G/DL (ref 31.4–37.4)
MCV RBC AUTO: 89 FL (ref 82–98)
MONOCYTES # BLD AUTO: 0.33 THOUSAND/ÂΜL (ref 0.17–1.22)
MONOCYTES NFR BLD AUTO: 6 % (ref 4–12)
NEUTROPHILS # BLD AUTO: 3.56 THOUSANDS/ÂΜL (ref 1.85–7.62)
NEUTS SEG NFR BLD AUTO: 65 % (ref 43–75)
NRBC BLD AUTO-RTO: 0 /100 WBCS
PLATELET # BLD AUTO: 238 THOUSANDS/UL (ref 149–390)
PMV BLD AUTO: 10.8 FL (ref 8.9–12.7)
PROTHROMBIN TIME: 14.2 SECONDS (ref 11.6–14.5)
RBC # BLD AUTO: 4.23 MILLION/UL (ref 3.81–5.12)
TIBC SERPL-MCNC: 394 UG/DL (ref 250–450)
UIBC SERPL-MCNC: 295 UG/DL (ref 155–355)
WBC # BLD AUTO: 5.46 THOUSAND/UL (ref 4.31–10.16)

## 2023-08-29 PROCEDURE — 99214 OFFICE O/P EST MOD 30 MIN: CPT | Performed by: NURSE PRACTITIONER

## 2023-08-29 PROCEDURE — 36415 COLL VENOUS BLD VENIPUNCTURE: CPT

## 2023-08-29 PROCEDURE — 85240 CLOT FACTOR VIII AHG 1 STAGE: CPT

## 2023-08-29 PROCEDURE — 85025 COMPLETE CBC W/AUTO DIFF WBC: CPT

## 2023-08-29 PROCEDURE — 83550 IRON BINDING TEST: CPT

## 2023-08-29 PROCEDURE — 85730 THROMBOPLASTIN TIME PARTIAL: CPT

## 2023-08-29 PROCEDURE — 85610 PROTHROMBIN TIME: CPT

## 2023-08-29 PROCEDURE — 74018 RADEX ABDOMEN 1 VIEW: CPT

## 2023-08-29 PROCEDURE — 82728 ASSAY OF FERRITIN: CPT

## 2023-08-29 PROCEDURE — 85246 CLOT FACTOR VIII VW ANTIGEN: CPT

## 2023-08-29 PROCEDURE — 83540 ASSAY OF IRON: CPT

## 2023-08-29 PROCEDURE — 85245 CLOT FACTOR VIII VW RISTOCTN: CPT

## 2023-08-29 RX ORDER — ESCITALOPRAM OXALATE 10 MG/1
10 TABLET ORAL DAILY
Qty: 90 TABLET | Refills: 0 | Status: SHIPPED | OUTPATIENT
Start: 2023-08-29

## 2023-08-29 NOTE — PATIENT INSTRUCTIONS
Meal Planning with the Plate Method   AMBULATORY CARE:   Meal planning with the plate method  is a way for people with diabetes to plan meals. The plate method can help you eat the right amount of carbohydrates and keep your blood sugar levels under control. Carbohydrates naturally raise your blood sugar level. Your blood sugar level can rise too high if you eat too much carbohydrate at one time. Carbohydrates are found in starches (bread, cereal, starchy vegetables, and beans), fruit, milk, yogurt, and sweets. How to use the plate method to plan meals:   Draw an imaginary line down the middle of a 9-inch dinner plate. On one side, draw another line to divide that section in half. Your plate will have 3 sections. Fill the largest section of your plate with non-starchy vegetables. These include broccoli, spinach, cucumbers, peppers, cauliflower, and tomatoes. Add a starch to 1 of the small sections of your plate. Starches include pasta, rice, whole-grain bread, tortillas, corn, potatoes, and beans. Add meat or another source of protein to the other small section of your plate. Examples include chicken or turkey without skin, fish, lean beef or pork, low-fat cheese, tofu, or eggs. Add dairy or fruit next your plate if your meal plan allows. Examples of dairy include skim or 1% milk or low-fat yogurt. If you do not drink milk, you may be able to add another serving of starchy food instead. Add a low-calorie or calorie-free drink. Examples include water or unsweetened tea or coffee.        Serving sizes of foods:   Non-starchy vegetables:      ½ cup of cooked vegetables or 1 cup of raw vegetables    ½ cup of vegetable juice    Starches:      1 ounce of whole-wheat bread or 1 small (6 inch) flour or corn tortilla    1 small (4 inch) pancake (about ¼ inch thick)    ¾ cup of dry, unsweetened, whole-grain ready-to-eat cereal or ¼ cup of low-fat granola    ½ cup of cooked cereal or oatmeal    ? cup of rice or pasta    ½ cup of corn, green peas, sweet potatoes, or mashed potatoes    ½ cup of cooked beans and peas (garbanzo, singh, kidney, white, split, black-eyed)    Meat and other protein sources:      3 to 4 ounces of any lean meat, fish, or poultry    ½ cup of tofu or tempeh    1 large egg    1½ ounces (about 2 tablespoons) of nuts or 2 tablespoons of peanut butter    Fruit:      1 small piece of fresh fruit     ½ cup of canned or fresh fruit or unsweetened fruit juice    ¼ cup of dried fruit    Milk and yogurt:      1 cup (8 ounces) of skim or 1% milk    ¾ cup (6 ounces) of plain, non-fat yogurt    Other healthy nutrition tips:   Limit salt and sugar. Choose and prepare foods and drinks with less salt and added sugars. Use the nutrition information on food labels to help you make healthy choices. The percent daily value listed on the food label tells you whether a food is low or high in certain nutrients. A percent daily value of 5% or less means that the food is low in a nutrient. A percent daily value of 20% or more means that the food is high in a nutrient. Choose healthy fats. Choose healthy fats such as polyunsaturated and monounsaturated fats in place of unhealthy fats. Healthy fats are found in vegetable oils such as soybean, corn, canola, olive, and sunflower oil. Unhealthy fats are saturated fats, trans fats, and cholesterol. Unhealthy fats are found in shortening, butter, stick margarine, and animal fat. Ask your healthcare provider if alcohol is okay for you. Generally, men 72 or older and women should limit alcohol to 1 drink within 24 hours and 7 within 1 week. Men 21 to 64 years should limit alcohol to 2 drinks a day and 14 within 1 week. Your provider can tell you how many drinks are okay for you within 24 hours or within 1 week. A drink of alcohol is 12 ounces of beer, 5 ounces of wine, or 1½ ounces of liquor. Always have food when you drink alcohol.  Your blood sugar may fall to a low level if you drink when your stomach is empty. © Copyright [employer] 2022 Information is for End User's use only and may not be sold, redistributed or otherwise used for commercial purposes. The above information is an  only. It is not intended as medical advice for individual conditions or treatments. Talk to your doctor, nurse or pharmacist before following any medical regimen to see if it is safe and effective for you.

## 2023-08-29 NOTE — PROGRESS NOTES
Name: Sharyn Baptiste      : 1990      MRN: [id number]  Encounter Provider: DENISSE Rodriguez  Encounter Date: 2023   Encounter department: [address] [facility],[address]     1. Constipation, unspecified constipation type  Assessment & Plan:  Constipation and bloating, BM 1-2 times per week  Encouraged increased water and fiber intake   will check KUB  Family hx of gastric CA   Referral to GI placed     Orders:  -     XR abdomen 1 view kub; Future; Expected date: 2023  -     Ambulatory Referral to Gastroenterology; Future    2. Bloating  -     XR abdomen 1 view kub; Future; Expected date: 2023  -     Ambulatory Referral to Gastroenterology; Future    3. Anxiety and depression  -     escitalopram (LEXAPRO) 10 mg tablet; Take 1 tablet (10 mg total) by mouth daily    4. Iron deficiency anemia due to chronic blood loss  Assessment & Plan:  Lab Results   Component Value Date    WBC 5.46 2023    HGB 12.0 2023    HCT 37.7 2023    MCV 89 2023     2023     She is following with hematology, has completed iron infusions  She missed her GYN appointment, encouraged to reschedule              Subjective     36 YO female with PMH depression and anemia presents today for follow up. Patient reports that she has been having difficulty with constipation and bloating after most meals. She goes 3-4 days w/o a bowel movement. Reports that she is drinking increased water but not eating much fiber. She reports positive family hx of stomach cancer. Review of Systems   Constitutional: Negative for chills and fever. HENT: Negative for ear pain and sore throat. Eyes: Negative for pain and visual disturbance. Respiratory: Negative for cough and shortness of breath. Cardiovascular: Negative for chest pain and palpitations. Gastrointestinal: Positive for abdominal distention, abdominal pain and constipation. PM&R Consult Follow Up Note  Vanessa Vale 28 y o  male MRN: [id number]  [facility]counter: [id number]     Assessment and Recommendations:  Mr Vanessa Vale is a 28 y o  male with PMH of ALL s/p chemo/XRT, L parafalcine meningioma, and hydrocephalus s/p VPS who presented with change in mental status concerning for seizure       Cerebral Meningioma   - s/p resection, bilateral parasagittal craniotomy   - slow steroid taper every 72 hours   - NSx following   - Sleep wake cycle with melatonin and quetiapine at night    - GI PPx with protonix   - DVT PPx with Lovenox, SCDs   - PT/OT/SLP    Seizures   - Keppra 2g BID   - Seizure precautions    Spasticity   - Significantly improved in UE   - Decreased tone in BL LE   - Demonstrated ROM for his brother Ashwini Hannon at last visit   - Has bilateral clonus  - Continue multi-podus on LLE    Adjustment Disorder with depressed mood   - Dr Sarai Hill with Rehab Psychology following  Neurogenic Bladder   - Condom catheter   - Will need bladder training program/timed voiding program     Neurogenic Bowel   - Abdominal exam benign    - Last BM documented 6/20/19   - On miralax daily and senna-docusate tablet  - If no BM today consider suppository  Impairments:  Impaired functional mobility and ability to perform ADL's  Impaired cognition and speech    Recommendations:  - Continue PT/OT while inpatient  Discussed patient's progress with therapies who state patient is motivated and tolerating therapies     - At this time, I think that Mr Elizabeth Warren would be best served by specialized care at a brain injury rehab program  He has issues with initation, processing, attention, and motor planning that are significantly affecting his mobility/function  He also has a significant aphasia, which is improving, but I anticipate he may need a longer length of stay  He has functional needs, as he is significantly below his baseline   I anticipate as swelling and Negative for blood in stool and vomiting. Genitourinary: Negative for dysuria and hematuria. Musculoskeletal: Negative for arthralgias and back pain. Skin: Negative for color change and rash. Neurological: Negative for seizures and syncope. All other systems reviewed and are negative. Past Medical History:   Diagnosis Date   • Anemia     taking iron pills BID    • Chronic kidney disease     hydronephrosis   • History of transfusion    • Kidney stone    • Sepsis ([address])    • Septic shock (HCC)    • Urinary tract infection     taking antibioitcs    • UTI (urinary tract infection) during pregnancy    • Vagina, candidiasis    • Varicella     as child     Past Surgical History:   Procedure Laterality Date   • ABDOMINAL ADHESION SURGERY N/A 2021    Procedure: LYSIS ADHESIONS;  Surgeon: Greg Dudley MD;  [facility]: [facility];  Service: Gynecology   •  SECTION N/A 2018    Procedure:  SECTION (); Surgeon: Judy Valdes MD;  Location: [facility] LD;  Service: Obstetrics   • CYSTOSCOPY  2018    with right uretral stent   • EXAMINATION UNDER ANESTHESIA N/A 2021    Procedure: EXAM UNDER ANESTHESIA (EUA);   Surgeon: Greg Dudley MD;  Location: [facility];  Service: Gynecology   • WA CYSTO BLADDER W/URETERAL CATHETERIZATION Right 2018    Procedure: CYSTOSCOPY RETROGRADE PYELOGRAM WITH INSERTION STENT URETERAL;  Surgeon: Fernando Hoyos MD;  Location[facility];  Service: Urology   • WA CYSTO/URETERO W/LITHOTRIPSY &INDWELL STENT INSRT Right 2018    Procedure: CYSTOSCOPY URETEROSCOPY WITH  RETROGRADE PYELOGRAM AND exchange  STENT URETERAl;  Surgeon: Fernando Hoyos MD;  [place] [address] [place], [place] [address] MA[facility] OR;  Service: Urology   • WA LAPAROSCOPY SURG CHOLECYSTECTOMY N/A 2022    Procedure: Amee Proud;  Surgeon: Tre Sierra MD;  Location: [address] [place], [place] [address] [place] [facility];  Service: General   • WA LAPAROSCOPY W/RMVL ADNEXAL STRUCTURES Bilateral 2021    Procedure: inflammation improve, he will continue to improve  He has good support at home  He has medical need for brain injury physiatrist to follow his care given need to monitor his mental status, monitor for seizures/hydrocephalus with management of his VPS, as well as managing his steroid taper, sequelae of brain injury (neuroendocrine, contracture, VTE, wounds), bowel/bladder management, spasticity management, and management of his adjustment disorder with rehab psychology  This also requires skilled rehab RN care  I discussed the above with his brother Franklin Lewis and the patient who expressed understanding   - Wife touring [facility]  Patient states they are interested  Thank you for allowing the PM&R service to participate in the care of Mr Jono Delvalle  We will continue to follow he progress with you  Please do not hesitate to call with questions or concerns  Interval History:   - MRI Brain performed on 6/27/19 was reviewed which confirmed meningioma resection with residual disease within the posterior sagittal sinus  Some cytotoxic edema along the R margin of the surgical cavity, additional smaller similar focus of cytotoxic edema in left anterior parietal cortex, concerning for arterial/venous infarction  Small bilateral convexity subdural collections  VPS in place  Seen by SLP, who report severe expressive aphasia, with at least moderate receptive aphasia  - Patient was transferred to the floors on 6/27    - NSx agreed with not proceeding with CTA as it would likely not   Subjective: Pt s/e at bedside  Speech has significantly improved  Wants to know when he can be discharged to rehab  Denies any headaches, significant pain, changes in vision, difficulty swallowing, coughing, choking, CP, SOB, abdominal pain, N/V  He is passing gas, but has not had a bowel movement in a while  He has had a poor appetite  He continues to have bilateral LE weakness, but his UE movement has improved   He SALPINGECTOMY, LAPAROSCOPIC;  Surgeon: Desiree Hopper MD;  [facility];  Service: Gynecology   • SD LAPS ABD PRTM&OMENTUM DX W/WO SPEC BR/WA SPX N/A 2021    Procedure: LAPAROSCOPY DIAGNOSTIC;  Surgeon: Desiree Hopper MD;  [facility];  Service: Gynecology   • TONSILLECTOMY     • TONSILLECTOMY     • TUBAL LIGATION       Family History   Problem Relation Age of Onset   • No Known Problems Mother    • Diabetes Father    • Heart defect Brother         3 open heart surgeries   • Heart failure Family         Congestive   • Diabetes Maternal Grandmother      Social History     Socioeconomic History   • Marital status: Single     Spouse name: None   • Number of children: None   • Years of education: None   • Highest education level: None   Occupational History   • None   Tobacco Use   • Smoking status: Some Days     Packs/day: 0.25     Types: Cigarettes     Last attempt to quit: 2019     Years since quittin.2   • Smokeless tobacco: Never   • Tobacco comments:     2-3 a day   Vaping Use   • Vaping Use: Never used   Substance and Sexual Activity   • Alcohol use: No   • Drug use: No   • Sexual activity: Yes     Partners: Male     Birth control/protection: None, Condom Male   Other Topics Concern   • None   Social History Narrative   • None     Social Determinants of Health     Financial Resource Strain: Low Risk  (2022)    Overall Financial Resource Strain (CARDIA)    • Difficulty of Paying Living Expenses: Not hard at all   Food Insecurity: No Food Insecurity (2022)    Hunger Vital Sign    • Worried About Running Out of Food in the Last Year: Never true    • Ran Out of Food in the Last Year: Never true   Transportation Needs: No Transportation Needs (2022)    PRAPARE - Transportation    • Lack of Transportation (Medical): No    • Lack of Transportation (Non-Medical):  No   Physical Activity: Not on file   Stress: Not on file   Social Connections: Not on file   Intimate Partner denies painful paresthesias, numbness, tingling      Objective:    Physical Therapy 6/27/19:  Cognition   Overall Cognitive Status Impaired   Arousal/Participation Lethargic   Attention Difficulty attending to directions   Orientation Level Oriented to person   Following Commands Follows one step commands with increased time or repetition   Comments  Pt continues to remain largely non-verbal throughout session and requires frequent cues for active participation in session   Bed Mobility   Rolling R 2  Maximal assistance   Additional items Assist x 2; Increased time required;Verbal cues;LE management   Rolling L 2  Maximal assistance   Additional items Assist x 2; Increased time required;Verbal cues;LE management   Supine to Sit 2  Maximal assistance   Additional items Assist x 2; Increased time required;Verbal cues;LE management   Sit to Supine 2  Maximal assistance   Additional items Assist x 2; Increased time required;Verbal cues;LE management   Transfers   Sit to Stand 2  Maximal assistance   Additional items Assist x 2; Increased time required;Verbal cues  (Unable to fully clear buttocks, x2 trials)   Stand to Sit 2  Maximal assistance   Additional items Assist x 2; Increased time required;Verbal cues  (Unable to fully clear buttocks, x2 trials)     Occupational Therapy 6/29/19:  Bed Mobility   Rolling R 2  Maximal assistance   Additional items Assist x 2; Increased time required;Verbal cues;LE management   Rolling L 2  Maximal assistance   Additional items Assist x 2; Increased time required;LE management   Supine to Sit 2  Maximal assistance   Additional items Assist x 1; Increased time required;Verbal cues;LE management   Sit to Supine 2  Maximal assistance   Additional items Assist x 1; Increased time required;Verbal cues;LE management   Additional Comments Pt does not initiate any transitional movements despite gesitural/models and backward chaining       Neuromuscular Education   Trunk Control Pt sat edge of bed x 15-20 minutes at a mod/max A level x 1 with poor trunk engagement/control  OT assisted patient to place BUE on bed rails to A with stabilizing self but despite BUE support, patient was unable to achieve upright posture  Pt demonstrate minimal righting reaction with anterior leaning/shifting  Pt is able to extend head into a more neutral position from forward neck flexion but unable to sustain in this position more than 30 seconds at a time  Manual assistance of mod/max A provided at pelvis/hips/shoulders  Pt unable to initiate motor initiation for scooting up/down edge of bed        Pt was able to replicate the following gestures: thumbs up/down with RUE, peace sign of both hands, and give OT high five with RUE at various planes/degrees of motion but had difficulty extending above 90 degrees         Cognition   Overall Cognitive Status Impaired   Arousal/Participation Arousable   Attention Difficulty attending to directions   Orientation Level Oriented to place  (oriented to month, day of week with binary choices)   Memory Decreased recall of precautions;Decreased short term memory   Following Commands Unable to follow one step commands   Comments Pt is expressively aphasic but able to state the following "June, [facility], Saturday, hello, and Cipriano Miller (daughters name?)  Pt requires maximal coaxing/cueing to vocalize and becomes tearful and frustrated with his communication limitations        Pt is highly restless pulling at scrotum and tobi pad at groin or at finger pulse ox lining constantly but is redirectable with manual assistance but does not follow commands to stop pulling verbally        Pt follows appx 25% of multimodal commands supplemented by gestures/modeling movement/behavior           Speech Therapy 6/28/19:  Assessment:  Improvement in speech output today, but continues with at least a moderate expressive aphasia       Plan/Recommendations:  Continue ST   Will complete re-evaluation next week and Violence: Not on file   Housing Stability: Not on file     Current Outpatient Medications on File Prior to Visit   Medication Sig   • cyanocobalamin (VITAMIN B-12) 1000 MCG tablet Take 1 tablet (1,000 mcg total) by mouth daily   • famotidine (PEPCID) 20 mg tablet Take 1 tablet (20 mg total) by mouth 2 (two) times a day (Patient not taking: Reported on 9/20/2022)   • ferrous sulfate 324 (65 Fe) mg Take 1 tablet (324 mg total) by mouth daily before breakfast   • medroxyPROGESTERone (PROVERA) 10 mg tablet Take 1 tablet (10 mg total) by mouth daily for 10 days   • ondansetron (ZOFRAN) 4 mg tablet Take 1 tablet (4 mg total) by mouth every 6 (six) hours (Patient not taking: Reported on 4/20/2022)     No Known Allergies  Immunization History   Administered Date(s) Administered   • DTP 07/09/1996   • DTaP 07/09/1996   • Hep B, Adolescent or Pediatric 05/26/1996, 07/09/1996, 01/22/2002   • Hep B, adult 04/05/2012, 05/02/2012, 07/29/2013   • INFLUENZA 09/30/2011   • IPV 07/09/1996, 10/27/2006   • MMR 05/28/1996, 01/22/2001   • Meningococcal MCV4P 10/31/2006   • Td (adult), Unspecified 10/28/2004   • Td (adult), adsorbed 10/28/2004   • Tdap 10/27/2006   • Tuberculin Skin Test-PPD Intradermal 06/16/2017, 06/27/2017, 07/17/2023, 08/21/2023   • Varicella 09/30/2011       Objective     /84 (BP Location: Left arm, Patient Position: Sitting, Cuff Size: Standard)   Pulse 82   Temp 98.2 °F (36.8 °C) (Temporal)   Resp 18   Ht 5' (1.524 m)   Wt 68 kg (149 lb 14.4 oz)   SpO2 98%   BMI 29.28 kg/m²     Physical Exam  Vitals and nursing note reviewed. Constitutional:       General: She is not in acute distress. Appearance: She is well-developed. She is not diaphoretic. HENT:      Head: Normocephalic and atraumatic. Eyes:      Conjunctiva/sclera: Conjunctivae normal.      Pupils: Pupils are equal, round, and reactive to light. Cardiovascular:      Rate and Rhythm: Normal rate and regular rhythm.    Pulmonary: Effort: Pulmonary effort is normal. No respiratory distress. Breath sounds: Normal breath sounds. No wheezing. Abdominal:      General: Bowel sounds are normal. There is no distension. Palpations: Abdomen is soft. Tenderness: There is no abdominal tenderness. There is no guarding or rebound. Musculoskeletal:         General: Normal range of motion. Cervical back: Normal range of motion and neck supple. Lymphadenopathy:      Cervical: No cervical adenopathy. Skin:     General: Skin is warm and dry. Capillary Refill: Capillary refill takes less than 2 seconds. Findings: No rash. Neurological:      Mental Status: She is alert and oriented to person, place, and time.    Psychiatric:         Behavior: Behavior normal.       Kiki Kraus update goals of care  Vital Signs:      Temp:  [97 9 °F (36 6 °C)-98 8 °F (37 1 °C)] 97 9 °F (36 6 °C)  HR:  [69-90] 70  Resp:  [13-16] 13  BP: (121-131)/(75-77) 121/75   Intake/Output Summary (Last 24 hours) at 7/1/2019 0837  Last data filed at 7/1/2019 0825  Gross per 24 hour   Intake 1100 ml   Output 1000 ml   Net 100 ml        Laboratory:      Lab Results   Component Value Date    HGB 9 7 (L) 06/29/2019    HCT 28 0 (L) 06/29/2019    WBC 17 25 (H) 06/29/2019     Lab Results   Component Value Date    BUN 17 06/29/2019    K 3 6 06/29/2019     06/29/2019    GLUCOSE 159 (H) 06/24/2019    CREATININE 0 53 (L) 06/29/2019     Lab Results   Component Value Date    PROTIME 13 6 06/20/2019    INR 1 03 06/20/2019        Gen: No acute distress, Well-nourished, well-appearing  HEENT: Moist mucus membranes, craniotomy incision healing well  Some edema  No drainage or signs of active infection  Sutures in place  Cardiovascular: Regular rate, rhythm, S1/S2  Heme/Extr: No edema/clubbing/cyanosis  Pulmonary: Non-labored breathing  Lungs CTAB  : Condom catheter  GI: Soft, non-tender, non-distended  BS+  MSK: He has functional ROM in his UE  PROM is full in his BL LE, except tightness in his L heel cord  Able to range to neutral  See MMT below  Some toe flexion noted on the L/R  No other voluntary movement noted in LE at this time  Neuro: AAOx3, Speech has significantly improved in regards to fluency, repetition, and comprehension  Attempts to follow commands  Requires visual and physical cues at times  Answers simple questions appropriately  He has decreased tone in his BLE except L heel cord as noted above  Tone in his UE has significantly improved  L > R tremor  DTRs: R toe is equivocal  L toe is flexor with plantar stim   Bilateral sustained clonus     MMT:   Strength:   Right  Left  Site  Right  Left  Site    5 4+ S Ab: Shoulder Abductors  0  0  HF: Hip Flexors    5 4+  EF: Elbow Flexors  0  0 KF: Knee Flexors    5  4 EE: Elbow Extensors  0  0  KE: Knee Extensors    5  4  WE: Wrist Extensors  0  0  DR: Dorsi Flexors    5  4  FF: Finger Flexors  0  0  PF: Plantar Flexors    3  3  HI: Hand Intrinsics  0  1  EHL: Extensor Hallucis Longus   Psych: Flat affect  Cooperative

## 2023-08-30 PROBLEM — K59.00 CONSTIPATION: Status: ACTIVE | Noted: 2023-08-30

## 2023-08-30 NOTE — ASSESSMENT & PLAN NOTE
Constipation and bloating, BM 1-2 times per week  Encouraged increased water and fiber intake   will check KUB  Family hx of gastric CA   Referral to GI placed

## 2023-08-30 NOTE — ASSESSMENT & PLAN NOTE
Lab Results   Component Value Date    WBC 5.46 08/29/2023    HGB 12.0 08/29/2023    HCT 37.7 08/29/2023    MCV 89 08/29/2023     08/29/2023     She is following with hematology, has completed iron infusions  She missed her GYN appointment, encouraged to reschedule

## 2023-09-01 LAB
FACT XIIIA PPP-ACNC: 59 % (ref 56–140)
VWF AG ACT/NOR PPP IA: 69 % (ref 50–200)
VWF:RCO ACT/NOR PPP PL AGG: 50 % (ref 50–200)

## 2023-09-08 DIAGNOSIS — K59.00 CONSTIPATION, UNSPECIFIED CONSTIPATION TYPE: Primary | ICD-10-CM

## 2023-09-08 RX ORDER — POLYETHYLENE GLYCOL 3350 17 G/17G
POWDER, FOR SOLUTION ORAL
Qty: 578 G | Refills: 2 | Status: SHIPPED | OUTPATIENT
Start: 2023-09-08

## 2023-09-16 ENCOUNTER — APPOINTMENT (EMERGENCY)
Dept: CT IMAGING | Facility: HOSPITAL | Age: 33
End: 2023-09-16
Payer: COMMERCIAL

## 2023-09-16 ENCOUNTER — HOSPITAL ENCOUNTER (EMERGENCY)
Facility: HOSPITAL | Age: 33
Discharge: HOME | End: 2023-09-16
Attending: EMERGENCY MEDICINE
Payer: COMMERCIAL

## 2023-09-16 VITALS
DIASTOLIC BLOOD PRESSURE: 66 MMHG | TEMPERATURE: 98.3 F | SYSTOLIC BLOOD PRESSURE: 118 MMHG | HEART RATE: 83 BPM | WEIGHT: 156.75 LBS | RESPIRATION RATE: 16 BRPM | OXYGEN SATURATION: 100 % | BODY MASS INDEX: 30.61 KG/M2
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
## 2023-09-16 DIAGNOSIS — R10.9 ABDOMINAL PAIN: ICD-10-CM

## 2023-09-16 DIAGNOSIS — S01.81XA CHIN LACERATION, INITIAL ENCOUNTER: ICD-10-CM

## 2023-09-16 DIAGNOSIS — N39.0 UTI (URINARY TRACT INFECTION): ICD-10-CM

## 2023-09-16 DIAGNOSIS — S39.91XA INJURY OF ABDOMEN, INITIAL ENCOUNTER: ICD-10-CM

## 2023-09-16 DIAGNOSIS — R16.0 HEPATOMEGALY: ICD-10-CM

## 2023-09-16 LAB
ANION GAP SERPL CALCULATED.3IONS-SCNC: 3 MMOL/L
BACTERIA UR QL AUTO: ABNORMAL /HPF
BILIRUB UR QL STRIP: NEGATIVE
BUN SERPL-MCNC: 12 MG/DL (ref 5–25)
CALCIUM SERPL-MCNC: 9 MG/DL (ref 8.4–10.2)
CHLORIDE SERPL-SCNC: 103 MMOL/L (ref 96–108)
CLARITY UR: ABNORMAL
CO2 SERPL-SCNC: 28 MMOL/L (ref 21–32)
COLOR UR: ABNORMAL
CREAT SERPL-MCNC: 0.53 MG/DL (ref 0.6–1.3)
EXT PREGNANCY TEST URINE: NEGATIVE
EXT. CONTROL: NORMAL
GFR SERPL CREATININE-BSD FRML MDRD: 125 ML/MIN/1.73SQ M
GLUCOSE SERPL-MCNC: 92 MG/DL (ref 65–140)
GLUCOSE UR STRIP-MCNC: NEGATIVE MG/DL
HCG SERPL QL: NEGATIVE
HGB UR QL STRIP.AUTO: NEGATIVE
HYALINE CASTS #/AREA URNS LPF: ABNORMAL /LPF
KETONES UR STRIP-MCNC: NEGATIVE MG/DL
LEUKOCYTE ESTERASE UR QL STRIP: ABNORMAL
MUCOUS THREADS UR QL AUTO: ABNORMAL
NITRITE UR QL STRIP: POSITIVE
NON-SQ EPI CELLS URNS QL MICRO: ABNORMAL /HPF
PH UR STRIP.AUTO: 6 [PH]
POTASSIUM SERPL-SCNC: 4.2 MMOL/L (ref 3.5–5.3)
PROT UR STRIP-MCNC: NEGATIVE MG/DL
RBC #/AREA URNS AUTO: ABNORMAL /HPF
SODIUM SERPL-SCNC: 134 MMOL/L (ref 135–147)
SP GR UR STRIP.AUTO: 1.02 (ref 1–1.03)
UROBILINOGEN UR STRIP-ACNC: <2 MG/DL
WBC #/AREA URNS AUTO: ABNORMAL /HPF

## 2023-09-16 PROCEDURE — 80048 BASIC METABOLIC PNL TOTAL CA: CPT

## 2023-09-16 PROCEDURE — 81025 URINE PREGNANCY TEST: CPT

## 2023-09-16 PROCEDURE — 84703 CHORIONIC GONADOTROPIN ASSAY: CPT | Performed by: EMERGENCY MEDICINE

## 2023-09-16 PROCEDURE — 96361 HYDRATE IV INFUSION ADD-ON: CPT

## 2023-09-16 PROCEDURE — 72125 CT NECK SPINE W/O DYE: CPT

## 2023-09-16 PROCEDURE — 96375 TX/PRO/DX INJ NEW DRUG ADDON: CPT

## 2023-09-16 PROCEDURE — 96374 THER/PROPH/DIAG INJ IV PUSH: CPT

## 2023-09-16 PROCEDURE — 74177 CT ABD & PELVIS W/CONTRAST: CPT

## 2023-09-16 PROCEDURE — 36415 COLL VENOUS BLD VENIPUNCTURE: CPT

## 2023-09-16 PROCEDURE — 99284 EMERGENCY DEPT VISIT MOD MDM: CPT

## 2023-09-16 PROCEDURE — G1004 CDSM NDSC: HCPCS

## 2023-09-16 PROCEDURE — 81001 URINALYSIS AUTO W/SCOPE: CPT | Performed by: EMERGENCY MEDICINE

## 2023-09-16 RX ORDER — CEPHALEXIN 250 MG/1
500 CAPSULE ORAL ONCE
Status: COMPLETED | OUTPATIENT
Start: 2023-09-16 | End: 2023-09-16

## 2023-09-16 RX ORDER — LIDOCAINE HYDROCHLORIDE 10 MG/ML
5 INJECTION, SOLUTION EPIDURAL; INFILTRATION; INTRACAUDAL; PERINEURAL ONCE
Status: COMPLETED | OUTPATIENT
Start: 2023-09-16 | End: 2023-09-16

## 2023-09-16 RX ORDER — ACETAMINOPHEN 325 MG/1
975 TABLET ORAL ONCE
Status: COMPLETED | OUTPATIENT
Start: 2023-09-16 | End: 2023-09-16

## 2023-09-16 RX ORDER — LIDOCAINE 50 MG/G
1 PATCH TOPICAL DAILY
Qty: 15 PATCH | Refills: 0 | Status: SHIPPED | OUTPATIENT
Start: 2023-09-16

## 2023-09-16 RX ORDER — CEPHALEXIN 500 MG/1
500 CAPSULE ORAL EVERY 6 HOURS SCHEDULED
Qty: 20 CAPSULE | Refills: 0 | Status: SHIPPED | OUTPATIENT
Start: 2023-09-16 | End: 2023-09-21

## 2023-09-16 RX ORDER — ONDANSETRON 2 MG/ML
4 INJECTION INTRAMUSCULAR; INTRAVENOUS ONCE
Status: COMPLETED | OUTPATIENT
Start: 2023-09-16 | End: 2023-09-16

## 2023-09-16 RX ORDER — IBUPROFEN 600 MG/1
600 TABLET ORAL EVERY 6 HOURS PRN
Qty: 30 TABLET | Refills: 0 | Status: SHIPPED | OUTPATIENT
Start: 2023-09-16

## 2023-09-16 RX ORDER — PHENAZOPYRIDINE HYDROCHLORIDE 200 MG/1
200 TABLET, FILM COATED ORAL 3 TIMES DAILY
Qty: 6 TABLET | Refills: 0 | Status: SHIPPED | OUTPATIENT
Start: 2023-09-16

## 2023-09-16 RX ORDER — MORPHINE SULFATE 4 MG/ML
4 INJECTION, SOLUTION INTRAMUSCULAR; INTRAVENOUS ONCE
Status: COMPLETED | OUTPATIENT
Start: 2023-09-16 | End: 2023-09-16

## 2023-09-16 RX ADMIN — CEPHALEXIN 500 MG: 250 CAPSULE ORAL at 19:34

## 2023-09-16 RX ADMIN — IOHEXOL 85 ML: 350 INJECTION, SOLUTION INTRAVENOUS at 20:56

## 2023-09-16 RX ADMIN — SODIUM CHLORIDE 1000 ML: 0.9 INJECTION, SOLUTION INTRAVENOUS at 19:17

## 2023-09-16 RX ADMIN — ONDANSETRON 4 MG: 2 INJECTION INTRAMUSCULAR; INTRAVENOUS at 19:18

## 2023-09-16 RX ADMIN — LIDOCAINE HYDROCHLORIDE 5 ML: 10 INJECTION, SOLUTION EPIDURAL; INFILTRATION; INTRACAUDAL at 19:03

## 2023-09-16 RX ADMIN — ACETAMINOPHEN 975 MG: 325 TABLET ORAL at 19:02

## 2023-09-16 RX ADMIN — MORPHINE SULFATE 4 MG: 4 INJECTION INTRAVENOUS at 20:58

## 2023-09-16 NOTE — Clinical Note
Elda Rowland was seen and treated in our emergency department on 9/16/2023. Diagnosis:     Sharath Prince  may return to work on return date. She may return on this date: 09/19/2023         If you have any questions or concerns, please don't hesitate to call.       Jasmina Contreras., DO    ______________________________           _______________          _______________  Hospital Representative                              Date                                Time
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
## 2023-09-26 ENCOUNTER — TELEPHONE (OUTPATIENT)
Dept: SURGICAL ONCOLOGY | Facility: CLINIC | Age: 33
End: 2023-09-26

## 2023-10-30 ENCOUNTER — OFFICE VISIT (OUTPATIENT)
Dept: GASTROENTEROLOGY | Facility: CLINIC | Age: 33
End: 2023-10-30
Payer: COMMERCIAL

## 2023-10-30 VITALS
BODY MASS INDEX: 30.94 KG/M2 | WEIGHT: 158.4 LBS | HEART RATE: 76 BPM | DIASTOLIC BLOOD PRESSURE: 78 MMHG | SYSTOLIC BLOOD PRESSURE: 120 MMHG | TEMPERATURE: 97.2 F

## 2023-10-30 DIAGNOSIS — K59.00 CONSTIPATION, UNSPECIFIED CONSTIPATION TYPE: Primary | ICD-10-CM

## 2023-10-30 DIAGNOSIS — K76.0 HEPATIC STEATOSIS: ICD-10-CM

## 2023-10-30 PROCEDURE — 99204 OFFICE O/P NEW MOD 45 MIN: CPT | Performed by: INTERNAL MEDICINE

## 2023-10-30 NOTE — PROGRESS NOTES
[facility] - Outpatient Consultation  Artur Watsonsb 35 y.o. female MRN: [id number]  Encounter: [id number]        ASSESSMENT AND PLAN     36 yo F with history of obesity, SHA and chronic constipation who present in follow up due to continued constipation with a hard BM every 2-3 days while taking Miralax every 3-4 likely in the setting of poor fluid intake drinking a 16 oz bottle of water every other day. 1. Constipation, unspecified constipation type  - continued constipation in the setting of decreased fluid intake  - using Miralax every 3-4 days   - having a hard BM every 2-3 days  - increase fluid intake, fiber and aerobic exercise  - start Miralax daily for 2 weeks then every other day   - trial Linzess 145 mcg     - Ambulatory Referral to Gastroenterology  - linaCLOtide 145 MCG CAPS; Take 1 capsule (145 mcg total) by mouth daily  Dispense: 60 capsule; Refill: 2    2. Hepatic steatosis  - recommend diet and exercise for weight loss       HISTORY OF PRESENT ILLNESS     Arlet Hua is our 36 yo F with history of obesity, history of iron deficiency anemia, MDD and cholelithiasis as well as chronic constipation who presents for new consult regarding constipation. She had abdominal pain that she describes as pressure in central abd radiating to the right with pressure. She has constipation with hard stools every 2-3 days. She takes Miralax every few days. Occasional diarrhea and nausea almost every day. She drinks a 16 oz bottle of water every 2 days. EGD from 9/30/20 for concern for esophageal food impaction showed duodenitis with path revealing H pylori infection treated with triple therapy. Denies vomiting, melena, hematochezia, dysphagia, odynophagia. REVIEW OF SYSTEMS     CONSTITUTIONAL: Denies any fever, chills, rigors, and weight loss. HEENT: No earache or tinnitus. Denies hearing loss or visual disturbances.   CARDIOVASCULAR: No chest pain or palpitations. RESPIRATORY: Denies any cough, hemoptysis, shortness of breath or dyspnea on exertion. GASTROINTESTINAL: As noted in the History of Present Illness. GENITOURINARY: No problems with urination. Denies any hematuria or dysuria. NEUROLOGIC: No dizziness or vertigo, denies headaches. MUSCULOSKELETAL: Denies any muscle or joint pain. SKIN: Denies skin rashes or itching. ENDOCRINE: Denies excessive thirst. Denies intolerance to heat or cold. PSYCHOSOCIAL: Denies any recent memory loss. Historical information     Past Medical History:   Diagnosis Date    Anemia     taking iron pills BID     Chronic kidney disease     hydronephrosis    History of transfusion     Kidney stone     Sepsis ([address])     Septic shock (HCC)     Urinary tract infection     taking antibioitcs     UTI (urinary tract infection) during pregnancy     Vagina, candidiasis     Varicella     as child     Past Surgical History:   Procedure Laterality Date    ABDOMINAL ADHESION SURGERY N/A 2021    Procedure: LYSIS ADHESIONS;  Surgeon: Ana Urbano MD;  Location: [facility] MAIN [facility];  Service: Gynecology     SECTION N/A 2018    Procedure:  SECTION (); Surgeon: Misti Henriquez MD;  Location: [facility] LD;  Service: Obstetrics    CYSTOSCOPY  2018    with right uretral stent    EXAMINATION UNDER ANESTHESIA N/A 2021    Procedure: EXAM UNDER ANESTHESIA (EUA);   Surgeon: Ana Urbano MD;  Location: [facility] MA[facility];  Service: Gynecology    MN CYSTO BLADDER W/URETERAL CATHETERIZATION Right 2018    Procedure: CYSTOSCOPY RETROGRADE PYELOGRAM WITH INSERTION STENT URETERAL;  Surgeon: Fidencio Mayo MD;  Location: [facility] Main OR;  Service: Urology    MN CYSTO/URETERO W/LITHOTRIPSY &INDWELL STENT INSRT Right 2018    Procedure: CYSTOSCOPY URETEROSCOPY WITH  RETROGRADE PYELOGRAM AND exchange  STENT URETERAl;  Surgeon: Fidencio Mayo MD;  Location: [address] [place] [address] MAIN OR;  Service: Urology    MN LAPAROSCOPY SURG CHOLECYSTECTOMY N/A 2022    Procedure: CHOLECYSTECTOMY LAPAROSCOPIC;  Surgeon: Yael Veloz MD;  Location:  MAIN OR;  Service: General    NV LAPAROSCOPY W/RMVL ADNEXAL STRUCTURES Bilateral 2021    Procedure: SALPINGECTOMY, LAPAROSCOPIC;  Surgeon: Nilson Villatoro MD;  Location:  MAIN OR;  Service: Gynecology    NV LAPS ABD PRTM&OMENTUM DX W/WO [facility] BR/WA [facility] N/A 2021    Procedure: LAPAROSCOPY DIAGNOSTIC;  Surgeon: Nilson Villatoro MD;  Location:  MAIN OR;  Service: Gynecology    TONSILLECTOMY      TONSILLECTOMY      TUBAL LIGATION       Social History   Social History     Substance and Sexual Activity   Alcohol Use No     Social History     Substance and Sexual Activity   Drug Use No     Social History     Tobacco Use   Smoking Status Former    Packs/day: 0.25    Types: Cigarettes    Quit date: 2019    Years since quittin.4   Smokeless Tobacco Never   Tobacco Comments    2-3 a day     Family History   Problem Relation Age of Onset    No Known Problems Mother     Diabetes Father     Heart defect Brother         3 open heart surgeries    Heart failure Family         Congestive    Diabetes Maternal Grandmother        Allergies       Current Outpatient Medications:     cyanocobalamin (VITAMIN B-12) 1000 MCG tablet    escitalopram (LEXAPRO) 10 mg tablet    famotidine (PEPCID) 20 mg tablet    ferrous sulfate 324 (65 Fe) mg    ibuprofen (MOTRIN) 600 mg tablet    lidocaine (LIDODERM) 5 %    medroxyPROGESTERone (PROVERA) 10 mg tablet    ondansetron (ZOFRAN) 4 mg tablet    phenazopyridine (PYRIDIUM) 200 mg tablet    polyethylene glycol (GLYCOLAX) 17 GM/SCOOP powder    Current Facility-Administered Medications:     cyanocobalamin injection 1,000 mcg, 1,000 mcg, Intramuscular, Q30 Days, 1,000 mcg at 23 1204    No Known Allergies        Objective assessment       not currently breastfeeding. There is no height or weight on file to calculate BMI.         PHYSICAL EXAM:         Physical Exam: GENERAL: NAD  HEENT:  NC/AT, MMM, no scleral icterus  CARDIAC:  Regular rate and rhythm  PULMONARY:  No respiratory distress, no wheezing/rales/rhonci, non-labored breathing  ABDOMEN:  Soft, NT/ND, +BS, no rebound/guarding/rigidity  Extremities:  No edema, cyanosis, or clubbing  NEUROLOGIC:  Alert/oriented x3. SKIN:  No rashes or erythema      Lab Results:      No visits with results within 1 Day(s) from this visit. Latest known visit with results is:   Admission on 09/16/2023, Discharged on 09/16/2023   Component Date Value    Sodium 09/16/2023 134 (L)     Potassium 09/16/2023 4.2     Chloride 09/16/2023 103     CO2 09/16/2023 28     ANION GAP 09/16/2023 3     BUN 09/16/2023 12     Creatinine 09/16/2023 0.53 (L)     Glucose 09/16/2023 92     Calcium 09/16/2023 9.0     eGFR 09/16/2023 125     EXT Preg Test, Ur 09/16/2023 Negative     Control 09/16/2023 Valid     Preg, Serum 09/16/2023 Negative     Color, UA 09/16/2023 Light [place]     Clarity, UA 09/16/2023 Turbid     Specific Gravity, UA 09/16/2023 1.019     pH, UA 09/16/2023 6.0     Leukocytes, UA 09/16/2023 Trace (A)     Nitrite, UA 09/16/2023 Positive (A)     Protein, UA 09/16/2023 Negative     Glucose, UA 09/16/2023 Negative     Ketones, UA 09/16/2023 Negative     Urobilinogen, UA 09/16/2023 <2.0     Bilirubin, UA 09/16/2023 Negative     Occult Blood, UA 09/16/2023 Negative     RBC, UA 09/16/2023 1-2     WBC, UA 09/16/2023 4-10 (A)     Epithelial Cells 09/16/2023 Moderate (A)     Bacteria, UA 09/16/2023 Moderate (A)     MUCUS THREADS 09/16/2023 Moderate (A)     Hyaline Casts, UA 09/16/2023 0-3 (A)          Radiology Results:      No results found.       Tenisha Mai MD  [facility] Fellow

## 2024-01-18 ENCOUNTER — TELEPHONE (OUTPATIENT)
Dept: FAMILY MEDICINE CLINIC | Facility: CLINIC | Age: 34
End: 2024-01-18

## 2024-01-18 NOTE — TELEPHONE ENCOUNTER
Mayra, this is Ebony Pete. I'm calling on to schedule an appointment with Ramona. If you could please give me a call back at [phone]. That's [phone]. Thank you and have a great day.        Spoke with pt and appt was scheduled for 1/26

## 2024-01-25 ENCOUNTER — TELEPHONE (OUTPATIENT)
Dept: FAMILY MEDICINE CLINIC | Facility: CLINIC | Age: 34
End: 2024-01-25

## 2024-01-25 NOTE — TELEPHONE ENCOUNTER
Hello, I'm calling to schedule my daughter as my an appointment. I just called you guvikram. She transferred me to upstairs. My daughter doesn't go upstairs. She goes to you guys. So please give me a call back at [phone]. That's [phone]. Thank you and have a great day.      first attempt to contact patient. left message to return my call on answering machine

## 2024-01-26 NOTE — ANESTHESIA POSTPROCEDURE EVALUATION
Post-Op Assessment Note    CV Status:  Stable    Pain management: satisfactory to patient     Mental Status:  Alert and awake   Hydration Status:  Stable   PONV Controlled:  None   Airway Patency:  Patent  Airway: intubated      Post Op Vitals Reviewed: Yes      Staff: CRNA         No complications documented      /55 (04/08/22 1259)    Temp (!) 97 2 °F (36 2 °C) (04/08/22 1259)    Pulse 93 (04/08/22 1259)   Resp 20 (04/08/22 1259)    SpO2 100 % (04/08/22 1259) [Shortness Of Breath] : no shortness of breath [Wheezing] : no wheezing [Cough] : cough [Dyspnea on Exertion] : not dyspnea on exertion [Negative] : Gastrointestinal

## 2024-04-04 ENCOUNTER — TELEPHONE (OUTPATIENT)
Dept: FAMILY MEDICINE CLINIC | Facility: CLINIC | Age: 34
End: 2024-04-04

## 2024-04-04 NOTE — TELEPHONE ENCOUNTER
Pt was called lvm to call the office regarding tomorrow appt we need canceled. Pt was scheduled at wrong office.

## 2024-04-16 ENCOUNTER — TELEPHONE (OUTPATIENT)
Dept: OBGYN CLINIC | Facility: CLINIC | Age: 34
End: 2024-04-16

## 2024-04-18 ENCOUNTER — TELEPHONE (OUTPATIENT)
Dept: OBGYN CLINIC | Facility: CLINIC | Age: 34
End: 2024-04-18

## 2024-05-08 ENCOUNTER — TELEPHONE (OUTPATIENT)
Dept: OBGYN CLINIC | Facility: CLINIC | Age: 34
End: 2024-05-08

## 2024-05-12 ENCOUNTER — APPOINTMENT (EMERGENCY)
Dept: RADIOLOGY | Facility: HOSPITAL | Age: 34
End: 2024-05-12
Payer: COMMERCIAL

## 2024-05-12 ENCOUNTER — HOSPITAL ENCOUNTER (EMERGENCY)
Facility: HOSPITAL | Age: 34
Discharge: HOME | End: 2024-05-12
Attending: INTERNAL MEDICINE
Payer: COMMERCIAL

## 2024-05-12 VITALS
TEMPERATURE: 98 F | SYSTOLIC BLOOD PRESSURE: 125 MMHG | WEIGHT: 166.23 LBS | HEART RATE: 84 BPM | BODY MASS INDEX: 32.46 KG/M2 | RESPIRATION RATE: 17 BRPM | DIASTOLIC BLOOD PRESSURE: 66 MMHG | OXYGEN SATURATION: 100 %

## 2024-05-12 DIAGNOSIS — L03.011 PARONYCHIA OF FINGER OF RIGHT HAND: ICD-10-CM

## 2024-05-12 DIAGNOSIS — S69.90XA FINGER INJURY: Primary | ICD-10-CM

## 2024-05-12 LAB
EXT PREGNANCY TEST URINE: NEGATIVE
EXT. CONTROL: NORMAL

## 2024-05-12 PROCEDURE — 99283 EMERGENCY DEPT VISIT LOW MDM: CPT

## 2024-05-12 PROCEDURE — 99284 EMERGENCY DEPT VISIT MOD MDM: CPT | Performed by: INTERNAL MEDICINE

## 2024-05-12 PROCEDURE — 81025 URINE PREGNANCY TEST: CPT

## 2024-05-12 PROCEDURE — 73130 X-RAY EXAM OF HAND: CPT

## 2024-05-12 RX ORDER — IBUPROFEN 400 MG/1
400 TABLET ORAL EVERY 6 HOURS PRN
Qty: 20 TABLET | Refills: 0 | Status: SHIPPED | OUTPATIENT
Start: 2024-05-12

## 2024-05-12 RX ORDER — ACETAMINOPHEN 325 MG/1
975 TABLET ORAL ONCE
Status: COMPLETED | OUTPATIENT
Start: 2024-05-12 | End: 2024-05-12

## 2024-05-12 RX ORDER — CEPHALEXIN 500 MG/1
500 CAPSULE ORAL EVERY 12 HOURS SCHEDULED
Qty: 14 CAPSULE | Refills: 0 | Status: SHIPPED | OUTPATIENT
Start: 2024-05-12 | End: 2024-05-19

## 2024-05-12 RX ORDER — KETOROLAC TROMETHAMINE 30 MG/ML
15 INJECTION, SOLUTION INTRAMUSCULAR; INTRAVENOUS ONCE
Status: DISCONTINUED | OUTPATIENT
Start: 2024-05-12 | End: 2024-05-12

## 2024-05-12 RX ORDER — CEPHALEXIN 250 MG/1
500 CAPSULE ORAL ONCE
Status: COMPLETED | OUTPATIENT
Start: 2024-05-12 | End: 2024-05-12

## 2024-05-12 RX ADMIN — CEPHALEXIN 500 MG: 250 CAPSULE ORAL at 17:09

## 2024-05-12 RX ADMIN — ACETAMINOPHEN 975 MG: 325 TABLET, FILM COATED ORAL at 17:06

## 2024-05-12 NOTE — Clinical Note
Ebony MohrMichaellisa was seen and treated in our emergency department on 5/12/2024.                Diagnosis:     Ebony  may return to work on return date.    She may return on this date: 05/14/2024         If you have any questions or concerns, please don't hesitate to call.      Marquis Chavez PA-C    ______________________________           _______________          _______________  Hospital Representative                              Date                                Time

## 2024-05-12 NOTE — ED PROVIDER NOTES
History  Chief Complaint   Patient presents with    Hand Injury     Pt reports walking into the club and hitting her right index finger on the wall, puss coming out from under acrylic nail      Patient is a 34-year-old female presented ED with chief complaint of right second digit pain.  Patient has significant past medical history of anemia, CKD, kidney stone, UTI. patient states that on Saturday she was out with friends and hit her finger on a door.  She states she jammed the front of her finger.  Patient stated today that she noticed some drainage from the finger underneath the nailbed.  She denies any fevers, chills, diaphoresis.  Patient denies any loss of function or sensation.  She stated that she has been using warm soaks to the finger after noticing any drainage.  Patient noted drainage has clearish yellow.  She states that she took some ibuprofen this morning with minimal relief.  She stated due to the pain continuing she reported to the ED today.  Patient does have a gel acrylic over all nails.  She stated that this was all her real nail and this was painted onto her nail.  She denies prior injury to the finger.  Patient denies any vomiting or diarrhea.  She states that the finger feels a little swollen compared to other fingers.Patient denies any chest pain, shortness of breath, vomiting, diarrhea, chills, diaphoresis, fevers, loss of consciousness, syncope, urinary and bowel changes, abdominal pain, visual symptoms, vision loss, loss of function, loss of sensation, decreased oral intake, hemoptysis, hematochezia, hematemesis, melena, confusion.         Prior to Admission Medications   Prescriptions Last Dose Informant Patient Reported? Taking?   cyanocobalamin (VITAMIN B-12) 1000 MCG tablet   No No   Sig: Take 1 tablet (1,000 mcg total) by mouth daily   escitalopram (LEXAPRO) 10 mg tablet   No No   Sig: Take 1 tablet (10 mg total) by mouth daily   Patient taking differently: Take 10 mg by mouth daily  prn   famotidine (PEPCID) 20 mg tablet   No No   Sig: Take 1 tablet (20 mg total) by mouth 2 (two) times a day   Patient not taking: Reported on 9/20/2022   ferrous sulfate 324 (65 Fe) mg   No No   Sig: Take 1 tablet (324 mg total) by mouth daily before breakfast   ibuprofen (MOTRIN) 600 mg tablet   No No   Sig: Take 1 tablet (600 mg total) by mouth every 6 (six) hours as needed for moderate pain   Patient not taking: Reported on 10/30/2023   lidocaine (LIDODERM) 5 %   No No   Sig: Apply 1 patch topically over 12 hours daily Remove & Discard patch within 12 hours or as directed by MD   Patient not taking: Reported on 10/30/2023   linaCLOtide 145 MCG CAPS   No No   Sig: Take 1 capsule (145 mcg total) by mouth daily   medroxyPROGESTERone (PROVERA) 10 mg tablet   No No   Sig: Take 1 tablet (10 mg total) by mouth daily for 10 days   ondansetron (ZOFRAN) 4 mg tablet   No No   Sig: Take 1 tablet (4 mg total) by mouth every 6 (six) hours   Patient not taking: Reported on 4/20/2022   phenazopyridine (PYRIDIUM) 200 mg tablet   No No   Sig: Take 1 tablet (200 mg total) by mouth 3 (three) times a day   Patient not taking: Reported on 10/30/2023   polyethylene glycol (GLYCOLAX) 17 GM/SCOOP powder   No No   Sig: Take 2x daily x 7 days then once daily after      Facility-Administered Medications Last Administration Doses Remaining   cyanocobalamin injection 1,000 mcg 4/14/2023 12:04 PM           Past Medical History:   Diagnosis Date    Anemia     taking iron pills BID     Chronic kidney disease     hydronephrosis    History of transfusion     Kidney stone     Sepsis (HCC)     Septic shock (HCC)     Urinary tract infection     taking antibioitcs     UTI (urinary tract infection) during pregnancy     Vagina, candidiasis     Varicella     as child       Past Surgical History:   Procedure Laterality Date    ABDOMINAL ADHESION SURGERY N/A 2/12/2021    Procedure: LYSIS ADHESIONS;  Surgeon: Maciej Watson MD;  [facility];   Service: Gynecology     SECTION N/A 2018    Procedure:  SECTION ();  Surgeon: Curtis Zavala MD;  [facility]: [facility];  Service: Obstetrics    CYSTOSCOPY  2018    with right uretral stent    EXAMINATION UNDER ANESTHESIA N/A 2021    Procedure: EXAM UNDER ANESTHESIA (EUA);  Surgeon: Maciej Watson MD;  [facility];  Service: Gynecology    NY CYSTO BLADDER W/URETERAL CATHETERIZATION Right 2018    Procedure: CYSTOSCOPY RETROGRADE PYELOGRAM WITH INSERTION STENT URETERAL;  Surgeon: Cristofer Matute MD;  [facility];  Service: Urology    NY CYSTO/URETERO W/LITHOTRIPSY &INDWELL STENT INSRT Right 2018    Procedure: CYSTOSCOPY URETEROSCOPY WITH  RETROGRADE PYELOGRAM AND exchange  STENT URETERAl;  Surgeon: Cristofer Matute MD;  Location:  MAIN OR;  Service: Urology    NY LAPAROSCOPY SURG CHOLECYSTECTOMY N/A 2022    Procedure: CHOLECYSTECTOMY LAPAROSCOPIC;  Surgeon: Jennyfer Moreland MD;  Location:  MAIN OR;  Service: General    NY LAPAROSCOPY W/RMVL ADNEXAL STRUCTURES Bilateral 2021    Procedure: SALPINGECTOMY, LAPAROSCOPIC;  Surgeon: Maciej Watson MD;  [facility];  Service: Gynecology    NY LAPS ABD PRTM&OMENTUM DX W/WO SPEC BR/WA SPX N/A 2021    Procedure: LAPAROSCOPY DIAGNOSTIC;  Surgeon: Maciej Watson MD;  [facility];  Service: Gynecology    TONSILLECTOMY      TONSILLECTOMY      TUBAL LIGATION         Family History   Problem Relation Age of Onset    No Known Problems Mother     Diabetes Father     Heart defect Brother         3 open heart surgeries    Heart failure Family         Congestive    Diabetes Maternal Grandmother      I have reviewed and agree with the history as documented.    E-Cigarette/Vaping    E-Cigarette Use Never User      E-Cigarette/Vaping Substances    Nicotine No     THC No     CBD No     Flavoring No     Other No     Unknown No      Social History     Tobacco Use    Smoking status: Former      Current packs/day: 0.00     Types: Cigarettes     Quit date: 2019     Years since quittin.9    Smokeless tobacco: Never    Tobacco comments:     2-3 a day   Vaping Use    Vaping status: Never Used   Substance Use Topics    Alcohol use: No    Drug use: No       Review of Systems   Constitutional:  Negative for chills, diaphoresis, fatigue and fever.   HENT:  Negative for congestion, ear discharge, ear pain, postnasal drip, rhinorrhea, sinus pressure, sinus pain and sore throat.    Eyes:  Negative for photophobia and visual disturbance.   Respiratory:  Negative for cough, chest tightness and shortness of breath.    Cardiovascular:  Negative for chest pain and palpitations.   Gastrointestinal:  Negative for abdominal distention, abdominal pain, constipation, diarrhea, nausea and vomiting.   Genitourinary:  Negative for difficulty urinating, dysuria, flank pain and frequency.   Musculoskeletal:  Positive for arthralgias. Negative for back pain, joint swelling, myalgias, neck pain and neck stiffness.   Skin:  Positive for wound. Negative for rash.   Neurological:  Negative for dizziness, tremors, syncope, facial asymmetry, weakness, light-headedness, numbness and headaches.       Physical Exam  Physical Exam  Constitutional:       General: She is not in acute distress.     Appearance: Normal appearance. She is not ill-appearing, toxic-appearing or diaphoretic.   HENT:      Head: Normocephalic.      Right Ear: External ear normal.      Left Ear: External ear normal.      Nose: Nose normal.      Mouth/Throat:      Mouth: Mucous membranes are moist.      Pharynx: Oropharynx is clear.   Eyes:      General:         Right eye: No discharge.         Left eye: No discharge.      Conjunctiva/sclera: Conjunctivae normal.   Cardiovascular:      Rate and Rhythm: Normal rate and regular rhythm.   Pulmonary:      Effort: Pulmonary effort is normal. No respiratory distress.      Breath sounds: Normal breath sounds. No stridor.  No wheezing, rhonchi or rales.   Chest:      Chest wall: No tenderness.   Abdominal:      General: Bowel sounds are normal.      Palpations: Abdomen is soft.      Tenderness: There is no abdominal tenderness.   Musculoskeletal:         General: Tenderness and signs of injury present. Normal range of motion.      Cervical back: Neck supple.      Comments: Tenderness to palpation over the second digit of the right hand.  Patient does have range of motion in flexion extension at both with DIP and PIP.  Examination of the wound there is some serosanguineous with mild purulent drainage.  This finger is mildly swollen there is no erythema or edema no extending swelling down the finger.  Patient has full sensation to the finger.  No obvious abscess formation or ecchymosis noted.  Will x-ray to further evaluate   Skin:     General: Skin is warm and dry.      Capillary Refill: Capillary refill takes less than 2 seconds.   Neurological:      Mental Status: She is alert and oriented to person, place, and time.   Psychiatric:         Mood and Affect: Mood normal.         Vital Signs  ED Triage Vitals   Temperature Pulse Respirations Blood Pressure SpO2   05/12/24 1609 05/12/24 1609 05/12/24 1609 05/12/24 1609 05/12/24 1609   98 °F (36.7 °C) 84 17 125/66 100 %      Temp Source Heart Rate Source Patient Position - Orthostatic VS BP Location FiO2 (%)   05/12/24 1609 05/12/24 1609 05/12/24 1609 05/12/24 1609 --   Oral Monitor Sitting Left arm       Pain Score       05/12/24 1706       4           Vitals:    05/12/24 1609   BP: 125/66   Pulse: 84   Patient Position - Orthostatic VS: Sitting         Visual Acuity      ED Medications  Medications   acetaminophen (TYLENOL) tablet 975 mg (975 mg Oral Given 5/12/24 1706)   cephalexin (KEFLEX) capsule 500 mg (500 mg Oral Given 5/12/24 1709)       Diagnostic Studies  Results Reviewed       Procedure Component Value Units Date/Time    POCT pregnancy, urine [[id number]]  (Normal) Resulted:  05/12/24 1642    Lab Status: Final result Updated: 05/12/24 1643     EXT Preg Test, Ur Negative     Control Valid                   XR hand 3+ views RIGHT    (Results Pending)              Procedures  Procedures         ED Course                               SBIRT 20yo+      Flowsheet Row Most Recent Value   Initial Alcohol Screen: US AUDIT-C     1. How often do you have a drink containing alcohol? 0 Filed at: 05/12/2024 1610   2. How many drinks containing alcohol do you have on a typical day you are drinking?  0 Filed at: 05/12/2024 1610   3b. FEMALE Any Age, or MALE 65+: How often do you have 4 or more drinks on one occassion? 0 Filed at: 05/12/2024 1610   Audit-C Score 0 Filed at: 05/12/2024 1610   DEE: How many times in the past year have you...    Used an illegal drug or used a prescription medication for non-medical reasons? Never Filed at: 05/12/2024 1610                      Medical Decision Making  Patient is a 34-year-old female presented ED with chief complaint of right second index finger.  Cardiopulmonary exam is benign.  Abdominal exam is benign.  On examination of the finger patient does have range of motion at the DIP and PIP flexion of the DIP does elicit pain.  There is mild swelling to the distal part of the finger there is serous drainage with mild purulent drainage noted.  Minimal drainage was able to be expressed.  There is no abscess formation on exam.  No areas of fluctuation noted.  There is no extending erythema or edema to the finger.  X-rays of the the right hand show no acute osseous abnormalities.  Patient has an appointment tomorrow to get the acrylic removed.  I advised her to do this.  Patient was given a 7-day course of Keflex.  Her first dose was given in the ED.  Naprosyn was given for pain.  Advised patient to soak the finger 4 times a day over the next week.  Patient was given strict return to ED protocol if any increasing swelling.,  Loss of sensation, loss of function,  "worsening drainage, abscess formation, systemic chills fevers diaphoresis, any skin color changes, worsening pain.Patient understood diagnosis and treatment plan and had no further questions.  Patient was discharged in stable condition.  Patient was advised to follow-up with her PCP in 1 to 2 days.  Patient was advised to return to the ED with any worsening symptoms that were explained on discharge including but not limited to chest pain, shortness of breath, irretractable vomiting or diarrhea, vision loss, loss of function, loss of sensation, syncope, hemoptysis, hematochezia, hematemesis, melena, decreased oral intake, feeling ill.  I expressly plans of antibiotics adherence and warm soaks.  Patient understood all follow-up directions and stated that she understood direction to return to ED    Ddx-finger sprain, paronychia, injury, dislocation, fracture, tuft fracture, contusion, avulsion fracture    Portions of the record may have been created with voice recognition software. Occasional wrong word or \"sound a like\" substitutions may have occurred due to the inherent limitations of voice recognition software. Read the chart carefully and recognize, using context, where substitutions have occurred.      Amount and/or Complexity of Data Reviewed  Labs: ordered.     Details: Details in MDM  Radiology: ordered.     Details: Details in MDM    Risk  OTC drugs.  Prescription drug management.             Disposition  Final diagnoses:   Finger injury   Paronychia of finger of right hand     Time reflects when diagnosis was documented in both MDM as applicable and the Disposition within this note       Time User Action Codes Description Comment    5/12/2024  5:06 PM Marquis Chavez Add [S69.90XA] Finger injury     5/12/2024  5:07 PM Marquis Chavez Add [L03.011] Paronychia of finger of right hand           ED Disposition       ED Disposition   Discharge    Condition   Stable    Date/Time   Sun May 12, 2024 1708    " Comment   Ebony Mcdaniel discharge to home/self care.                   Follow-up Information       Follow up With Specialties Details Why Contact Info Additional Information    [facility], Nurse Practitioner   [address] [place] [id number]  [phone]       [facility]ly [facility], [facility] Emergency Department Emergency Medicine   [address] [facility] [address] [phone] [facility] Emergency Department, [address], [place], [address]            Discharge Medication List as of 5/12/2024  5:08 PM        START taking these medications    Details   cephalexin (KEFLEX) 500 mg capsule Take 1 capsule (500 mg total) by mouth every 12 (twelve) hours for 7 days, Starting Sun 5/12/2024, Until Sun 5/19/2024, Normal      !! ibuprofen (MOTRIN) 400 mg tablet Take 1 tablet (400 mg total) by mouth every 6 (six) hours as needed for mild pain or moderate pain, Starting Sun 5/12/2024, Normal       !! - Potential duplicate medications found. Please discuss with provider.        CONTINUE these medications which have NOT CHANGED    Details   cyanocobalamin (VITAMIN B-12) 1000 MCG tablet Take 1 tablet (1,000 mcg total) by mouth daily, Starting Thu 9/8/2022, Normal      escitalopram (LEXAPRO) 10 mg tablet Take 1 tablet (10 mg total) by mouth daily, Starting Tue 8/29/2023, Normal      famotidine (PEPCID) 20 mg tablet Take 1 tablet (20 mg total) by mouth 2 (two) times a day, Starting Mon 3/21/2022, Normal      ferrous sulfate 324 (65 Fe) mg Take 1 tablet (324 mg total) by mouth daily before breakfast, Starting Mon 4/3/2023, Until Wed 5/3/2023, Normal      !! ibuprofen (MOTRIN) 600 mg tablet Take 1 tablet (600 mg total) by mouth every 6 (six) hours as needed for moderate pain, Starting Sat 9/16/2023, Normal      lidocaine (LIDODERM) 5 % Apply 1 patch topically over 12 hours daily Remove & Discard patch within 12 hours or as  directed by MD, Starting Sat 9/16/2023, Normal      linaCLOtide 145 MCG CAPS Take 1 capsule (145 mcg total) by mouth daily, Starting Mon 10/30/2023, Until Sat 4/27/2024, Normal      medroxyPROGESTERone (PROVERA) 10 mg tablet Take 1 tablet (10 mg total) by mouth daily for 10 days, Starting Thu 4/8/2021, Until Sun 4/18/2021, Normal      ondansetron (ZOFRAN) 4 mg tablet Take 1 tablet (4 mg total) by mouth every 6 (six) hours, Starting Sat 10/23/2021, Normal      phenazopyridine (PYRIDIUM) 200 mg tablet Take 1 tablet (200 mg total) by mouth 3 (three) times a day, Starting Sat 9/16/2023, Normal      polyethylene glycol (GLYCOLAX) 17 GM/SCOOP powder Take 2x daily x 7 days then once daily after, Normal       !! - Potential duplicate medications found. Please discuss with provider.          No discharge procedures on file.    PDMP Review         Value Time User    PDMP Reviewed  Yes 2/12/2021  2:14 PM Maciej Watson MD            ED Provider  Electronically Signed by             Marquis Chavez PA-C  05/13/24 0106

## 2024-05-12 NOTE — DISCHARGE INSTRUCTIONS
Patient vies follow-up with PCP within 1 week for checkup.  Patient vies return to ED with any worsening symptoms or explained on discharge.  Patient advised to take prescribed medication as prescribed.  Patient advised to use warm water soaks 4 times daily.  Patient has to have acrylic removed in which she has a appointment tomorrow.

## 2024-06-13 ENCOUNTER — TELEPHONE (OUTPATIENT)
Dept: FAMILY MEDICINE CLINIC | Facility: CLINIC | Age: 34
End: 2024-06-13

## 2024-07-02 ENCOUNTER — OFFICE VISIT (OUTPATIENT)
Dept: FAMILY MEDICINE CLINIC | Facility: CLINIC | Age: 34
End: 2024-07-02

## 2024-07-02 VITALS
RESPIRATION RATE: 16 BRPM | SYSTOLIC BLOOD PRESSURE: 101 MMHG | OXYGEN SATURATION: 98 % | WEIGHT: 164.6 LBS | DIASTOLIC BLOOD PRESSURE: 71 MMHG | HEIGHT: 61 IN | TEMPERATURE: 97.8 F | HEART RATE: 91 BPM | BODY MASS INDEX: 31.08 KG/M2

## 2024-07-02 DIAGNOSIS — Z11.1 TUBERCULOSIS SCREENING: ICD-10-CM

## 2024-07-02 DIAGNOSIS — Z02.1 PHYSICAL EXAM, PRE-EMPLOYMENT: Primary | ICD-10-CM

## 2024-07-02 PROCEDURE — 99214 OFFICE O/P EST MOD 30 MIN: CPT | Performed by: FAMILY MEDICINE

## 2024-07-02 NOTE — PROGRESS NOTES
"Ambulatory Visit  Name: Ebony Mcdaniel      : 1990      MRN: [id number]  Encounter Provider: Yuly Gayle MD  Encounter Date: 2024   Encounter department: [facility]    Assessment & Plan   1. Physical exam, pre-employment  Assessment & Plan:  Tb screening ordered  Medical hx reviewed  Immunizations reviewed    Orders:  -     Quantiferon TB Gold Plus Assay; Future  2. Tuberculosis screening  -     Quantiferon TB Gold Plus Assay; Future       History of Present Illness     HPI  Ebony Mcdaniel is a 34 y.o. female  who presented to the office today for a pre-employment physical.  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    The following portions of the patient's history were reviewed and updated as appropriate: allergies, current medications, past family history, past medical history, past social history, past surgical history and problem list.    Review of Systems   Constitutional:  Negative for chills and fever.   HENT:  Negative for congestion, rhinorrhea and sore throat.    Respiratory:  Negative for cough and shortness of breath.    Cardiovascular:  Negative for chest pain.   Gastrointestinal:  Negative for diarrhea, nausea and vomiting.   Musculoskeletal:  Negative for arthralgias and back pain.   Skin:  Negative for rash.   Neurological:  Negative for dizziness and headaches.       Objective     /71 (BP Location: Left arm, Patient Position: Sitting, Cuff Size: Standard)   Pulse 91   Temp 97.8 °F (36.6 °C) (Temporal)   Resp 16   Ht 5' 1\" (1.549 m)   Wt 74.7 kg (164 lb 9.6 oz)   SpO2 98%   BMI 31.10 kg/m²     Physical Exam  Vitals and nursing note reviewed.   Constitutional:       General: She is not in acute distress.     Appearance: She is well-developed.   HENT:      Head: Normocephalic and atraumatic.      Right Ear: External ear normal.      Left Ear: External ear normal.      Nose: Nose normal.      " Mouth/Throat:      Mouth: Mucous membranes are moist.   Eyes:      Extraocular Movements: Extraocular movements intact.      Conjunctiva/sclera: Conjunctivae normal.   Cardiovascular:      Rate and Rhythm: Normal rate and regular rhythm.      Heart sounds: Normal heart sounds. No murmur heard.  Pulmonary:      Effort: Pulmonary effort is normal. No respiratory distress.      Breath sounds: Normal breath sounds.   Abdominal:      Palpations: Abdomen is soft.      Tenderness: There is no abdominal tenderness.   Musculoskeletal:         General: No swelling. Normal range of motion.      Cervical back: Neck supple.      Right lower leg: No edema.      Left lower leg: No edema.   Skin:     General: Skin is warm and dry.      Capillary Refill: Capillary refill takes less than 2 seconds.      Findings: No rash.   Neurological:      Mental Status: She is alert and oriented to person, place, and time.   Psychiatric:         Mood and Affect: Mood normal.         Behavior: Behavior normal.       Administrative Statements

## 2024-07-11 ENCOUNTER — OFFICE VISIT (OUTPATIENT)
Dept: FAMILY MEDICINE CLINIC | Facility: CLINIC | Age: 34
End: 2024-07-11

## 2024-07-11 ENCOUNTER — APPOINTMENT (OUTPATIENT)
Dept: LAB | Facility: CLINIC | Age: 34
End: 2024-07-11
Payer: COMMERCIAL

## 2024-07-11 VITALS
OXYGEN SATURATION: 99 % | DIASTOLIC BLOOD PRESSURE: 74 MMHG | WEIGHT: 164.4 LBS | HEART RATE: 84 BPM | SYSTOLIC BLOOD PRESSURE: 112 MMHG | RESPIRATION RATE: 18 BRPM | TEMPERATURE: 97.8 F | BODY MASS INDEX: 31.04 KG/M2 | HEIGHT: 61 IN

## 2024-07-11 DIAGNOSIS — Z11.59 ENCOUNTER FOR HEPATITIS C SCREENING TEST FOR LOW RISK PATIENT: ICD-10-CM

## 2024-07-11 DIAGNOSIS — F41.9 ANXIETY AND DEPRESSION: Primary | ICD-10-CM

## 2024-07-11 DIAGNOSIS — E66.9 OBESITY (BMI 30.0-34.9): ICD-10-CM

## 2024-07-11 DIAGNOSIS — Z11.4 ENCOUNTER FOR SCREENING FOR HIV: ICD-10-CM

## 2024-07-11 DIAGNOSIS — E53.8 B12 DEFICIENCY: ICD-10-CM

## 2024-07-11 DIAGNOSIS — K59.00 CONSTIPATION, UNSPECIFIED CONSTIPATION TYPE: ICD-10-CM

## 2024-07-11 DIAGNOSIS — Z11.3 ROUTINE SCREENING FOR STI (SEXUALLY TRANSMITTED INFECTION): ICD-10-CM

## 2024-07-11 DIAGNOSIS — E55.9 VITAMIN D DEFICIENCY: ICD-10-CM

## 2024-07-11 DIAGNOSIS — Z02.1 PHYSICAL EXAM, PRE-EMPLOYMENT: ICD-10-CM

## 2024-07-11 DIAGNOSIS — Z11.1 TUBERCULOSIS SCREENING: ICD-10-CM

## 2024-07-11 DIAGNOSIS — E61.1 IRON DEFICIENCY: ICD-10-CM

## 2024-07-11 DIAGNOSIS — D50.0 IRON DEFICIENCY ANEMIA DUE TO CHRONIC BLOOD LOSS: ICD-10-CM

## 2024-07-11 DIAGNOSIS — F32.A ANXIETY AND DEPRESSION: Primary | ICD-10-CM

## 2024-07-11 PROBLEM — E66.811 OBESITY (BMI 30.0-34.9): Status: ACTIVE | Noted: 2023-03-30

## 2024-07-11 LAB
25(OH)D3 SERPL-MCNC: 19.1 NG/ML (ref 30–100)
ALBUMIN SERPL BCG-MCNC: 4.1 G/DL (ref 3.5–5)
ALP SERPL-CCNC: 84 U/L (ref 34–104)
ALT SERPL W P-5'-P-CCNC: 36 U/L (ref 7–52)
ANION GAP SERPL CALCULATED.3IONS-SCNC: 7 MMOL/L (ref 4–13)
AST SERPL W P-5'-P-CCNC: 29 U/L (ref 13–39)
BASOPHILS # BLD AUTO: 0.03 THOUSANDS/ÂΜL (ref 0–0.1)
BASOPHILS NFR BLD AUTO: 1 % (ref 0–1)
BILIRUB SERPL-MCNC: 0.42 MG/DL (ref 0.2–1)
BUN SERPL-MCNC: 9 MG/DL (ref 5–25)
CALCIUM SERPL-MCNC: 9.3 MG/DL (ref 8.4–10.2)
CHLORIDE SERPL-SCNC: 105 MMOL/L (ref 96–108)
CHOLEST SERPL-MCNC: 155 MG/DL
CO2 SERPL-SCNC: 25 MMOL/L (ref 21–32)
CREAT SERPL-MCNC: 0.52 MG/DL (ref 0.6–1.3)
EOSINOPHIL # BLD AUTO: 0.07 THOUSAND/ÂΜL (ref 0–0.61)
EOSINOPHIL NFR BLD AUTO: 1 % (ref 0–6)
ERYTHROCYTE [DISTWIDTH] IN BLOOD BY AUTOMATED COUNT: 16.3 % (ref 11.6–15.1)
EST. AVERAGE GLUCOSE BLD GHB EST-MCNC: 117 MG/DL
FERRITIN SERPL-MCNC: 5 NG/ML (ref 11–307)
GFR SERPL CREATININE-BSD FRML MDRD: 125 ML/MIN/1.73SQ M
GLUCOSE P FAST SERPL-MCNC: 86 MG/DL (ref 65–99)
HBA1C MFR BLD: 5.7 %
HCT VFR BLD AUTO: 33.1 % (ref 34.8–46.1)
HCV AB SER QL: NORMAL
HDLC SERPL-MCNC: 48 MG/DL
HGB BLD-MCNC: 9.3 G/DL (ref 11.5–15.4)
HIV 1+2 AB+HIV1 P24 AG SERPL QL IA: NORMAL
HIV 2 AB SERPL QL IA: NORMAL
HIV1 AB SERPL QL IA: NORMAL
HIV1 P24 AG SERPL QL IA: NORMAL
IMM GRANULOCYTES # BLD AUTO: 0.03 THOUSAND/UL (ref 0–0.2)
IMM GRANULOCYTES NFR BLD AUTO: 1 % (ref 0–2)
IRON SATN MFR SERPL: 6 % (ref 15–50)
IRON SERPL-MCNC: 29 UG/DL (ref 50–212)
LDLC SERPL CALC-MCNC: 86 MG/DL (ref 0–100)
LYMPHOCYTES # BLD AUTO: 1.4 THOUSANDS/ÂΜL (ref 0.6–4.47)
LYMPHOCYTES NFR BLD AUTO: 28 % (ref 14–44)
MCH RBC QN AUTO: 20.4 PG (ref 26.8–34.3)
MCHC RBC AUTO-ENTMCNC: 28.1 G/DL (ref 31.4–37.4)
MCV RBC AUTO: 73 FL (ref 82–98)
MONOCYTES # BLD AUTO: 0.54 THOUSAND/ÂΜL (ref 0.17–1.22)
MONOCYTES NFR BLD AUTO: 11 % (ref 4–12)
NEUTROPHILS # BLD AUTO: 2.91 THOUSANDS/ÂΜL (ref 1.85–7.62)
NEUTS SEG NFR BLD AUTO: 58 % (ref 43–75)
NONHDLC SERPL-MCNC: 107 MG/DL
NRBC BLD AUTO-RTO: 0 /100 WBCS
PLATELET # BLD AUTO: 310 THOUSANDS/UL (ref 149–390)
PMV BLD AUTO: 10.4 FL (ref 8.9–12.7)
POTASSIUM SERPL-SCNC: 4.4 MMOL/L (ref 3.5–5.3)
PROT SERPL-MCNC: 7.5 G/DL (ref 6.4–8.4)
RBC # BLD AUTO: 4.55 MILLION/UL (ref 3.81–5.12)
SODIUM SERPL-SCNC: 137 MMOL/L (ref 135–147)
TIBC SERPL-MCNC: 475 UG/DL (ref 250–450)
TREPONEMA PALLIDUM IGG+IGM AB [PRESENCE] IN SERUM OR PLASMA BY IMMUNOASSAY: NORMAL
TRIGL SERPL-MCNC: 104 MG/DL
TSH SERPL DL<=0.05 MIU/L-ACNC: 1.55 UIU/ML (ref 0.45–4.5)
UIBC SERPL-MCNC: 446 UG/DL (ref 155–355)
VIT B12 SERPL-MCNC: 284 PG/ML (ref 180–914)
WBC # BLD AUTO: 4.98 THOUSAND/UL (ref 4.31–10.16)

## 2024-07-11 PROCEDURE — 80061 LIPID PANEL: CPT

## 2024-07-11 PROCEDURE — 83550 IRON BINDING TEST: CPT

## 2024-07-11 PROCEDURE — 83540 ASSAY OF IRON: CPT

## 2024-07-11 PROCEDURE — 99395 PREV VISIT EST AGE 18-39: CPT | Performed by: NURSE PRACTITIONER

## 2024-07-11 PROCEDURE — 86780 TREPONEMA PALLIDUM: CPT

## 2024-07-11 PROCEDURE — 83036 HEMOGLOBIN GLYCOSYLATED A1C: CPT

## 2024-07-11 PROCEDURE — 87491 CHLMYD TRACH DNA AMP PROBE: CPT

## 2024-07-11 PROCEDURE — 87591 N.GONORRHOEAE DNA AMP PROB: CPT

## 2024-07-11 PROCEDURE — 80053 COMPREHEN METABOLIC PANEL: CPT

## 2024-07-11 PROCEDURE — 85025 COMPLETE CBC W/AUTO DIFF WBC: CPT

## 2024-07-11 PROCEDURE — 82607 VITAMIN B-12: CPT

## 2024-07-11 PROCEDURE — 82728 ASSAY OF FERRITIN: CPT

## 2024-07-11 PROCEDURE — 86480 TB TEST CELL IMMUN MEASURE: CPT

## 2024-07-11 PROCEDURE — 87389 HIV-1 AG W/HIV-1&-2 AB AG IA: CPT

## 2024-07-11 PROCEDURE — 84443 ASSAY THYROID STIM HORMONE: CPT

## 2024-07-11 PROCEDURE — 86803 HEPATITIS C AB TEST: CPT

## 2024-07-11 PROCEDURE — 36415 COLL VENOUS BLD VENIPUNCTURE: CPT

## 2024-07-11 PROCEDURE — 82306 VITAMIN D 25 HYDROXY: CPT

## 2024-07-11 PROCEDURE — 99213 OFFICE O/P EST LOW 20 MIN: CPT | Performed by: NURSE PRACTITIONER

## 2024-07-11 RX ORDER — ESCITALOPRAM OXALATE 10 MG/1
10 TABLET ORAL DAILY
Qty: 90 TABLET | Refills: 0 | Status: SHIPPED | OUTPATIENT
Start: 2024-07-11

## 2024-07-11 RX ORDER — POLYETHYLENE GLYCOL 3350 17 G/17G
17 POWDER, FOR SOLUTION ORAL DAILY
Qty: 578 G | Refills: 2 | Status: SHIPPED | OUTPATIENT
Start: 2024-07-11

## 2024-07-11 RX ORDER — FERROUS SULFATE 324(65)MG
324 TABLET, DELAYED RELEASE (ENTERIC COATED) ORAL
Qty: 90 TABLET | Refills: 1 | Status: SHIPPED | OUTPATIENT
Start: 2024-07-11 | End: 2024-08-10

## 2024-07-11 NOTE — PROGRESS NOTES
Adult Annual Physical  Name: Ebony Mcdaniel      : 1990      MRN: [id number]  Encounter Provider: DENISSE Newell  Encounter Date: 2024   Encounter department: [facility]    Assessment & Plan   1. Anxiety and depression  Assessment & Plan:    PHQ-2/9 Depression Screening    Little interest or pleasure in doing things: 1 - several days  Feeling down, depressed, or hopeless: 0 - not at all  Trouble falling or staying asleep, or sleeping too much: 1 - several days  Feeling tired or having little energy: 3 - nearly every day  Poor appetite or overeatin - not at all  Feeling bad about yourself - or that you are a failure or have let yourself or your family down: 2 - more than half the days  Trouble concentrating on things, such as reading the newspaper or watching television: 0 - not at all  Moving or speaking so slowly that other people could have noticed. Or the opposite - being so fidgety or restless that you have been moving around a lot more than usual: 0 - not at all  Thoughts that you would be better off dead, or of hurting yourself in some way: 0 - not at all  PHQ-9 Score: 7  PHQ-9 Interpretation: Mild depression           She is going to [facility] for therapy (near [facility]), feels that it is helping a lot  Wants to restart escitalopram, will restart at 10 mg daily, discussed expected outcomes and reviewed possible side effects of medication        Orders:  -     escitalopram (LEXAPRO) 10 mg tablet; Take 1 tablet (10 mg total) by mouth daily  2. Iron deficiency anemia due to chronic blood loss  Assessment & Plan:  Lab Results   Component Value Date    WBC 5.46 2023    HGB 12.0 2023    HCT 37.7 2023    MCV 89 2023     2023     Lab Results   Component Value Date    IRON 99 2023    TIBC 394 2023    FERRITIN 16 2023     Previously required iron infusions and was following with  hematology     Orders:  -     CBC and differential; Future  -     Iron Panel (Includes Ferritin, Iron Sat%, Iron, and TIBC); Future  3. Vitamin D deficiency  -     Vitamin D 25 hydroxy; Future  4. B12 deficiency  -     Vitamin B12; Future  5. Obesity (BMI 30.0-34.9)  Assessment & Plan:  Wt Readings from Last 3 Encounters:   07/11/24 74.6 kg (164 lb 6.4 oz)   07/02/24 74.7 kg (164 lb 9.6 oz)   05/12/24 75.4 kg (166 lb 3.6 oz)     -Encouraged diet and lifestyle changes: decrease processed foods (cakes, cookies, chips, soda), decrease total carbohydrate intake, decrease fried/fatty foods, increase fruits and vegetables, increase lean proteins (chicken, turkey), increase healthy fats (avocado, fish, nuts), drink plenty of water (at least four 16 oz bottles per day)    Orders:  -     Comprehensive metabolic panel; Future  -     Hemoglobin A1C; Future  -     Lipid panel; Future  -     TSH, 3rd generation with Free T4 reflex; Future  6. Routine screening for STI (sexually transmitted infection)  -     RPR-Syphilis Screening (Total Syphilis IGG/IGM); Future  -     Chlamydia/GC amplified DNA by PCR; Future  7. Encounter for screening for HIV  -     HIV 1/2 AG/AB w Reflex [facility] for 2 yr old and above; Future  8. Encounter for hepatitis C screening test for low risk patient  -     Hepatitis C antibody; Future  9. Iron deficiency  -     ferrous sulfate 324 (65 Fe) mg; Take 1 tablet (324 mg total) by mouth daily before breakfast  10. Constipation, unspecified constipation type  Assessment & Plan:  Continue with Miralax daily PRN   Orders:  -     polyethylene glycol (GLYCOLAX) 17 GM/SCOOP powder; Take 17 g by mouth daily Take 2x daily x 7 days then once daily after    Immunizations and preventive care screenings were discussed with patient today. Appropriate education was printed on patient's after visit summary.    Counseling:  Alcohol/drug use: discussed moderation in alcohol intake, the recommendations for healthy alcohol use,  and avoidance of illicit drug use.  Dental Health: discussed importance of regular tooth brushing, flossing, and dental visits.  Injury prevention: discussed safety/seat belts, safety helmets, smoke detectors, carbon dioxide detectors, and smoking near bedding or upholstery.  Sexual health: discussed sexually transmitted diseases, partner selection, use of condoms, avoidance of unintended pregnancy, and contraceptive alternatives.  Exercise: the importance of regular exercise/physical activity was discussed. Recommend exercise 3-5 times per week for at least 30 minutes.     BMI Counseling: Body mass index is 31.06 kg/m². The BMI is above normal. Nutrition recommendations include decreasing portion sizes, encouraging healthy choices of fruits and vegetables, decreasing fast food intake, consuming healthier snacks and limiting drinks that contain sugar. Exercise recommendations include exercising 3-5 times per week. Rationale for BMI follow-up plan is due to patient being overweight or obese.     Depression Screening and Follow-up Plan: Patient's depression screening was positive with a PHQ-9 score of 7. Patient assessed for underlying major depression. Brief counseling provided and recommend additional follow-up/re-evaluation next office visit.         History of Present Illness     Adult Annual Physical:  Patient presents for annual physical. Patient is a 33 YO female who has a past medical history of iron deficiency anemia, anxiety and depression, constipation, uretal calculi who presents today for physical exam and follow up of chronic conditions.     The following portions of the patient's history were reviewed and updated as appropriate: allergies, current medications, past family history, past medical history, past social history, past surgical history and problem list.  .     Diet and Physical Activity:  - Diet/Nutrition: limited junk food.  - Exercise: no formal exercise.    Depression Screening:    - PHQ-9  Score: 7    General Health:  - Sleep: sleeps well.  - Hearing: normal hearing right ear and normal hearing left ear.  - Vision: no vision problems.  - Dental: regular dental visits.    /GYN Health:  - Follows with GYN: yes.   - Menopause: premenopausal.   - History of STDs: no    Advanced Care Planning:  - Has an advanced directive?: no      Review of Systems   Constitutional:  Positive for activity change and fatigue. Negative for appetite change, chills, fever and unexpected weight change.   HENT:  Negative for hearing loss, nosebleeds, sinus pain, sneezing, sore throat and trouble swallowing.    Eyes:  Negative for photophobia and visual disturbance.   Respiratory:  Negative for cough, chest tightness, shortness of breath and wheezing.    Cardiovascular:  Negative for chest pain, palpitations and leg swelling.   Gastrointestinal:  Negative for abdominal pain, constipation, nausea and vomiting.   Genitourinary:  Negative for decreased urine volume, difficulty urinating, dysuria, flank pain, genital sores, hematuria and urgency.   Musculoskeletal:  Negative for back pain and gait problem.   Skin:  Negative for pallor, rash and wound.   Neurological:  Negative for dizziness, seizures, syncope, weakness, numbness and headaches.   Hematological:  Negative for adenopathy. Does not bruise/bleed easily.   Psychiatric/Behavioral:  Negative for confusion, hallucinations, self-injury, sleep disturbance and suicidal ideas. The patient is not nervous/anxious.      Current Outpatient Medications on File Prior to Visit   Medication Sig Dispense Refill    cyanocobalamin (VITAMIN B-12) 1000 MCG tablet Take 1 tablet (1,000 mcg total) by mouth daily 90 tablet 3    ibuprofen (MOTRIN) 400 mg tablet Take 1 tablet (400 mg total) by mouth every 6 (six) hours as needed for mild pain or moderate pain 20 tablet 0    linaCLOtide 145 MCG CAPS Take 1 capsule (145 mcg total) by mouth daily 60 capsule 2    medroxyPROGESTERone (PROVERA) 10 mg  "tablet Take 1 tablet (10 mg total) by mouth daily for 10 days 10 tablet 0    [DISCONTINUED] escitalopram (LEXAPRO) 10 mg tablet Take 1 tablet (10 mg total) by mouth daily (Patient taking differently: Take 10 mg by mouth daily prn) 90 tablet 0    [DISCONTINUED] famotidine (PEPCID) 20 mg tablet Take 1 tablet (20 mg total) by mouth 2 (two) times a day (Patient not taking: Reported on 9/20/2022) 30 tablet 0    [DISCONTINUED] ferrous sulfate 324 (65 Fe) mg Take 1 tablet (324 mg total) by mouth daily before breakfast 60 tablet 1    [DISCONTINUED] ibuprofen (MOTRIN) 600 mg tablet Take 1 tablet (600 mg total) by mouth every 6 (six) hours as needed for moderate pain (Patient not taking: Reported on 10/30/2023) 30 tablet 0    [DISCONTINUED] lidocaine (LIDODERM) 5 % Apply 1 patch topically over 12 hours daily Remove & Discard patch within 12 hours or as directed by MD (Patient not taking: Reported on 10/30/2023) 15 patch 0    [DISCONTINUED] ondansetron (ZOFRAN) 4 mg tablet Take 1 tablet (4 mg total) by mouth every 6 (six) hours (Patient not taking: Reported on 4/20/2022) 12 tablet 0    [DISCONTINUED] phenazopyridine (PYRIDIUM) 200 mg tablet Take 1 tablet (200 mg total) by mouth 3 (three) times a day (Patient not taking: Reported on 10/30/2023) 6 tablet 0    [DISCONTINUED] polyethylene glycol (GLYCOLAX) 17 GM/SCOOP powder Take 2x daily x 7 days then once daily after 578 g 2     Current Facility-Administered Medications on File Prior to Visit   Medication Dose Route Frequency Provider Last Rate Last Admin    cyanocobalamin injection 1,000 mcg  1,000 mcg Intramuscular Q30 Days DENISSE Newell   1,000 mcg at 04/14/23 1204        Objective     /74 (BP Location: Right arm, Patient Position: Sitting, Cuff Size: Standard)   Pulse 84   Temp 97.8 °F (36.6 °C) (Temporal)   Resp 18   Ht 5' 1\" (1.549 m)   Wt 74.6 kg (164 lb 6.4 oz)   LMP 06/17/2024 (Approximate)   SpO2 99%   BMI 31.06 kg/m²     Physical " Exam  Vitals and nursing note reviewed.   Constitutional:       General: She is not in acute distress.     Appearance: Normal appearance. She is not diaphoretic.   HENT:      Head: Normocephalic.      Right Ear: Tympanic membrane and external ear normal.      Left Ear: Tympanic membrane and external ear normal.      Nose: Nose normal.      Mouth/Throat:      Mouth: Mucous membranes are moist.   Eyes:      General:         Right eye: No discharge.         Left eye: No discharge.      Conjunctiva/sclera: Conjunctivae normal.      Pupils: Pupils are equal, round, and reactive to light.   Cardiovascular:      Rate and Rhythm: Normal rate and regular rhythm.   Pulmonary:      Effort: Pulmonary effort is normal. No respiratory distress.      Breath sounds: Normal breath sounds.   Abdominal:      General: Bowel sounds are normal.   Musculoskeletal:         General: Normal range of motion.      Cervical back: Normal range of motion and neck supple. No rigidity.      Right lower leg: No edema.      Left lower leg: No edema.   Lymphadenopathy:      Cervical: No cervical adenopathy.   Skin:     General: Skin is warm.      Capillary Refill: Capillary refill takes less than 2 seconds.   Neurological:      General: No focal deficit present.      Mental Status: She is alert and oriented to person, place, and time.   Psychiatric:         Mood and Affect: Mood normal.         Behavior: Behavior normal.

## 2024-07-11 NOTE — ASSESSMENT & PLAN NOTE
Wt Readings from Last 3 Encounters:   07/11/24 74.6 kg (164 lb 6.4 oz)   07/02/24 74.7 kg (164 lb 9.6 oz)   05/12/24 75.4 kg (166 lb 3.6 oz)     -Encouraged diet and lifestyle changes: decrease processed foods (cakes, cookies, chips, soda), decrease total carbohydrate intake, decrease fried/fatty foods, increase fruits and vegetables, increase lean proteins (chicken, turkey), increase healthy fats (avocado, fish, nuts), drink plenty of water (at least four 16 oz bottles per day)

## 2024-07-11 NOTE — ASSESSMENT & PLAN NOTE
PHQ-2/9 Depression Screening    Little interest or pleasure in doing things: 1 - several days  Feeling down, depressed, or hopeless: 0 - not at all  Trouble falling or staying asleep, or sleeping too much: 1 - several days  Feeling tired or having little energy: 3 - nearly every day  Poor appetite or overeatin - not at all  Feeling bad about yourself - or that you are a failure or have let yourself or your family down: 2 - more than half the days  Trouble concentrating on things, such as reading the newspaper or watching television: 0 - not at all  Moving or speaking so slowly that other people could have noticed. Or the opposite - being so fidgety or restless that you have been moving around a lot more than usual: 0 - not at all  Thoughts that you would be better off dead, or of hurting yourself in some way: 0 - not at all  PHQ-9 Score: 7  PHQ-9 Interpretation: Mild depression           She is going to [facility] for therapy (near [place]), feels that it is helping a lot  Wants to restart escitalopram, will restart at 10 mg daily, discussed expected outcomes and reviewed possible side effects of medication

## 2024-07-11 NOTE — ASSESSMENT & PLAN NOTE
Lab Results   Component Value Date    WBC 5.46 08/29/2023    HGB 12.0 08/29/2023    HCT 37.7 08/29/2023    MCV 89 08/29/2023     08/29/2023     Lab Results   Component Value Date    IRON 99 08/29/2023    TIBC 394 08/29/2023    FERRITIN 16 08/29/2023     Previously required iron infusions and was following with hematology

## 2024-07-12 LAB
C TRACH DNA SPEC QL NAA+PROBE: NEGATIVE
N GONORRHOEA DNA SPEC QL NAA+PROBE: NEGATIVE

## 2024-07-13 LAB
GAMMA INTERFERON BACKGROUND BLD IA-ACNC: 0.05 IU/ML
M TB IFN-G BLD-IMP: NEGATIVE
M TB IFN-G CD4+ BCKGRND COR BLD-ACNC: 0 IU/ML
M TB IFN-G CD4+ BCKGRND COR BLD-ACNC: 0.01 IU/ML
MITOGEN IGNF BCKGRD COR BLD-ACNC: 0.92 IU/ML

## 2024-07-16 DIAGNOSIS — D50.0 IRON DEFICIENCY ANEMIA DUE TO CHRONIC BLOOD LOSS: Primary | ICD-10-CM

## 2024-07-18 ENCOUNTER — HOSPITAL ENCOUNTER (EMERGENCY)
Facility: HOSPITAL | Age: 34
Discharge: HOME | End: 2024-07-18
Attending: EMERGENCY MEDICINE
Payer: COMMERCIAL

## 2024-07-18 VITALS
WEIGHT: 161.2 LBS | TEMPERATURE: 98.3 F | DIASTOLIC BLOOD PRESSURE: 67 MMHG | HEART RATE: 88 BPM | OXYGEN SATURATION: 97 % | RESPIRATION RATE: 18 BRPM | SYSTOLIC BLOOD PRESSURE: 120 MMHG | BODY MASS INDEX: 30.46 KG/M2

## 2024-07-18 DIAGNOSIS — D64.9 ANEMIA: ICD-10-CM

## 2024-07-18 DIAGNOSIS — R53.83 FATIGUE: Primary | ICD-10-CM

## 2024-07-18 LAB
ANION GAP SERPL CALCULATED.3IONS-SCNC: 8 MMOL/L (ref 4–13)
ATRIAL RATE: 85 BPM
BASOPHILS # BLD MANUAL: 0 THOUSAND/UL (ref 0–0.1)
BASOPHILS NFR MAR MANUAL: 0 % (ref 0–1)
BUN SERPL-MCNC: 14 MG/DL (ref 5–25)
CALCIUM SERPL-MCNC: 8.9 MG/DL (ref 8.4–10.2)
CHLORIDE SERPL-SCNC: 105 MMOL/L (ref 96–108)
CO2 SERPL-SCNC: 25 MMOL/L (ref 21–32)
CREAT SERPL-MCNC: 0.6 MG/DL (ref 0.6–1.3)
EOSINOPHIL # BLD MANUAL: 0 THOUSAND/UL (ref 0–0.4)
EOSINOPHIL NFR BLD MANUAL: 0 % (ref 0–6)
ERYTHROCYTE [DISTWIDTH] IN BLOOD BY AUTOMATED COUNT: 15.9 % (ref 11.6–15.1)
GFR SERPL CREATININE-BSD FRML MDRD: 119 ML/MIN/1.73SQ M
GLUCOSE SERPL-MCNC: 117 MG/DL (ref 65–140)
HCT VFR BLD AUTO: 27.8 % (ref 34.8–46.1)
HGB BLD-MCNC: 8 G/DL (ref 11.5–15.4)
HYPERCHROMIA BLD QL SMEAR: PRESENT
LG PLATELETS BLD QL SMEAR: PRESENT
LYMPHOCYTES # BLD AUTO: 1.96 THOUSAND/UL (ref 0.6–4.47)
LYMPHOCYTES # BLD AUTO: 44 % (ref 14–44)
MCH RBC QN AUTO: 20.7 PG (ref 26.8–34.3)
MCHC RBC AUTO-ENTMCNC: 28.8 G/DL (ref 31.4–37.4)
MCV RBC AUTO: 72 FL (ref 82–98)
MONOCYTES # BLD AUTO: 0.2 THOUSAND/UL (ref 0–1.22)
MONOCYTES NFR BLD: 5 % (ref 4–12)
NEUTROPHILS # BLD MANUAL: 1.92 THOUSAND/UL (ref 1.85–7.62)
NEUTS BAND NFR BLD MANUAL: 5 % (ref 0–8)
NEUTS SEG NFR BLD AUTO: 42 % (ref 43–75)
P AXIS: 47 DEGREES
PLATELET # BLD AUTO: 226 THOUSANDS/UL (ref 149–390)
PLATELET BLD QL SMEAR: ADEQUATE
PMV BLD AUTO: 11.1 FL (ref 8.9–12.7)
POTASSIUM SERPL-SCNC: 3.4 MMOL/L (ref 3.5–5.3)
PR INTERVAL: 130 MS
QRS AXIS: 27 DEGREES
QRSD INTERVAL: 82 MS
QT INTERVAL: 352 MS
QTC INTERVAL: 418 MS
RBC # BLD AUTO: 3.86 MILLION/UL (ref 3.81–5.12)
RBC MORPH BLD: PRESENT
SODIUM SERPL-SCNC: 138 MMOL/L (ref 135–147)
T WAVE AXIS: 30 DEGREES
VARIANT LYMPHS # BLD AUTO: 4 %
VENTRICULAR RATE: 85 BPM
WBC # BLD AUTO: 4.09 THOUSAND/UL (ref 4.31–10.16)

## 2024-07-18 PROCEDURE — 85007 BL SMEAR W/DIFF WBC COUNT: CPT | Performed by: EMERGENCY MEDICINE

## 2024-07-18 PROCEDURE — 99285 EMERGENCY DEPT VISIT HI MDM: CPT | Performed by: EMERGENCY MEDICINE

## 2024-07-18 PROCEDURE — 93010 ELECTROCARDIOGRAM REPORT: CPT | Performed by: INTERNAL MEDICINE

## 2024-07-18 PROCEDURE — 36415 COLL VENOUS BLD VENIPUNCTURE: CPT | Performed by: EMERGENCY MEDICINE

## 2024-07-18 PROCEDURE — 99283 EMERGENCY DEPT VISIT LOW MDM: CPT

## 2024-07-18 PROCEDURE — 80048 BASIC METABOLIC PNL TOTAL CA: CPT | Performed by: EMERGENCY MEDICINE

## 2024-07-18 PROCEDURE — 93005 ELECTROCARDIOGRAM TRACING: CPT

## 2024-07-18 PROCEDURE — 85027 COMPLETE CBC AUTOMATED: CPT | Performed by: EMERGENCY MEDICINE

## 2024-07-18 NOTE — Clinical Note
Ebony Mcdaniel was seen and treated in our emergency department on 7/18/2024.                Diagnosis:     Ebony  may return to work on return date.    She may return on this date: 07/22/2024         If you have any questions or concerns, please don't hesitate to call.      Obinna Garay MD    ______________________________           _______________          _______________  Hospital Representative                              Date                                Time

## 2024-07-19 ENCOUNTER — TELEPHONE (OUTPATIENT)
Dept: HEMATOLOGY ONCOLOGY | Facility: CLINIC | Age: 34
End: 2024-07-19

## 2024-07-19 ENCOUNTER — OFFICE VISIT (OUTPATIENT)
Dept: HEMATOLOGY ONCOLOGY | Facility: CLINIC | Age: 34
End: 2024-07-19
Payer: COMMERCIAL

## 2024-07-19 VITALS
SYSTOLIC BLOOD PRESSURE: 112 MMHG | DIASTOLIC BLOOD PRESSURE: 78 MMHG | BODY MASS INDEX: 30.21 KG/M2 | HEIGHT: 61 IN | HEART RATE: 80 BPM | WEIGHT: 160 LBS | OXYGEN SATURATION: 98 % | RESPIRATION RATE: 17 BRPM | TEMPERATURE: 97.5 F

## 2024-07-19 DIAGNOSIS — E61.1 IRON DEFICIENCY: Primary | ICD-10-CM

## 2024-07-19 DIAGNOSIS — N92.0 MENORRHAGIA WITH REGULAR CYCLE: ICD-10-CM

## 2024-07-19 PROCEDURE — 99214 OFFICE O/P EST MOD 30 MIN: CPT | Performed by: PHYSICIAN ASSISTANT

## 2024-07-19 RX ORDER — SODIUM CHLORIDE 9 MG/ML
20 INJECTION, SOLUTION INTRAVENOUS ONCE
OUTPATIENT
Start: 2024-07-26

## 2024-07-19 NOTE — ED PROCEDURE NOTE
PROCEDURE  ECG 12 Lead Documentation Only    Date/Time: 7/18/2024 9:41 PM    Performed by: Obinna Garay MD  Authorized by: Obinna Garay MD    Indications / Diagnosis:  Fatigue  ECG reviewed by me, the ED Provider: yes    Patient location:  ED  Interpretation:     Interpretation: normal    Rate:     ECG rate:  85    ECG rate assessment: normal    Rhythm:     Rhythm: sinus rhythm    Ectopy:     Ectopy: none    QRS:     QRS axis:  Normal    QRS intervals:  Normal  Conduction:     Conduction: normal    ST segments:     ST segments:  Normal  T waves:     T waves: normal         Obinna Garay MD  07/18/24 2144

## 2024-07-19 NOTE — PATIENT INSTRUCTIONS
Follow up with GYN   Follow up with GI     Follow up here in 3-4 months in the office with labs before

## 2024-07-19 NOTE — QUICK NOTE
Received a message from ED physician at 9:56pm if we can assist with obtaining venofer infusion asap    Spoke with patient; unsure why has chronic anemia and states has been present since she was a child. No other family members with something similar. No history of bariatric surgery. No diarrhea however does sometimes get constipated. Diet currently consists of what the shelter gives including rice, and lots of beans. Lately has been drinking a lot of 1% milk. Her menstruations last about 5 days and per patient is very heavy.Currently she lives in a shelter with her five children and works two jobs. Previously was receiving venofer infusion weekly for 6 weeks and it was supposed to be every 2 1/2 months per patient but she stopped going due to social situation.     Per chart review, when she was going to her infusions her hemoglobin improved to normal@12.0. Additionally, had a hematology appointment back in 03/23 and note states suspect it's from menorrhagia. Since she failed oral iron therapy plan was 6 weeks of venofer infusion.     Reviewed labwork including CBC and BMP significant for hemoglobin/hematocrit of 8/27.8 and MCV of 72    Vitals:    07/18/24 2115   BP: 120/67   Pulse: 88   Resp: 18   Temp: 98.3 °F (36.8 °C)   SpO2: 97%     Physical Exam:  General: normal appearance, clean  Eyes: lower lid pallor  Cardiopulmonary: no rubs, murmurs, gallops, regular rate/rhythm, lungs clear to auscultation bilaterally     PLAN  -counseled patient thoroughly on high iron diet and milk preventing absorption  -motivational interviewing to take charge of health and her capability to do so including exploring reasons as such I.e. her children   -discussed with ED physician that she will need two work notes; one for each job with return on Monday  -sent high priority referral team message to get patient into IV iron infusion and provided my number in case there are questions, issues, or orders that need to be placed

## 2024-07-19 NOTE — PROGRESS NOTES
Hematology/Oncology Outpatient Follow-up  Ebony Mcdaniel 34 y.o. female 1990 [id number]    Date:  7/19/2024      Assessment and Plan:  1. Iron deficiency  Had IV iron, responded well   Did not follow up as scheduled   Now anemic again     We reviewed iron deficiency is not a primary hematological process  We reviewed causes of iron deficiency to include:   Blood loss (GI bleed, menses, frequent blood donation, frequent epistaxis)  Malabsorption (Cory-en Y or sleeve gastrectomy, active or recent H. pylori, chronic prolonged PPI use)  Insufficient dietary intake      Likely menstrual blood loss, possible also inadequate intake.     Will proceed with infusions again.     Venofer 200 mg IV weekly x 6     Follow up in 3-4 months.     - H. pylori antigen, stool; Future  - Occult Blood, Fecal Immunochemical; Future  - CBC and differential; Future  - Ferritin; Future  - Vitamin B12; Future    2. Menorrhagia with regular cycle  F/u GYN    HPI:  34 year old female presents for follow up for iron def anemia; seen in May 2023 and did not follow up after.     She had IV iron; PTT, PT, von willeband negative     Has not followed up with GYN     ROS: Review of Systems   Constitutional:  Positive for fatigue.   Respiratory:  Positive for shortness of breath. Negative for cough.    Cardiovascular:  Negative for palpitations and leg swelling.   Gastrointestinal:  Negative for abdominal pain, constipation, diarrhea, nausea and vomiting.   Genitourinary:  Positive for menstrual problem (heavy bleeding). Negative for difficulty urinating and dyspareunia.   Musculoskeletal:  Negative for arthralgias.   Skin: Negative.    Neurological:  Positive for dizziness, weakness, light-headedness and headaches.   Hematological: Negative.    Psychiatric/Behavioral: Negative.         Past Medical History:   Diagnosis Date    Anemia     taking iron pills BID     Chronic kidney disease     hydronephrosis    History of transfusion      Kidney stone     Sepsis (HCC)     Septic shock (HCC)     Urinary tract infection     taking antibioitcs     UTI (urinary tract infection) during pregnancy     Vagina, candidiasis     Varicella     as child       Past Surgical History:   Procedure Laterality Date    ABDOMINAL ADHESION SURGERY N/A 2021    Procedure: LYSIS ADHESIONS;  Surgeon: Maciej Watson MD;  [facility];  Service: Gynecology     SECTION N/A 2018    Procedure:  SECTION ();  Surgeon: uCrtis Zavala MD;  [facility];  Service: Obstetrics    CYSTOSCOPY  2018    with right uretral stent    EXAMINATION UNDER ANESTHESIA N/A 2021    Procedure: EXAM UNDER ANESTHESIA (EUA);  Surgeon: Maciej Watson MD;  [facility];  Service: Gynecology    MT CYSTO BLADDER W/URETERAL CATHETERIZATION Right 2018    Procedure: CYSTOSCOPY RETROGRADE PYELOGRAM WITH INSERTION STENT URETERAL;  Surgeon: Cristofer Matute MD;  [facility];  Service: Urology    MT CYSTO/URETERO W/LITHOTRIPSY &INDWELL STENT INSRT Right 2018    Procedure: CYSTOSCOPY URETEROSCOPY WITH  RETROGRADE PYELOGRAM AND exchange  STENT URETERAl;  Surgeon: Cristofer Matute MD;  Location:  [facility] OR;  Service: Urology    MT LAPAROSCOPY SURG CHOLECYSTECTOMY N/A 2022    Procedure: CHOLECYSTECTOMY LAPAROSCOPIC;  Surgeon: Jennyfer Moreland MD;  Location:  MAIN OR;  Service: General    MT LAPAROSCOPY W/RMVL ADNEXAL STRUCTURES Bilateral 2021    Procedure: SALPINGECTOMY, LAPAROSCOPIC;  Surgeon: Maciej Watson MD;  [facility];  Service: Gynecology    MT LAPS ABD PRTM&OMENTUM DX W/WO SPEC BR/WA SPX N/A 2021    Procedure: LAPAROSCOPY DIAGNOSTIC;  Surgeon: Maciej Watson MD;  [facility];  Service: Gynecology    TONSILLECTOMY      TONSILLECTOMY      TUBAL LIGATION         Social History     Socioeconomic History    Marital status: Single     Spouse name: Not on file    Number of children: Not on file    Years  of education: Not on file    Highest education level: Not on file   Occupational History    Not on file   Tobacco Use    Smoking status: Former     Current packs/day: 0.00     Types: Cigarettes     Quit date: 2019     Years since quittin.1    Smokeless tobacco: Never    Tobacco comments:     2-3 a day   Vaping Use    Vaping status: Never Used   Substance and Sexual Activity    Alcohol use: No    Drug use: No    Sexual activity: Yes     Partners: Male     Birth control/protection: None, Condom Male   Other Topics Concern    Not on file   Social History Narrative    Not on file     Social Determinants of Health     Financial Resource Strain: Low Risk  (2024)    Overall Financial Resource Strain (CARDIA)     Difficulty of Paying Living Expenses: Not hard at all   Food Insecurity: No Food Insecurity (2024)    Hunger Vital Sign     Worried About Running Out of Food in the Last Year: Never true     Ran Out of Food in the Last Year: Never true   Transportation Needs: No Transportation Needs (2024)    PRAPARE - Transportation     Lack of Transportation (Medical): No     Lack of Transportation (Non-Medical): No   Physical Activity: Not on file   Stress: Not on file   Social Connections: Not on file   Intimate Partner Violence: Not on file   Housing Stability: Low Risk  (2024)    Housing Stability Vital Sign     Unable to Pay for Housing in the Last Year: No     Number of Times Moved in the Last Year: 1     Homeless in the Last Year: No       Family History   Problem Relation Age of Onset    No Known Problems Mother     Diabetes Father     Heart defect Brother         3 open heart surgeries    Heart failure Family         Congestive    Diabetes Maternal Grandmother        Allergies   Allergen Reactions    Morphine Shortness Of Breath         Current Outpatient Medications:     cyanocobalamin (VITAMIN B-12) 1000 MCG tablet, Take 1 tablet (1,000 mcg total) by mouth daily, Disp: 90 tablet, Rfl: 3     "escitalopram (LEXAPRO) 10 mg tablet, Take 1 tablet (10 mg total) by mouth daily, Disp: 90 tablet, Rfl: 0    ferrous sulfate 324 (65 Fe) mg, Take 1 tablet (324 mg total) by mouth daily before breakfast, Disp: 90 tablet, Rfl: 1    polyethylene glycol (GLYCOLAX) 17 GM/SCOOP powder, Take 17 g by mouth daily Take 2x daily x 7 days then once daily after, Disp: 578 g, Rfl: 2    ibuprofen (MOTRIN) 400 mg tablet, Take 1 tablet (400 mg total) by mouth every 6 (six) hours as needed for mild pain or moderate pain (Patient not taking: Reported on 7/19/2024), Disp: 20 tablet, Rfl: 0    linaCLOtide 145 MCG CAPS, Take 1 capsule (145 mcg total) by mouth daily (Patient not taking: Reported on 7/19/2024), Disp: 60 capsule, Rfl: 2    medroxyPROGESTERone (PROVERA) 10 mg tablet, Take 1 tablet (10 mg total) by mouth daily for 10 days, Disp: 10 tablet, Rfl: 0    Current Facility-Administered Medications:     cyanocobalamin injection 1,000 mcg, 1,000 mcg, Intramuscular, Q30 Days, Ramona Choe, CRNP, 1,000 mcg at 04/14/23 1204      Physical Exam:  /78 (BP Location: Left arm, Patient Position: Sitting, Cuff Size: Adult)   Pulse 80   Temp 97.5 °F (36.4 °C)   Resp 17   Ht 5' 1\" (1.549 m)   Wt 72.6 kg (160 lb)   LMP 06/17/2024 (Approximate)   SpO2 98%   BMI 30.23 kg/m²     Physical Exam  Vitals reviewed.   Constitutional:       General: She is not in acute distress.     Appearance: She is well-developed. She is not ill-appearing.   HENT:      Head: Normocephalic and atraumatic.   Eyes:      General: No scleral icterus.     Conjunctiva/sclera: Conjunctivae normal.   Cardiovascular:      Rate and Rhythm: Normal rate and regular rhythm.      Heart sounds: Normal heart sounds. No murmur heard.  Pulmonary:      Effort: Pulmonary effort is normal. No respiratory distress.      Breath sounds: Normal breath sounds.   Abdominal:      Palpations: Abdomen is soft.      Tenderness: There is no abdominal tenderness.   Musculoskeletal:   "       General: No tenderness. Normal range of motion.      Cervical back: Normal range of motion and neck supple.      Right lower leg: No edema.      Left lower leg: No edema.   Lymphadenopathy:      Cervical: No cervical adenopathy.   Skin:     General: Skin is warm and dry.   Neurological:      Mental Status: She is alert and oriented to person, place, and time.      Cranial Nerves: No cranial nerve deficit.   Psychiatric:         Mood and Affect: Mood normal.         Behavior: Behavior normal.       Labs:  Lab Results   Component Value Date    WBC 4.09 (L) 07/18/2024    HGB 8.0 (L) 07/18/2024    HCT 27.8 (L) 07/18/2024    MCV 72 (L) 07/18/2024     07/18/2024     I have spent 30 minutes with Patient  today in which greater than 50% of this time was spent in counseling/coordination of care regarding Diagnostic results, Risks and benefits of tx options, Instructions for management, Impressions, Documenting in the medical record, Reviewing / ordering tests, medicine, procedures  , and Obtaining or reviewing history  .     Patient voiced understanding and agreement in the above discussion. Aware to contact our office with questions/symptoms in the interim.     This note has been generated by voice recognition software system.  Therefore, there may be spelling, grammar, and or syntax errors. Please contact if questions arise.

## 2024-07-19 NOTE — ED PROVIDER NOTES
"History  Chief Complaint   Patient presents with   • Fatigue     Pt was recently diagnosed with pre diabetes and is anemic. Feels \"woozy\" and tired.      Patient is a 34-year-old female with a h/o anemia and recent dx of pre diabetes presents with a 1 week of fatigue.  Feels tired, almost to the point of passing on.  On iron pills.  Waiting for scheduled iron infusion.  Sleeping 5-7 hours a night.  Working 2 jobs at this point.  No cp, dyspnea, n/v, ha,         Prior to Admission Medications   Prescriptions Last Dose Informant Patient Reported? Taking?   cyanocobalamin (VITAMIN B-12) 1000 MCG tablet   No No   Sig: Take 1 tablet (1,000 mcg total) by mouth daily   escitalopram (LEXAPRO) 10 mg tablet   No No   Sig: Take 1 tablet (10 mg total) by mouth daily   ferrous sulfate 324 (65 Fe) mg   No No   Sig: Take 1 tablet (324 mg total) by mouth daily before breakfast   ibuprofen (MOTRIN) 400 mg tablet   No No   Sig: Take 1 tablet (400 mg total) by mouth every 6 (six) hours as needed for mild pain or moderate pain   linaCLOtide 145 MCG CAPS   No No   Sig: Take 1 capsule (145 mcg total) by mouth daily   medroxyPROGESTERone (PROVERA) 10 mg tablet   No No   Sig: Take 1 tablet (10 mg total) by mouth daily for 10 days   polyethylene glycol (GLYCOLAX) 17 GM/SCOOP powder   No No   Sig: Take 17 g by mouth daily Take 2x daily x 7 days then once daily after      Facility-Administered Medications Last Administration Doses Remaining   cyanocobalamin injection 1,000 mcg 4/14/2023 12:04 PM           Past Medical History:   Diagnosis Date   • Anemia     taking iron pills BID    • Chronic kidney disease     hydronephrosis   • History of transfusion    • Kidney stone    • Sepsis (HCC)    • Septic shock (HCC)    • Urinary tract infection     taking antibioitcs    • UTI (urinary tract infection) during pregnancy    • Vagina, candidiasis    • Varicella     as child       Past Surgical History:   Procedure Laterality Date   • ABDOMINAL " ADHESION SURGERY N/A 2021    Procedure: LYSIS ADHESIONS;  Surgeon: Maciej Watson MD;  [facility];  Service: Gynecology   •  SECTION N/A 2018    Procedure:  SECTION ();  Surgeon: Curtis Zavala MD;  [facility];  Service: Obstetrics   • CYSTOSCOPY  2018    with right uretral stent   • EXAMINATION UNDER ANESTHESIA N/A 2021    Procedure: EXAM UNDER ANESTHESIA (EUA);  Surgeon: Maciej Watson MD;  [facility];  Service: Gynecology   • NH CYSTO BLADDER W/URETERAL CATHETERIZATION Right 2018    Procedure: CYSTOSCOPY RETROGRADE PYELOGRAM WITH INSERTION STENT URETERAL;  Surgeon: Cristofer Matute MD;  [facility];  Service: Urology   • NH CYSTO/URETERO W/LITHOTRIPSY &INDWELL STENT INSRT Right 2018    Procedure: CYSTOSCOPY URETEROSCOPY WITH  RETROGRADE PYELOGRAM AND exchange  STENT URETERAl;  Surgeon: Cristofer Matute MD;  Location:  [facility];  Service: Urology   • NH LAPAROSCOPY SURG CHOLECYSTECTOMY N/A 2022    Procedure: CHOLECYSTECTOMY LAPAROSCOPIC;  Surgeon: Jennyfer Moreland MD;  Location:  [facility];  Service: General   • NH LAPAROSCOPY W/RMVL ADNEXAL STRUCTURES Bilateral 2021    Procedure: SALPINGECTOMY, LAPAROSCOPIC;  Surgeon: Maciej Watson MD;  [facility];  Service: Gynecology   • NH LAPS ABD PRTM&OMENTUM DX W/WO SPEC BR/WA SPX N/A 2021    Procedure: LAPAROSCOPY DIAGNOSTIC;  Surgeon: Maciej Watson MD;  [facility];  Service: Gynecology   • TONSILLECTOMY     • TONSILLECTOMY     • TUBAL LIGATION         Family History   Problem Relation Age of Onset   • No Known Problems Mother    • Diabetes Father    • Heart defect Brother         3 open heart surgeries   • Heart failure Family         Congestive   • Diabetes Maternal Grandmother      I have reviewed and agree with the history as documented.    E-Cigarette/Vaping   • E-Cigarette Use Never User      E-Cigarette/Vaping Substances   • Nicotine No    •  THC No    • CBD No    • Flavoring No    • Other No    • Unknown No      Social History     Tobacco Use   • Smoking status: Former     Current packs/day: 0.00     Types: Cigarettes     Quit date: 2019     Years since quittin.1   • Smokeless tobacco: Never   • Tobacco comments:     2-3 a day   Vaping Use   • Vaping status: Never Used   Substance Use Topics   • Alcohol use: No   • Drug use: No       Review of Systems   Constitutional:  Positive for activity change and fatigue.   HENT: Negative.     Eyes: Negative.    Respiratory: Negative.     Cardiovascular: Negative.    Gastrointestinal: Negative.    Endocrine: Negative.    Genitourinary: Negative.    Musculoskeletal: Negative.    Skin: Negative.    Allergic/Immunologic: Negative.    Neurological: Negative.    Hematological: Negative.    Psychiatric/Behavioral: Negative.     All other systems reviewed and are negative.      Physical Exam  Physical Exam  Vitals and nursing note reviewed.   Constitutional:       Appearance: Normal appearance. She is normal weight.   HENT:      Head: Normocephalic and atraumatic.      Nose: Nose normal.      Mouth/Throat:      Mouth: Mucous membranes are moist.      Pharynx: Oropharynx is clear.   Eyes:      Extraocular Movements: Extraocular movements intact.      Conjunctiva/sclera: Conjunctivae normal.      Pupils: Pupils are equal, round, and reactive to light.   Cardiovascular:      Rate and Rhythm: Normal rate and regular rhythm.      Pulses: Normal pulses.      Heart sounds: Normal heart sounds.   Pulmonary:      Effort: Pulmonary effort is normal.      Breath sounds: Normal breath sounds.   Abdominal:      General: Bowel sounds are normal.      Palpations: Abdomen is soft.   Musculoskeletal:         General: Normal range of motion.      Cervical back: Normal range of motion and neck supple.   Skin:     General: Skin is warm.      Capillary Refill: Capillary refill takes less than 2 seconds.   Neurological:      General:  No focal deficit present.      Mental Status: She is alert and oriented to person, place, and time.      Cranial Nerves: No cranial nerve deficit.      Sensory: No sensory deficit.      Motor: No weakness.      Gait: Gait normal.         Vital Signs  ED Triage Vitals [07/18/24 2115]   Temperature Pulse Respirations Blood Pressure SpO2   98.3 °F (36.8 °C) 88 18 120/67 97 %      Temp Source Heart Rate Source Patient Position - Orthostatic VS BP Location FiO2 (%)   Tympanic Monitor Lying Left arm --      Pain Score       --           Vitals:    07/18/24 2115   BP: 120/67   Pulse: 88   Patient Position - Orthostatic VS: Lying         Visual Acuity      ED Medications  Medications - No data to display    Diagnostic Studies  Results Reviewed       Procedure Component Value Units Date/Time    CBC and differential [[id number]] Collected: 07/18/24 2132    Lab Status: No result Specimen: Blood from Arm, Left     Basic metabolic panel [[id number]] Collected: 07/18/24 2132    Lab Status: No result Specimen: Blood from Arm, Left                    No orders to display              Procedures  Procedures         ED Course  ED Course as of 07/18/24 2236   Thu Jul 18, 2024 2153 Hgb 8.  On iron pills. Waiting on infusion.    2204 Spoke with FP, will be down to see patient.                                  SBIRT 20yo+      Flowsheet Row Most Recent Value   Initial Alcohol Screen: US AUDIT-C     1. How often do you have a drink containing alcohol? 0 Filed at: 07/18/2024 2120   2. How many drinks containing alcohol do you have on a typical day you are drinking?  0 Filed at: 07/18/2024 2120   3b. FEMALE Any Age, or MALE 65+: How often do you have 4 or more drinks on one occassion? 0 Filed at: 07/18/2024 2120   Audit-C Score 0 Filed at: 07/18/2024 2120   DEE: How many times in the past year have you...    Used an illegal drug or used a prescription medication for non-medical reasons? Never Filed at: 07/18/2024 2120                       Medical Decision Making  Amount and/or Complexity of Data Reviewed  Labs: ordered.                 Disposition  Final diagnoses:   None     ED Disposition       None          Follow-up Information    None         Patient's Medications   Discharge Prescriptions    No medications on file       No discharge procedures on file.    PDMP Review         Value Time User    PDMP Reviewed  Yes 2/12/2021  2:14 PM Maciej Watson MD            ED Provider  Electronically Signed by             Obinna Garay MD  07/18/24 7759

## 2024-07-21 ENCOUNTER — HOSPITAL ENCOUNTER (EMERGENCY)
Facility: HOSPITAL | Age: 34
Discharge: HOME | End: 2024-07-21
Attending: EMERGENCY MEDICINE | Admitting: EMERGENCY MEDICINE
Payer: COMMERCIAL

## 2024-07-21 ENCOUNTER — APPOINTMENT (EMERGENCY)
Dept: RADIOLOGY | Facility: HOSPITAL | Age: 34
End: 2024-07-21
Payer: COMMERCIAL

## 2024-07-21 VITALS
TEMPERATURE: 98.3 F | DIASTOLIC BLOOD PRESSURE: 59 MMHG | RESPIRATION RATE: 17 BRPM | OXYGEN SATURATION: 100 % | SYSTOLIC BLOOD PRESSURE: 99 MMHG | HEART RATE: 68 BPM

## 2024-07-21 DIAGNOSIS — M54.42 ACUTE MIDLINE LOW BACK PAIN WITH BILATERAL SCIATICA: Primary | ICD-10-CM

## 2024-07-21 DIAGNOSIS — M54.41 ACUTE MIDLINE LOW BACK PAIN WITH BILATERAL SCIATICA: Primary | ICD-10-CM

## 2024-07-21 DIAGNOSIS — D64.9 ANEMIA: ICD-10-CM

## 2024-07-21 LAB
ALBUMIN SERPL BCG-MCNC: 4.2 G/DL (ref 3.5–5)
ALP SERPL-CCNC: 76 U/L (ref 34–104)
ALT SERPL W P-5'-P-CCNC: 29 U/L (ref 7–52)
AMORPH URATE CRY URNS QL MICRO: ABNORMAL
ANION GAP SERPL CALCULATED.3IONS-SCNC: 5 MMOL/L (ref 4–13)
AST SERPL W P-5'-P-CCNC: 22 U/L (ref 13–39)
BACTERIA UR QL AUTO: ABNORMAL /HPF
BASOPHILS # BLD AUTO: 0.03 THOUSANDS/ÂΜL (ref 0–0.1)
BASOPHILS NFR BLD AUTO: 1 % (ref 0–1)
BILIRUB SERPL-MCNC: 0.28 MG/DL (ref 0.2–1)
BILIRUB UR QL STRIP: NEGATIVE
BUN SERPL-MCNC: 12 MG/DL (ref 5–25)
CALCIUM SERPL-MCNC: 9.4 MG/DL (ref 8.4–10.2)
CHLORIDE SERPL-SCNC: 107 MMOL/L (ref 96–108)
CLARITY UR: CLEAR
CO2 SERPL-SCNC: 25 MMOL/L (ref 21–32)
COLOR UR: YELLOW
CREAT SERPL-MCNC: 0.62 MG/DL (ref 0.6–1.3)
EOSINOPHIL # BLD AUTO: 0.1 THOUSAND/ÂΜL (ref 0–0.61)
EOSINOPHIL NFR BLD AUTO: 2 % (ref 0–6)
ERYTHROCYTE [DISTWIDTH] IN BLOOD BY AUTOMATED COUNT: 15.9 % (ref 11.6–15.1)
EXT PREGNANCY TEST URINE: NEGATIVE
EXT. CONTROL: NORMAL
GFR SERPL CREATININE-BSD FRML MDRD: 118 ML/MIN/1.73SQ M
GLUCOSE SERPL-MCNC: 99 MG/DL (ref 65–140)
GLUCOSE UR STRIP-MCNC: NEGATIVE MG/DL
HCT VFR BLD AUTO: 28.9 % (ref 34.8–46.1)
HGB BLD-MCNC: 8.2 G/DL (ref 11.5–15.4)
HGB UR QL STRIP.AUTO: ABNORMAL
IMM GRANULOCYTES # BLD AUTO: 0.15 THOUSAND/UL (ref 0–0.2)
IMM GRANULOCYTES NFR BLD AUTO: 2 % (ref 0–2)
KETONES UR STRIP-MCNC: NEGATIVE MG/DL
LEUKOCYTE ESTERASE UR QL STRIP: ABNORMAL
LYMPHOCYTES # BLD AUTO: 1.41 THOUSANDS/ÂΜL (ref 0.6–4.47)
LYMPHOCYTES NFR BLD AUTO: 22 % (ref 14–44)
MAGNESIUM SERPL-MCNC: 2 MG/DL (ref 1.9–2.7)
MCH RBC QN AUTO: 20 PG (ref 26.8–34.3)
MCHC RBC AUTO-ENTMCNC: 28.4 G/DL (ref 31.4–37.4)
MCV RBC AUTO: 71 FL (ref 82–98)
MONOCYTES # BLD AUTO: 0.44 THOUSAND/ÂΜL (ref 0.17–1.22)
MONOCYTES NFR BLD AUTO: 7 % (ref 4–12)
MUCOUS THREADS UR QL AUTO: ABNORMAL
NEUTROPHILS # BLD AUTO: 4.25 THOUSANDS/ÂΜL (ref 1.85–7.62)
NEUTS SEG NFR BLD AUTO: 66 % (ref 43–75)
NITRITE UR QL STRIP: NEGATIVE
NON-SQ EPI CELLS URNS QL MICRO: ABNORMAL /HPF
NRBC BLD AUTO-RTO: 0 /100 WBCS
PH UR STRIP.AUTO: 7 [PH] (ref 4.5–8)
PLATELET # BLD AUTO: 347 THOUSANDS/UL (ref 149–390)
PMV BLD AUTO: 10.8 FL (ref 8.9–12.7)
POTASSIUM SERPL-SCNC: 3.8 MMOL/L (ref 3.5–5.3)
PROT SERPL-MCNC: 7.3 G/DL (ref 6.4–8.4)
PROT UR STRIP-MCNC: NEGATIVE MG/DL
RBC # BLD AUTO: 4.09 MILLION/UL (ref 3.81–5.12)
RBC #/AREA URNS AUTO: ABNORMAL /HPF
SODIUM SERPL-SCNC: 137 MMOL/L (ref 135–147)
SP GR UR STRIP.AUTO: >=1.03 (ref 1–1.03)
UROBILINOGEN UR QL STRIP.AUTO: 1 E.U./DL
WBC # BLD AUTO: 6.38 THOUSAND/UL (ref 4.31–10.16)
WBC #/AREA URNS AUTO: ABNORMAL /HPF

## 2024-07-21 PROCEDURE — 96376 TX/PRO/DX INJ SAME DRUG ADON: CPT

## 2024-07-21 PROCEDURE — 80053 COMPREHEN METABOLIC PANEL: CPT

## 2024-07-21 PROCEDURE — 96361 HYDRATE IV INFUSION ADD-ON: CPT

## 2024-07-21 PROCEDURE — 81001 URINALYSIS AUTO W/SCOPE: CPT

## 2024-07-21 PROCEDURE — 96374 THER/PROPH/DIAG INJ IV PUSH: CPT

## 2024-07-21 PROCEDURE — 87086 URINE CULTURE/COLONY COUNT: CPT

## 2024-07-21 PROCEDURE — 36415 COLL VENOUS BLD VENIPUNCTURE: CPT

## 2024-07-21 PROCEDURE — 72100 X-RAY EXAM L-S SPINE 2/3 VWS: CPT

## 2024-07-21 PROCEDURE — 83735 ASSAY OF MAGNESIUM: CPT

## 2024-07-21 PROCEDURE — 99285 EMERGENCY DEPT VISIT HI MDM: CPT

## 2024-07-21 PROCEDURE — 99283 EMERGENCY DEPT VISIT LOW MDM: CPT

## 2024-07-21 PROCEDURE — 85025 COMPLETE CBC W/AUTO DIFF WBC: CPT

## 2024-07-21 PROCEDURE — 81025 URINE PREGNANCY TEST: CPT

## 2024-07-21 RX ORDER — LIDOCAINE 50 MG/G
1 PATCH TOPICAL ONCE
Status: DISCONTINUED | OUTPATIENT
Start: 2024-07-21 | End: 2024-07-22 | Stop reason: HOSPADM

## 2024-07-21 RX ORDER — ACETAMINOPHEN 325 MG/1
650 TABLET ORAL ONCE
Status: COMPLETED | OUTPATIENT
Start: 2024-07-21 | End: 2024-07-21

## 2024-07-21 RX ORDER — METHOCARBAMOL 500 MG/1
500 TABLET, FILM COATED ORAL ONCE
Status: COMPLETED | OUTPATIENT
Start: 2024-07-21 | End: 2024-07-21

## 2024-07-21 RX ORDER — NAPROXEN 500 MG/1
500 TABLET ORAL 2 TIMES DAILY WITH MEALS
Qty: 30 TABLET | Refills: 0 | Status: SHIPPED | OUTPATIENT
Start: 2024-07-21

## 2024-07-21 RX ORDER — KETOROLAC TROMETHAMINE 30 MG/ML
15 INJECTION, SOLUTION INTRAMUSCULAR; INTRAVENOUS ONCE
Status: COMPLETED | OUTPATIENT
Start: 2024-07-21 | End: 2024-07-21

## 2024-07-21 RX ORDER — METHOCARBAMOL 500 MG/1
500 TABLET, FILM COATED ORAL 2 TIMES DAILY
Qty: 20 TABLET | Refills: 0 | Status: SHIPPED | OUTPATIENT
Start: 2024-07-21

## 2024-07-21 RX ADMIN — KETOROLAC TROMETHAMINE 15 MG: 30 INJECTION, SOLUTION INTRAMUSCULAR; INTRAVENOUS at 22:39

## 2024-07-21 RX ADMIN — METHOCARBAMOL 500 MG: 500 TABLET ORAL at 21:10

## 2024-07-21 RX ADMIN — KETOROLAC TROMETHAMINE 15 MG: 30 INJECTION, SOLUTION INTRAMUSCULAR; INTRAVENOUS at 21:12

## 2024-07-21 RX ADMIN — ACETAMINOPHEN 650 MG: 325 TABLET, FILM COATED ORAL at 22:39

## 2024-07-21 RX ADMIN — LIDOCAINE 1 PATCH: 50 PATCH CUTANEOUS at 21:13

## 2024-07-21 RX ADMIN — SODIUM CHLORIDE 1000 ML: 0.9 INJECTION, SOLUTION INTRAVENOUS at 21:11

## 2024-07-21 NOTE — Clinical Note
Ebony Bertinlisa was seen and treated in our emergency department on 7/21/2024.            Please allow for bed rest 7/22/24 if applicable    Diagnosis: Radiculopathy    Ebony  .    She may return on this date: 07/23/2024         If you have any questions or concerns, please don't hesitate to call.      Aubree Steele PA-C    ______________________________           _______________          _______________  Hospital Representative                              Date                                Time

## 2024-07-21 NOTE — Clinical Note
Ebony Mcdaniel was seen and treated in our emergency department on 7/21/2024.            No lifting over 15 lbs until cleared by a provider.    Diagnosis: Radiculopathy    Ebony  may return to work on return date.    She may return on this date: 07/24/2024         If you have any questions or concerns, please don't hesitate to call.      Aubree Steele PA-C    ______________________________           _______________          _______________  Hospital Representative                              Date                                Time

## 2024-07-22 NOTE — ED PROVIDER NOTES
History  Chief Complaint   Patient presents with    Leg Pain     Pt c/o bilateral leg pain that radiates to lower back. Believes pain is d/t her anemia. Denies injury or fall. Tylenol taken yesterday with no relief.     Ebony is a 34-year-old female presenting to the emergency department with back and leg pain x a few days.  She reports that her pain starts in her low back in the middle and radiates down the back of bilateral legs.  Also reports occasional bilateral cramping in her inner thigh area.  Denies changes in urinary output, ability to hold bladder or bowels, saddle anesthesia, leg weakness, fevers.  States that she has chronic anemia and was previously told that it can contribute to her pain.  Denies lightheadedness, dizziness, shortness of breath, nausea, vomiting, diarrhea.      Leg Pain  Associated symptoms: back pain    Associated symptoms: no fatigue, no fever and no neck pain        Prior to Admission Medications   Prescriptions Last Dose Informant Patient Reported? Taking?   cyanocobalamin (VITAMIN B-12) 1000 MCG tablet   No No   Sig: Take 1 tablet (1,000 mcg total) by mouth daily   escitalopram (LEXAPRO) 10 mg tablet   No No   Sig: Take 1 tablet (10 mg total) by mouth daily   ferrous sulfate 324 (65 Fe) mg   No No   Sig: Take 1 tablet (324 mg total) by mouth daily before breakfast   ibuprofen (MOTRIN) 400 mg tablet   No No   Sig: Take 1 tablet (400 mg total) by mouth every 6 (six) hours as needed for mild pain or moderate pain   Patient not taking: Reported on 7/19/2024   linaCLOtide 145 MCG CAPS   No No   Sig: Take 1 capsule (145 mcg total) by mouth daily   Patient not taking: Reported on 7/19/2024   medroxyPROGESTERone (PROVERA) 10 mg tablet   No No   Sig: Take 1 tablet (10 mg total) by mouth daily for 10 days   polyethylene glycol (GLYCOLAX) 17 GM/SCOOP powder   No No   Sig: Take 17 g by mouth daily Take 2x daily x 7 days then once daily after      Facility-Administered Medications Last  Administration Doses Remaining   cyanocobalamin injection 1,000 mcg 2023 12:04 PM           Past Medical History:   Diagnosis Date    Anemia     taking iron pills BID     Chronic kidney disease     hydronephrosis    History of transfusion     Kidney stone     Sepsis (HCC)     Septic shock (HCC)     Urinary tract infection     taking antibioitcs     UTI (urinary tract infection) during pregnancy     Vagina, candidiasis     Varicella     as child       Past Surgical History:   Procedure Laterality Date    ABDOMINAL ADHESION SURGERY N/A 2021    Procedure: LYSIS ADHESIONS;  Surgeon: Maciej Watson MD;  [facility]: [facility];  Service: Gynecology     SECTION N/A 2018    Procedure:  SECTION ();  Surgeon: Curtis Zavala MD;  Location: [facility];  Service: Obstetrics    CYSTOSCOPY  2018    with right uretral stent    EXAMINATION UNDER ANESTHESIA N/A 2021    Procedure: EXAM UNDER ANESTHESIA (EUA);  Surgeon: Maciej Watson MD;  [facility];  Service: Gynecology    VA CYSTO BLADDER W/URETERAL CATHETERIZATION Right 2018    Procedure: CYSTOSCOPY RETROGRADE PYELOGRAM WITH INSERTION STENT URETERAL;  Surgeon: Cristofer Matute MD;  Location[facility];  Service: Urology    VA CYSTO/URETERO W/LITHOTRIPSY &INDWELL STENT INSRT Right 2018    Procedure: CYSTOSCOPY URETEROSCOPY WITH  RETROGRADE PYELOGRAM AND exchange  STENT URETERAl;  Surgeon: Cristofer Matute MD;  Location:  MAIN OR;  Service: Urology    VA LAPAROSCOPY SURG CHOLECYSTECTOMY N/A 2022    Procedure: CHOLECYSTECTOMY LAPAROSCOPIC;  Surgeon: Jennyfer Moreland MD;  Location:  MAIN OR;  Service: General    VA LAPAROSCOPY W/RMVL ADNEXAL STRUCTURES Bilateral 2021    Procedure: SALPINGECTOMY, LAPAROSCOPIC;  Surgeon: Maciej Watson MD;  [facility];  Service: Gynecology    VA LAPS ABD PRTM&OMENTUM DX W/WO SPEC BR/WA SPX N/A 2021    Procedure: LAPAROSCOPY DIAGNOSTIC;  Surgeon: Maciej Watson MD;   [facility];  Service: Gynecology    TONSILLECTOMY      TONSILLECTOMY      TUBAL LIGATION         Family History   Problem Relation Age of Onset    No Known Problems Mother     Diabetes Father     Heart defect Brother         3 open heart surgeries    Heart failure Family         Congestive    Diabetes Maternal Grandmother      I have reviewed and agree with the history as documented.    E-Cigarette/Vaping    E-Cigarette Use Never User      E-Cigarette/Vaping Substances    Nicotine No     THC No     CBD No     Flavoring No     Other No     Unknown No      Social History     Tobacco Use    Smoking status: Former     Current packs/day: 0.00     Types: Cigarettes     Quit date: 2019     Years since quittin.1    Smokeless tobacco: Never    Tobacco comments:     2-3 a day   Vaping Use    Vaping status: Never Used   Substance Use Topics    Alcohol use: No    Drug use: No       Review of Systems   Constitutional:  Negative for chills, fatigue and fever.   Eyes:  Negative for visual disturbance.   Respiratory:  Negative for cough and shortness of breath.    Gastrointestinal:  Negative for abdominal pain and nausea.   Genitourinary:  Negative for dysuria, flank pain, frequency, hematuria, pelvic pain and urgency.   Musculoskeletal:  Positive for back pain. Negative for arthralgias, gait problem, myalgias, neck pain and neck stiffness.   Skin:  Negative for rash.   Neurological:  Negative for seizures, weakness, light-headedness and numbness.       Physical Exam  Physical Exam  Constitutional:       General: She is not in acute distress.     Appearance: Normal appearance. She is not ill-appearing or diaphoretic.   Pulmonary:      Effort: Pulmonary effort is normal. No respiratory distress.      Breath sounds: Normal breath sounds. No wheezing, rhonchi or rales.   Abdominal:      General: There is no distension.      Tenderness: There is no abdominal tenderness.   Musculoskeletal:      Cervical back: Normal  range of motion. No tenderness.      Thoracic back: No bony tenderness. Normal range of motion.      Lumbar back: Tenderness present. No bony tenderness. Normal range of motion. Positive left straight leg raise test. Negative right straight leg raise test.        Back:       Right lower leg: No edema.      Left lower leg: No edema.   Skin:     Findings: No bruising or rash.   Neurological:      General: No focal deficit present.      Mental Status: She is alert and oriented to person, place, and time.      Cranial Nerves: No cranial nerve deficit.      Motor: No weakness.      Gait: Gait normal.         Vital Signs  ED Triage Vitals   Temperature Pulse Respirations Blood Pressure SpO2   07/21/24 2017 07/21/24 2017 07/21/24 2017 07/21/24 2017 07/21/24 2017   98.3 °F (36.8 °C) 93 18 105/61 99 %      Temp src Heart Rate Source Patient Position - Orthostatic VS BP Location FiO2 (%)   -- 07/21/24 2017 07/21/24 2017 07/21/24 2017 --    Monitor Sitting Left arm       Pain Score       07/21/24 2112       9           Vitals:    07/21/24 2017 07/21/24 2244   BP: 105/61 99/59   Pulse: 93 68   Patient Position - Orthostatic VS: Sitting Sitting         Visual Acuity      ED Medications  Medications   ketorolac (TORADOL) injection 15 mg (15 mg Intravenous Given 7/21/24 2112)   methocarbamol (ROBAXIN) tablet 500 mg (500 mg Oral Given 7/21/24 2110)   sodium chloride 0.9 % bolus 1,000 mL (0 mL Intravenous Stopped 7/21/24 2245)   ketorolac (TORADOL) injection 15 mg (15 mg Intravenous Given 7/21/24 2239)   acetaminophen (TYLENOL) tablet 650 mg (650 mg Oral Given 7/21/24 2239)       Diagnostic Studies  Results Reviewed       Procedure Component Value Units Date/Time    Comprehensive metabolic panel [[id number]] Collected: 07/21/24 2114    Lab Status: Final result Specimen: Blood from Arm, Left Updated: 07/21/24 2148     Sodium 137 mmol/L      Potassium 3.8 mmol/L      Chloride 107 mmol/L      CO2 25 mmol/L      ANION GAP 5 mmol/L       BUN 12 mg/dL      Creatinine 0.62 mg/dL      Glucose 99 mg/dL      Calcium 9.4 mg/dL      AST 22 U/L      ALT 29 U/L      Alkaline Phosphatase 76 U/L      Total Protein 7.3 g/dL      Albumin 4.2 g/dL      Total Bilirubin 0.28 mg/dL      eGFR 118 ml/min/1.73sq m     Narrative:      National Kidney Disease Foundation guidelines for Chronic Kidney Disease (CKD):     Stage 1 with normal or high GFR (GFR > 90 mL/min/1.73 square meters)    Stage 2 Mild CKD (GFR = 60-89 mL/min/1.73 square meters)    Stage 3A Moderate CKD (GFR = 45-59 mL/min/1.73 square meters)    Stage 3B Moderate CKD (GFR = 30-44 mL/min/1.73 square meters)    Stage 4 Severe CKD (GFR = 15-29 mL/min/1.73 square meters)    Stage 5 End Stage CKD (GFR <15 mL/min/1.73 square meters)  Note: GFR calculation is accurate only with a steady state creatinine    Magnesium [[id number]]  (Normal) Collected: 07/21/24 2114    Lab Status: Final result Specimen: Blood from Arm, Left Updated: 07/21/24 2148     Magnesium 2.0 mg/dL     Urine Microscopic [[id number]]  (Abnormal) Collected: 07/21/24 2045    Lab Status: Final result Specimen: Urine, Clean Catch Updated: 07/21/24 2139     RBC, UA 4-10 /hpf      WBC, UA 10-20 /hpf      Epithelial Cells Occasional /hpf      Bacteria, UA Occasional /hpf      MUCUS THREADS Occasional     Amorphous Crystals, UA Occasional    Urine culture [[id number]] Collected: 07/21/24 2045    Lab Status: In process Specimen: Urine, Clean Catch Updated: 07/21/24 2138    CBC and differential [[id number]]  (Abnormal) Collected: 07/21/24 2114    Lab Status: Final result Specimen: Blood from Arm, Left Updated: 07/21/24 2123     WBC 6.38 Thousand/uL      RBC 4.09 Million/uL      Hemoglobin 8.2 g/dL      Hematocrit 28.9 %      MCV 71 fL      MCH 20.0 pg      MCHC 28.4 g/dL      RDW 15.9 %      MPV 10.8 fL      Platelets 347 Thousands/uL      nRBC 0 /100 WBCs      Segmented % 66 %      Immature Grans % 2 %      Lymphocytes % 22 %      Monocytes % 7 %       Eosinophils Relative 2 %      Basophils Relative 1 %      Absolute Neutrophils 4.25 Thousands/µL      Absolute Immature Grans 0.15 Thousand/uL      Absolute Lymphocytes 1.41 Thousands/µL      Absolute Monocytes 0.44 Thousand/µL      Eosinophils Absolute 0.10 Thousand/µL      Basophils Absolute 0.03 Thousands/µL     POCT pregnancy, urine [[id number]]  (Normal) Resulted: 07/21/24 2102    Lab Status: Final result Updated: 07/21/24 2102     EXT Preg Test, Ur Negative     Control Valid    Urine Macroscopic, POC [[id number]]  (Abnormal) Collected: 07/21/24 2045    Lab Status: Final result Specimen: Urine Updated: 07/21/24 2047     Color, UA Yellow     Clarity, UA Clear     pH, UA 7.0     Leukocytes, UA Small     Nitrite, UA Negative     Protein, UA Negative mg/dl      Glucose, UA Negative mg/dl      Ketones, UA Negative mg/dl      Urobilinogen, UA 1.0 E.U./dl      Bilirubin, UA Negative     Occult Blood, UA Large     Specific Gravity, UA >=1.030    Narrative:      CLINITEK RESULT                   XR lumbar spine 2 or 3 views   ED Interpretation by Aubree Steele PA-C (07/21 2220)   No acute bony process                 Procedures  Procedures         ED Course  ED Course as of 07/22/24 0344   Sun Jul 21, 2024   2231 Bacteria, UA: Occasional  Pt currently has menses. UA consistent with contamination rather than UTI will await culture.                                  SBIRT 22yo+      Flowsheet Row Most Recent Value   Initial Alcohol Screen: US AUDIT-C     1. How often do you have a drink containing alcohol? 0 Filed at: 07/21/2024 2123   2. How many drinks containing alcohol do you have on a typical day you are drinking?  0 Filed at: 07/21/2024 2123   3a. Male UNDER 65: How often do you have five or more drinks on one occasion? 0 Filed at: 07/21/2024 2123   3b. FEMALE Any Age, or MALE 65+: How often do you have 4 or more drinks on one occassion? 0 Filed at: 07/21/2024 2123   Audit-C Score 0 Filed at: 07/21/2024 2123  "  DEE: How many times in the past year have you...    Used an illegal drug or used a prescription medication for non-medical reasons? Never Filed at: 07/21/2024 2123                      Medical Decision Making  DDx includes lumbar radiculopathy, nephrolithiasis, UTI, muscle spasm, menorrhagia.  CBC ordered to rule out infection, anemia.  CMP ordered to rule out electrolyte abnormality, kidney injury, liver injury.  Patient reports chronic anemia.  Reports that she has her menses currently.  UA has blood and small leukocytes, suspicious for contamination, in the setting of current menses will wait for urine culture.  Discussed with the patient.  Symptoms improved with Toradol, Tylenol, Robaxin, Lidoderm.    Discussed findings from the visit with the patient.  We had a conversation regarding supportive care and indications for return.  Recommended appropriate follow-up.  Patient and/or family understand and agree with plan.    Portions of the record may have been created with voice recognition software. Occasional use of the incorrect word or \"sound a like\" substitutions may have occurred due to the inherent limitations of voice recognition software. Read the chart carefully and recognize, using context, where substitutions have occurred.         Amount and/or Complexity of Data Reviewed  Labs: ordered. Decision-making details documented in ED Course.  Radiology: ordered and independent interpretation performed.    Risk  OTC drugs.  Prescription drug management.                 Disposition  Final diagnoses:   Acute midline low back pain with bilateral sciatica   Anemia     Time reflects when diagnosis was documented in both MDM as applicable and the Disposition within this note       Time User Action Codes Description Comment    7/21/2024 10:33 PM Aubree Steele Add [M54.42,  M54.41] Acute midline low back pain with bilateral sciatica     7/21/2024 10:33 PM Aubree Steele [D64.9] Anemia           ED " Disposition       ED Disposition   Discharge    Condition   Stable    Date/Time   Sun Jul 21, 2024 2232    Comment   Ebony Mcdaniel discharge to home/self care.                   Follow-up Information       Follow up With Specialties Details Why Contact Info    [facility], Nurse Practitioner   [address] [place] [id number]  [phone]              Discharge Medication List as of 7/21/2024 10:44 PM        START taking these medications    Details   methocarbamol (ROBAXIN) 500 mg tablet Take 1 tablet (500 mg total) by mouth 2 (two) times a day, Starting Sun 7/21/2024, Normal      naproxen (Naprosyn) 500 mg tablet Take 1 tablet (500 mg total) by mouth 2 (two) times a day with meals, Starting Sun 7/21/2024, Normal           CONTINUE these medications which have NOT CHANGED    Details   cyanocobalamin (VITAMIN B-12) 1000 MCG tablet Take 1 tablet (1,000 mcg total) by mouth daily, Starting Thu 9/8/2022, Normal      escitalopram (LEXAPRO) 10 mg tablet Take 1 tablet (10 mg total) by mouth daily, Starting Thu 7/11/2024, Normal      ferrous sulfate 324 (65 Fe) mg Take 1 tablet (324 mg total) by mouth daily before breakfast, Starting Thu 7/11/2024, Until Sat 8/10/2024, Normal      ibuprofen (MOTRIN) 400 mg tablet Take 1 tablet (400 mg total) by mouth every 6 (six) hours as needed for mild pain or moderate pain, Starting Sun 5/12/2024, Normal      linaCLOtide 145 MCG CAPS Take 1 capsule (145 mcg total) by mouth daily, Starting Mon 10/30/2023, Until Sat 4/27/2024, Normal      medroxyPROGESTERone (PROVERA) 10 mg tablet Take 1 tablet (10 mg total) by mouth daily for 10 days, Starting Thu 4/8/2021, Until Sun 4/18/2021, Normal      polyethylene glycol (GLYCOLAX) 17 GM/SCOOP powder Take 17 g by mouth daily Take 2x daily x 7 days then once daily after, Starting Thu 7/11/2024, Normal                 PDMP Review         Value Time User    PDMP Reviewed  Yes 2/12/2021   2:14 PM Maciej Watson MD            ED Provider  Electronically Signed by             Aubree Steele PA-C  07/22/24 0347

## 2024-07-22 NOTE — DISCHARGE INSTRUCTIONS
Use Naproxen, Tylenol, and Robaxin for discomfort.  Use caution driving or operating machinery while using Robaxin.  Follow-up with comprehensive spine.  Follow-up up with your hematologist for chronic anemia.

## 2024-07-23 ENCOUNTER — NURSE TRIAGE (OUTPATIENT)
Dept: PHYSICAL THERAPY | Facility: OTHER | Age: 34
End: 2024-07-23

## 2024-07-23 DIAGNOSIS — M54.40 ACUTE MIDLINE LOW BACK PAIN WITH SCIATICA, SCIATICA LATERALITY UNSPECIFIED: Primary | ICD-10-CM

## 2024-07-23 LAB — BACTERIA UR CULT: NORMAL

## 2024-07-23 NOTE — TELEPHONE ENCOUNTER
"Additional Information   Negative: Is this related to a work injury?   Negative: Is this related to an MVA?   Negative: Are you currently recieving homecare services?    Background - Initial Assessment  Clinical complaint: ED visit on 07/21 due to Midline Lower Back Pain that started 2 days prior ED visit ( Friday 07/19). No previous hx of back pain. Patient states pain radiates down her buttocks \"a little bit\" in her hips and down to the back of both legs, occasionally in both thighs. Numbness and tingling on the side of both thighs. NKI. Not seeing a spine specialist for this pain yet. Pain is constant off medication and intermittent on medication. Worse when sitting or laying down and feels some relief when walking. Patient described pain as pressure, pulling, sharp and aching.  Date of onset: Friday 07/19/24  Frequency of pain: constant, intermittent on medication.  Quality of pain: aching, numb, sharp,pressure, pulling and tingling.    Protocols used: [facility] Protocol    "

## 2024-07-23 NOTE — ADDENDUM NOTE
Addended by: NEVIN CHILDS on: 7/23/2024 11:44 AM     Modules accepted: Orders    
Principal Discharge DX:	Pyelonephritis

## 2024-07-23 NOTE — TELEPHONE ENCOUNTER
Additional Information   Negative: Has the patient had unexplained weight loss?   Negative: Does the patient have a fever?   Negative: Is the patient experiencing urine retention?   Negative: Is the patient experiencing acute drop foot or paralysis?   Negative: Has the patient experienced major trauma? (fall from height, high speed collision, direct blow to spine) and is also experiencing nausea, light-headedness, or loss of consciousness?   Negative: Is the patient experiencing blood in sputum?   Negative: Is this a chronic condition?    Protocols used: [facility] Protocol  Nurse completed triage and NO RF s/s present. Referral entered for the [facility] site and contact/phone number info given to the patient as well.   Patients information was sent to the preferred site and pt made aware clerical would be calling to schedule the evaluation appointment. Nurse encouraged her to call the site if she does not hear from clerical beforehand. Patient Agreed.    Patient did not voice any additional questions or concerns at this time.   Patient is aware current/past complaints, relevant dx, additional referrals and treatment/options will be discussed at the evaluation/consultation appointment.   Patient is in agreement with plan to be evaluated by [facility]  therapist/DPT.   Patient very appreciative of CB and referral placement.     Nurse wished her well and referral closed.

## 2024-08-05 ENCOUNTER — HOSPITAL ENCOUNTER (OUTPATIENT)
Dept: INFUSION CENTER | Facility: HOSPITAL | Age: 34
Discharge: HOME | End: 2024-08-05
Attending: INTERNAL MEDICINE
Payer: COMMERCIAL

## 2024-08-05 VITALS
RESPIRATION RATE: 18 BRPM | TEMPERATURE: 97.6 F | HEART RATE: 96 BPM | DIASTOLIC BLOOD PRESSURE: 66 MMHG | SYSTOLIC BLOOD PRESSURE: 98 MMHG

## 2024-08-05 DIAGNOSIS — E61.1 IRON DEFICIENCY: Primary | ICD-10-CM

## 2024-08-05 PROCEDURE — 96365 THER/PROPH/DIAG IV INF INIT: CPT

## 2024-08-05 RX ORDER — SODIUM CHLORIDE 9 MG/ML
20 INJECTION, SOLUTION INTRAVENOUS ONCE
Status: COMPLETED | OUTPATIENT
Start: 2024-08-05 | End: 2024-08-05

## 2024-08-05 RX ORDER — SODIUM CHLORIDE 9 MG/ML
20 INJECTION, SOLUTION INTRAVENOUS ONCE
Status: CANCELLED | OUTPATIENT
Start: 2024-08-12

## 2024-08-05 RX ADMIN — SODIUM CHLORIDE 20 ML/HR: 0.9 INJECTION, SOLUTION INTRAVENOUS at 14:17

## 2024-08-05 RX ADMIN — IRON SUCROSE 300 MG: 20 INJECTION, SOLUTION INTRAVENOUS at 14:17

## 2024-08-12 DIAGNOSIS — E61.1 IRON DEFICIENCY: Primary | ICD-10-CM

## 2024-08-12 RX ORDER — SODIUM CHLORIDE 9 MG/ML
20 INJECTION, SOLUTION INTRAVENOUS ONCE
Status: CANCELLED | OUTPATIENT
Start: 2024-08-14

## 2024-08-19 DIAGNOSIS — E61.1 IRON DEFICIENCY: Primary | ICD-10-CM

## 2024-08-19 RX ORDER — SODIUM CHLORIDE 9 MG/ML
20 INJECTION, SOLUTION INTRAVENOUS ONCE
Status: CANCELLED | OUTPATIENT
Start: 2024-08-21

## 2024-08-26 DIAGNOSIS — E61.1 IRON DEFICIENCY: Primary | ICD-10-CM

## 2024-08-26 RX ORDER — SODIUM CHLORIDE 9 MG/ML
20 INJECTION, SOLUTION INTRAVENOUS ONCE
OUTPATIENT
Start: 2024-08-28

## 2024-08-28 ENCOUNTER — OFFICE VISIT (OUTPATIENT)
Dept: FAMILY MEDICINE CLINIC | Facility: CLINIC | Age: 34
End: 2024-08-28

## 2024-08-28 VITALS
DIASTOLIC BLOOD PRESSURE: 76 MMHG | OXYGEN SATURATION: 99 % | RESPIRATION RATE: 18 BRPM | HEIGHT: 61 IN | HEART RATE: 94 BPM | SYSTOLIC BLOOD PRESSURE: 108 MMHG | BODY MASS INDEX: 30.4 KG/M2 | WEIGHT: 161 LBS | TEMPERATURE: 97.9 F

## 2024-08-28 DIAGNOSIS — M54.41 ACUTE MIDLINE LOW BACK PAIN WITH BILATERAL SCIATICA: Primary | ICD-10-CM

## 2024-08-28 DIAGNOSIS — M54.42 ACUTE MIDLINE LOW BACK PAIN WITH BILATERAL SCIATICA: Primary | ICD-10-CM

## 2024-08-28 PROCEDURE — 99213 OFFICE O/P EST LOW 20 MIN: CPT | Performed by: PHYSICIAN ASSISTANT

## 2024-08-28 NOTE — ASSESSMENT & PLAN NOTE
-Reviewed ED note from 7/21/2024.  - Reviewed Lumbar spine x-ray from 7/21/2024.  Results showed mild degenerative facet arthrosis L5-S1.  No acute osseous abnormality.   -Pain has resolved.  - Return to work without restrictions note provided to patient.

## 2024-08-28 NOTE — PROGRESS NOTES
Assessment/Plan:    Acute midline low back pain with bilateral sciatica  -Reviewed ED note from 7/21/2024.  - Reviewed Lumbar spine x-ray from 7/21/2024.  Results showed mild degenerative facet arthrosis L5-S1.  No acute osseous abnormality.   -Pain has resolved.  - Return to work without restrictions note provided to patient.      Diagnoses and all orders for this visit:    Acute midline low back pain with bilateral sciatica        All of patients questions were answered. Patient understands and agrees with the above plan.     Return as needed.    Cari Maurice PA-C  08/28/24  [facility]          Subjective:     Patient ID: Ebony Mcdaniel  is a 34 y.o. female with known PMH of anxiety, depression, iron deficiency anemia who presents today in office for ED visit follow-up.     - Patient is a 34 y.o. female who presents today for ED visit follow-up.  Patient had presented to ED on 7/21/2024 due to lower back pain and bilateral leg pain for a few days.  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  Lumbar spine x-ray showed mild degenerative facet arthrosis L5-S1.  No acute osseous abnormality.  Patient treated symptomatically and discharged with Robaxin and naproxen.  Patient reports she did schedule an appointment with physical therapy but was unable to attend.  Patient reports she did try to reschedule but was unable to get in touch with anyone.  Patient notes after few days, her back pain resolved.  She denies any further pain.  Patient is requesting a note to return to work without any restrictions.    The following portions of the patient's history were reviewed and updated as appropriate: allergies, current medications, past family history, past medical history, past social history, past surgical history, and problem list.        Review of Systems   Constitutional:  Negative for chills and fever.   Musculoskeletal:  Negative for arthralgias  "and back pain.   Neurological:  Negative for numbness.   All other systems reviewed and are negative.                Objective:   Vitals:    08/28/24 1016   BP: 108/76   BP Location: Left arm   Patient Position: Sitting   Cuff Size: Standard   Pulse: 94   Resp: 18   Temp: 97.9 °F (36.6 °C)   TempSrc: Temporal   SpO2: 99%   Weight: 73 kg (161 lb)   Height: 5' 1\" (1.549 m)         Physical Exam  Vitals and nursing note reviewed.   Constitutional:       General: She is not in acute distress.     Appearance: She is well-developed.   HENT:      Head: Normocephalic and atraumatic.      Right Ear: External ear normal.      Left Ear: External ear normal.      Nose: Nose normal.   Eyes:      Conjunctiva/sclera: Conjunctivae normal.   Cardiovascular:      Rate and Rhythm: Normal rate and regular rhythm.      Pulses: Normal pulses.      Heart sounds: Normal heart sounds.   Pulmonary:      Effort: Pulmonary effort is normal. No respiratory distress.      Breath sounds: Normal breath sounds. No wheezing.   Musculoskeletal:         General: Normal range of motion.      Cervical back: Normal range of motion and neck supple.      Lumbar back: No swelling or tenderness. Normal range of motion. Negative right straight leg raise test and negative left straight leg raise test.   Skin:     General: Skin is warm and dry.   Neurological:      Mental Status: She is alert and oriented to person, place, and time.   Psychiatric:         Behavior: Behavior normal.             "

## 2024-08-28 NOTE — LETTER
August 28, 2024     Patient: Ebony Mcdaniel  YOB: 1990  Date of Visit: 8/28/2024      To Whom it May Concern:    Ebony Mcdaniel is under my professional care. Ebony was seen in my office on 8/28/2024. Ebony may return to work without restrictions.     If you have any questions or concerns, please don't hesitate to call.         Sincerely,          Cari Maurice PA-C        CC: No Recipients

## 2024-10-17 DIAGNOSIS — F32.A ANXIETY AND DEPRESSION: ICD-10-CM

## 2024-10-17 DIAGNOSIS — F41.9 ANXIETY AND DEPRESSION: ICD-10-CM

## 2024-10-17 RX ORDER — ESCITALOPRAM OXALATE 10 MG/1
10 TABLET ORAL DAILY
Qty: 90 TABLET | Refills: 0 | Status: SHIPPED | OUTPATIENT
Start: 2024-10-17

## 2024-10-31 ENCOUNTER — TELEPHONE (OUTPATIENT)
Dept: HEMATOLOGY ONCOLOGY | Facility: CLINIC | Age: 34
End: 2024-10-31

## 2024-10-31 NOTE — TELEPHONE ENCOUNTER
Spoke with patient in regards to obtaining labwork prior to upcoming appointment with Michael Paula on 11/11.  Advised patient labs are non fasting and in system.  Patient verbalized understanding.

## 2024-11-06 ENCOUNTER — TELEPHONE (OUTPATIENT)
Dept: HEMATOLOGY ONCOLOGY | Facility: CLINIC | Age: 34
End: 2024-11-06

## 2024-11-12 ENCOUNTER — HOSPITAL ENCOUNTER (EMERGENCY)
Facility: HOSPITAL | Age: 34
Discharge: HOME | End: 2024-11-12
Attending: EMERGENCY MEDICINE
Payer: COMMERCIAL

## 2024-11-12 VITALS
OXYGEN SATURATION: 100 % | WEIGHT: 164.24 LBS | RESPIRATION RATE: 18 BRPM | DIASTOLIC BLOOD PRESSURE: 64 MMHG | BODY MASS INDEX: 31.03 KG/M2 | TEMPERATURE: 98 F | SYSTOLIC BLOOD PRESSURE: 118 MMHG | HEART RATE: 82 BPM

## 2024-11-12 DIAGNOSIS — M79.10 MYALGIA: ICD-10-CM

## 2024-11-12 DIAGNOSIS — R52 BODY ACHES: Primary | ICD-10-CM

## 2024-11-12 LAB
EXT PREGNANCY TEST URINE: NEGATIVE
EXT. CONTROL: NORMAL
FLUAV AG UPPER RESP QL IA.RAPID: NEGATIVE
FLUBV AG UPPER RESP QL IA.RAPID: NEGATIVE
SARS-COV+SARS-COV-2 AG RESP QL IA.RAPID: NEGATIVE

## 2024-11-12 PROCEDURE — 87811 SARS-COV-2 COVID19 W/OPTIC: CPT | Performed by: PHYSICIAN ASSISTANT

## 2024-11-12 PROCEDURE — 99283 EMERGENCY DEPT VISIT LOW MDM: CPT

## 2024-11-12 PROCEDURE — 99284 EMERGENCY DEPT VISIT MOD MDM: CPT | Performed by: PHYSICIAN ASSISTANT

## 2024-11-12 PROCEDURE — 81025 URINE PREGNANCY TEST: CPT | Performed by: PHYSICIAN ASSISTANT

## 2024-11-12 PROCEDURE — 87804 INFLUENZA ASSAY W/OPTIC: CPT | Performed by: PHYSICIAN ASSISTANT

## 2024-11-12 RX ORDER — IBUPROFEN 600 MG/1
600 TABLET, FILM COATED ORAL ONCE
Status: COMPLETED | OUTPATIENT
Start: 2024-11-12 | End: 2024-11-12

## 2024-11-12 RX ADMIN — IBUPROFEN 600 MG: 600 TABLET, FILM COATED ORAL at 19:32

## 2024-11-12 NOTE — Clinical Note
Ebony Mcdaniel was seen and treated in our emergency department on 11/12/2024.                Diagnosis:     Ebony  may return to work on return date.    She may return on this date: 11/14/2024         If you have any questions or concerns, please don't hesitate to call.      Ela Garibay RN    ______________________________           _______________          _______________  Hospital Representative                              Date                                Time

## 2024-11-12 NOTE — Clinical Note
Ebony Mcdaniel was seen and treated in our emergency department on 11/12/2024.    No restrictions            Diagnosis: Fever    Ebony  may return to work on return date.    She may return on this date: 11/13/2024         If you have any questions or concerns, please don't hesitate to call.      Maris Barroso PA-C    ______________________________           _______________          _______________  Hospital Representative                              Date                                Time

## 2024-11-13 NOTE — ED PROVIDER NOTES
Time reflects when diagnosis was documented in both MDM as applicable and the Disposition within this note       Time User Action Codes Description Comment    11/12/2024  8:34 PM Maris Barroso [R52] Body aches     11/12/2024  8:34 PM Maris Barroso [M79.10] Myalgia           ED Disposition       ED Disposition   Discharge    Condition   Stable    Date/Time   Tue Nov 12, 2024  7:55 PM    Comment   Ebony Mcdaniel discharge to home/self care.             Assessment & Plan       Medical Decision Making      DDx including but not limited to: viral illness, PNA, URI, pharyngitis, influenza, UTI, sinusitis, COVID-19    Patient presenting to ED for evaluation of generalized myalgias and fever.  Symptoms are likely viral related.  She denies any cough, congestion or sore throat, low suspicion of pneumonia,  pharyngitis or sinusitis.  She denies any urinary symptoms to suggest urinary tract infection.  Will test for COVID and flu and treat symptomatically with Motrin.    Prior to discharge, discharge instructions were discussed with patient at bedside. Patient was provided both verbal and written instructions. Patient is understanding of the discharge instructions and is agreeable to plan of care. Return precautions were discussed with patient bedside, patient verbalized understanding of signs and symptoms that would necessitate return to the ED. All questions were answered. Patient was comfortable with the plan of care and discharged to home.     Dispo: discharge home with follow up to PCP. Patient stable, in no acute distress and non-toxic at discharge.    Problems Addressed:  Body aches: acute illness or injury  Myalgia: acute illness or injury    Amount and/or Complexity of Data Reviewed  Labs: ordered. Decision-making details documented in ED Course.    Risk  Prescription drug management.        ED Course as of 11/13/24 0146   Tue Nov 12, 2024   1940 PREGNANCY TEST URINE: Negative        Medications   ibuprofen (MOTRIN) tablet 600 mg (600 mg Oral Given 24 193)       ED Risk Strat Scores                           SBIRT 22yo+      Flowsheet Row Most Recent Value   Initial Alcohol Screen: US AUDIT-C     1. How often do you have a drink containing alcohol? 0 Filed at: 2024   2. How many drinks containing alcohol do you have on a typical day you are drinking?  0 Filed at: 2024   3b. FEMALE Any Age, or MALE 65+: How often do you have 4 or more drinks on one occassion? 0 Filed at: 2024   Audit-C Score 0 Filed at: 2024   DEE: How many times in the past year have you...    Used an illegal drug or used a prescription medication for non-medical reasons? Never Filed at: 2024                            History of Present Illness       Chief Complaint   Patient presents with    Generalized Body Aches     Pt c/o generalized body aches for few days. Denies cough. States fevers at home. Tylenol take around 30min pta       Past Medical History:   Diagnosis Date    Anemia     taking iron pills BID     Chronic kidney disease     hydronephrosis    History of transfusion     Kidney stone     Sepsis (HCC)     Septic shock (HCC)     Urinary tract infection     taking antibioitcs     UTI (urinary tract infection) during pregnancy     Vagina, candidiasis     Varicella     as child      Past Surgical History:   Procedure Laterality Date    ABDOMINAL ADHESION SURGERY N/A 2021    Procedure: LYSIS ADHESIONS;  Surgeon: Maciej Watson MD;  Location: [facility];  Service: Gynecology     SECTION N/A 2018    Procedure:  SECTION ();  Surgeon: Curtis Zavala MD;  Location: [facility];  Service: Obstetrics    CYSTOSCOPY  2018    with right uretral stent    EXAMINATION UNDER ANESTHESIA N/A 2021    Procedure: EXAM UNDER ANESTHESIA (EUA);  Surgeon: Maciej Watson MD;  Location: [facility];  Service: Gynecology    WA CYSTO BLADDER  W/URETERAL CATHETERIZATION Right 2018    Procedure: CYSTOSCOPY RETROGRADE PYELOGRAM WITH INSERTION STENT URETERAL;  Surgeon: Cristofer Matute MD;  Location: [facility];  Service: Urology    AZ CYSTO/URETERO W/LITHOTRIPSY &INDWELL STENT INSRT Right 2018    Procedure: CYSTOSCOPY URETEROSCOPY WITH  RETROGRADE PYELOGRAM AND exchange  STENT URETERAl;  Surgeon: Cristofer Matute MD;  Location: [facility];  Service: Urology    AZ LAPAROSCOPY SURG CHOLECYSTECTOMY N/A 2022    Procedure: CHOLECYSTECTOMY LAPAROSCOPIC;  Surgeon: Jennyfer Moreland MD;  Location:  MAIN OR;  Service: General    AZ LAPAROSCOPY W/RMVL ADNEXAL STRUCTURES Bilateral 2021    Procedure: SALPINGECTOMY, LAPAROSCOPIC;  Surgeon: Maciej Watson MD;  [facility]: [facility];  Service: Gynecology    AZ LAPS ABD PRTM&OMENTUM DX W/WO SPEC BR/WA SPX N/A 2021    Procedure: LAPAROSCOPY DIAGNOSTIC;  Surgeon: Maciej Watson MD;  [facility]: [facility];  Service: Gynecology    TONSILLECTOMY      TONSILLECTOMY      TUBAL LIGATION        Family History   Problem Relation Age of Onset    No Known Problems Mother     Diabetes Father     Heart defect Brother         3 open heart surgeries    Heart failure Family         Congestive    Diabetes Maternal Grandmother       Social History     Tobacco Use    Smoking status: Former     Current packs/day: 0.00     Types: Cigarettes     Quit date: 2019     Years since quittin.4    Smokeless tobacco: Never    Tobacco comments:     2-3 a day   Vaping Use    Vaping status: Never Used   Substance Use Topics    Alcohol use: No    Drug use: No      E-Cigarette/Vaping    E-Cigarette Use Never User       E-Cigarette/Vaping Substances    Nicotine No     THC No     CBD No     Flavoring No     Other No     Unknown No       I have reviewed and agree with the history as documented.     This is a 34-year-old female presenting to ED for evaluation of generalized bodyaches and fever.  Patient states that she has had head  generalized bodyaches for the past several days.  She denies any URI symptoms including cough, congestion and sore throat.  She states that she had a fever at home with a Tmax of 101 °F.  She took Tylenol approximately 30 minutes prior to arrival for symptoms.  She states that she had multiple sick contacts at work.  She denies any urinary frequency, urgency, hematuria or dysuria.  She denies any abdominal pain, back pain, nausea or vomiting.  She offers no other complaints at this time.            Review of Systems   Constitutional:  Positive for fever.   Musculoskeletal:  Positive for myalgias.   All other systems reviewed and are negative.          Objective       ED Triage Vitals   Temperature Pulse Blood Pressure Respirations SpO2 Patient Position - Orthostatic VS   11/12/24 1815 11/12/24 1815 11/12/24 1815 11/12/24 1815 11/12/24 1815 11/12/24 1815   98 °F (36.7 °C) 82 118/64 18 100 % Sitting      Temp Source Heart Rate Source BP Location FiO2 (%) Pain Score    11/12/24 1815 11/12/24 1815 11/12/24 1815 -- 11/12/24 1932    Oral Monitor Right arm  10 - Worst Possible Pain      Vitals      Date and Time Temp Pulse SpO2 Resp BP Pain Score FACES Pain Rating User   11/12/24 2030 -- -- -- -- -- 9 -- KS   11/12/24 1932 -- -- -- -- -- 10 - Worst Possible Pain -- KS   11/12/24 1815 98 °F (36.7 °C) 82 100 % 18 118/64 -- -- JR            Physical Exam  Vitals reviewed.   Constitutional:       General: She is not in acute distress.     Appearance: Normal appearance. She is well-developed and well-groomed. She is not ill-appearing, toxic-appearing or diaphoretic.   HENT:      Head: Normocephalic and atraumatic.      Right Ear: External ear normal.      Left Ear: External ear normal.      Nose: Nose normal. No congestion or rhinorrhea.      Mouth/Throat:      Mouth: Mucous membranes are moist.      Pharynx: Oropharynx is clear. No oropharyngeal exudate or posterior oropharyngeal erythema.   Eyes:      General: No scleral  icterus.        Right eye: No discharge.         Left eye: No discharge.      Extraocular Movements: Extraocular movements intact.      Conjunctiva/sclera: Conjunctivae normal.   Cardiovascular:      Rate and Rhythm: Normal rate and regular rhythm.      Pulses: Normal pulses.      Heart sounds: No murmur heard.     No friction rub. No gallop.   Pulmonary:      Effort: Pulmonary effort is normal. No respiratory distress.      Breath sounds: Normal breath sounds. No wheezing, rhonchi or rales.   Abdominal:      General: Abdomen is flat. There is no distension.      Palpations: Abdomen is soft.      Tenderness: There is no abdominal tenderness. There is no right CVA tenderness, left CVA tenderness, guarding or rebound.   Musculoskeletal:         General: No deformity. Normal range of motion.      Cervical back: Normal range of motion and neck supple.   Skin:     General: Skin is warm and dry.      Coloration: Skin is not jaundiced or pale.      Findings: No rash.   Neurological:      General: No focal deficit present.      Mental Status: She is alert.   Psychiatric:         Mood and Affect: Mood normal.         Behavior: Behavior normal. Behavior is cooperative.         Results Reviewed       Procedure Component Value Units Date/Time    FLU/COVID Rapid Antigen (30 min. TAT) - Preferred screening test in ED [[id number]]  (Normal) Collected: 11/12/24 1932    Lab Status: Final result Specimen: Nares from Nose Updated: 11/12/24 2003     SARS COV Rapid Antigen Negative     Influenza A Rapid Antigen Negative     Influenza B Rapid Antigen Negative    Narrative:      This test has been performed using the Quidel Meghan 2 FLU+SARS Antigen test under the Emergency Use Authorization (EUA). This test has been validated by the  and verified by the performing laboratory. The Meghan uses lateral flow immunofluorescent sandwich assay to detect SARS-COV, Influenza A and Influenza B Antigen.     The Quidel Meghan 2 SARS Antigen  test does not differentiate between SARS-CoV and SARS-CoV-2.     Negative results are presumptive and may be confirmed with a molecular assay, if necessary, for patient management. Negative results do not rule out SARS-CoV-2 or influenza infection and should not be used as the sole basis for treatment or patient management decisions. A negative test result may occur if the level of antigen in a sample is below the limit of detection of this test.     Positive results are indicative of the presence of viral antigens, but do not rule out bacterial infection or co-infection with other viruses.     All test results should be used as an adjunct to clinical observations and other information available to the provider.    FOR PEDIATRIC PATIENTS - copy/paste COVID Guidelines URL to browser: [email or web address]    POCT pregnancy, urine [[id number]]  (Normal) Resulted: 11/12/24 1939    Lab Status: Final result Updated: 11/12/24 1939     EXT Preg Test, Ur Negative     Control Valid            No orders to display       Procedures    ED Medication and Procedure Management   Prior to Admission Medications   Prescriptions Last Dose Informant Patient Reported? Taking?   cyanocobalamin (VITAMIN B-12) 1000 MCG tablet   No No   Sig: Take 1 tablet (1,000 mcg total) by mouth daily   escitalopram (LEXAPRO) 10 mg tablet   No No   Sig: TAKE 1 TABLET BY MOUTH EVERY DAY   ferrous sulfate 324 (65 Fe) mg   No No   Sig: Take 1 tablet (324 mg total) by mouth daily before breakfast   ibuprofen (MOTRIN) 400 mg tablet   No No   Sig: Take 1 tablet (400 mg total) by mouth every 6 (six) hours as needed for mild pain or moderate pain   Patient not taking: Reported on 7/19/2024   linaCLOtide 145 MCG CAPS   No No   Sig: Take 1 capsule (145 mcg total) by mouth daily   Patient not taking: Reported on 7/19/2024   medroxyPROGESTERone (PROVERA) 10 mg tablet   No No   Sig: Take 1 tablet (10 mg total) by  mouth daily for 10 days   methocarbamol (ROBAXIN) 500 mg tablet   No No   Sig: Take 1 tablet (500 mg total) by mouth 2 (two) times a day   naproxen (Naprosyn) 500 mg tablet   No No   Sig: Take 1 tablet (500 mg total) by mouth 2 (two) times a day with meals   polyethylene glycol (GLYCOLAX) 17 GM/SCOOP powder   No No   Sig: Take 17 g by mouth daily Take 2x daily x 7 days then once daily after      Facility-Administered Medications Last Administration Doses Remaining   cyanocobalamin injection 1,000 mcg 4/14/2023 12:04 PM         Discharge Medication List as of 11/12/2024  8:36 PM        CONTINUE these medications which have NOT CHANGED    Details   cyanocobalamin (VITAMIN B-12) 1000 MCG tablet Take 1 tablet (1,000 mcg total) by mouth daily, Starting Thu 9/8/2022, Normal      escitalopram (LEXAPRO) 10 mg tablet TAKE 1 TABLET BY MOUTH EVERY DAY, Starting u 10/17/2024, Normal      ferrous sulfate 324 (65 Fe) mg Take 1 tablet (324 mg total) by mouth daily before breakfast, Starting Thu 7/11/2024, Until Sat 8/10/2024, Normal      ibuprofen (MOTRIN) 400 mg tablet Take 1 tablet (400 mg total) by mouth every 6 (six) hours as needed for mild pain or moderate pain, Starting Sun 5/12/2024, Normal      linaCLOtide 145 MCG CAPS Take 1 capsule (145 mcg total) by mouth daily, Starting Mon 10/30/2023, Until Sat 4/27/2024, Normal      medroxyPROGESTERone (PROVERA) 10 mg tablet Take 1 tablet (10 mg total) by mouth daily for 10 days, Starting Thu 4/8/2021, Until Sun 4/18/2021, Normal      methocarbamol (ROBAXIN) 500 mg tablet Take 1 tablet (500 mg total) by mouth 2 (two) times a day, Starting Sun 7/21/2024, Normal      naproxen (Naprosyn) 500 mg tablet Take 1 tablet (500 mg total) by mouth 2 (two) times a day with meals, Starting Sun 7/21/2024, Normal      polyethylene glycol (GLYCOLAX) 17 GM/SCOOP powder Take 17 g by mouth daily Take 2x daily x 7 days then once daily after, Starting u 7/11/2024, Normal           No discharge  procedures on file.  ED SEPSIS DOCUMENTATION   Time reflects when diagnosis was documented in both MDM as applicable and the Disposition within this note       Time User Action Codes Description Comment    11/12/2024  8:34 PM Maris Barroso [R52] Body aches     11/12/2024  8:34 PM Maris Barroso [M79.10] Myalgia                  Maris Barroso PA-C  11/13/24 0146

## 2025-01-15 ENCOUNTER — HOSPITAL ENCOUNTER (EMERGENCY)
Facility: HOSPITAL | Age: 35
Discharge: HOME | End: 2025-01-16
Attending: EMERGENCY MEDICINE
Payer: COMMERCIAL

## 2025-01-15 VITALS
WEIGHT: 161.16 LBS | BODY MASS INDEX: 30.45 KG/M2 | RESPIRATION RATE: 18 BRPM | DIASTOLIC BLOOD PRESSURE: 53 MMHG | HEART RATE: 88 BPM | TEMPERATURE: 98.1 F | OXYGEN SATURATION: 99 % | SYSTOLIC BLOOD PRESSURE: 107 MMHG

## 2025-01-15 DIAGNOSIS — N93.9 ABNORMAL UTERINE BLEEDING: Primary | ICD-10-CM

## 2025-01-15 DIAGNOSIS — N39.0 UTI (URINARY TRACT INFECTION): ICD-10-CM

## 2025-01-15 LAB
ALBUMIN SERPL BCG-MCNC: 3.6 G/DL (ref 3.5–5)
ALP SERPL-CCNC: 49 U/L (ref 34–104)
ALT SERPL W P-5'-P-CCNC: 11 U/L (ref 7–52)
AMORPH URATE CRY URNS QL MICRO: ABNORMAL
AMORPH URATE CRY URNS QL MICRO: ABNORMAL
ANION GAP SERPL CALCULATED.3IONS-SCNC: 4 MMOL/L (ref 4–13)
APTT PPP: 27 SECONDS (ref 23–34)
AST SERPL W P-5'-P-CCNC: 23 U/L (ref 13–39)
BACTERIA UR QL AUTO: ABNORMAL /HPF
BACTERIA UR QL AUTO: ABNORMAL /HPF
BASOPHILS # BLD AUTO: 0.03 THOUSANDS/ΜL (ref 0–0.1)
BASOPHILS NFR BLD AUTO: 1 % (ref 0–1)
BILIRUB SERPL-MCNC: 0.24 MG/DL (ref 0.2–1)
BILIRUB UR QL STRIP: ABNORMAL
BILIRUB UR QL STRIP: NEGATIVE
BUN SERPL-MCNC: 10 MG/DL (ref 5–25)
CALCIUM SERPL-MCNC: 7.7 MG/DL (ref 8.4–10.2)
CHLORIDE SERPL-SCNC: 109 MMOL/L (ref 96–108)
CLARITY UR: ABNORMAL
CLARITY UR: CLEAR
CO2 SERPL-SCNC: 25 MMOL/L (ref 21–32)
COLOR UR: ABNORMAL
COLOR UR: ABNORMAL
CREAT SERPL-MCNC: 0.58 MG/DL (ref 0.6–1.3)
EOSINOPHIL # BLD AUTO: 0.11 THOUSAND/ΜL (ref 0–0.61)
EOSINOPHIL NFR BLD AUTO: 2 % (ref 0–6)
ERYTHROCYTE [DISTWIDTH] IN BLOOD BY AUTOMATED COUNT: 17 % (ref 11.6–15.1)
GFR SERPL CREATININE-BSD FRML MDRD: 120 ML/MIN/1.73SQ M
GLUCOSE SERPL-MCNC: 100 MG/DL (ref 65–140)
GLUCOSE UR STRIP-MCNC: ABNORMAL MG/DL
GLUCOSE UR STRIP-MCNC: NEGATIVE MG/DL
HCT VFR BLD AUTO: 33.1 % (ref 34.8–46.1)
HGB BLD-MCNC: 9.4 G/DL (ref 11.5–15.4)
HGB UR QL STRIP.AUTO: ABNORMAL
HGB UR QL STRIP.AUTO: ABNORMAL
IMM GRANULOCYTES # BLD AUTO: 0.01 THOUSAND/UL (ref 0–0.2)
IMM GRANULOCYTES NFR BLD AUTO: 0 % (ref 0–2)
INR PPP: 1.02 (ref 0.85–1.19)
KETONES UR STRIP-MCNC: ABNORMAL MG/DL
KETONES UR STRIP-MCNC: NEGATIVE MG/DL
LEUKOCYTE ESTERASE UR QL STRIP: ABNORMAL
LEUKOCYTE ESTERASE UR QL STRIP: ABNORMAL
LYMPHOCYTES # BLD AUTO: 1.56 THOUSANDS/ΜL (ref 0.6–4.47)
LYMPHOCYTES NFR BLD AUTO: 29 % (ref 14–44)
MCH RBC QN AUTO: 20.8 PG (ref 26.8–34.3)
MCHC RBC AUTO-ENTMCNC: 28.4 G/DL (ref 31.4–37.4)
MCV RBC AUTO: 73 FL (ref 82–98)
MONOCYTES # BLD AUTO: 0.53 THOUSAND/ΜL (ref 0.17–1.22)
MONOCYTES NFR BLD AUTO: 10 % (ref 4–12)
MUCOUS THREADS UR QL AUTO: ABNORMAL
MUCOUS THREADS UR QL AUTO: ABNORMAL
NEUTROPHILS # BLD AUTO: 3.1 THOUSANDS/ΜL (ref 1.85–7.62)
NEUTS SEG NFR BLD AUTO: 58 % (ref 43–75)
NITRITE UR QL STRIP: NEGATIVE
NITRITE UR QL STRIP: NEGATIVE
NON-SQ EPI CELLS URNS QL MICRO: ABNORMAL /HPF
NON-SQ EPI CELLS URNS QL MICRO: ABNORMAL /HPF
NRBC BLD AUTO-RTO: 0 /100 WBCS
PH UR STRIP.AUTO: 8 [PH]
PH UR STRIP.AUTO: 8.5 [PH] (ref 4.5–8)
PLATELET # BLD AUTO: 325 THOUSANDS/UL (ref 149–390)
PMV BLD AUTO: 10.6 FL (ref 8.9–12.7)
POTASSIUM SERPL-SCNC: 4 MMOL/L (ref 3.5–5.3)
PROT SERPL-MCNC: 6.6 G/DL (ref 6.4–8.4)
PROT UR STRIP-MCNC: ABNORMAL MG/DL
PROT UR STRIP-MCNC: ABNORMAL MG/DL
PROTHROMBIN TIME: 13.6 SECONDS (ref 12.3–15)
RBC # BLD AUTO: 4.51 MILLION/UL (ref 3.81–5.12)
RBC #/AREA URNS AUTO: ABNORMAL /HPF
RBC #/AREA URNS AUTO: ABNORMAL /HPF
SODIUM SERPL-SCNC: 138 MMOL/L (ref 135–147)
SP GR UR STRIP.AUTO: 1.02 (ref 1–1.03)
SP GR UR STRIP.AUTO: 1.02 (ref 1–1.03)
UROBILINOGEN UR QL STRIP.AUTO: 1 E.U./DL
UROBILINOGEN UR STRIP-ACNC: 2 MG/DL
WBC # BLD AUTO: 5.34 THOUSAND/UL (ref 4.31–10.16)
WBC #/AREA URNS AUTO: ABNORMAL /HPF
WBC #/AREA URNS AUTO: ABNORMAL /HPF

## 2025-01-15 PROCEDURE — 36415 COLL VENOUS BLD VENIPUNCTURE: CPT

## 2025-01-15 PROCEDURE — 87086 URINE CULTURE/COLONY COUNT: CPT

## 2025-01-15 PROCEDURE — 99284 EMERGENCY DEPT VISIT MOD MDM: CPT

## 2025-01-15 PROCEDURE — 85025 COMPLETE CBC W/AUTO DIFF WBC: CPT

## 2025-01-15 PROCEDURE — 81001 URINALYSIS AUTO W/SCOPE: CPT

## 2025-01-15 PROCEDURE — 96360 HYDRATION IV INFUSION INIT: CPT

## 2025-01-15 PROCEDURE — 85730 THROMBOPLASTIN TIME PARTIAL: CPT

## 2025-01-15 PROCEDURE — 87077 CULTURE AEROBIC IDENTIFY: CPT

## 2025-01-15 PROCEDURE — 85610 PROTHROMBIN TIME: CPT

## 2025-01-15 PROCEDURE — 87186 SC STD MICRODIL/AGAR DIL: CPT

## 2025-01-15 PROCEDURE — 87147 CULTURE TYPE IMMUNOLOGIC: CPT

## 2025-01-15 PROCEDURE — 80053 COMPREHEN METABOLIC PANEL: CPT

## 2025-01-15 RX ORDER — IBUPROFEN 600 MG/1
600 TABLET, FILM COATED ORAL EVERY 6 HOURS PRN
Qty: 30 TABLET | Refills: 0 | Status: SHIPPED | OUTPATIENT
Start: 2025-01-15

## 2025-01-15 RX ORDER — IBUPROFEN 600 MG/1
600 TABLET, FILM COATED ORAL ONCE
Status: COMPLETED | OUTPATIENT
Start: 2025-01-15 | End: 2025-01-15

## 2025-01-15 RX ADMIN — SODIUM CHLORIDE 1000 ML: 0.9 INJECTION, SOLUTION INTRAVENOUS at 20:22

## 2025-01-15 RX ADMIN — IBUPROFEN 600 MG: 600 TABLET, FILM COATED ORAL at 20:26

## 2025-01-15 NOTE — Clinical Note
Ebony Mcdaniel was seen and treated in our emergency department on 1/15/2025.                Diagnosis:     Ebony  is off the rest of the shift today, may return to work on return date.    She may return on this date: 01/17/2025         If you have any questions or concerns, please don't hesitate to call.      Maryellen Owusu PA-C    ______________________________           _______________          _______________  Hospital Representative                              Date                                Time

## 2025-01-16 NOTE — DISCHARGE INSTRUCTIONS
Follow up with your OB/GYN as soon as possible    Return for worsening lightheadedness, dizziness, passing out, worsening abdominal pain, chest pain, trouble breathing, or any other new/concerning symptoms     Continue taking your iron    Get your iron transfusion Monday as scheduled

## 2025-01-16 NOTE — ED PROVIDER NOTES
Time reflects when diagnosis was documented in both MDM as applicable and the Disposition within this note       Time User Action Codes Description Comment    1/15/2025 11:48 PM Maryellen Owusu Add [N93.9] Abnormal uterine bleeding           ED Disposition       ED Disposition   Discharge    Condition   Stable    Date/Time   Wed Nir 15, 2025 11:48 PM    Comment   Ebony Mak Bertinlisa discharge to home/self care.                   Assessment & Plan       Medical Decision Making  DDx including but not limited to: anemia, coagulopathy, DUB, tumor, PCOS; doubt ovarian torsion or ruptured ovarian cyst.     Will obtain CBC to evaluate for leukocytosis, anemia.  Will obtain CMP to evaluate kidney function, for electrolyte disturbance.  Will obtain coags.  Will obtain UA to evaluate for UTI.     UA concerning for UTI however suspect 2/2 vaginal bleeding; will repeat. Hgb 9.4, increased from previous. Repeat UA with no bacteria. Initially, ordered pelvic US, however patient states that her pain is controlled and she does not want to wait. Offered repeat Hgb prior to d/c, patient declined and states that she would like to follow up with her OB/GYN. Will dc    At the time of discharge, the patient is in no acute distress. I discussed with the patient the diagnosis, treatment plan, follow-up, return precautions, and discharge instructions; they were given the opportunity to ask questions and verbalized understanding.      Problems Addressed:  Abnormal uterine bleeding: acute illness or injury    Amount and/or Complexity of Data Reviewed  External Data Reviewed: labs, radiology and notes.  Labs: ordered. Decision-making details documented in ED Course.    Risk  Prescription drug management.        ED Course as of 01/16/25 0328   Wed Nir 15, 2025   2031 Hemoglobin(!): 9.4   2251 pH, UA(!): 8.5   2252 Leukocytes, UA(!): Small   2252 WBC, UA(!): Innumerable   2252 Bacteria, UA: None Seen       Medications   sodium  chloride 0.9 % bolus 1,000 mL (0 mL Intravenous Stopped 1/15/25 2122)   ibuprofen (MOTRIN) tablet 600 mg (600 mg Oral Given 1/15/25 2026)       ED Risk Strat Scores                History of Present Illness       Chief Complaint   Patient presents with    Vaginal Bleeding     Pt c/o heavy vaginal bleeding. Per patient this has been happening every time she gets her period, where she has very heavy bleeding causing her to change her menstrual pad every 20 minutes. +passing large clots. Patient also c/o abdominal pain that is waking her up from her sleep       Past Medical History:   Diagnosis Date    Anemia     taking iron pills BID     Chronic kidney disease     hydronephrosis    History of transfusion     Kidney stone     Sepsis (HCC)     Septic shock (HCC)     Urinary tract infection     taking antibioitcs     UTI (urinary tract infection) during pregnancy     Vagina, candidiasis     Varicella     as child      Past Surgical History:   Procedure Laterality Date    ABDOMINAL ADHESION SURGERY N/A 2021    Procedure: LYSIS ADHESIONS;  Surgeon: Maciej Watson MD;  [facility]: [facility];  Service: Gynecology     SECTION N/A 2018    Procedure:  SECTION ();  Surgeon: Curtis Zavala MD;  Location: [facility];  Service: Obstetrics    CYSTOSCOPY  2018    with right uretral stent    EXAMINATION UNDER ANESTHESIA N/A 2021    Procedure: EXAM UNDER ANESTHESIA (EUA);  Surgeon: Maciej Watson MD;  Location:  MAIN OR;  Service: Gynecology    AZ CYSTO/URETERO W/LITHOTRIPSY &INDWELL STENT INSRT Right 2018    Procedure: CYSTOSCOPY URETEROSCOPY WITH  RETROGRADE PYELOGRAM AND exchange  STENT URETERAl;  Surgeon: Cristofer Matute MD;  Location:  MAIN OR;  Service: Urology    AZ CYSTOURETHROSCOPY W/URETERAL CATHETERIZATION Right 2018    Procedure: CYSTOSCOPY RETROGRADE PYELOGRAM WITH INSERTION STENT URETERAL;  Surgeon: Cristofer Matute MD;  Location: [facility];  Service: Urology    AZ  LAPAROSCOPY SURG CHOLECYSTECTOMY N/A 2022    Procedure: CHOLECYSTECTOMY LAPAROSCOPIC;  Surgeon: Jennyfer Moreland MD;  Location:  MAIN OR;  Service: General    MN LAPAROSCOPY W/RMVL ADNEXAL STRUCTURES Bilateral 2021    Procedure: SALPINGECTOMY, LAPAROSCOPIC;  Surgeon: Maciej Watson MD;  [facility]: [facility];  Service: Gynecology    MN LAPS ABD PRTM&OMENTUM DX W/WO SPEC BR/WA SPX N/A 2021    Procedure: LAPAROSCOPY DIAGNOSTIC;  Surgeon: Maciej Watson MD;  [facility];  Service: Gynecology    TONSILLECTOMY      TONSILLECTOMY      TUBAL LIGATION        Family History   Problem Relation Age of Onset    No Known Problems Mother     Diabetes Father     Heart defect Brother         3 open heart surgeries    Heart failure Family         Congestive    Diabetes Maternal Grandmother       Social History     Tobacco Use    Smoking status: Former     Current packs/day: 0.00     Types: Cigarettes     Quit date: 2019     Years since quittin.6    Smokeless tobacco: Never    Tobacco comments:     2-3 a day   Vaping Use    Vaping status: Never Used   Substance Use Topics    Alcohol use: No    Drug use: No      E-Cigarette/Vaping    E-Cigarette Use Never User       E-Cigarette/Vaping Substances    Nicotine No     THC No     CBD No     Flavoring No     Other No     Unknown No       I have reviewed and agree with the history as documented.     The patient is a 34-year-old female with history of CKD, anemia, status post bilateral salpingectomy in  who presents to the ED for evaluation of heavy vaginal bleeding x 2 days. She reports she has a long history of heavy menstruation, and was supposed to be put on birth control by her OB/GYN for this.  She reports her last period was 1 month ago, and was not heavy, but the 2 prior were.  She is supposed to get an iron infusion Monday morning.  She also reports abdominal cramping that is waking her from sleep, which she has never had with her heavy  menstruation in the past.  She reports she has to change a pad every 20 minutes, and is passing large clots.  She does report feeling weak all day today. She otherwise denies fever, chills, vomiting, diarrhea, melena, hematochezia, dysuria, hematuria, syncope.         Review of Systems   Constitutional:  Negative for chills and fever.   HENT:  Negative for congestion and rhinorrhea.    Respiratory:  Negative for cough and shortness of breath.    Cardiovascular:  Negative for chest pain and leg swelling.   Gastrointestinal:  Positive for nausea. Negative for abdominal pain, constipation, diarrhea and vomiting.   Genitourinary:  Positive for vaginal bleeding. Negative for dysuria and flank pain.   Musculoskeletal:  Negative for arthralgias and myalgias.   Skin:  Negative for rash and wound.   Neurological:  Positive for weakness. Negative for numbness.           Objective       ED Triage Vitals   Temperature Pulse Blood Pressure Respirations SpO2 Patient Position - Orthostatic VS   01/15/25 1908 01/15/25 1908 01/15/25 1908 01/15/25 1908 01/15/25 1908 01/15/25 1908   98.1 °F (36.7 °C) 92 105/56 18 99 % Sitting      Temp Source Heart Rate Source BP Location FiO2 (%) Pain Score    01/15/25 1908 01/15/25 1908 01/15/25 1908 -- 01/15/25 2026    Oral Monitor Right arm  7      Vitals      Date and Time Temp Pulse SpO2 Resp BP Pain Score FACES Pain Rating User   01/15/25 2234 -- 88 99 % 18 107/53 -- -- KA   01/15/25 2054 -- -- -- -- -- 3 --    01/15/25 2026 -- -- -- -- -- 7 --    01/15/25 1908 98.1 °F (36.7 °C) 92 99 % 18 105/56 -- -- SJ            Physical Exam  Vitals and nursing note reviewed.   Constitutional:       General: She is not in acute distress.     Appearance: She is well-developed. She is not toxic-appearing.   HENT:      Head: Normocephalic and atraumatic.   Eyes:      Conjunctiva/sclera: Conjunctivae normal.   Cardiovascular:      Rate and Rhythm: Normal rate and regular rhythm.      Heart sounds: No  murmur heard.  Pulmonary:      Effort: Pulmonary effort is normal. No respiratory distress.      Breath sounds: Normal breath sounds.   Abdominal:      Palpations: Abdomen is soft.      Tenderness: There is no abdominal tenderness. There is no right CVA tenderness, left CVA tenderness, guarding or rebound.   Genitourinary:     Comments: Deferred  Musculoskeletal:         General: No swelling.      Cervical back: Neck supple.      Right lower leg: No edema.      Left lower leg: No edema.   Skin:     General: Skin is warm and dry.      Capillary Refill: Capillary refill takes less than 2 seconds.   Neurological:      Mental Status: She is alert.   Psychiatric:         Mood and Affect: Mood normal.         Results Reviewed       Procedure Component Value Units Date/Time    POCT urinalysis dipstick [[id number]]  (Normal) Collected: 01/15/25 2232    Lab Status: Final result Specimen: Urine Updated: 01/15/25 2254     Color, UA --     Clarity, UA --     EXT Leukocytes, UA --     Nitrite, UA --     Protein, UA -- mg/dl      Glucose, UA --     Ketones, UA -- mg/dl      EXT Urobilinogen, UA --      Bilirubin, UA --     Blood, UA --    Urine Microscopic [[id number]]  (Abnormal) Collected: 01/15/25 2231    Lab Status: Final result Specimen: Urine, Clean Catch Updated: 01/15/25 2249     RBC, UA Innumerable /hpf      WBC, UA Innumerable /hpf      Epithelial Cells Moderate /hpf      Bacteria, UA None Seen /hpf      MUCUS THREADS Innumerable     Amorphous Crystals, UA Occasional    Urine culture [[id number]] Collected: 01/15/25 2231    Lab Status: In process Specimen: Urine, Clean Catch Updated: 01/15/25 2249    Urine Macroscopic, POC [[id number]]  (Abnormal) Collected: 01/15/25 2231    Lab Status: Final result Specimen: Urine Updated: 01/15/25 2232     Color, UA Bloody     Clarity, UA Clear     pH, UA 8.5     Leukocytes, UA Small     Nitrite, UA Negative     Protein,  (2+) mg/dl      Glucose,  (1/10%) mg/dl      Ketones,  UA Trace mg/dl      Urobilinogen, UA 1.0 E.U./dl      Bilirubin, UA Small     Occult Blood, UA Large     Specific Gravity, UA 1.020    Narrative:      CLINITEK RESULT    Comprehensive metabolic panel [[id number]]  (Abnormal) Collected: 01/15/25 2054    Lab Status: Final result Specimen: Blood from Arm, Right Updated: 01/15/25 2113     Sodium 138 mmol/L      Potassium 4.0 mmol/L      Chloride 109 mmol/L      CO2 25 mmol/L      ANION GAP 4 mmol/L      BUN 10 mg/dL      Creatinine 0.58 mg/dL      Glucose 100 mg/dL      Calcium 7.7 mg/dL      AST 23 U/L      ALT 11 U/L      Alkaline Phosphatase 49 U/L      Total Protein 6.6 g/dL      Albumin 3.6 g/dL      Total Bilirubin 0.24 mg/dL      eGFR 120 ml/min/1.73sq m     Narrative:      [employer] Kid[employer] guidelines for Chronic Kidney Disease (CKD):     Stage 1 with normal or high GFR (GFR > 90 mL/min/1.73 square meters)    Stage 2 Mild CKD (GFR = 60-89 mL/min/1.73 square meters)    Stage 3A Moderate CKD (GFR = 45-59 mL/min/1.73 square meters)    Stage 3B Moderate CKD (GFR = 30-44 mL/min/1.73 square meters)    Stage 4 Severe CKD (GFR = 15-29 mL/min/1.73 square meters)    Stage 5 End Stage CKD (GFR <15 mL/min/1.73 square meters)  Note: GFR calculation is accurate only with a steady state creatinine    Urine Microscopic [[id number]]  (Abnormal) Collected: 01/15/25 2026    Lab Status: Final result Specimen: Urine, Clean Catch Updated: 01/15/25 2056     RBC, UA Innumerable /hpf      WBC, UA Innumerable /hpf      Epithelial Cells Moderate /hpf      Bacteria, UA Moderate /hpf      MUCUS THREADS Moderate     Amorphous Crystals, UA Occasional    Urine culture [[id number]] Collected: 01/15/25 2026    Lab Status: In process Specimen: Urine, Clean Catch Updated: 01/15/25 2056    UA w Reflex to Microscopic w Reflex to Culture [[id number]]  (Abnormal) Collected: 01/15/25 2026    Lab Status: Final result Specimen: Urine, Clean Catch Updated: 01/15/25 2052     Color, UA Light  [place]     Clarity, UA Extra Turbid     Specific Gravity, UA 1.025     pH, UA 8.0     Leukocytes, UA Large     Nitrite, UA Negative     Protein, UA 30 (1+) mg/dl      Glucose, UA Negative mg/dl      Ketones, UA Negative mg/dl      Urobilinogen, UA 2.0 mg/dl      Bilirubin, UA Negative     Occult Blood, UA Large    Protime-INR [[id number]]  (Normal) Collected: 01/15/25 2021    Lab Status: Final result Specimen: Blood from Arm, Right Updated: 01/15/25 2042     Protime 13.6 seconds      INR 1.02    Narrative:      INR Therapeutic Range    Indication                                             INR Range      Atrial Fibrillation                                               2.0-3.0  Hypercoagulable State                                    2.0.2.3  Left Ventricular Asist Device                            2.0-3.0  Mechanical Heart Valve                                  -    Aortic(with afib, MI, embolism, HF, LA enlargement,    and/or coagulopathy)                                     2.0-3.0 (2.5-3.5)     Mitral                                                             2.5-3.5  Prosthetic/Bioprosthetic Heart Valve               2.0-3.0  Venous thromboembolism (VTE: VT, PE        2.0-3.0    APTT [[id number]]  (Normal) Collected: 01/15/25 2021    Lab Status: Final result Specimen: Blood from Arm, Right Updated: 01/15/25 2042     PTT 27 seconds     CBC and differential [[id number]]  (Abnormal) Collected: 01/15/25 2021    Lab Status: Final result Specimen: Blood from Arm, Right Updated: 01/15/25 2028     WBC 5.34 Thousand/uL      RBC 4.51 Million/uL      Hemoglobin 9.4 g/dL      Hematocrit 33.1 %      MCV 73 fL      MCH 20.8 pg      MCHC 28.4 g/dL      RDW 17.0 %      MPV 10.6 fL      Platelets 325 Thousands/uL      nRBC 0 /100 WBCs      Segmented % 58 %      Immature Grans % 0 %      Lymphocytes % 29 %      Monocytes % 10 %      Eosinophils Relative 2 %      Basophils Relative 1 %      Absolute Neutrophils 3.10 Thousands/µL       Absolute Immature Grans 0.01 Thousand/uL      Absolute Lymphocytes 1.56 Thousands/µL      Absolute Monocytes 0.53 Thousand/µL      Eosinophils Absolute 0.11 Thousand/µL      Basophils Absolute 0.03 Thousands/µL             No orders to display       Procedures    ED Medication and Procedure Management   Prior to Admission Medications   Prescriptions Last Dose Informant Patient Reported? Taking?   cyanocobalamin (VITAMIN B-12) 1000 MCG tablet   No No   Sig: Take 1 tablet (1,000 mcg total) by mouth daily   escitalopram (LEXAPRO) 10 mg tablet   No No   Sig: TAKE 1 TABLET BY MOUTH EVERY DAY   ferrous sulfate 324 (65 Fe) mg   No No   Sig: Take 1 tablet (324 mg total) by mouth daily before breakfast   ibuprofen (MOTRIN) 400 mg tablet   No No   Sig: Take 1 tablet (400 mg total) by mouth every 6 (six) hours as needed for mild pain or moderate pain   Patient not taking: Reported on 7/19/2024   linaCLOtide 145 MCG CAPS   No No   Sig: Take 1 capsule (145 mcg total) by mouth daily   Patient not taking: Reported on 7/19/2024   medroxyPROGESTERone (PROVERA) 10 mg tablet   No No   Sig: Take 1 tablet (10 mg total) by mouth daily for 10 days   methocarbamol (ROBAXIN) 500 mg tablet   No No   Sig: Take 1 tablet (500 mg total) by mouth 2 (two) times a day   naproxen (Naprosyn) 500 mg tablet   No No   Sig: Take 1 tablet (500 mg total) by mouth 2 (two) times a day with meals   polyethylene glycol (GLYCOLAX) 17 GM/SCOOP powder   No No   Sig: Take 17 g by mouth daily Take 2x daily x 7 days then once daily after      Facility-Administered Medications Last Administration Doses Remaining   cyanocobalamin injection 1,000 mcg 4/14/2023 12:04 PM         Discharge Medication List as of 1/15/2025 11:53 PM        CONTINUE these medications which have NOT CHANGED    Details   cyanocobalamin (VITAMIN B-12) 1000 MCG tablet Take 1 tablet (1,000 mcg total) by mouth daily, Starting u 9/8/2022, Normal      escitalopram (LEXAPRO) 10 mg tablet TAKE 1  TABLET BY MOUTH EVERY DAY, Starting Thu 10/17/2024, Normal      ferrous sulfate 324 (65 Fe) mg Take 1 tablet (324 mg total) by mouth daily before breakfast, Starting Thu 7/11/2024, Until Sat 8/10/2024, Normal      ibuprofen (MOTRIN) 400 mg tablet Take 1 tablet (400 mg total) by mouth every 6 (six) hours as needed for mild pain or moderate pain, Starting Sun 5/12/2024, Normal      linaCLOtide 145 MCG CAPS Take 1 capsule (145 mcg total) by mouth daily, Starting Mon 10/30/2023, Until Sat 4/27/2024, Normal      medroxyPROGESTERone (PROVERA) 10 mg tablet Take 1 tablet (10 mg total) by mouth daily for 10 days, Starting Thu 4/8/2021, Until Sun 4/18/2021, Normal      methocarbamol (ROBAXIN) 500 mg tablet Take 1 tablet (500 mg total) by mouth 2 (two) times a day, Starting Sun 7/21/2024, Normal      naproxen (Naprosyn) 500 mg tablet Take 1 tablet (500 mg total) by mouth 2 (two) times a day with meals, Starting Sun 7/21/2024, Normal      polyethylene glycol (GLYCOLAX) 17 GM/SCOOP powder Take 17 g by mouth daily Take 2x daily x 7 days then once daily after, Starting u 7/11/2024, Normal           No discharge procedures on file.  ED SEPSIS DOCUMENTATION   Time reflects when diagnosis was documented in both MDM as applicable and the Disposition within this note       Time User Action Codes Description Comment    1/15/2025 11:48 PM Maryellen Owusu [N93.9] Abnormal uterine bleeding                  Maryellen Owusu PA-C  01/16/25 0332

## 2025-01-17 ENCOUNTER — RESULTS FOLLOW-UP (OUTPATIENT)
Dept: EMERGENCY DEPT | Facility: HOSPITAL | Age: 35
End: 2025-01-17

## 2025-01-17 LAB
BACTERIA UR CULT: ABNORMAL
BACTERIA UR CULT: ABNORMAL

## 2025-01-18 LAB
BACTERIA UR CULT: ABNORMAL

## 2025-01-18 RX ORDER — CEPHALEXIN 500 MG/1
500 CAPSULE ORAL EVERY 6 HOURS SCHEDULED
Qty: 28 CAPSULE | Refills: 0 | Status: SHIPPED | OUTPATIENT
Start: 2025-01-18 | End: 2025-01-25

## 2025-01-23 ENCOUNTER — TELEPHONE (OUTPATIENT)
Dept: FAMILY MEDICINE CLINIC | Facility: CLINIC | Age: 35
End: 2025-01-23

## 2025-01-23 NOTE — TELEPHONE ENCOUNTER
VM: Pt wanted to reschedule appointment for 1/20/25 that was missed.    Pt was contacted back and rescheduled for a day that was convenient with her schedule.

## 2025-02-13 ENCOUNTER — OFFICE VISIT (OUTPATIENT)
Dept: FAMILY MEDICINE CLINIC | Facility: CLINIC | Age: 35
End: 2025-02-13

## 2025-02-13 VITALS
HEART RATE: 101 BPM | SYSTOLIC BLOOD PRESSURE: 106 MMHG | TEMPERATURE: 98.1 F | BODY MASS INDEX: 30.57 KG/M2 | HEIGHT: 61 IN | RESPIRATION RATE: 18 BRPM | DIASTOLIC BLOOD PRESSURE: 70 MMHG | WEIGHT: 161.9 LBS | OXYGEN SATURATION: 99 %

## 2025-02-13 DIAGNOSIS — D50.0 IRON DEFICIENCY ANEMIA DUE TO CHRONIC BLOOD LOSS: Primary | ICD-10-CM

## 2025-02-13 DIAGNOSIS — N92.0 MENORRHAGIA WITH REGULAR CYCLE: ICD-10-CM

## 2025-02-13 DIAGNOSIS — E55.9 VITAMIN D DEFICIENCY: ICD-10-CM

## 2025-02-13 DIAGNOSIS — E66.811 OBESITY (BMI 30.0-34.9): ICD-10-CM

## 2025-02-13 DIAGNOSIS — Z11.3 ROUTINE SCREENING FOR STI (SEXUALLY TRANSMITTED INFECTION): ICD-10-CM

## 2025-02-13 DIAGNOSIS — Z11.4 SCREENING FOR HIV (HUMAN IMMUNODEFICIENCY VIRUS): ICD-10-CM

## 2025-02-13 DIAGNOSIS — E53.8 B12 DEFICIENCY: ICD-10-CM

## 2025-02-13 DIAGNOSIS — Z11.59 NEED FOR HEPATITIS C SCREENING TEST: ICD-10-CM

## 2025-02-13 DIAGNOSIS — E61.1 IRON DEFICIENCY: ICD-10-CM

## 2025-02-13 PROBLEM — Z02.1 PHYSICAL EXAM, PRE-EMPLOYMENT: Status: RESOLVED | Noted: 2024-07-02 | Resolved: 2025-02-13

## 2025-02-13 PROCEDURE — 99214 OFFICE O/P EST MOD 30 MIN: CPT | Performed by: NURSE PRACTITIONER

## 2025-02-13 NOTE — PATIENT INSTRUCTIONS
Make follow up with hematology   Make follow up Gynecology Please contact us at [phone] to schedule your appointment.

## 2025-02-13 NOTE — ASSESSMENT & PLAN NOTE
Orders:    CBC and differential; Future    Comprehensive metabolic panel; Future    Hemoglobin A1C; Future    TSH, 3rd generation with Free T4 reflex; Future    Iron Panel (Includes Ferritin, Iron Sat%, Iron, and TIBC); Future    Ambulatory Referral to Obstetrics / Gynecology; Future

## 2025-02-13 NOTE — PROGRESS NOTES
Name: Ebony Mcdaniel      : 1990      MRN: [id number]  Encounter Provider: DENISSE Newell  Encounter Date: 2025   Encounter department: [facility]  :  Assessment & Plan  Iron deficiency anemia due to chronic blood loss  Lab Results   Component Value Date    WBC 5.34 01/15/2025    HGB 9.4 (L) 01/15/2025    HCT 33.1 (L) 01/15/2025    MCV 73 (L) 01/15/2025     01/15/2025     Lab Results   Component Value Date    IRON 29 (L) 2024    TIBC 475 (H) 2024    FERRITIN 5 (L) 2024 menorrhagia   Previously required iron infusions and was following with hematology       Orders:    CBC and differential; Future    Iron Panel (Includes Ferritin, Iron Sat%, Iron, and TIBC); Future    Obesity (BMI 30.0-34.9)  Wt Readings from Last 3 Encounters:   25 73.4 kg (161 lb 14.4 oz)   01/15/25 73.1 kg (161 lb 2.5 oz)   24 74.5 kg (164 lb 3.9 oz)     -Encouraged diet and lifestyle changes: decrease processed foods (cakes, cookies, chips, soda), decrease total carbohydrate intake, decrease fried/fatty foods, increase fruits and vegetables, increase lean proteins (chicken, turkey), increase healthy fats (avocado, fish, nuts), drink plenty of water (at least four 16 oz bottles per day)      Orders:    CBC and differential; Future    Comprehensive metabolic panel; Future    Hemoglobin A1C; Future    Lipid panel; Future    TSH, 3rd generation with Free T4 reflex; Future    Vitamin D 25 hydroxy; Future    Menorrhagia with regular cycle    Orders:    CBC and differential; Future    Comprehensive metabolic panel; Future    Hemoglobin A1C; Future    TSH, 3rd generation with Free T4 reflex; Future    Iron Panel (Includes Ferritin, Iron Sat%, Iron, and TIBC); Future    Ambulatory Referral to Obstetrics / Gynecology; Future    B12 deficiency    Orders:    Vitamin B12; Future    Vitamin D deficiency    Orders:    Vitamin D 25 hydroxy;  Future    Iron deficiency    Orders:    CBC and differential; Future    Iron Panel (Includes Ferritin, Iron Sat%, Iron, and TIBC); Future    Routine screening for STI (sexually transmitted infection)    Orders:    RPR-Syphilis Screening (Total Syphilis IGG/IGM); Future    Chlamydia/GC amplified DNA by PCR; Future    Need for hepatitis C screening test    Orders:    Hepatitis C antibody; Future    Screening for HIV (human immunodeficiency virus)    Orders:    HIV 1/2 AG/AB w Reflex [facility] for 2 yr old and above; Future        BMI Counseling: Body mass index is 30.59 kg/m². The BMI is above normal. Nutrition recommendations include encouraging healthy choices of fruits and vegetables, decreasing fast food intake, consuming healthier snacks and limiting drinks that contain sugar. Exercise recommendations include exercising 3-5 times per week. Rationale for BMI follow-up plan is due to patient being overweight or obese.       History of Present Illness   Patient is a 35 yo female who  has a past medical history of Anemia, Chronic kidney disease, History of transfusion, Kidney stone, Sepsis (HCC), Septic shock (HCC), Urinary tract infection, UTI (urinary tract infection) during pregnancy, Vagina, candidiasis, and Varicella here for follow up. Continues with menorrhagia, has not seen GYN. Feeling weak and tired for the last week.     The following portions of the patient's history were reviewed and updated as appropriate: allergies, current medications, past family history, past medical history, past social history, past surgical history and problem list.        Review of Systems   Constitutional:  Positive for activity change and fatigue. Negative for chills and fever.   HENT:  Negative for ear pain and sore throat.    Eyes:  Negative for pain and visual disturbance.   Respiratory:  Negative for cough and shortness of breath.    Cardiovascular:  Negative for chest pain and palpitations.   Gastrointestinal:  Negative for  "abdominal pain and vomiting.   Genitourinary:  Positive for menstrual problem. Negative for dysuria and hematuria.   Musculoskeletal:  Negative for arthralgias and back pain.   Skin:  Negative for color change and rash.   Neurological:  Negative for seizures and syncope.   All other systems reviewed and are negative.      Objective   /70 (BP Location: Left arm, Patient Position: Sitting, Cuff Size: Standard)   Pulse 101   Temp 98.1 °F (36.7 °C) (Temporal)   Resp 18   Ht 5' 1\" (1.549 m)   Wt 73.4 kg (161 lb 14.4 oz)   LMP 01/15/2025 (Exact Date)   SpO2 99%   BMI 30.59 kg/m²      Physical Exam  Vitals and nursing note reviewed.   Constitutional:       General: She is not in acute distress.     Appearance: She is well-developed.   HENT:      Head: Normocephalic and atraumatic.   Eyes:      Conjunctiva/sclera: Conjunctivae normal.   Cardiovascular:      Rate and Rhythm: Normal rate and regular rhythm.      Heart sounds: No murmur heard.  Pulmonary:      Effort: Pulmonary effort is normal. No respiratory distress.      Breath sounds: Normal breath sounds.   Abdominal:      Palpations: Abdomen is soft.      Tenderness: There is no abdominal tenderness.   Musculoskeletal:         General: No swelling.      Cervical back: Neck supple.   Skin:     General: Skin is warm and dry.      Capillary Refill: Capillary refill takes less than 2 seconds.   Neurological:      Mental Status: She is alert.   Psychiatric:         Mood and Affect: Mood normal.         "

## 2025-02-13 NOTE — ASSESSMENT & PLAN NOTE
Lab Results   Component Value Date    WBC 5.34 01/15/2025    HGB 9.4 (L) 01/15/2025    HCT 33.1 (L) 01/15/2025    MCV 73 (L) 01/15/2025     01/15/2025     Lab Results   Component Value Date    IRON 29 (L) 07/11/2024    TIBC 475 (H) 07/11/2024    FERRITIN 5 (L) 07/11/2024     2/2 menorrhagia   Previously required iron infusions and was following with hematology       Orders:    CBC and differential; Future    Iron Panel (Includes Ferritin, Iron Sat%, Iron, and TIBC); Future

## 2025-02-13 NOTE — ASSESSMENT & PLAN NOTE
Wt Readings from Last 3 Encounters:   02/13/25 73.4 kg (161 lb 14.4 oz)   01/15/25 73.1 kg (161 lb 2.5 oz)   11/12/24 74.5 kg (164 lb 3.9 oz)     -Encouraged diet and lifestyle changes: decrease processed foods (cakes, cookies, chips, soda), decrease total carbohydrate intake, decrease fried/fatty foods, increase fruits and vegetables, increase lean proteins (chicken, turkey), increase healthy fats (avocado, fish, nuts), drink plenty of water (at least four 16 oz bottles per day)      Orders:    CBC and differential; Future    Comprehensive metabolic panel; Future    Hemoglobin A1C; Future    Lipid panel; Future    TSH, 3rd generation with Free T4 reflex; Future    Vitamin D 25 hydroxy; Future

## 2025-02-26 ENCOUNTER — OFFICE VISIT (OUTPATIENT)
Dept: OBGYN CLINIC | Facility: CLINIC | Age: 35
End: 2025-02-26

## 2025-02-26 VITALS — WEIGHT: 161.6 LBS | BODY MASS INDEX: 30.53 KG/M2 | SYSTOLIC BLOOD PRESSURE: 112 MMHG | DIASTOLIC BLOOD PRESSURE: 68 MMHG

## 2025-02-26 DIAGNOSIS — N92.0 MENORRHAGIA WITH REGULAR CYCLE: ICD-10-CM

## 2025-02-26 PROCEDURE — 99213 OFFICE O/P EST LOW 20 MIN: CPT | Performed by: OBSTETRICS & GYNECOLOGY

## 2025-02-26 NOTE — PROGRESS NOTES
PROBLEM GYNECOLOGICAL VISIT    Ebony Mcdaniel is a 34 y.o. female who presents today with complaint of heavy menses.  Her general medical history has been reviewed and she reports it as follows:    Past Medical History:   Diagnosis Date    Anemia     taking iron pills BID     Chronic kidney disease     hydronephrosis    History of transfusion     Kidney stone     Sepsis (HCC)     Septic shock (HCC)     Urinary tract infection     taking antibioitcs     UTI (urinary tract infection) during pregnancy     Vagina, candidiasis     Varicella     as child     Past Surgical History:   Procedure Laterality Date    ABDOMINAL ADHESION SURGERY N/A 2021    Procedure: LYSIS ADHESIONS;  Surgeon: Maciej Watson MD;  [facility];  Service: Gynecology     SECTION N/A 2018    Procedure:  SECTION ();  Surgeon: Curtis Zavala MD;  [facility];  Service: Obstetrics    CYSTOSCOPY  2018    with right uretral stent    EXAMINATION UNDER ANESTHESIA N/A 2021    Procedure: EXAM UNDER ANESTHESIA (EUA);  Surgeon: Maciej Watson MD;  [facility];  Service: Gynecology    VT CYSTO/URETERO W/LITHOTRIPSY &INDWELL STENT INSRT Right 2018    Procedure: CYSTOSCOPY URETEROSCOPY WITH  RETROGRADE PYELOGRAM AND exchange  STENT URETERAl;  Surgeon: Cristofer Matute MD;  Location:  MAIN OR;  Service: Urology    VT CYSTOURETHROSCOPY W/URETERAL CATHETERIZATION Right 2018    Procedure: CYSTOSCOPY RETROGRADE PYELOGRAM WITH INSERTION STENT URETERAL;  Surgeon: Cristofer Matute MD;  [facility];  Service: Urology    VT LAPAROSCOPY SURG CHOLECYSTECTOMY N/A 2022    Procedure: CHOLECYSTECTOMY LAPAROSCOPIC;  Surgeon: Jennyfer Moreland MD;  Location:  MAIN OR;  Service: General    VT LAPAROSCOPY W/RMVL ADNEXAL STRUCTURES Bilateral 2021    Procedure: SALPINGECTOMY, LAPAROSCOPIC;  Surgeon: Maciej Watson MD;  [facility];  Service: Gynecology    VT LAPS  ABD PRTM&OMENTUM DX W/WO SPEC BR/WA SPX N/A 2021    Procedure: LAPAROSCOPY DIAGNOSTIC;  Surgeon: Maciej Watson MD;  [facility];  Service: Gynecology    TONSILLECTOMY      TONSILLECTOMY      TUBAL LIGATION       OB History          5    Para   5    Term   5            AB        Living   5         SAB        IAB        Ectopic        Multiple   0    Live Births   5               Social History     Tobacco Use    Smoking status: Former     Current packs/day: 0.00     Types: Cigarettes     Quit date: 2019     Years since quittin.7    Smokeless tobacco: Never    Tobacco comments:     2-3 a day   Vaping Use    Vaping status: Never Used   Substance Use Topics    Alcohol use: No    Drug use: No     Social History     Substance and Sexual Activity   Sexual Activity Yes    Partners: Male    Birth control/protection: None, Condom Male       Current Outpatient Medications   Medication Instructions    cyanocobalamin (VITAMIN B-12) 1,000 mcg, Oral, Daily    ferrous sulfate 324 mg, Oral, Daily before breakfast    ibuprofen (MOTRIN) 400 mg, Oral, Every 6 hours PRN    ibuprofen (MOTRIN) 600 mg, Oral, Every 6 hours PRN    linaCLOtide 145 mcg, Oral, Daily    medroxyPROGESTERone (PROVERA) 10 mg, Oral, Daily    naproxen (NAPROSYN) 500 mg, Oral, 2 times daily with meals    polyethylene glycol (GLYCOLAX) 17 g, Oral, Daily, Take 2x daily x 7 days then once daily after       History of Present Illness:   Patient presents with c/o heavy menses which has been going on since she started her menses.   Patient states was recommend by her PCP for possible uterine ablation.    Review of Systems:  Review of Systems   Genitourinary:  Positive for menstrual problem. Negative for pelvic pain, vaginal bleeding and vaginal discharge.        Heavy menses   All other systems reviewed and are negative.      Physical Exam:  /68 (BP Location: Left arm, Patient Position: Sitting)   Wt 73.3 kg (161 lb 9.6 oz)   LMP  02/07/2025 (Exact Date)   BMI 30.53 kg/m²   Physical Exam  Constitutional:       Appearance: Normal appearance.   Neurological:      Mental Status: She is alert.   Vitals and nursing note reviewed.     Discussion:  Discuss with patient prior to having any surgical management will need a recent pelvic sonogram and EMB to r/o any pathology which may need gyn-onco referral.     Assessment:   1. Menorrhagia    Plan:      1. Imaging ordered: pelvic sonogram   2. Return to office 4-6wks.   3. Patient's depression screening was assessed with a PHQ-2 score of 0. Clinically patient does not have depression. No treatment is required.      Reviewed with patient that [facility], but that they will not necessarily be reviewed by me immediately.  Explained that I will review results at my earliest opportunity and contact patient appropriately.

## 2025-02-27 ENCOUNTER — APPOINTMENT (OUTPATIENT)
Dept: LAB | Facility: HOSPITAL | Age: 35
End: 2025-02-27
Payer: COMMERCIAL

## 2025-02-27 ENCOUNTER — HOSPITAL ENCOUNTER (OUTPATIENT)
Dept: ULTRASOUND IMAGING | Facility: HOSPITAL | Age: 35
End: 2025-02-27
Payer: COMMERCIAL

## 2025-02-27 DIAGNOSIS — N92.0 MENORRHAGIA WITH REGULAR CYCLE: ICD-10-CM

## 2025-02-27 DIAGNOSIS — Z11.3 ROUTINE SCREENING FOR STI (SEXUALLY TRANSMITTED INFECTION): ICD-10-CM

## 2025-02-27 DIAGNOSIS — E66.811 OBESITY (BMI 30.0-34.9): ICD-10-CM

## 2025-02-27 DIAGNOSIS — E55.9 VITAMIN D DEFICIENCY: ICD-10-CM

## 2025-02-27 DIAGNOSIS — Z11.59 NEED FOR HEPATITIS C SCREENING TEST: ICD-10-CM

## 2025-02-27 DIAGNOSIS — Z11.4 SCREENING FOR HIV (HUMAN IMMUNODEFICIENCY VIRUS): ICD-10-CM

## 2025-02-27 DIAGNOSIS — E61.1 IRON DEFICIENCY: ICD-10-CM

## 2025-02-27 DIAGNOSIS — D50.0 IRON DEFICIENCY ANEMIA DUE TO CHRONIC BLOOD LOSS: ICD-10-CM

## 2025-02-27 DIAGNOSIS — E53.8 B12 DEFICIENCY: ICD-10-CM

## 2025-02-27 LAB
25(OH)D3 SERPL-MCNC: 7.8 NG/ML (ref 30–100)
ALBUMIN SERPL BCG-MCNC: 4 G/DL (ref 3.5–5)
ALP SERPL-CCNC: 60 U/L (ref 34–104)
ALT SERPL W P-5'-P-CCNC: 12 U/L (ref 7–52)
ANION GAP SERPL CALCULATED.3IONS-SCNC: 7 MMOL/L (ref 4–13)
AST SERPL W P-5'-P-CCNC: 13 U/L (ref 13–39)
BASOPHILS # BLD AUTO: 0.05 THOUSANDS/ÂΜL (ref 0–0.1)
BASOPHILS NFR BLD AUTO: 1 % (ref 0–1)
BILIRUB SERPL-MCNC: 0.24 MG/DL (ref 0.2–1)
BUN SERPL-MCNC: 12 MG/DL (ref 5–25)
CALCIUM SERPL-MCNC: 9.1 MG/DL (ref 8.4–10.2)
CHLORIDE SERPL-SCNC: 101 MMOL/L (ref 96–108)
CHOLEST SERPL-MCNC: 167 MG/DL (ref ?–200)
CO2 SERPL-SCNC: 24 MMOL/L (ref 21–32)
CREAT SERPL-MCNC: 0.58 MG/DL (ref 0.6–1.3)
EOSINOPHIL # BLD AUTO: 0.2 THOUSAND/ÂΜL (ref 0–0.61)
EOSINOPHIL NFR BLD AUTO: 3 % (ref 0–6)
ERYTHROCYTE [DISTWIDTH] IN BLOOD BY AUTOMATED COUNT: 16 % (ref 11.6–15.1)
EST. AVERAGE GLUCOSE BLD GHB EST-MCNC: 114 MG/DL
FERRITIN SERPL-MCNC: 4 NG/ML (ref 11–307)
GFR SERPL CREATININE-BSD FRML MDRD: 120 ML/MIN/1.73SQ M
GLUCOSE SERPL-MCNC: 94 MG/DL (ref 65–140)
HBA1C MFR BLD: 5.6 %
HCT VFR BLD AUTO: 32.6 % (ref 34.8–46.1)
HCV AB SER QL: NORMAL
HDLC SERPL-MCNC: 52 MG/DL
HGB BLD-MCNC: 9.2 G/DL (ref 11.5–15.4)
HIV 1+2 AB+HIV1 P24 AG SERPL QL IA: NORMAL
IMM GRANULOCYTES # BLD AUTO: 0.01 THOUSAND/UL (ref 0–0.2)
IMM GRANULOCYTES NFR BLD AUTO: 0 % (ref 0–2)
IRON SATN MFR SERPL: 3 % (ref 15–50)
IRON SERPL-MCNC: 16 UG/DL (ref 50–212)
LDLC SERPL CALC-MCNC: 81 MG/DL (ref 0–100)
LYMPHOCYTES # BLD AUTO: 1.86 THOUSANDS/ÂΜL (ref 0.6–4.47)
LYMPHOCYTES NFR BLD AUTO: 28 % (ref 14–44)
MCH RBC QN AUTO: 20.8 PG (ref 26.8–34.3)
MCHC RBC AUTO-ENTMCNC: 28.2 G/DL (ref 31.4–37.4)
MCV RBC AUTO: 74 FL (ref 82–98)
MONOCYTES # BLD AUTO: 0.68 THOUSAND/ÂΜL (ref 0.17–1.22)
MONOCYTES NFR BLD AUTO: 10 % (ref 4–12)
NEUTROPHILS # BLD AUTO: 3.96 THOUSANDS/ÂΜL (ref 1.85–7.62)
NEUTS SEG NFR BLD AUTO: 58 % (ref 43–75)
NONHDLC SERPL-MCNC: 115 MG/DL
NRBC BLD AUTO-RTO: 0 /100 WBCS
PLATELET # BLD AUTO: 281 THOUSANDS/UL (ref 149–390)
PMV BLD AUTO: 10.6 FL (ref 8.9–12.7)
POTASSIUM SERPL-SCNC: 3.6 MMOL/L (ref 3.5–5.3)
PROT SERPL-MCNC: 7.2 G/DL (ref 6.4–8.4)
RBC # BLD AUTO: 4.43 MILLION/UL (ref 3.81–5.12)
SODIUM SERPL-SCNC: 132 MMOL/L (ref 135–147)
TIBC SERPL-MCNC: 490 UG/DL (ref 250–450)
TRANSFERRIN SERPL-MCNC: 350 MG/DL (ref 203–362)
TREPONEMA PALLIDUM IGG+IGM AB [PRESENCE] IN SERUM OR PLASMA BY IMMUNOASSAY: NORMAL
TRIGL SERPL-MCNC: 170 MG/DL (ref ?–150)
TSH SERPL DL<=0.05 MIU/L-ACNC: 1.04 UIU/ML (ref 0.45–4.5)
UIBC SERPL-MCNC: 474 UG/DL (ref 155–355)
VIT B12 SERPL-MCNC: 271 PG/ML (ref 180–914)
WBC # BLD AUTO: 6.76 THOUSAND/UL (ref 4.31–10.16)

## 2025-02-27 PROCEDURE — 83540 ASSAY OF IRON: CPT

## 2025-02-27 PROCEDURE — 86780 TREPONEMA PALLIDUM: CPT

## 2025-02-27 PROCEDURE — 80053 COMPREHEN METABOLIC PANEL: CPT

## 2025-02-27 PROCEDURE — 82607 VITAMIN B-12: CPT

## 2025-02-27 PROCEDURE — 87389 HIV-1 AG W/HIV-1&-2 AB AG IA: CPT

## 2025-02-27 PROCEDURE — 86803 HEPATITIS C AB TEST: CPT

## 2025-02-27 PROCEDURE — 82306 VITAMIN D 25 HYDROXY: CPT

## 2025-02-27 PROCEDURE — 83550 IRON BINDING TEST: CPT

## 2025-02-27 PROCEDURE — 76830 TRANSVAGINAL US NON-OB: CPT

## 2025-02-27 PROCEDURE — 85025 COMPLETE CBC W/AUTO DIFF WBC: CPT

## 2025-02-27 PROCEDURE — 82728 ASSAY OF FERRITIN: CPT

## 2025-02-27 PROCEDURE — 80061 LIPID PANEL: CPT

## 2025-02-27 PROCEDURE — 36415 COLL VENOUS BLD VENIPUNCTURE: CPT

## 2025-02-27 PROCEDURE — 83036 HEMOGLOBIN GLYCOSYLATED A1C: CPT

## 2025-02-27 PROCEDURE — 84443 ASSAY THYROID STIM HORMONE: CPT

## 2025-02-27 PROCEDURE — 76856 US EXAM PELVIC COMPLETE: CPT

## 2025-02-28 ENCOUNTER — RESULTS FOLLOW-UP (OUTPATIENT)
Dept: FAMILY MEDICINE CLINIC | Facility: CLINIC | Age: 35
End: 2025-02-28

## 2025-02-28 DIAGNOSIS — D50.0 IRON DEFICIENCY ANEMIA DUE TO CHRONIC BLOOD LOSS: ICD-10-CM

## 2025-02-28 DIAGNOSIS — D50.9 IRON DEFICIENCY ANEMIA, UNSPECIFIED IRON DEFICIENCY ANEMIA TYPE: Primary | ICD-10-CM

## 2025-02-28 RX ORDER — SODIUM CHLORIDE 9 MG/ML
20 INJECTION, SOLUTION INTRAVENOUS ONCE
OUTPATIENT
Start: 2025-03-14

## 2025-03-18 ENCOUNTER — TELEPHONE (OUTPATIENT)
Dept: OBGYN CLINIC | Facility: CLINIC | Age: 35
End: 2025-03-18

## 2025-03-26 ENCOUNTER — HOSPITAL ENCOUNTER (OUTPATIENT)
Dept: INFUSION CENTER | Facility: HOSPITAL | Age: 35
Discharge: HOME | End: 2025-03-26
Attending: FAMILY MEDICINE
Payer: COMMERCIAL

## 2025-03-26 VITALS
RESPIRATION RATE: 18 BRPM | SYSTOLIC BLOOD PRESSURE: 124 MMHG | HEART RATE: 72 BPM | DIASTOLIC BLOOD PRESSURE: 72 MMHG | TEMPERATURE: 97.4 F

## 2025-03-26 DIAGNOSIS — D50.0 IRON DEFICIENCY ANEMIA DUE TO CHRONIC BLOOD LOSS: Primary | ICD-10-CM

## 2025-03-26 PROCEDURE — 96365 THER/PROPH/DIAG IV INF INIT: CPT

## 2025-03-26 RX ORDER — SODIUM CHLORIDE 9 MG/ML
20 INJECTION, SOLUTION INTRAVENOUS ONCE
Status: COMPLETED | OUTPATIENT
Start: 2025-03-26 | End: 2025-03-26

## 2025-03-26 RX ORDER — SODIUM CHLORIDE 9 MG/ML
20 INJECTION, SOLUTION INTRAVENOUS ONCE
Status: CANCELLED | OUTPATIENT
Start: 2025-04-02

## 2025-03-26 RX ADMIN — IRON SUCROSE 200 MG: 20 INJECTION, SOLUTION INTRAVENOUS at 13:53

## 2025-03-26 RX ADMIN — SODIUM CHLORIDE 20 ML/HR: 0.9 INJECTION, SOLUTION INTRAVENOUS at 13:52

## 2025-03-26 NOTE — PLAN OF CARE
Problem: Potential for Falls  Goal: Patient will remain free of falls  Description: INTERVENTIONS:  Instruct patient to call for assistance with activity   Keep Call bell within reach  Keep care items and personal belongings within reach  Initiate and maintain comfort rounds      Outcome: Progressing

## 2025-03-26 NOTE — PROGRESS NOTES
Ebony Mcdaniel  tolerated treatment well with no complications.      Ebony Mcdaniel is aware of future appt on 4/2/25 at 230.     AVS printed and given to Ebony Mcdaniel:  No (Declined by Ebony Mcdaniel)

## 2025-03-31 ENCOUNTER — TELEPHONE (OUTPATIENT)
Dept: OBGYN CLINIC | Facility: CLINIC | Age: 35
End: 2025-03-31

## 2025-04-02 ENCOUNTER — HOSPITAL ENCOUNTER (OUTPATIENT)
Dept: INFUSION CENTER | Facility: HOSPITAL | Age: 35
Discharge: HOME | End: 2025-04-02
Attending: FAMILY MEDICINE
Payer: COMMERCIAL

## 2025-04-02 VITALS
DIASTOLIC BLOOD PRESSURE: 65 MMHG | RESPIRATION RATE: 18 BRPM | SYSTOLIC BLOOD PRESSURE: 110 MMHG | TEMPERATURE: 97.6 F | HEART RATE: 83 BPM

## 2025-04-02 DIAGNOSIS — D50.0 IRON DEFICIENCY ANEMIA DUE TO CHRONIC BLOOD LOSS: Primary | ICD-10-CM

## 2025-04-02 PROCEDURE — 96365 THER/PROPH/DIAG IV INF INIT: CPT

## 2025-04-02 RX ORDER — SODIUM CHLORIDE 9 MG/ML
20 INJECTION, SOLUTION INTRAVENOUS ONCE
Status: COMPLETED | OUTPATIENT
Start: 2025-04-02 | End: 2025-04-02

## 2025-04-02 RX ORDER — SODIUM CHLORIDE 9 MG/ML
20 INJECTION, SOLUTION INTRAVENOUS ONCE
Status: CANCELLED | OUTPATIENT
Start: 2025-04-09

## 2025-04-02 RX ADMIN — SODIUM CHLORIDE 20 ML/HR: 0.9 INJECTION, SOLUTION INTRAVENOUS at 14:45

## 2025-04-02 RX ADMIN — IRON SUCROSE 200 MG: 20 INJECTION, SOLUTION INTRAVENOUS at 14:45

## 2025-04-14 ENCOUNTER — TELEPHONE (OUTPATIENT)
Dept: OBGYN CLINIC | Facility: CLINIC | Age: 35
End: 2025-04-14

## 2025-04-16 ENCOUNTER — HOSPITAL ENCOUNTER (OUTPATIENT)
Dept: INFUSION CENTER | Facility: HOSPITAL | Age: 35
Discharge: HOME | End: 2025-04-16
Attending: FAMILY MEDICINE
Payer: COMMERCIAL

## 2025-04-16 VITALS
DIASTOLIC BLOOD PRESSURE: 70 MMHG | RESPIRATION RATE: 20 BRPM | TEMPERATURE: 97.8 F | SYSTOLIC BLOOD PRESSURE: 102 MMHG | HEART RATE: 90 BPM

## 2025-04-16 DIAGNOSIS — D50.0 IRON DEFICIENCY ANEMIA DUE TO CHRONIC BLOOD LOSS: Primary | ICD-10-CM

## 2025-04-16 PROCEDURE — 96365 THER/PROPH/DIAG IV INF INIT: CPT

## 2025-04-16 RX ORDER — SODIUM CHLORIDE 9 MG/ML
20 INJECTION, SOLUTION INTRAVENOUS ONCE
Status: CANCELLED | OUTPATIENT
Start: 2025-04-23

## 2025-04-16 RX ORDER — SODIUM CHLORIDE 9 MG/ML
20 INJECTION, SOLUTION INTRAVENOUS ONCE
Status: COMPLETED | OUTPATIENT
Start: 2025-04-16 | End: 2025-04-16

## 2025-04-16 RX ADMIN — SODIUM CHLORIDE 20 ML/HR: 0.9 INJECTION, SOLUTION INTRAVENOUS at 14:08

## 2025-04-16 RX ADMIN — IRON SUCROSE 200 MG: 20 INJECTION, SOLUTION INTRAVENOUS at 14:08

## 2025-04-16 NOTE — PROGRESS NOTES
Ebony Mcdaniel  tolerated venofer well with no complications. Aware of future appt on 4/23/25 at 1400. AVS declined. Patient left clinic ambulatory.

## 2025-04-16 NOTE — PLAN OF CARE
Problem: Potential for Falls  Goal: Patient will remain free of falls  Description: INTERVENTIONS:- Educate patient/family on patient safety including physical limitations- Instruct patient to call for assistance with activity - Consult OT/PT to assist with strengthening/mobility - Keep Call bell within reach- Keep bed low and locked with side rails adjusted as appropriate- Keep care items and personal belongings within reach- Initiate and maintain comfort rounds- Make Fall Risk Sign visible to staff- - Consider moving patient to room near nurses station  Outcome: Progressing

## 2025-04-23 ENCOUNTER — HOSPITAL ENCOUNTER (OUTPATIENT)
Dept: INFUSION CENTER | Facility: HOSPITAL | Age: 35
Discharge: HOME | End: 2025-04-23
Payer: COMMERCIAL

## 2025-04-23 VITALS
RESPIRATION RATE: 18 BRPM | TEMPERATURE: 97.3 F | SYSTOLIC BLOOD PRESSURE: 118 MMHG | HEART RATE: 88 BPM | DIASTOLIC BLOOD PRESSURE: 72 MMHG

## 2025-04-23 DIAGNOSIS — D50.0 IRON DEFICIENCY ANEMIA DUE TO CHRONIC BLOOD LOSS: Primary | ICD-10-CM

## 2025-04-23 PROCEDURE — 96365 THER/PROPH/DIAG IV INF INIT: CPT

## 2025-04-23 RX ORDER — SODIUM CHLORIDE 9 MG/ML
20 INJECTION, SOLUTION INTRAVENOUS ONCE
Status: COMPLETED | OUTPATIENT
Start: 2025-04-23 | End: 2025-04-23

## 2025-04-23 RX ORDER — SODIUM CHLORIDE 9 MG/ML
20 INJECTION, SOLUTION INTRAVENOUS ONCE
OUTPATIENT
Start: 2025-04-30

## 2025-04-23 RX ADMIN — SODIUM CHLORIDE 20 ML/HR: 9 INJECTION, SOLUTION INTRAVENOUS at 14:18

## 2025-04-23 RX ADMIN — IRON SUCROSE 200 MG: 20 INJECTION, SOLUTION INTRAVENOUS at 14:18

## 2025-04-30 ENCOUNTER — HOSPITAL ENCOUNTER (OUTPATIENT)
Dept: INFUSION CENTER | Facility: HOSPITAL | Age: 35
Discharge: HOME | End: 2025-04-30
Attending: FAMILY MEDICINE

## 2025-05-14 ENCOUNTER — TELEPHONE (OUTPATIENT)
Dept: OBGYN CLINIC | Facility: CLINIC | Age: 35
End: 2025-05-14

## 2025-05-19 ENCOUNTER — TELEPHONE (OUTPATIENT)
Dept: OBGYN CLINIC | Facility: CLINIC | Age: 35
End: 2025-05-19

## 2025-05-19 NOTE — TELEPHONE ENCOUNTER
"Patient called and left voicemail:    \"Mayra, this is Shannan Juares. I was calling to reschedule my appointment. If you could give me a call back at [phone] that's [phone] and my birthday is [id number] May 5th, 1990. Thank you and have a great day\"    Returned patient call and she has been rescheduled.   "

## 2025-05-21 ENCOUNTER — CLINICAL SUPPORT (OUTPATIENT)
Dept: FAMILY MEDICINE CLINIC | Facility: CLINIC | Age: 35
End: 2025-05-21

## 2025-05-21 DIAGNOSIS — Z11.1 TUBERCULOSIS SCREENING: Primary | ICD-10-CM

## 2025-05-21 PROCEDURE — 86580 TB INTRADERMAL TEST: CPT

## 2025-06-19 ENCOUNTER — ANNUAL EXAM (OUTPATIENT)
Dept: OBGYN CLINIC | Facility: CLINIC | Age: 35
End: 2025-06-19

## 2025-06-19 VITALS — SYSTOLIC BLOOD PRESSURE: 116 MMHG | DIASTOLIC BLOOD PRESSURE: 82 MMHG | BODY MASS INDEX: 31.03 KG/M2 | WEIGHT: 164.2 LBS

## 2025-06-19 DIAGNOSIS — Z23 NEED FOR HPV VACCINE: ICD-10-CM

## 2025-06-19 DIAGNOSIS — R82.90 MALODOROUS URINE: ICD-10-CM

## 2025-06-19 DIAGNOSIS — Z01.419 ROUTINE GYNECOLOGICAL EXAMINATION: Primary | ICD-10-CM

## 2025-06-19 DIAGNOSIS — N92.0 MENORRHAGIA WITH REGULAR CYCLE: ICD-10-CM

## 2025-06-19 DIAGNOSIS — Z12.4 SCREENING FOR CERVICAL CANCER: ICD-10-CM

## 2025-06-19 DIAGNOSIS — N89.8 VAGINAL DISCHARGE: ICD-10-CM

## 2025-06-19 DIAGNOSIS — B96.89 BV (BACTERIAL VAGINOSIS): ICD-10-CM

## 2025-06-19 DIAGNOSIS — N76.0 BV (BACTERIAL VAGINOSIS): ICD-10-CM

## 2025-06-19 PROCEDURE — G0145 SCR C/V CYTO,THINLAYER,RESCR: HCPCS | Performed by: OBSTETRICS & GYNECOLOGY

## 2025-06-19 PROCEDURE — 87077 CULTURE AEROBIC IDENTIFY: CPT | Performed by: OBSTETRICS & GYNECOLOGY

## 2025-06-19 PROCEDURE — 87210 SMEAR WET MOUNT SALINE/INK: CPT | Performed by: OBSTETRICS & GYNECOLOGY

## 2025-06-19 PROCEDURE — 87086 URINE CULTURE/COLONY COUNT: CPT | Performed by: OBSTETRICS & GYNECOLOGY

## 2025-06-19 PROCEDURE — 99395 PREV VISIT EST AGE 18-39: CPT | Performed by: OBSTETRICS & GYNECOLOGY

## 2025-06-19 PROCEDURE — G0476 HPV COMBO ASSAY CA SCREEN: HCPCS | Performed by: OBSTETRICS & GYNECOLOGY

## 2025-06-19 PROCEDURE — 87186 SC STD MICRODIL/AGAR DIL: CPT | Performed by: OBSTETRICS & GYNECOLOGY

## 2025-06-19 RX ORDER — NORETHINDRONE ACETATE AND ETHINYL ESTRADIOL 1.5-30(21)
1 KIT ORAL DAILY
Qty: 28 TABLET | Refills: 11 | Status: SHIPPED | OUTPATIENT
Start: 2025-06-19

## 2025-06-19 RX ORDER — METRONIDAZOLE 500 MG/1
500 TABLET ORAL EVERY 12 HOURS SCHEDULED
Qty: 14 TABLET | Refills: 0 | Status: SHIPPED | OUTPATIENT
Start: 2025-06-19 | End: 2025-06-26

## 2025-06-19 NOTE — PROGRESS NOTES
ANNUAL GYNECOLOGICAL EXAMINATION    Ebony Mcdaniel is a 35 y.o. female who presents today for annual GYN exam.  Her last pap smear was performed 21 and result was negative.  She reports no history of abnormal pap smears in her past.   She had HIV screening performed 25 and it was negative.  She reports menses as regular.  Patient's last menstrual period was 2025 (approximate).  Her general medical history has been reviewed and she reports it as follows:    Past Medical History[1]  Past Surgical History[2]  OB History          5    Para   5    Term   5            AB        Living   5         SAB        IAB        Ectopic        Multiple   0    Live Births   5               Social History[3]  Social History     Substance and Sexual Activity   Sexual Activity Not Currently    Partners: Male    Birth control/protection: None, Condom Male     Cancer-related family history is not on file.    Current Outpatient Medications   Medication Instructions    cyanocobalamin (VITAMIN B-12) 1,000 mcg, Oral, Daily    ferrous sulfate 324 mg, Oral, Daily before breakfast    ibuprofen (MOTRIN) 400 mg, Oral, Every 6 hours PRN    ibuprofen (MOTRIN) 600 mg, Oral, Every 6 hours PRN    linaCLOtide 145 mcg, Oral, Daily    medroxyPROGESTERone (PROVERA) 10 mg, Oral, Daily    naproxen (NAPROSYN) 500 mg, Oral, 2 times daily with meals    polyethylene glycol (GLYCOLAX) 17 g, Oral, Daily, Take 2x daily x 7 days then once daily after       Review of Systems:  Review of Systems   Genitourinary:  Positive for menstrual problem. Negative for vaginal bleeding and vaginal discharge.        Menorrhagia     All other systems reviewed and are negative.      Physical Exam:  /82 (BP Location: Left arm, Patient Position: Sitting)   Wt 74.5 kg (164 lb 3.2 oz)   LMP 2025 (Approximate)   BMI 31.03 kg/m²   Physical Exam  Constitutional:       Appearance: Normal appearance.   Genitourinary:      Bladder  Delivery Note  Information for the patient's :  Carson Calhoun [[id number]]   Birth Information  YOB: 2023  Time of birth: 3:45 PM  Delivering clinician: Mary Lou Nur  Sex: male  Delivery type: Vaginal, Spontaneous  Presentation: Vertex  Gestational Age: 38w5d    APGARS:    One Minute: 9   Five Minutes: 9    Ten Minutes:         Weight: 7 lb 3.5 oz (3275 g)   NICU Staff Present at Delivery?: No     Cervical Ripening: None  Pitocin: Used postpartum     Anesthesia: Epidural  EBL: 607    Antibiotics Received During Labor: Ampicillin  Antibiotic Reason: Prophylaxis    Episiotomy: none  Lacerations: 1st degree and bilateral labial     Uterus Explored: No    Complications: None    Condition: Stable    Delivery:  The infant presented in OA position, restituted to CHELSEA with right shoulder anterior. The infant was delivered and bulb suctioned.  Cord clamping was delayed for 30 seconds, then the cord was doubly clamped and cut.  The infant was vigorous and placed on mom's chest..  Placenta was delivered spontaneously and intact.  No cervical or vaginal lacerations noted.  The 1st degree laceration and bilateral labial  laceration wererepaired with 3-0 vicryl.  Mom and baby stable.     Attending: Mary Lou Nur MD,   Review the Delivery Report for details          and urethral meatus normal.      No lesions in the vagina.      Right Labia: No rash, tenderness or lesions.     Left Labia: No tenderness, lesions or rash.     No inguinal adenopathy present in the right or left side.     Vaginal discharge present.        Right Adnexa: not tender, not full and no mass present.     Left Adnexa: not tender, not full and no mass present.     No cervical motion tenderness, discharge or lesion.      Uterus is not enlarged or tender.      No uterine mass detected.     No urethral tenderness or mass present.   Breasts:     Breasts are soft.     Right: No inverted nipple, mass, nipple discharge, skin change or tenderness.      Left: No inverted nipple, mass, nipple discharge, skin change or tenderness.   HENT:      Head: Normocephalic and atraumatic.     Cardiovascular:      Rate and Rhythm: Normal rate and regular rhythm.   Pulmonary:      Effort: Pulmonary effort is normal.      Breath sounds: Normal breath sounds.   Abdominal:      General: There is no distension.      Palpations: Abdomen is soft. There is no mass.      Hernia: There is no hernia in the left inguinal area or right inguinal area.     Musculoskeletal:         General: Normal range of motion.      Cervical back: Normal range of motion and neck supple.   Lymphadenopathy:      Upper Body:      Right upper body: No supraclavicular or axillary adenopathy.      Left upper body: No supraclavicular or axillary adenopathy.      Lower Body: No right inguinal adenopathy. No left inguinal adenopathy.     Neurological:      Mental Status: She is alert and oriented to person, place, and time.     Skin:     General: Skin is warm and dry.     Psychiatric:         Mood and Affect: Mood normal.   Vitals and nursing note reviewed.           Assessment/Plan:   1. Normal well-woman GYN exam.  2. Cervical cancer screening:  Normal cervical exam.  Pap smear done with HPV co-testing.  Has not received HPV vaccine in the past, but she desires to  initiate vaccine series now.  Given HPV vaccine today and she will return in 2 and 6 months for subsequent vaccinations. Offered vaccine series to patient now and she declines.     3. STD screening:  Patient declines.   4. Breast cancer screening:  Normal breast exam.   Reviewed breast self-awareness.   5. Depression Screening: Patient's depression screening was assessed with a PHQ-2 score of 0. Clinically patient does not have depression. No treatment is required.  R   6. BMI Counseling: Body mass index is 31.03 kg/m². Discussed the patient's BMI with her. The BMI is above normal. Nutrition recommendations include decreasing overall calorie intake, decreasing soda and/or juice intake, moderation in carbohydrate intake, increasing intake of lean protein, reducing intake of saturated fat and trans fat, and reducing intake of cholesterol.   7. Contraception:  sterilization   8. Return to office 1yr/prn, 3mos.   9. BV: Flagyl escribed   10. Malodorous urine: culture sent    Reviewed with patient that [facility], but that they will not necessarily be reviewed by me immediately.  Explained that I will review results at my earliest opportunity and contact patient appropriately.         [1]   Past Medical History:  Diagnosis Date    Anemia     taking iron pills BID     Chronic kidney disease     hydronephrosis    History of transfusion     Kidney stone     Sepsis (HCC)     Septic shock (HCC)     Urinary tract infection     taking antibioitcs     UTI (urinary tract infection) during pregnancy     Vagina, candidiasis     Varicella     as child   [2]   Past Surgical History:  Procedure Laterality Date    ABDOMINAL ADHESION SURGERY N/A 2021    Procedure: LYSIS ADHESIONS;  Surgeon: Maciej Watson MD;  [facility];  Service: Gynecology     SECTION N/A 2018    Procedure:  SECTION ();  Surgeon: Curtis Zavala MD;  Location: [facility];  Service:  Obstetrics    CYSTOSCOPY  2018    with right uretral stent    EXAMINATION UNDER ANESTHESIA N/A 2021    Procedure: EXAM UNDER ANESTHESIA (EUA);  Surgeon: Maciej Watson MD;  [facility];  Service: Gynecology    NC CYSTO/URETERO W/LITHOTRIPSY &INDWELL STENT INSRT Right 2018    Procedure: CYSTOSCOPY URETEROSCOPY WITH  RETROGRADE PYELOGRAM AND exchange  STENT URETERAl;  Surgeon: Cristofer Matute MD;  Location:  MAIN OR;  Service: Urology    NC CYSTOURETHROSCOPY W/URETERAL CATHETERIZATION Right 2018    Procedure: CYSTOSCOPY RETROGRADE PYELOGRAM WITH INSERTION STENT URETERAL;  Surgeon: Cristofer Matute MD;  [facility];  Service: Urology    NC LAPAROSCOPY SURG CHOLECYSTECTOMY N/A 2022    Procedure: CHOLECYSTECTOMY LAPAROSCOPIC;  Surgeon: Jennyfer Moreland MD;  Location:  MAIN OR;  Service: General    NC LAPAROSCOPY W/RMVL ADNEXAL STRUCTURES Bilateral 2021    Procedure: SALPINGECTOMY, LAPAROSCOPIC;  Surgeon: Maciej Watson MD;  [facility];  Service: Gynecology    NC LAPS ABD PRTM&OMENTUM DX W/WO SPEC BR/WA SPX N/A 2021    Procedure: LAPAROSCOPY DIAGNOSTIC;  Surgeon: Maciej Watson MD;  [facility];  Service: Gynecology    TONSILLECTOMY      TONSILLECTOMY      TUBAL LIGATION     [3]   Social History  Tobacco Use    Smoking status: Former     Current packs/day: 0.00     Types: Cigarettes     Quit date: 2019     Years since quittin.0    Smokeless tobacco: Never    Tobacco comments:     2-3 a day   Vaping Use    Vaping status: Never Used   Substance Use Topics    Alcohol use: No    Drug use: No

## 2025-06-19 NOTE — PROGRESS NOTES
HPV given to patient in left arm on 6/19/2025.    NDC: 9140-8532-99  LOT: [id number]  EXP: 02/16/2027

## 2025-06-21 LAB — BACTERIA UR CULT: ABNORMAL

## 2025-06-23 ENCOUNTER — RESULTS FOLLOW-UP (OUTPATIENT)
Dept: OBGYN CLINIC | Facility: CLINIC | Age: 35
End: 2025-06-23

## 2025-06-23 DIAGNOSIS — R82.90 MALODOROUS URINE: Primary | ICD-10-CM

## 2025-06-23 RX ORDER — NITROFURANTOIN 25; 75 MG/1; MG/1
100 CAPSULE ORAL 2 TIMES DAILY
Qty: 14 CAPSULE | Refills: 0 | Status: SHIPPED | OUTPATIENT
Start: 2025-06-23 | End: 2025-06-30

## 2025-06-25 LAB
LAB AP GYN PRIMARY INTERPRETATION: NORMAL
Lab: NORMAL

## 2025-07-21 ENCOUNTER — HOSPITAL ENCOUNTER (EMERGENCY)
Facility: HOSPITAL | Age: 35
Discharge: HOME | End: 2025-07-21
Attending: EMERGENCY MEDICINE | Admitting: EMERGENCY MEDICINE
Payer: COMMERCIAL

## 2025-07-21 VITALS
TEMPERATURE: 97.9 F | SYSTOLIC BLOOD PRESSURE: 114 MMHG | RESPIRATION RATE: 16 BRPM | HEART RATE: 84 BPM | DIASTOLIC BLOOD PRESSURE: 58 MMHG | BODY MASS INDEX: 30.49 KG/M2 | OXYGEN SATURATION: 98 % | WEIGHT: 161.38 LBS

## 2025-07-21 DIAGNOSIS — Z76.89 RETURN TO WORK EVALUATION: Primary | ICD-10-CM

## 2025-07-21 PROCEDURE — 99283 EMERGENCY DEPT VISIT LOW MDM: CPT

## 2025-07-21 PROCEDURE — 99281 EMR DPT VST MAYX REQ PHY/QHP: CPT

## 2025-08-08 ENCOUNTER — TELEPHONE (OUTPATIENT)
Dept: FAMILY MEDICINE CLINIC | Facility: CLINIC | Age: 35
End: 2025-08-08

## (undated) DEVICE — SUT VICRYL 0 UR-6 27 IN J603H

## (undated) DEVICE — ENDOPATH 5MM CURVED SCISSORS WITH MONOPOLAR CAUTERY: Brand: ENDOPATH

## (undated) DEVICE — LIGACLIP 10-M/L, 10MM ENDOSCOPIC ROTATING MULTIPLE CLIP APPLIERS: Brand: LIGACLIP

## (undated) DEVICE — TUBING SUCTION 5MM X 12 FT

## (undated) DEVICE — ENDOPATH PNEUMONEEDLE INSUFFLATION NEEDLES WITH LUER LOCK CONNECTORS 120MM: Brand: ENDOPATH

## (undated) DEVICE — BAG URINE DRAINAGE 2000ML ANTI RFLX LF

## (undated) DEVICE — CHLORHEXIDINE 4PCT 4 OZ

## (undated) DEVICE — SUT MONOCRYL 4-0 PS-2 27 IN Y426H

## (undated) DEVICE — UROCATCH BAG

## (undated) DEVICE — TROCAR: Brand: KII® SLEEVE

## (undated) DEVICE — TUBING SMOKE EVAC W/FILTRATION DEVICE PLUMEPORT ACTIV

## (undated) DEVICE — NEEDLE BLUNT 18 G X 1 1/2IN

## (undated) DEVICE — PREMIUM DRY TRAY LF: Brand: MEDLINE INDUSTRIES, INC.

## (undated) DEVICE — SUT VICRYL 0 CT-1 36 IN J946H

## (undated) DEVICE — SINGLE PORT MANIFOLD: Brand: NEPTUNE 2

## (undated) DEVICE — SCD SEQUENTIAL COMPRESSION COMFORT SLEEVE MEDIUM KNEE LENGTH: Brand: KENDALL SCD

## (undated) DEVICE — PACK TUR

## (undated) DEVICE — ENSEAL LAPAROSCOPIC TISSUE SEALER G2 CURVED JAW FOR USE WITH G2 GENERATOR 5MM DIAMETER 35CM SHAFT LENGTH: Brand: ENSEAL

## (undated) DEVICE — CATH FOLEY 12FR 5ML 2 WAY SILICONE ELASTIMER

## (undated) DEVICE — PVC URETHRAL CATHETER: Brand: DOVER

## (undated) DEVICE — ALLENTOWN LAP CHOLE APP PACK: Brand: CARDINAL HEALTH

## (undated) DEVICE — ABG MICROSTICKS SAFETY

## (undated) DEVICE — CHLORAPREP HI-LITE 26ML ORANGE

## (undated) DEVICE — Device

## (undated) DEVICE — SUT VICRYL 3-0 CT-1 36 IN J944H

## (undated) DEVICE — INTENDED FOR TISSUE SEPARATION, AND OTHER PROCEDURES THAT REQUIRE A SHARP SURGICAL BLADE TO PUNCTURE OR CUT.: Brand: BARD-PARKER SAFETY BLADES SIZE 11, STERILE

## (undated) DEVICE — GLOVE SRG BIOGEL 7.5

## (undated) DEVICE — GLOVE PI ULTRA TOUCH SZ.7.0

## (undated) DEVICE — TROCAR: Brand: KII FIOS FIRST ENTRY

## (undated) DEVICE — ADHESIVE SKN CLSR HISTOACRYL FLEX 0.5ML LF

## (undated) DEVICE — PACK PBDS MINOR GYN LAP RF

## (undated) DEVICE — GLOVE INDICATOR PI UNDERGLOVE SZ 8 BLUE

## (undated) DEVICE — Device: Brand: OMNICLOSE TROCAR SITE CLOSURE DEVICE

## (undated) DEVICE — 3M™ STERI-STRIP™ REINFORCED ADHESIVE SKIN CLOSURES, R1547, 1/2 IN X 4 IN (12 MM X 100 MM), 6 STRIPS/ENVELOPE: Brand: 3M™ STERI-STRIP™

## (undated) DEVICE — CATH FOLEY COUNCIL 18FR 5ML 2 WAY LUBRICATH

## (undated) DEVICE — GUIDEWIRE STRGHT TIP 0.035 IN  SOLO PLUS

## (undated) DEVICE — TUBING SET W. FILTER, GAS FLOW 30 L/MIN: Brand: N.A.

## (undated) DEVICE — PACK C-SECTION PBDS

## (undated) DEVICE — SPECIMEN CONTAINER STERILE PEEL PACK

## (undated) DEVICE — CATH URETERAL 5FR X 70 CM FLEX TIP POLYUR BARD

## (undated) DEVICE — STERILE POLYISOPRENE POWDER-FREE SURGICAL GLOVES: Brand: PROTEXIS

## (undated) DEVICE — STERILE POLYISOPRENE POWDER-FREE SURGICAL GLOVES WITH EMOLLIENT COATING: Brand: PROTEXIS

## (undated) DEVICE — SYRINGE 30ML LL

## (undated) DEVICE — SUT VICRYL 0 CTX 36 IN J978H

## (undated) DEVICE — ENDOPOUCH RETRIEVER SPECIMEN RETRIEVAL BAGS: Brand: ENDOPOUCH RETRIEVER

## (undated) DEVICE — ADHESIVE SKIN HIGH VISCOSITY EXOFIN 1ML

## (undated) DEVICE — NEEDLE 25G X 1 1/2

## (undated) DEVICE — SUT MONOCRYL 4-0 PS-2 18 IN Y496G

## (undated) DEVICE — GLOVE INDICATOR PI UNDERGLOVE SZ 7.5 BLUE

## (undated) DEVICE — GLOVE SRG BIOGEL ECLIPSE 7.5